# Patient Record
Sex: MALE | Race: BLACK OR AFRICAN AMERICAN | Employment: FULL TIME | URBAN - METROPOLITAN AREA
[De-identification: names, ages, dates, MRNs, and addresses within clinical notes are randomized per-mention and may not be internally consistent; named-entity substitution may affect disease eponyms.]

---

## 2021-06-07 ENCOUNTER — HOSPITAL ENCOUNTER (OUTPATIENT)
Dept: PREADMISSION TESTING | Age: 56
Discharge: HOME | End: 2021-06-07
Payer: COMMERCIAL

## 2021-06-07 VITALS
WEIGHT: 315 LBS | HEART RATE: 74 BPM | DIASTOLIC BLOOD PRESSURE: 78 MMHG | HEIGHT: 77 IN | SYSTOLIC BLOOD PRESSURE: 149 MMHG | BODY MASS INDEX: 37.19 KG/M2 | TEMPERATURE: 98.4 F

## 2021-06-07 DIAGNOSIS — Z96.651 STATUS POST RIGHT KNEE REPLACEMENT: Primary | ICD-10-CM

## 2021-06-07 LAB
ABO + RH BLD: NORMAL
ANION GAP SERPL CALC-SCNC: 4 MMOL/L (ref 5–15)
APPEARANCE UR: CLEAR
BACTERIA URNS QL MICRO: NEGATIVE /HPF
BILIRUB UR QL: NEGATIVE
BLOOD GROUP ANTIBODIES SERPL: NORMAL
BUN SERPL-MCNC: 15 MG/DL (ref 6–20)
BUN/CREAT SERPL: 19 (ref 12–20)
CALCIUM SERPL-MCNC: 9 MG/DL (ref 8.5–10.1)
CHLORIDE SERPL-SCNC: 106 MMOL/L (ref 97–108)
CO2 SERPL-SCNC: 27 MMOL/L (ref 21–32)
COLOR UR: NORMAL
CREAT SERPL-MCNC: 0.81 MG/DL (ref 0.7–1.3)
EPITH CASTS URNS QL MICRO: NORMAL /LPF
ERYTHROCYTE [DISTWIDTH] IN BLOOD BY AUTOMATED COUNT: 12.7 % (ref 11.5–14.5)
EST. AVERAGE GLUCOSE BLD GHB EST-MCNC: 137 MG/DL
GLUCOSE SERPL-MCNC: 111 MG/DL (ref 65–100)
GLUCOSE UR STRIP.AUTO-MCNC: NEGATIVE MG/DL
HBA1C MFR BLD: 6.4 % (ref 4–5.6)
HCT VFR BLD AUTO: 43.2 % (ref 36.6–50.3)
HGB BLD-MCNC: 14 G/DL (ref 12.1–17)
HGB UR QL STRIP: NEGATIVE
HYALINE CASTS URNS QL MICRO: NORMAL /LPF (ref 0–5)
INR PPP: 1 (ref 0.9–1.1)
KETONES UR QL STRIP.AUTO: NEGATIVE MG/DL
LEUKOCYTE ESTERASE UR QL STRIP.AUTO: NEGATIVE
MCH RBC QN AUTO: 29.9 PG (ref 26–34)
MCHC RBC AUTO-ENTMCNC: 32.4 G/DL (ref 30–36.5)
MCV RBC AUTO: 92.3 FL (ref 80–99)
NITRITE UR QL STRIP.AUTO: NEGATIVE
NRBC # BLD: 0 K/UL (ref 0–0.01)
NRBC BLD-RTO: 0 PER 100 WBC
PH UR STRIP: 5.5 [PH] (ref 5–8)
PLATELET # BLD AUTO: 242 K/UL (ref 150–400)
PMV BLD AUTO: 9.9 FL (ref 8.9–12.9)
POTASSIUM SERPL-SCNC: 4.2 MMOL/L (ref 3.5–5.1)
PROT UR STRIP-MCNC: NEGATIVE MG/DL
PROTHROMBIN TIME: 10.5 SEC (ref 9–11.1)
RBC # BLD AUTO: 4.68 M/UL (ref 4.1–5.7)
RBC #/AREA URNS HPF: NORMAL /HPF (ref 0–5)
SODIUM SERPL-SCNC: 137 MMOL/L (ref 136–145)
SP GR UR REFRACTOMETRY: 1.02 (ref 1–1.03)
SPECIMEN EXP DATE BLD: NORMAL
UA: UC IF INDICATED,UAUC: NORMAL
UROBILINOGEN UR QL STRIP.AUTO: 1 EU/DL (ref 0.2–1)
WBC # BLD AUTO: 5.5 K/UL (ref 4.1–11.1)
WBC URNS QL MICRO: NORMAL /HPF (ref 0–4)

## 2021-06-07 PROCEDURE — 81001 URINALYSIS AUTO W/SCOPE: CPT

## 2021-06-07 PROCEDURE — 93005 ELECTROCARDIOGRAM TRACING: CPT

## 2021-06-07 PROCEDURE — 85610 PROTHROMBIN TIME: CPT

## 2021-06-07 PROCEDURE — 83036 HEMOGLOBIN GLYCOSYLATED A1C: CPT

## 2021-06-07 PROCEDURE — 80048 BASIC METABOLIC PNL TOTAL CA: CPT

## 2021-06-07 PROCEDURE — 36415 COLL VENOUS BLD VENIPUNCTURE: CPT

## 2021-06-07 PROCEDURE — 86850 RBC ANTIBODY SCREEN: CPT

## 2021-06-07 PROCEDURE — 85027 COMPLETE CBC AUTOMATED: CPT

## 2021-06-07 RX ORDER — METOPROLOL SUCCINATE 50 MG/1
50 TABLET, EXTENDED RELEASE ORAL
COMMUNITY

## 2021-06-07 RX ORDER — LOVASTATIN 20 MG/1
20 TABLET ORAL
COMMUNITY

## 2021-06-07 RX ORDER — FUROSEMIDE 40 MG/1
TABLET ORAL
COMMUNITY

## 2021-06-07 RX ORDER — POTASSIUM CHLORIDE 20 MEQ/1
20 TABLET, EXTENDED RELEASE ORAL
COMMUNITY

## 2021-06-07 NOTE — PERIOP NOTES
PREOPERATIVE INSTRUCTIONS REVIEWED WITH PATIENT. PATIENT GIVEN TWO--BOTTLES OF CHG SOAPS  INSTRUCTIONS REVIEWED ON USE OF CHG SOAPS  PATIENT GIVEN SSI INFECTIONS SHEET; MRSA/MSSA TREATMENT INSTRUCTION SHEET GIVEN WITH AN EXPLANATION TO PATIENT THAT THEY WILL BE NOTIFIED IF TREATMENT INSTRUCTIONS NEED TO BE INITIATED. PATIENT WAS GIVEN THE OPPORTUNITY TO ASK QUESTIONS; REGARDING THE INFORMATION PROVIDED.       PREOP DIET AND NUTRITION UPDATED GUIDELINES/ INSTRUCTIONS PROVIDED     PATIENT ATTENDED PREOP JOINT CLASS TODAY            COVID 19 VACCINATION, COMPLETED, COPY IN THE CHART

## 2021-06-07 NOTE — PERIOP NOTES
PAT Nurse Practitioner   Pre-Operative Chart Review/Assessment:-ORTHOPEDIC                Patient Name:  Erin Rodríguez                                                           Age:   50 y.o.    :  1965     Today's Date:  2021     Date of PAT:   2021      Date of Surgery:    6/15/2021      Procedure(s):  Right Total Knee Arthroplasty     Surgeon:   Dr. Chhaya Abel                       PLAN:      1)  Medical Clearance:  Dr. Nito Adams. PAT labs faxed to PCP for continuity of care. 2)  Cardiac Clearance:  EKG w/ TWA. No previous EKGs for comparison. Sent to anesthesia for review. EKG from 21 reviewed by Dr. Vitor Feliciano, anesthesiologist. Planned procedure, PMHx, functional status reviewed. Pt ok to proceed with planned procedure per Dr. Vitor Feliciano. 3)  Diabetic Treatment Consult:  Not indicated. A1c-6.4      4)  Sleep Apnea evaluation:   LISA score of 5. Pt reports a diagnosis of HTN. Pt denies loud snoring that can be heard through a closed door, daytime fatigue and witnessed pauses in breathing. Referral sent to PCP for F/U and recommendations.       5) Treatment for MRSA/Staph Aureus:  Neg      6) Additional Concerns:  HTN    Escript sent to [facility] for Lenette Span per pt request.              Vital Signs:         Vitals:    21 0821   BP: (!) 149/78   Pulse: 74   Temp: 98.4 °F (36.9 °C)   Weight: (!) 164 kg (361 lb 8.9 oz)   Height: 6' 5\" (1.956 m)            ____________________________________________  PAST MEDICAL HISTORY  Past Medical History:   Diagnosis Date    Arthritis     Chronic pain     COVID-19 vaccine series completed 21, 21    MODERNA    Elevated hemoglobin A1c 2021    6.4    Hypertension       ____________________________________________  PAST SURGICAL HISTORY  Past Surgical History:   Procedure Laterality Date    HX BUNIONECTOMY Right     HX BUNIONECTOMY Left     HX ORTHOPAEDIC Right 1985    FOOT SURGERY TO CURRECT DEFORMITY ____________________________________________  HOME MEDICATIONS  Current Outpatient Medications   Medication Sig    metoprolol succinate (TOPROL-XL) 50 mg XL tablet Take 50 mg by mouth daily (after breakfast).  lovastatin (MEVACOR) 20 mg tablet Take 20 mg by mouth nightly.  potassium chloride (K-DUR, KLOR-CON) 20 mEq tablet Take 20 mEq by mouth daily (after breakfast).  furosemide (LASIX) 40 mg tablet Take  by mouth daily (after breakfast). No current facility-administered medications for this encounter.      ____________________________________________  ALLERGIES  No Known Allergies   ____________________________________________  SOCIAL HISTORY  Social History     Tobacco Use    Smoking status: Never Smoker    Smokeless tobacco: Never Used   Substance Use Topics    Alcohol use: Yes     Alcohol/week: 2.0 standard drinks     Types: 2 Cans of beer per week      ____________________________________________        Labs:     Hospital Outpatient Visit on 06/07/2021   Component Date Value Ref Range Status    Sodium 06/07/2021 137  136 - 145 mmol/L Final    Potassium 06/07/2021 4.2  3.5 - 5.1 mmol/L Final    Chloride 06/07/2021 106  97 - 108 mmol/L Final    CO2 06/07/2021 27  21 - 32 mmol/L Final    Anion gap 06/07/2021 4* 5 - 15 mmol/L Final    Glucose 06/07/2021 111* 65 - 100 mg/dL Final    BUN 06/07/2021 15  6 - 20 MG/DL Final    Creatinine 06/07/2021 0.81  0.70 - 1.30 MG/DL Final    BUN/Creatinine ratio 06/07/2021 19  12 - 20   Final    GFR est AA 06/07/2021 >60  >60 ml/min/1.73m2 Final    GFR est non-AA 06/07/2021 >60  >60 ml/min/1.73m2 Final    Estimated GFR is calculated using the IDMS-traceable Modification of Diet in Renal Disease (MDRD) Study equation, reported for both  Americans (GFRAA) and non- Americans (GFRNA), and normalized to 1.73m2 body surface area. The physician must decide which value applies to the patient.     Calcium 06/07/2021 9.0  8.5 - 10.1 MG/DL Final  WBC 06/07/2021 5.5  4.1 - 11.1 K/uL Final    RBC 06/07/2021 4.68  4.10 - 5.70 M/uL Final    HGB 06/07/2021 14.0  12.1 - 17.0 g/dL Final    HCT 06/07/2021 43.2  36.6 - 50.3 % Final    MCV 06/07/2021 92.3  80.0 - 99.0 FL Final    MCH 06/07/2021 29.9  26.0 - 34.0 PG Final    MCHC 06/07/2021 32.4  30.0 - 36.5 g/dL Final    RDW 06/07/2021 12.7  11.5 - 14.5 % Final    PLATELET 95/20/3077 340  150 - 400 K/uL Final    MPV 06/07/2021 9.9  8.9 - 12.9 FL Final    NRBC 06/07/2021 0.0  0  WBC Final    ABSOLUTE NRBC 06/07/2021 0.00  0.00 - 0.01 K/uL Final    Crossmatch Expiration 06/07/2021 06/18/2021,2359   Final    ABO/Rh(D) 06/07/2021 B NEGATIVE   Final    Antibody screen 06/07/2021 NEG   Final    INR 06/07/2021 1.0  0.9 - 1.1   Final    A single therapeutic range for Vit K antagonists may not be optimal for all indications - see June, 2008 issue of Chest, American College of Chest Physicians Evidence-Based Clinical Practice Guidelines, 8th Edition.     Prothrombin time 06/07/2021 10.5  9.0 - 11.1 sec Final    Color 06/07/2021 YELLOW/STRAW    Final    Color Reference Range: Straw, Yellow or Dark Yellow    Appearance 06/07/2021 CLEAR  CLEAR   Final    Specific gravity 06/07/2021 1.022  1.003 - 1.030   Final    pH (UA) 06/07/2021 5.5  5.0 - 8.0   Final    Protein 06/07/2021 Negative  NEG mg/dL Final    Glucose 06/07/2021 Negative  NEG mg/dL Final    Ketone 06/07/2021 Negative  NEG mg/dL Final    Bilirubin 06/07/2021 Negative  NEG   Final    Blood 06/07/2021 Negative  NEG   Final    Urobilinogen 06/07/2021 1.0  0.2 - 1.0 EU/dL Final    Nitrites 06/07/2021 Negative  NEG   Final    Leukocyte Esterase 06/07/2021 Negative  NEG   Final    UA:UC IF INDICATED 06/07/2021 CULTURE NOT INDICATED BY UA RESULT  CNI   Final    WBC 06/07/2021 0-4  0 - 4 /hpf Final    RBC 06/07/2021 0-5  0 - 5 /hpf Final    Epithelial cells 06/07/2021 FEW  FEW /lpf Final    Epithelial cell category consists of squamous cells and /or transitional urothelial cells. Renal tubular cells, if present, are separately identified as such.  Bacteria 06/07/2021 Negative  NEG /hpf Final    Hyaline cast 06/07/2021 0-2  0 - 5 /lpf Final    Ventricular Rate 06/07/2021 69  BPM Final    Atrial Rate 06/07/2021 69  BPM Final    P-R Interval 06/07/2021 218  ms Final    QRS Duration 06/07/2021 110  ms Final    Q-T Interval 06/07/2021 430  ms Final    QTC Calculation (Bezet) 06/07/2021 460  ms Final    Calculated P Axis 06/07/2021 18  degrees Final    Calculated R Axis 06/07/2021 -5  degrees Final    Calculated T Axis 06/07/2021 96  degrees Final    Diagnosis 06/07/2021    Final                    Value:Sinus rhythm with 1st degree AV block  T wave abnormality, consider lateral ischemia  No previous ECGs available  Confirmed by Donnie Church M.D., Laila Chaves ([id number]) on 6/8/2021 4:16:24 PM      Hemoglobin A1c 06/07/2021 6.4* 4.0 - 5.6 % Final    Comment: NEW METHOD  PLEASE NOTE NEW REFERENCE RANGE  (NOTE)  HbA1C Interpretive Ranges  <5.7              Normal  5.7 - 6.4         Consider Prediabetes  >6.5              Consider Diabetes      Est. average glucose 06/07/2021 137  mg/dL Final    Special Requests: 06/07/2021 NO SPECIAL REQUESTS    Final    Culture result: 06/07/2021 MRSA NOT PRESENT    Final       Skin:     Denies open wounds, cuts, sores, rashes or other areas of concern in PAT assessment.           Lulu Lacey NP

## 2021-06-08 LAB
ATRIAL RATE: 69 BPM
BACTERIA SPEC CULT: NORMAL
BACTERIA SPEC CULT: NORMAL
CALCULATED P AXIS, ECG09: 18 DEGREES
CALCULATED R AXIS, ECG10: -5 DEGREES
CALCULATED T AXIS, ECG11: 96 DEGREES
DIAGNOSIS, 93000: NORMAL
P-R INTERVAL, ECG05: 218 MS
Q-T INTERVAL, ECG07: 430 MS
QRS DURATION, ECG06: 110 MS
QTC CALCULATION (BEZET), ECG08: 460 MS
SERVICE CMNT-IMP: NORMAL
VENTRICULAR RATE, ECG03: 69 BPM

## 2021-06-08 NOTE — H&P
Elizabeth Bustamante  : 1965  Single / Language: Leeann Menjivar / Race: Black or   Male      History of Present Illness   The patient is a 50year old male who presents to the practice today for a transition into care. Additional reasons for visit:    Knee Pain is described as the following: The onset of the knee pain has been gradual and has been occurring in a persistent pattern for years. The course has been gradually worsening. The knee pain is moderate. The knee pain is characterized as a dull aching. The knee pain is described as being located in the entire knee (bilateral). The knee pain is aggravated by squatting, kneeling, climbing, stairs and prolonged standing. The knee pain is relieved by rest, ice and elevation of leg. Note for \"Knee Pain\": Standing history of bilateral knee pain. Right knee is worse than the left pain is activity limiting. Cannot ace and descend stairs. Pain awakens him from sleep. Has prior orthopedic surgeon had x-rays that showed severe medial compartment DJD both knees. Problem List/Past Medical  Hypertension, goal below 140/90 (401.9  I10)    Right Knee Pain (719.46  M25.561)    Left Meniscus tear (836.2  S83.209A)    Left Knee OA (716.96  M17.10)    Primary osteoarthritis of right knee (715.16  M17.11)    Weight above 97th percentile (V49.89  Z78.9)    Right Knee Swelling (719.06  M25.469)    Knee pain, left anterior (719.46  M25.562)    REVIEW OF SYSTEMS: Systems were reviewed by the provider.      Allergies   No Known Allergies   [2020]:  No Known Drug Allergies   [2020]: Allergies Reconciled      Family History   Arthritis   Father. Bleeding disorder   Father. Diabetes Mellitus   Mother. Heart disease in female family member before age 72    Hypertension   Mother. Kidney disease   Mother. Social History  Alcohol use   1 time, 3-5 drinks per occasion, Drinks beer, Occasionally drinks more than 5 drinks per occasion.   Caffeine use   1-2 drinks per day, Carbonated beverages, Coffee. Current work status   Full-time. Exercise   Inactive. Marital status   Single. No drug use    Seat Belt Use   Always uses seat belts. Medication History   Furosemide  (40MG Tablet, Oral) Active. Potassium Chloride ER  (20MEQ Tablet ER, Oral) Active. Synvisc One  (48MG/6ML Soln Pref Syr, 1 (one) Intra-articular inject prefilled syringe into right knee, Taken starting 12/22/2020) Active. Gel-One  (30MG/3ML Prefilled Syr, 1 (one) Intra-articular inject into right knee, Taken starting 02/22/2021) Active. Metoprolol Succinate ER  (50MG Tablet ER 24HR, Oral) Active. Lovastatin  (20MG Tablet, Oral) Active. Tylenol  (325MG Tablet, Oral) Active. Medications Reconciled     Past Surgical History   Toe Surgery For Arthritis      Other Problems   Arthritis    Bleeding disorder    High blood pressure    Hypercholesterolemia          Review of Systems   General Not Present- Chills and Fatigue. Skin Not Present- Bruising, Pallor and Skin Color Changes. Respiratory Not Present- Cough and Difficulty Breathing. Cardiovascular Not Present- Chest Pain, Fainting / Blacking Out and Rapid Heart Rate. Musculoskeletal Present- Joint Pain. Not Present- Decreased Range of Motion and Joint Swelling. Neurological Not Present- Dysesthesia, Paresthesias and Weakness In Extremities. Hematology Not Present- Abnormal Bleeding, Blood Clots and Petechiae. Vitals   Weight: 340 lb   Height: 77 in   Weight was reported by patient. Height was reported by patient. Body Surface Area: 2.8 m²   Body Mass Index: 40.32 kg/m²        Physical Exam   Musculoskeletal  Global Assessment  Examination of related systems reveals - well-developed, well-nourished, in no acute distress, alert and oriented x 3. Right Lower Extremity - Note: Hip was examined and has painless range of motion with negative impingement and apprehension sign. Straight leg raise and femoral nerve stretch test are negative. Lower extremity has palpable dorsalis pedis pulse. Skin is intact and foot is sensate and well-perfused. Varus malalignment 5 degrees. There is no ligamentous instability. Severe pain along the medial joint line. Range of motion is 3 to 120 degrees. Motor testing reveals 5 out of 5 strength of the hip flexors, quads, ankle plantar flexors, and ankle dorsiflexors. Assessment & Plan    Primary osteoarthritis of right knee (715.16  M17.11)  Impression: Severe right knee DJD. Patient has had appropriate conservative treatment. Progression of symptoms. At this point he would like to proceed to a right total knee. All questions were answered. He has medial compartment bone-on-bone with large osteophytes and subchondral cysts on his radiographs. After long discussion he has elected to proceed with a right total knee. Current Plans  Pt Education - How to [address] using Patient Portal and 3rd Party Apps: discussed with patient and provided information. Pt Education - Educational materials were provided.: discussed with patient and provided information.     REVIEW OF SYSTEMS: Systems were reviewed by the provider.(V49.9)      Weight above 97th percentile (V49.89  Z78.9)    Current Plans  LIFESTYLE EDUCATION REGARDING DIET (58282)

## 2021-06-14 ENCOUNTER — ANESTHESIA EVENT (OUTPATIENT)
Dept: SURGERY | Age: 56
End: 2021-06-14
Payer: COMMERCIAL

## 2021-06-14 PROBLEM — M17.11 PRIMARY OSTEOARTHRITIS OF RIGHT KNEE: Status: ACTIVE | Noted: 2021-06-14

## 2021-06-15 ENCOUNTER — HOSPITAL ENCOUNTER (OUTPATIENT)
Age: 56
Discharge: HOME HEALTH | End: 2021-06-17
Attending: ORTHOPAEDIC SURGERY | Admitting: ORTHOPAEDIC SURGERY
Payer: COMMERCIAL

## 2021-06-15 ENCOUNTER — ANESTHESIA (OUTPATIENT)
Dept: SURGERY | Age: 56
End: 2021-06-15
Payer: COMMERCIAL

## 2021-06-15 DIAGNOSIS — M17.11 PRIMARY OSTEOARTHRITIS OF RIGHT KNEE: Primary | ICD-10-CM

## 2021-06-15 LAB
GLUCOSE BLD STRIP.AUTO-MCNC: 125 MG/DL (ref 65–117)
SERVICE CMNT-IMP: ABNORMAL

## 2021-06-15 PROCEDURE — 77030027138 HC INCENT SPIROMETER -A

## 2021-06-15 PROCEDURE — 97161 PT EVAL LOW COMPLEX 20 MIN: CPT

## 2021-06-15 PROCEDURE — 76060000035 HC ANESTHESIA 2 TO 2.5 HR: Performed by: ORTHOPAEDIC SURGERY

## 2021-06-15 PROCEDURE — 82962 GLUCOSE BLOOD TEST: CPT

## 2021-06-15 PROCEDURE — 2709999900 HC NON-CHARGEABLE SUPPLY: Performed by: ORTHOPAEDIC SURGERY

## 2021-06-15 PROCEDURE — 77030041279 HC DRSG PRMSL AG MDII -B: Performed by: ORTHOPAEDIC SURGERY

## 2021-06-15 PROCEDURE — 77030011992 HC AIRWY NASOPHGL TELE -A: Performed by: ANESTHESIOLOGY

## 2021-06-15 PROCEDURE — C1776 JOINT DEVICE (IMPLANTABLE): HCPCS | Performed by: ORTHOPAEDIC SURGERY

## 2021-06-15 PROCEDURE — 77030005513 HC CATH URETH FOL11 MDII -B: Performed by: ORTHOPAEDIC SURGERY

## 2021-06-15 PROCEDURE — 77030008462 HC STPLR SKN PROX J&J -A: Performed by: ORTHOPAEDIC SURGERY

## 2021-06-15 PROCEDURE — 77030008463 HC STPLR SKN PROX J&J -B: Performed by: ORTHOPAEDIC SURGERY

## 2021-06-15 PROCEDURE — C9290 INJ, BUPIVACAINE LIPOSOME: HCPCS | Performed by: ORTHOPAEDIC SURGERY

## 2021-06-15 PROCEDURE — 76010000171 HC OR TIME 2 TO 2.5 HR INTENSV-TIER 1: Performed by: ORTHOPAEDIC SURGERY

## 2021-06-15 PROCEDURE — 77030019905 HC CATH URETH INTMIT MDII -A: Performed by: ORTHOPAEDIC SURGERY

## 2021-06-15 PROCEDURE — 97116 GAIT TRAINING THERAPY: CPT

## 2021-06-15 PROCEDURE — 74011000258 HC RX REV CODE- 258: Performed by: ORTHOPAEDIC SURGERY

## 2021-06-15 PROCEDURE — C1713 ANCHOR/SCREW BN/BN,TIS/BN: HCPCS | Performed by: ORTHOPAEDIC SURGERY

## 2021-06-15 PROCEDURE — 77030031139 HC SUT VCRL2 J&J -A: Performed by: ORTHOPAEDIC SURGERY

## 2021-06-15 PROCEDURE — 77030018831 HC SOL IRR H20 BAXT -A: Performed by: ORTHOPAEDIC SURGERY

## 2021-06-15 PROCEDURE — 77030018673: Performed by: ORTHOPAEDIC SURGERY

## 2021-06-15 PROCEDURE — 2709999900 HC NON-CHARGEABLE SUPPLY

## 2021-06-15 PROCEDURE — 74011250636 HC RX REV CODE- 250/636: Performed by: ORTHOPAEDIC SURGERY

## 2021-06-15 PROCEDURE — 77030007866 HC KT SPN ANES BBMI -B: Performed by: ANESTHESIOLOGY

## 2021-06-15 PROCEDURE — 74011250637 HC RX REV CODE- 250/637: Performed by: ORTHOPAEDIC SURGERY

## 2021-06-15 PROCEDURE — 77030035236 HC SUT PDS STRATFX BARB J&J -B: Performed by: ORTHOPAEDIC SURGERY

## 2021-06-15 PROCEDURE — 77030028907 HC WRP KNEE WO BGS SOLM -B

## 2021-06-15 PROCEDURE — 77030040922 HC BLNKT HYPOTHRM STRY -A

## 2021-06-15 PROCEDURE — 74011250636 HC RX REV CODE- 250/636: Performed by: NURSE ANESTHETIST, CERTIFIED REGISTERED

## 2021-06-15 PROCEDURE — 77030010783 HC BOWL MX BN CEM J&J -B: Performed by: ORTHOPAEDIC SURGERY

## 2021-06-15 PROCEDURE — 74011000250 HC RX REV CODE- 250: Performed by: NURSE ANESTHETIST, CERTIFIED REGISTERED

## 2021-06-15 PROCEDURE — 74011250636 HC RX REV CODE- 250/636: Performed by: ANESTHESIOLOGY

## 2021-06-15 PROCEDURE — 77030006822 HC BLD SAW SAG BRSM -B: Performed by: ORTHOPAEDIC SURGERY

## 2021-06-15 PROCEDURE — 74011250637 HC RX REV CODE- 250/637: Performed by: ANESTHESIOLOGY

## 2021-06-15 PROCEDURE — 74011000250 HC RX REV CODE- 250: Performed by: ANESTHESIOLOGY

## 2021-06-15 PROCEDURE — 74011000258 HC RX REV CODE- 258: Performed by: NURSE ANESTHETIST, CERTIFIED REGISTERED

## 2021-06-15 PROCEDURE — 74011000250 HC RX REV CODE- 250: Performed by: PHYSICIAN ASSISTANT

## 2021-06-15 PROCEDURE — 74011000250 HC RX REV CODE- 250: Performed by: ORTHOPAEDIC SURGERY

## 2021-06-15 PROCEDURE — 76210000016 HC OR PH I REC 1 TO 1.5 HR: Performed by: ORTHOPAEDIC SURGERY

## 2021-06-15 PROCEDURE — 77030003601 HC NDL NRV BLK BBMI -A

## 2021-06-15 PROCEDURE — 77030000032 HC CUF TRNQT ZIMM -B: Performed by: ORTHOPAEDIC SURGERY

## 2021-06-15 PROCEDURE — 74011250636 HC RX REV CODE- 250/636: Performed by: PHYSICIAN ASSISTANT

## 2021-06-15 PROCEDURE — 74011250637 HC RX REV CODE- 250/637: Performed by: PHYSICIAN ASSISTANT

## 2021-06-15 DEVICE — SMARTSET GHV GENTAMICIN HIGH VISCOSITY BONE CEMENT 40G
Type: IMPLANTABLE DEVICE | Site: KNEE | Status: FUNCTIONAL
Brand: SMARTSET

## 2021-06-15 DEVICE — IMPLANTABLE DEVICE
Type: IMPLANTABLE DEVICE | Site: KNEE | Status: FUNCTIONAL
Brand: PERSONA® NATURAL TIBIA®

## 2021-06-15 DEVICE — KNEE K1 TOT HEMI STD CEM IMPL CAPPED K1 ZIM: Type: IMPLANTABLE DEVICE | Status: FUNCTIONAL

## 2021-06-15 DEVICE — IMPLANTABLE DEVICE
Type: IMPLANTABLE DEVICE | Site: KNEE | Status: FUNCTIONAL
Brand: PERSONA® VIVACIT-E®

## 2021-06-15 DEVICE — IMPLANTABLE DEVICE
Type: IMPLANTABLE DEVICE | Site: KNEE | Status: FUNCTIONAL
Brand: PERSONA®

## 2021-06-15 RX ORDER — ONDANSETRON 2 MG/ML
4 INJECTION INTRAMUSCULAR; INTRAVENOUS
Status: DISCONTINUED | OUTPATIENT
Start: 2021-06-15 | End: 2021-06-17 | Stop reason: HOSPADM

## 2021-06-15 RX ORDER — ONDANSETRON 2 MG/ML
4 INJECTION INTRAMUSCULAR; INTRAVENOUS AS NEEDED
Status: DISCONTINUED | OUTPATIENT
Start: 2021-06-15 | End: 2021-06-15 | Stop reason: HOSPADM

## 2021-06-15 RX ORDER — SODIUM CHLORIDE 9 MG/ML
125 INJECTION, SOLUTION INTRAVENOUS CONTINUOUS
Status: DISPENSED | OUTPATIENT
Start: 2021-06-15 | End: 2021-06-16

## 2021-06-15 RX ORDER — MIDAZOLAM HYDROCHLORIDE 1 MG/ML
1 INJECTION, SOLUTION INTRAMUSCULAR; INTRAVENOUS AS NEEDED
Status: DISCONTINUED | OUTPATIENT
Start: 2021-06-15 | End: 2021-06-15 | Stop reason: HOSPADM

## 2021-06-15 RX ORDER — HYDROXYZINE HYDROCHLORIDE 10 MG/1
10 TABLET, FILM COATED ORAL
Status: DISCONTINUED | OUTPATIENT
Start: 2021-06-15 | End: 2021-06-17 | Stop reason: HOSPADM

## 2021-06-15 RX ORDER — DIPHENHYDRAMINE HYDROCHLORIDE 50 MG/ML
12.5 INJECTION, SOLUTION INTRAMUSCULAR; INTRAVENOUS AS NEEDED
Status: DISCONTINUED | OUTPATIENT
Start: 2021-06-15 | End: 2021-06-15 | Stop reason: HOSPADM

## 2021-06-15 RX ORDER — SODIUM CHLORIDE 0.9 % (FLUSH) 0.9 %
5-40 SYRINGE (ML) INJECTION AS NEEDED
Status: DISCONTINUED | OUTPATIENT
Start: 2021-06-15 | End: 2021-06-17 | Stop reason: HOSPADM

## 2021-06-15 RX ORDER — SODIUM CHLORIDE 0.9 % (FLUSH) 0.9 %
5-40 SYRINGE (ML) INJECTION AS NEEDED
Status: DISCONTINUED | OUTPATIENT
Start: 2021-06-15 | End: 2021-06-15 | Stop reason: HOSPADM

## 2021-06-15 RX ORDER — POTASSIUM CHLORIDE 750 MG/1
20 TABLET, FILM COATED, EXTENDED RELEASE ORAL DAILY
Status: DISCONTINUED | OUTPATIENT
Start: 2021-06-16 | End: 2021-06-17 | Stop reason: HOSPADM

## 2021-06-15 RX ORDER — HYDROMORPHONE HYDROCHLORIDE 1 MG/ML
1 INJECTION, SOLUTION INTRAMUSCULAR; INTRAVENOUS; SUBCUTANEOUS
Status: DISCONTINUED | OUTPATIENT
Start: 2021-06-15 | End: 2021-06-17 | Stop reason: HOSPADM

## 2021-06-15 RX ORDER — METOPROLOL SUCCINATE 50 MG/1
50 TABLET, EXTENDED RELEASE ORAL
Status: DISCONTINUED | OUTPATIENT
Start: 2021-06-16 | End: 2021-06-17 | Stop reason: HOSPADM

## 2021-06-15 RX ORDER — MORPHINE SULFATE 2 MG/ML
2 INJECTION, SOLUTION INTRAMUSCULAR; INTRAVENOUS
Status: DISCONTINUED | OUTPATIENT
Start: 2021-06-15 | End: 2021-06-15 | Stop reason: HOSPADM

## 2021-06-15 RX ORDER — OXYCODONE HYDROCHLORIDE 5 MG/1
5 TABLET ORAL AS NEEDED
Status: DISCONTINUED | OUTPATIENT
Start: 2021-06-15 | End: 2021-06-15 | Stop reason: HOSPADM

## 2021-06-15 RX ORDER — BUPIVACAINE HYDROCHLORIDE 5 MG/ML
INJECTION, SOLUTION EPIDURAL; INTRACAUDAL
Status: COMPLETED | OUTPATIENT
Start: 2021-06-15 | End: 2021-06-15

## 2021-06-15 RX ORDER — LIDOCAINE HYDROCHLORIDE 10 MG/ML
0.5 INJECTION, SOLUTION EPIDURAL; INFILTRATION; INTRACAUDAL; PERINEURAL AS NEEDED
Status: DISCONTINUED | OUTPATIENT
Start: 2021-06-15 | End: 2021-06-15 | Stop reason: HOSPADM

## 2021-06-15 RX ORDER — DEXTROSE, SODIUM CHLORIDE, SODIUM LACTATE, POTASSIUM CHLORIDE, AND CALCIUM CHLORIDE 5; .6; .31; .03; .02 G/100ML; G/100ML; G/100ML; G/100ML; G/100ML
125 INJECTION, SOLUTION INTRAVENOUS CONTINUOUS
Status: DISCONTINUED | OUTPATIENT
Start: 2021-06-15 | End: 2021-06-15 | Stop reason: HOSPADM

## 2021-06-15 RX ORDER — SODIUM CHLORIDE, SODIUM LACTATE, POTASSIUM CHLORIDE, CALCIUM CHLORIDE 600; 310; 30; 20 MG/100ML; MG/100ML; MG/100ML; MG/100ML
INJECTION, SOLUTION INTRAVENOUS
Status: DISCONTINUED | OUTPATIENT
Start: 2021-06-15 | End: 2021-06-15 | Stop reason: HOSPADM

## 2021-06-15 RX ORDER — OXYCODONE HYDROCHLORIDE 5 MG/1
10 TABLET ORAL
Status: DISCONTINUED | OUTPATIENT
Start: 2021-06-15 | End: 2021-06-17 | Stop reason: HOSPADM

## 2021-06-15 RX ORDER — WARFARIN SODIUM 5 MG/1
5 TABLET ORAL ONCE
Status: COMPLETED | OUTPATIENT
Start: 2021-06-15 | End: 2021-06-15

## 2021-06-15 RX ORDER — FUROSEMIDE 40 MG/1
40 TABLET ORAL
Status: DISCONTINUED | OUTPATIENT
Start: 2021-06-16 | End: 2021-06-17 | Stop reason: HOSPADM

## 2021-06-15 RX ORDER — FUROSEMIDE 40 MG/1
40 TABLET ORAL
Status: COMPLETED | OUTPATIENT
Start: 2021-06-15 | End: 2021-06-15

## 2021-06-15 RX ORDER — AMOXICILLIN 250 MG
1 CAPSULE ORAL 2 TIMES DAILY
Status: DISCONTINUED | OUTPATIENT
Start: 2021-06-15 | End: 2021-06-17 | Stop reason: HOSPADM

## 2021-06-15 RX ORDER — FENTANYL CITRATE 50 UG/ML
50 INJECTION, SOLUTION INTRAMUSCULAR; INTRAVENOUS AS NEEDED
Status: DISCONTINUED | OUTPATIENT
Start: 2021-06-15 | End: 2021-06-15 | Stop reason: HOSPADM

## 2021-06-15 RX ORDER — PROPOFOL 10 MG/ML
INJECTION, EMULSION INTRAVENOUS
Status: DISCONTINUED | OUTPATIENT
Start: 2021-06-15 | End: 2021-06-15 | Stop reason: HOSPADM

## 2021-06-15 RX ORDER — SODIUM CHLORIDE 0.9 % (FLUSH) 0.9 %
5-40 SYRINGE (ML) INJECTION EVERY 8 HOURS
Status: DISCONTINUED | OUTPATIENT
Start: 2021-06-15 | End: 2021-06-15 | Stop reason: HOSPADM

## 2021-06-15 RX ORDER — POLYETHYLENE GLYCOL 3350 17 G/17G
17 POWDER, FOR SOLUTION ORAL DAILY
Status: DISCONTINUED | OUTPATIENT
Start: 2021-06-16 | End: 2021-06-17 | Stop reason: HOSPADM

## 2021-06-15 RX ORDER — ATORVASTATIN CALCIUM 10 MG/1
20 TABLET, FILM COATED ORAL
Status: DISCONTINUED | OUTPATIENT
Start: 2021-06-15 | End: 2021-06-17 | Stop reason: HOSPADM

## 2021-06-15 RX ORDER — FENTANYL CITRATE 50 UG/ML
25 INJECTION, SOLUTION INTRAMUSCULAR; INTRAVENOUS
Status: DISCONTINUED | OUTPATIENT
Start: 2021-06-15 | End: 2021-06-15 | Stop reason: HOSPADM

## 2021-06-15 RX ORDER — SODIUM CHLORIDE, SODIUM LACTATE, POTASSIUM CHLORIDE, CALCIUM CHLORIDE 600; 310; 30; 20 MG/100ML; MG/100ML; MG/100ML; MG/100ML
125 INJECTION, SOLUTION INTRAVENOUS CONTINUOUS
Status: DISCONTINUED | OUTPATIENT
Start: 2021-06-15 | End: 2021-06-15 | Stop reason: HOSPADM

## 2021-06-15 RX ORDER — NALOXONE HYDROCHLORIDE 0.4 MG/ML
0.4 INJECTION, SOLUTION INTRAMUSCULAR; INTRAVENOUS; SUBCUTANEOUS AS NEEDED
Status: DISCONTINUED | OUTPATIENT
Start: 2021-06-15 | End: 2021-06-17 | Stop reason: HOSPADM

## 2021-06-15 RX ORDER — ACETAMINOPHEN 500 MG
1000 TABLET ORAL EVERY 6 HOURS
Status: DISCONTINUED | OUTPATIENT
Start: 2021-06-15 | End: 2021-06-17 | Stop reason: HOSPADM

## 2021-06-15 RX ORDER — FACIAL-BODY WIPES
10 EACH TOPICAL DAILY PRN
Status: DISCONTINUED | OUTPATIENT
Start: 2021-06-17 | End: 2021-06-17 | Stop reason: HOSPADM

## 2021-06-15 RX ORDER — SODIUM CHLORIDE 0.9 % (FLUSH) 0.9 %
5-40 SYRINGE (ML) INJECTION EVERY 8 HOURS
Status: DISCONTINUED | OUTPATIENT
Start: 2021-06-15 | End: 2021-06-17 | Stop reason: HOSPADM

## 2021-06-15 RX ORDER — MIDAZOLAM HYDROCHLORIDE 1 MG/ML
1 INJECTION, SOLUTION INTRAMUSCULAR; INTRAVENOUS
Status: DISCONTINUED | OUTPATIENT
Start: 2021-06-15 | End: 2021-06-15 | Stop reason: HOSPADM

## 2021-06-15 RX ORDER — ONDANSETRON 2 MG/ML
INJECTION INTRAMUSCULAR; INTRAVENOUS AS NEEDED
Status: DISCONTINUED | OUTPATIENT
Start: 2021-06-15 | End: 2021-06-15 | Stop reason: HOSPADM

## 2021-06-15 RX ORDER — OXYCODONE HYDROCHLORIDE 5 MG/1
5 TABLET ORAL
Status: DISCONTINUED | OUTPATIENT
Start: 2021-06-15 | End: 2021-06-17 | Stop reason: HOSPADM

## 2021-06-15 RX ORDER — PROPOFOL 10 MG/ML
INJECTION, EMULSION INTRAVENOUS AS NEEDED
Status: DISCONTINUED | OUTPATIENT
Start: 2021-06-15 | End: 2021-06-15 | Stop reason: HOSPADM

## 2021-06-15 RX ORDER — ACETAMINOPHEN 325 MG/1
650 TABLET ORAL ONCE
Status: COMPLETED | OUTPATIENT
Start: 2021-06-15 | End: 2021-06-15

## 2021-06-15 RX ORDER — DEXAMETHASONE SODIUM PHOSPHATE 4 MG/ML
INJECTION, SOLUTION INTRA-ARTICULAR; INTRALESIONAL; INTRAMUSCULAR; INTRAVENOUS; SOFT TISSUE AS NEEDED
Status: DISCONTINUED | OUTPATIENT
Start: 2021-06-15 | End: 2021-06-15 | Stop reason: HOSPADM

## 2021-06-15 RX ORDER — ROPIVACAINE HYDROCHLORIDE 5 MG/ML
INJECTION, SOLUTION EPIDURAL; INFILTRATION; PERINEURAL
Status: COMPLETED | OUTPATIENT
Start: 2021-06-15 | End: 2021-06-15

## 2021-06-15 RX ORDER — SODIUM CHLORIDE 9 MG/ML
INJECTION, SOLUTION INTRAVENOUS
Status: DISCONTINUED | OUTPATIENT
Start: 2021-06-15 | End: 2021-06-15 | Stop reason: HOSPADM

## 2021-06-15 RX ADMIN — SODIUM CHLORIDE 125 ML/HR: 9 INJECTION, SOLUTION INTRAVENOUS at 13:39

## 2021-06-15 RX ADMIN — FUROSEMIDE 40 MG: 40 TABLET ORAL at 17:12

## 2021-06-15 RX ADMIN — ACETAMINOPHEN 650 MG: 325 TABLET ORAL at 08:41

## 2021-06-15 RX ADMIN — OXYCODONE HYDROCHLORIDE 10 MG: 5 TABLET ORAL at 17:56

## 2021-06-15 RX ADMIN — CEFAZOLIN 2 G: 1 INJECTION, POWDER, FOR SOLUTION INTRAMUSCULAR; INTRAVENOUS at 19:33

## 2021-06-15 RX ADMIN — PROPOFOL 30 MG: 10 INJECTION, EMULSION INTRAVENOUS at 11:44

## 2021-06-15 RX ADMIN — SODIUM CHLORIDE, POTASSIUM CHLORIDE, SODIUM LACTATE AND CALCIUM CHLORIDE 125 ML/HR: 600; 310; 30; 20 INJECTION, SOLUTION INTRAVENOUS at 08:53

## 2021-06-15 RX ADMIN — PHENYLEPHRINE HYDROCHLORIDE 60 MCG/MIN: 10 INJECTION INTRAVENOUS at 10:26

## 2021-06-15 RX ADMIN — CEFAZOLIN 3 G: 1 INJECTION, POWDER, FOR SOLUTION INTRAMUSCULAR; INTRAVENOUS; PARENTERAL at 10:32

## 2021-06-15 RX ADMIN — BUPIVACAINE HYDROCHLORIDE 10 MG: 5 INJECTION, SOLUTION EPIDURAL; INTRACAUDAL; PERINEURAL at 10:26

## 2021-06-15 RX ADMIN — OXYCODONE HYDROCHLORIDE 10 MG: 5 TABLET ORAL at 21:18

## 2021-06-15 RX ADMIN — SODIUM CHLORIDE: 900 INJECTION, SOLUTION INTRAVENOUS at 11:44

## 2021-06-15 RX ADMIN — PROPOFOL 30 MG: 10 INJECTION, EMULSION INTRAVENOUS at 10:17

## 2021-06-15 RX ADMIN — ACETAMINOPHEN 1000 MG: 500 TABLET ORAL at 17:12

## 2021-06-15 RX ADMIN — DEXAMETHASONE SODIUM PHOSPHATE 4 MG: 4 INJECTION, SOLUTION INTRAMUSCULAR; INTRAVENOUS at 10:38

## 2021-06-15 RX ADMIN — ONDANSETRON HYDROCHLORIDE 4 MG: 2 INJECTION, SOLUTION INTRAMUSCULAR; INTRAVENOUS at 10:38

## 2021-06-15 RX ADMIN — DOCUSATE SODIUM 50 MG AND SENNOSIDES 8.6 MG 1 TABLET: 8.6; 5 TABLET, FILM COATED ORAL at 17:12

## 2021-06-15 RX ADMIN — SODIUM CHLORIDE, POTASSIUM CHLORIDE, SODIUM LACTATE AND CALCIUM CHLORIDE: 600; 310; 30; 20 INJECTION, SOLUTION INTRAVENOUS at 09:51

## 2021-06-15 RX ADMIN — ATORVASTATIN CALCIUM 20 MG: 10 TABLET, FILM COATED ORAL at 21:18

## 2021-06-15 RX ADMIN — MIDAZOLAM HYDROCHLORIDE 2 MG: 1 INJECTION, SOLUTION INTRAMUSCULAR; INTRAVENOUS at 09:36

## 2021-06-15 RX ADMIN — FENTANYL CITRATE 50 MCG: 50 INJECTION INTRAMUSCULAR; INTRAVENOUS at 09:36

## 2021-06-15 RX ADMIN — WARFARIN SODIUM 5 MG: 5 TABLET ORAL at 15:18

## 2021-06-15 RX ADMIN — TRANEXAMIC ACID 1 G: 100 INJECTION, SOLUTION INTRAVENOUS at 10:30

## 2021-06-15 RX ADMIN — PROPOFOL 50 MCG/KG/MIN: 10 INJECTION, EMULSION INTRAVENOUS at 10:14

## 2021-06-15 RX ADMIN — ROPIVACAINE HYDROCHLORIDE 30 ML: 5 INJECTION, SOLUTION EPIDURAL; INFILTRATION; PERINEURAL at 09:44

## 2021-06-15 NOTE — PROGRESS NOTES
Pharmacist Note - Warfarin Dosing  Consult provided for this 50 y.o. male to manage warfarin for DVT prophylaxis post total knee replacement    INR Goal: 1.7- 2.2    Therapy Day: 1    Preop Dose: Dobzyni[place]- none    Drugs that may increase INR: None  Drugs that may decrease INR: None  Other current anticoagulants/ drugs that may increase bleeding risk: None  Risk factors: None  Daily INR ordered: YES    No results for input(s): HGB, INR, HGBEXT, INREXT in the last 72 hours. Date               INR                 Dose  6/15       1.0 on 6/7  5 mg    Assessment/ Plan: Will order warfarin 5 mg PO x 1 dose. Pharmacy will continue to monitor daily and adjust therapy as indicated.

## 2021-06-15 NOTE — OP NOTES
Name: Dana Calderon  MRN:  [id number]  : 1965  Age:  50 y.o. Surgery Date: 6/15/2021      OPERATIVE REPORT - RIGHT TOTAL KNEE REPLACEMENT    PREOPERATIVE DIAGNOSIS: Osteoarthritis, right knee. POSTOPERATIVE DIAGNOSIS: Osteoarthritis, right knee. PROCEDURE PERFORMED: Right total knee arthroplasty. SURGEON: Meron Norman MD    FIRST ASSISTANT:   Tripp Raymond PA-C    ANESTHESIA: Spinal    PRE-OP ANTIBIOTIC: Ancef 3g    COMPLICATIONS: None. ESTIMATED BLOOD LOSS: 50 mL. SPECIMENS REMOVED: None. COMPONENTS IMPLANTED:   Implant Name Type Inv. Item Serial No.  Lot No. LRB No. Used Action   CEMENT BNE 40GM FULL DOSE PMMA W/ GENT HI VISC RADPQ LNG - SN/A  CEMENT BNE 40GM FULL DOSE PMMA W/ GENT HI VISC RADPQ LNG N/A [employer] [id number] Right 1 Implanted   TIB PRN NP STM 5 DEG SZ HR -- PERSONA - SN/A  TIB PRN NP STM 5 DEG SZ HR -- PERSONA N/A [employer] [id number] Right 1 Implanted   EXTENSION STEM L30MM [id number] KNEE TAPR ADELSO PERSONA - SN/A  EXTENSION STEM L30MM [id number] KNEE Justice Signs N/A [employer] [id number] Right 1 Implanted   PSN FEM CR CMT CCR STD SZ12 R - SN/A  PSN FEM CR CMT CCR STD SZ12 R N/A [employer] [id number] Right 1 Implanted   COMPONENT PAT [id number] STD VIVACIT-E ADELSO INSET FOR - SN/A  COMPONENT PAT [id number] STD VIVACIT-E ADELSO INSET FOR N/A [employer] [id number] Right 1 Implanted   PSN MC VE ASF R 12MM 12/GH - SN/A  PSN MC VE ASF R 12MM 12/GH N/A [employer]ory [employer] [id number] Right 1 Implanted       INDICATIONS: The patient is an 50 yrs male with progressive debilitating right knee pain due to severe osteoarthritis. Symptoms have progressed despite comprehensive conservative treatment and the patient presents for right total knee replacement. Risks, benefits, alternatives of the procedure were reviewed in detail and the patient elects to proceed.  The patient understands the risk for perioperative medical complications. DESCRIPTION OF PROCEDURE: Spinal anesthesia was initiated. Preoperative dose of antibiotic was given. Locke catheter was not placed. The right lower extremity was prepped and draped in the usual sterile fashion. The skin was covered with Ioban occlusive dressing. The right lower extremity was exsanguinated. A medial parapatellar incision was made to the right knee. The right knee was exposed and the distal femur was cut at 5 degrees distal femoral valgus. Femur was sized for a size 12. An AP cutting block was placed, rotated based on bony landmarks. Femoral cuts were completed for a size 12. The tibia was subluxed and a neutral varus/valgus proximal tibial cut was made matching the patient's slope. The meniscal remnants were removed. The posterior osteophytes were removed from the femur. Gaps were examined. The flexion and extension gaps were 12 mm. The femur was finished for a 12, tibia for a H. Trials were placed. Patella was cut and a 41 mm trial was fitted . The knee tracked well with all trial implants. The trials were then removed. Bony surfaces were drilled, lavaged, and dried. All components were cemented. Excess cement was removed. A 12 polyethylene component was placed. After the cement had fully cured, the knee was ranged and  irrigated copiously with pulsatile lavage. All surrounding soft tissues were infiltrated with local anesthetic. The arthrotomy was closed with #2 Vicryl sutures and 0 Vicryl sutures. Skin and subcutaneous tissues were irrigated and closed in standard fashion. Sterile dressing was applied. There were no complications. The procedure was a RIGHT TOTAL KNEE REPLACEMENT. A Flor total knee construct was utilized. No specimens were obtained/sent. The patient was transferred to the recovery room in stable condition. Varus release. PCL was attenuated but present.     Tripp Raymond PA-C was critical throughout the case to assist with positioning, retraction and closure. There were no other available residents or surgical assistants to assist during this procedure.       Clyde Santacruz MD

## 2021-06-15 NOTE — ANESTHESIA PREPROCEDURE EVALUATION
Relevant Problems   No relevant active problems       Anesthetic History   No history of anesthetic complications            Review of Systems / Medical History  Patient summary reviewed, nursing notes reviewed and pertinent labs reviewed    Pulmonary  Within defined limits                 Neuro/Psych   Within defined limits           Cardiovascular  Within defined limits  Hypertension                   GI/Hepatic/Renal  Within defined limits              Endo/Other  Within defined limits      Arthritis     Other Findings              Physical Exam    Airway  Mallampati: II  TM Distance: > 6 cm  Neck ROM: normal range of motion   Mouth opening: Normal     Cardiovascular  Regular rate and rhythm,  S1 and S2 normal,  no murmur, click, rub, or gallop             Dental  No notable dental hx       Pulmonary  Breath sounds clear to auscultation               Abdominal  GI exam deferred       Other Findings            Anesthetic Plan    ASA: 2  Anesthesia type: spinal      Post-op pain plan if not by surgeon: peripheral nerve block single    Induction: Intravenous  Anesthetic plan and risks discussed with: Patient

## 2021-06-15 NOTE — PERIOP NOTES
TRANSFER - OUT REPORT:    Verbal report given to Keily Driver (name) on John Boo  being transferred to [phone] (unit) for routine post - op       Report consisted of patients Situation, Background, Assessment and   Recommendations(SBAR). Time Pre op antibiotic given:1032  Anesthesia Stop time: 9205  Locke Present on Transfer to floor:N/A  Order for Locke on Chart:N/A  Discharge Prescriptions with Chart:N/A    Information from the following report(s) SBAR, OR Summary, Procedure Summary, Intake/Output and MAR was reviewed with the receiving nurse. Opportunity for questions and clarification was provided. Is the patient on 02? YES       L/Min 2       Other N/A    Is the patient on a monitor? NO    Is the nurse transporting with the patient? NO    Surgical Waiting Area notified of patient's transfer from PACU?  YES      The following personal items collected during your admission accompanied patient upon transfer:   Dental Appliance: Dental Appliances: None  Vision:    Hearing Aid:    Jewelry:    Clothing:    Other Valuables:    Valuables sent to safe:

## 2021-06-15 NOTE — PROGRESS NOTES
Problem: Mobility Impaired (Adult and Pediatric)  Goal: *Acute Goals and Plan of Care (Insert Text)  Description: FUNCTIONAL STATUS PRIOR TO ADMISSION: Patient was independent and active without use of DME.    HOME SUPPORT PRIOR TO ADMISSION: The patient lived with family but did not require assist.    Physical Therapy Goals  Initiated 6/15/2021    1. Patient will move from supine to sit and sit to supine  and roll side to side in bed with modified independence within 4 days. 2. Patient will perform sit to stand with modified independence within 4 days. 3. Patient will ambulate with modified independence for 150 feet with the least restrictive device within 4 days. 4. Patient will ascend/descend 4 stairs with 1 handrail(s) with modified independence within 4 days. 5. Patient will perform home exercise program per protocol with modified independence within 4 days. 6. Patient will demonstrate AROM 0-90 degrees in operative joint within 4 days. Outcome: Progressing Towards Goal   PHYSICAL THERAPY EVALUATION  Patient: Emil Ramos (7 y.o. male)  Date: 6/15/2021  Primary Diagnosis: Localized osteoarthritis of right knee [M17.11]  Procedure(s) (LRB):  RIGHT TOTAL KNEE ARTHROPLASTY WITH ARVNID PERSONA IMPLANT SYSTEM (Right) Day of Surgery   Precautions:   Fall, WBAT      ASSESSMENT  Based on the objective data described below, the patient presents with decreased mobility compared to baseline after R TKA, POD0. He was able to ambulate with the RW with min assist overall and fair pain control with VSS. Reviewed safety considerations for hospital stay, positioning and ice for pain management and plan of care. He will need a walker for home use, and it will need to be tall height and bariatric weight. Order placed. Expect that he will progress well as long as his pain is under control and that he will be able to return home with HHPT and family assist per pathway.     Current Level of Function Impacting Discharge (mobility/balance): min assist for short distance ambulation with RW    Functional Outcome Measure: The patient scored Total: 55/100 on the Barthel Index which is indicative of moderately impaired ability to care for basic self needs/dependency on others. Other factors to consider for discharge: needs specialty walker     Patient will benefit from skilled therapy intervention to address the above noted impairments. PLAN :  Recommendations and Planned Interventions: bed mobility training, transfer training, gait training, therapeutic exercises, patient and family training/education, and therapeutic activities      Frequency/Duration: Patient will be followed by physical therapy:  twice daily to address goals. Recommendation for discharge: (in order for the patient to meet his/her long term goals)  Physical therapy at least 2 days/week in the home     This discharge recommendation:  Has been made in collaboration with the attending provider and/or case management    IF patient discharges home will need the following DME: tall bariatric rolling walker         SUBJECTIVE:   Patient stated It's definitely sore.     OBJECTIVE DATA SUMMARY:   HISTORY:    Past Medical History:   Diagnosis Date    Arthritis     Chronic pain     COVID-19 vaccine series completed 4/2/21, 5/7/21    MODERNA    Elevated hemoglobin A1c 06/07/2021    6.4    Hypertension      Past Surgical History:   Procedure Laterality Date    HX BUNIONECTOMY Right 1985    HX BUNIONECTOMY Left 1983    HX ORTHOPAEDIC Right 1985    FOOT SURGERY TO CURRECT DEFORMITY       Personal factors and/or comorbidities impacting plan of care: as noted above    Home Situation  Home Environment: Private residence  # Steps to Enter: 4  Rails to Enter: Yes  Hand Rails : Bilateral  One/Two Story Residence: Two story, live on 1st floor  Living Alone: No  Support Systems: Family member(s), Parent  Patient Expects to be Discharged to[de-identified] [place]  Current DME Used/Available at Home: Cane, straight, Walker, rolling, Cane, quad, Safety frame toliet, Grab bars, Raised toilet seat  Tub or Shower Type: Tub/Shower combination    EXAMINATION/PRESENTATION/DECISION MAKING:   Critical Behavior:  Neurologic State: Alert  Orientation Level: Oriented X4  Cognition: Appropriate for age attention/concentration     Hearing:     Skin:  postop ace in place R LE with no drainage noted  Edema: none  Range Of Motion:  AROM: Generally decreased, functional (R knee approx -10 to 60)                       Strength:    Strength: Within functional limits (RLE 3-/5 post op)                    Tone & Sensation:   Tone: Normal              Sensation: Intact               Coordination:  Coordination: Within functional limits  Vision:      Functional Mobility:  Bed Mobility:     Supine to Sit: Minimum assistance; Additional time  Sit to Supine: Minimum assistance; Additional time     Transfers:  Sit to Stand: Minimum assistance; Adaptive equipment; Additional time  Stand to Sit: Minimum assistance; Adaptive equipment; Additional time                       Balance:   Sitting: Intact; Without support  Standing: Impaired; With support (hands on RW)  Standing - Static: Occasional;Fair  Standing - Dynamic : Fair;Constant support  Ambulation/Gait Training:  Distance (ft): 20 Feet (ft) (twice with seated rest between)  Assistive Device: Gait belt;Walker, rolling  Ambulation - Level of Assistance: Minimal assistance; Adaptive equipment; Additional time        Gait Abnormalities: Antalgic;Decreased step clearance; Step to gait  Right Side Weight Bearing: As tolerated  Left Side Weight Bearing: Full  Base of Support: Widened;Shift to left  Stance: Right decreased  Speed/Johana: Slow  Step Length: Right shortened;Left shortened  Swing Pattern: Right asymmetrical     Interventions: Safety awareness training; Tactile cues; Verbal cues; Visual/Demos            Stairs: Therapeutic Exercises:    Ankle pumps, heel slides for comfort    Functional Measure:  Barthel Index:    Bathin  Bladder: 10  Bowels: 10  Groomin  Dressin  Feeding: 10  Mobility: 0  Stairs: 0  Toilet Use: 5  Transfer (Bed to Chair and Back): 10  Total: 55/100       The Barthel ADL Index: Guidelines  1. The index should be used as a record of what a patient does, not as a record of what a patient could do. 2. The main aim is to establish degree of independence from any help, physical or verbal, however minor and for whatever reason. 3. The need for supervision renders the patient not independent. 4. A patient's performance should be established using the best available evidence. Asking the patient, friends/relatives and nurses are the usual sources, but direct observation and common sense are also important. However direct testing is not needed. 5. Usually the patient's performance over the preceding 24-48 hours is important, but occasionally longer periods will be relevant. 6. Middle categories imply that the patient supplies over 50 per cent of the effort. 7. Use of aids to be independent is allowed. Estrellita Cruz., Barthel, D.W. (4157). Functional evaluation: the Barthel Index. [address] [facility] (14)2. [facility] Flor violeta KENDALL TempletonF, [facility] Rhianna Hernandez., [place], [address] [place] [address] (). Measuring the change indisability after inpatient rehabilitation; comparison of the responsiveness of the Barthel Index and Functional [place] Measure. Journal of Neurology, Neurosurgery, and Psychiatry, 66(4), 134-843. Roma Erazo, N.J.A, DOUGIE Ken, & Jolly Watkins M.A. (2004.) Assessment of post-stroke quality of life in cost-effectiveness studies: The usefulness of the Barthel Index and the EuroQoL-5D.  Quality of Life Research, 15, 319-06        Physical Therapy Evaluation Charge Determination   History Examination Presentation Decision-Making   HIGH Complexity :3+ comorbidities / personal factors will impact the outcome/ POC  MEDIUM Complexity : 3 Standardized tests and measures addressing body structure, function, activity limitation and / or participation in recreation  LOW Complexity : Stable, uncomplicated  LOW Complexity : FOTO score of       Based on the above components, the patient evaluation is determined to be of the following complexity level: LOW     Pain Ratin/10 R knee, RN notified and pillows positioned under RLE to elevate leg    Activity Tolerance:   Good    After treatment patient left in no apparent distress:   Supine in bed, Call bell within reach, and Side rails x 3    COMMUNICATION/EDUCATION:   The patients plan of care was discussed with: Registered nurse. Fall prevention education was provided and the patient/caregiver indicated understanding., Patient/family have participated as able in goal setting and plan of care. , and Patient/family agree to work toward stated goals and plan of care.     Thank you for this referral.  Angelica Bradley, PT   Time Calculation: 25 mins

## 2021-06-15 NOTE — PROGRESS NOTES
Occupational Therapy     Orders received, chart review completed. Note patient POD #0 s/p RIGHT TOTAL KNEE ARTHROPLASTY. OT will follow up tomorrow for evaluation. Recommend OOB to chair three times a day for meals, self-completion of ADLs as able and medically stable. Thank you.

## 2021-06-15 NOTE — ANESTHESIA PROCEDURE NOTES
Peripheral Block    Start time: 6/15/2021 9:40 AM  End time: 6/15/2021 9:44 AM  Performed by: Jacek Gaines MD  Authorized by: Jacek Gaines MD       Pre-procedure: Indications: at surgeon's request and post-op pain management    Preanesthetic Checklist: patient identified, risks and benefits discussed, site marked, timeout performed and patient being monitored    Timeout Time: 09:40 EDT          Block Type:   Block Type:   Adductor canal  Laterality:  Right  Monitoring:  Standard ASA monitoring, continuous pulse ox, frequent vital sign checks, heart rate, responsive to questions and oxygen  Injection Technique:  Single shot  Procedures: ultrasound guided    Patient Position: supine  Prep: DuraPrep    Needle Type:  Stimuplex  Needle Gauge:  22 G  Needle Localization:  Ultrasound guidance  Medication Injected:  Ropivacaine (PF) (NAROPIN)(0.5%) 5 mg/mL injection, 30 mL  Med Admin Time: 6/15/2021 9:44 AM    Assessment:  Number of attempts:  1  Injection Assessment:  Incremental injection every 5 mL, local visualized surrounding nerve on ultrasound, negative aspiration for blood, no paresthesia and no intravascular symptoms  Patient tolerance:  Patient tolerated the procedure well with no immediate complications

## 2021-06-15 NOTE — ANESTHESIA PROCEDURE NOTES
Spinal Block    Start time: 6/15/2021 10:18 AM  End time: 6/15/2021 10:26 AM  Performed by: Steve Vidales CRNA  Authorized by: Keila Longoria MD     Pre-procedure:   Indications: at surgeon's request, post-op pain management, procedure for pain and primary anesthetic  Preanesthetic Checklist: patient identified, risks and benefits discussed, anesthesia consent, site marked, patient being monitored and timeout performed      Spinal Block:   Patient Position:  Seated  Prep Region:  Lumbar  Prep: Betadine      Location:  L3-4  Technique:  Single shot        Needle:   Needle Type:  Pencan  Needle Gauge:  25 G  Attempts:  1      Events: CSF confirmed, no blood with aspiration and no paresthesia        Assessment:  Insertion:  Uncomplicated  Patient tolerance:  Patient tolerated the procedure well with no immediate complications

## 2021-06-15 NOTE — ANESTHESIA POSTPROCEDURE EVALUATION
Post-Anesthesia Evaluation and Assessment    Patient: Keri Grijalva MRN: [id number]  SSN: [id number]    YOB: 1965  Age: 50 y.o. Sex: male      I have evaluated the patient and they are stable and ready for discharge from the PACU. Cardiovascular Function/Vital Signs  Visit Vitals  BP (!) 156/87 (BP 1 Location: Right upper arm, BP Patient Position: At rest)   Pulse 65   Temp 36.9 °C (98.4 °F)   Resp 16   Ht 6' 5\" (1.956 m)   Wt 154.2 kg (340 lb)   SpO2 97%   BMI 40.32 kg/m²       Patient is status post Spinal anesthesia for Procedure(s):  RIGHT TOTAL KNEE ARTHROPLASTY WITH [address] Nausea/Vomiting: None    Postoperative hydration reviewed and adequate. Pain:  Pain Scale 1: Numeric (0 - 10) (06/15/21 1419)  Pain Intensity 1: 0 (06/15/21 1419)   Managed    Neurological Status:   Neuro (WDL): Within Defined Limits (06/15/21 1347)  Neuro  Neurologic State: Alert (06/15/21 1419)  Orientation Level: Oriented X4 (06/15/21 1419)  Cognition: Appropriate for age attention/concentration (06/15/21 1419)  Speech: Clear (06/15/21 1419)  LUE Motor Response: Purposeful (06/15/21 1419)  LLE Motor Response: Purposeful (06/15/21 1419)  RUE Motor Response: Purposeful (06/15/21 1419)  RLE Motor Response: Purposeful;Weak (06/15/21 1419)   At baseline    Mental Status, Level of Consciousness: Alert and  oriented to person, place, and time    Pulmonary Status:   O2 Device: None (Room air) (06/15/21 1419)   Adequate oxygenation and airway patent    Complications related to anesthesia: None    Post-anesthesia assessment completed. No concerns    Signed By: Corry Davison MD     Erma 15, 2021              Procedure(s):  RIGHT TOTAL KNEE ARTHROPLASTY WITH ARVIND PERSONA IMPLANT SYSTEM.     spinal    <BSHSIANPOST>    INITIAL Post-op Vital signs:   Vitals Value Taken Time   /70 06/15/21 1347   Temp 36.5 °C (97.7 °F) 06/15/21 1347   Pulse 62 06/15/21 1353   Resp 16 06/15/21 1353   SpO2 98 % 06/15/21 1353   Vitals shown include unvalidated device data.

## 2021-06-16 LAB
ANION GAP SERPL CALC-SCNC: 7 MMOL/L (ref 5–15)
BUN SERPL-MCNC: 10 MG/DL (ref 6–20)
BUN/CREAT SERPL: 13 (ref 12–20)
CALCIUM SERPL-MCNC: 8.8 MG/DL (ref 8.5–10.1)
CHLORIDE SERPL-SCNC: 103 MMOL/L (ref 97–108)
CO2 SERPL-SCNC: 22 MMOL/L (ref 21–32)
CREAT SERPL-MCNC: 0.76 MG/DL (ref 0.7–1.3)
GLUCOSE SERPL-MCNC: 128 MG/DL (ref 65–100)
HGB BLD-MCNC: 12 G/DL (ref 12.1–17)
INR PPP: 1.1 (ref 0.9–1.1)
POTASSIUM SERPL-SCNC: 3.9 MMOL/L (ref 3.5–5.1)
PROTHROMBIN TIME: 11.1 SEC (ref 9–11.1)
SODIUM SERPL-SCNC: 132 MMOL/L (ref 136–145)

## 2021-06-16 PROCEDURE — 74011250637 HC RX REV CODE- 250/637: Performed by: PHYSICIAN ASSISTANT

## 2021-06-16 PROCEDURE — 97116 GAIT TRAINING THERAPY: CPT

## 2021-06-16 PROCEDURE — 80048 BASIC METABOLIC PNL TOTAL CA: CPT

## 2021-06-16 PROCEDURE — 74011250637 HC RX REV CODE- 250/637: Performed by: ORTHOPAEDIC SURGERY

## 2021-06-16 PROCEDURE — 85610 PROTHROMBIN TIME: CPT

## 2021-06-16 PROCEDURE — 74011250636 HC RX REV CODE- 250/636: Performed by: PHYSICIAN ASSISTANT

## 2021-06-16 PROCEDURE — 85018 HEMOGLOBIN: CPT

## 2021-06-16 PROCEDURE — 36415 COLL VENOUS BLD VENIPUNCTURE: CPT

## 2021-06-16 PROCEDURE — 97530 THERAPEUTIC ACTIVITIES: CPT

## 2021-06-16 PROCEDURE — 97535 SELF CARE MNGMENT TRAINING: CPT

## 2021-06-16 PROCEDURE — 97165 OT EVAL LOW COMPLEX 30 MIN: CPT

## 2021-06-16 PROCEDURE — 74011000250 HC RX REV CODE- 250: Performed by: PHYSICIAN ASSISTANT

## 2021-06-16 RX ORDER — OXYCODONE HYDROCHLORIDE 5 MG/1
5-10 TABLET ORAL
Qty: 60 TABLET | Refills: 0 | Status: SHIPPED | OUTPATIENT
Start: 2021-06-16 | End: 2021-06-30

## 2021-06-16 RX ORDER — NALOXONE HYDROCHLORIDE 4 MG/.1ML
SPRAY NASAL
Qty: 1 EACH | Refills: 0 | Status: SHIPPED | OUTPATIENT
Start: 2021-06-16

## 2021-06-16 RX ORDER — KETOROLAC TROMETHAMINE 30 MG/ML
30 INJECTION, SOLUTION INTRAMUSCULAR; INTRAVENOUS
Status: DISCONTINUED | OUTPATIENT
Start: 2021-06-16 | End: 2021-06-17 | Stop reason: HOSPADM

## 2021-06-16 RX ORDER — AMOXICILLIN 250 MG
1 CAPSULE ORAL 2 TIMES DAILY
Qty: 50 TABLET | Refills: 0 | Status: SHIPPED | OUTPATIENT
Start: 2021-06-16

## 2021-06-16 RX ORDER — KETOROLAC TROMETHAMINE 30 MG/ML
30 INJECTION, SOLUTION INTRAMUSCULAR; INTRAVENOUS
Status: DISCONTINUED | OUTPATIENT
Start: 2021-06-16 | End: 2021-06-16 | Stop reason: SDUPTHER

## 2021-06-16 RX ORDER — WARFARIN 7.5 MG/1
7.5 TABLET ORAL ONCE
Status: COMPLETED | OUTPATIENT
Start: 2021-06-16 | End: 2021-06-16

## 2021-06-16 RX ORDER — WARFARIN 2 MG/1
TABLET ORAL
Qty: 90 TABLET | Refills: 0 | Status: SHIPPED | OUTPATIENT
Start: 2021-06-16 | End: 2021-06-17 | Stop reason: SDUPTHER

## 2021-06-16 RX ORDER — KETOROLAC TROMETHAMINE 30 MG/ML
30 INJECTION, SOLUTION INTRAMUSCULAR; INTRAVENOUS
Status: COMPLETED | OUTPATIENT
Start: 2021-06-16 | End: 2021-06-16

## 2021-06-16 RX ADMIN — OXYCODONE HYDROCHLORIDE 5 MG: 5 TABLET ORAL at 18:32

## 2021-06-16 RX ADMIN — OXYCODONE HYDROCHLORIDE 5 MG: 5 TABLET ORAL at 15:18

## 2021-06-16 RX ADMIN — KETOROLAC TROMETHAMINE 30 MG: 30 INJECTION, SOLUTION INTRAMUSCULAR at 22:06

## 2021-06-16 RX ADMIN — ACETAMINOPHEN 1000 MG: 500 TABLET ORAL at 17:37

## 2021-06-16 RX ADMIN — DOCUSATE SODIUM 50 MG AND SENNOSIDES 8.6 MG 1 TABLET: 8.6; 5 TABLET, FILM COATED ORAL at 08:48

## 2021-06-16 RX ADMIN — METOPROLOL SUCCINATE 50 MG: 50 TABLET, EXTENDED RELEASE ORAL at 08:47

## 2021-06-16 RX ADMIN — ACETAMINOPHEN 1000 MG: 500 TABLET ORAL at 00:12

## 2021-06-16 RX ADMIN — ATORVASTATIN CALCIUM 20 MG: 10 TABLET, FILM COATED ORAL at 22:01

## 2021-06-16 RX ADMIN — OXYCODONE HYDROCHLORIDE 10 MG: 5 TABLET ORAL at 07:23

## 2021-06-16 RX ADMIN — ACETAMINOPHEN 1000 MG: 500 TABLET ORAL at 12:11

## 2021-06-16 RX ADMIN — FUROSEMIDE 40 MG: 40 TABLET ORAL at 08:47

## 2021-06-16 RX ADMIN — OXYCODONE HYDROCHLORIDE 10 MG: 5 TABLET ORAL at 03:29

## 2021-06-16 RX ADMIN — KETOROLAC TROMETHAMINE 30 MG: 30 INJECTION, SOLUTION INTRAMUSCULAR at 08:47

## 2021-06-16 RX ADMIN — SODIUM CHLORIDE 125 ML/HR: 9 INJECTION, SOLUTION INTRAVENOUS at 06:06

## 2021-06-16 RX ADMIN — WARFARIN SODIUM 7.5 MG: 7.5 TABLET ORAL at 12:11

## 2021-06-16 RX ADMIN — CEFAZOLIN 2 G: 1 INJECTION, POWDER, FOR SOLUTION INTRAMUSCULAR; INTRAVENOUS at 03:29

## 2021-06-16 RX ADMIN — ACETAMINOPHEN 1000 MG: 500 TABLET ORAL at 06:06

## 2021-06-16 RX ADMIN — POLYETHYLENE GLYCOL 3350 17 G: 17 POWDER, FOR SOLUTION ORAL at 08:48

## 2021-06-16 RX ADMIN — POTASSIUM CHLORIDE 20 MEQ: 750 TABLET, EXTENDED RELEASE ORAL at 08:47

## 2021-06-16 RX ADMIN — OXYCODONE HYDROCHLORIDE 10 MG: 5 TABLET ORAL at 00:12

## 2021-06-16 RX ADMIN — OXYCODONE HYDROCHLORIDE 5 MG: 5 TABLET ORAL at 10:45

## 2021-06-16 RX ADMIN — DOCUSATE SODIUM 50 MG AND SENNOSIDES 8.6 MG 1 TABLET: 8.6; 5 TABLET, FILM COATED ORAL at 17:37

## 2021-06-16 NOTE — PROGRESS NOTES
Bedside shift change report given to Collette Lucero RN (oncoming nurse) by Grecia Lind RN(offgoing nurse).  Report included the following information Miki ZAMORA

## 2021-06-16 NOTE — PROGRESS NOTES
RADHA: The patient plans to discharge home with outpatient therapy and lab draws at [facility]. Family to transport when stable for discharge. Rolling walker ordered and delivered from [facility] equipment. UPDATE: CM was unable to establish home health due to the patient's insurance. CM sent referrals to: [facility], [facility], [facility], [facility], [address] [facility], [facility], [facility], [facility], and [facility]. CM notified Orthopedics PA. The patient is agreeable to outpatient therapy. RUR: N/A    1. The patient is from home and lives with his mom and sister. 2. Orthopedics, PT/OT following. 3. The patient plans to discharge home with home health and family to transport. Observation notice provided in writing to patient and/or caregiver as well as verbal explanation of the policy. Patients who are in outpatient status also receive the Observation notice. Patient has received notice and or patient representative has received via secure email, fax, or certified mail based on patient representative's preference. Care Management Interventions  PCP Verified by CM: Yes  Palliative Care Criteria Met (RRAT>21 & CHF Dx)?: No  Mode of Transport at Discharge: Other (see comment)  Transition of Care Consult (CM Consult): [facility]: No  Discharge Durable Medical Equipment: Yes ([facility])  Physical Therapy Consult: Yes  Occupational Therapy Consult: Yes  Speech Therapy Consult: No  Current Support Network: Other  Confirm Follow Up Transport: Family  The Plan for Transition of Care is Related to the Following Treatment Goals : The patient plans to discharge home with sister to transport when stable for discharge.    Resource Information Provided?: No  Discharge Location  Discharge Placement: Home with family assistance     Reason for Admission:  Right Total Knee                     RUR Score:  N/A                   Plan for utilizing home health: The Plan for Transition of Care is related to the following treatment goals: Home Health    The Patient and/or patient representative Marjan Payton was provided with a choice of provider and agrees   with the discharge plan. [x] Yes [] No    Freedom of choice list was provided with basic dialogue that supports the patient's individualized plan of care/goals, treatment preferences and shares the quality data associated with the providers. [x] Yes [] No       PCP: First and Last name:  Sherita Busby MD, Phone: [phone]     Name of Practice:    Are you a current patient: Yes/No: Yes   Approximate date of last visit: June, 2021   Can you participate in a virtual visit with your PCP: Yes                    Current Advanced Directive/Advance Care Plan: Full Code      Healthcare Decision Maker:   Click here to complete [address] including selection of the Healthcare Decision Maker Relationship (ie \"Primary\")                             Transition of Care Plan:    CM met with patient in room 566. The patient is alert and oriented x4. Confirmed demographics. The patient is independent with ADL's/IADL's, drives a vehicle and uses [address] in [place]. The patient plans to discharge home with home health and sister, Reji Arevalo ([phone]) to transport home where his lives with his sister and mother. CM following for discharge needs.     Magdaleno Sargent RN/CRM

## 2021-06-16 NOTE — PROGRESS NOTES
Bedside and Verbal shift change report given to Mortimer Hives, RN (oncoming nurse) by Chaya Arevalo RN (offgoing nurse). Report included the following information SBAR.

## 2021-06-16 NOTE — PROGRESS NOTES
Pharmacist Note - Warfarin Dosing  Consult provided for this 50 y.o. male to manage warfarin for DVT prophylaxis post total knee replacement    INR Goal: 1.7- 2.2    Drugs that may increase INR: None  Drugs that may decrease INR: None  Other current anticoagulants/ drugs that may increase bleeding risk: None  Risk factors: None  Daily INR ordered: YES    Recent Labs     06/16/21  0348   HGB 12.0*   INR 1.1       Date               INR                 Dose  6/15       1.0 on 6/7  5 mg  6/16                1.1                  7.5 mg    Assessment/ Plan: Will order warfarin 7.5 mg PO x 1 dose. Pharmacy will continue to monitor daily and adjust therapy as indicated.

## 2021-06-16 NOTE — PROGRESS NOTES
ORTHO PROGRESS NOTE    2021  Admit Date:   6/15/2021        Subjective:    Elizabeth Bustamante is a 50 y.o. BLACK/ male who is 1 Day Post-Op Procedure(s):  RIGHT TOTAL KNEE ARTHROPLASTY WITH ARVIND PERSONA IMPLANT SYSTEM. The patient states significant pain, otherwise without c/o at this time. The patient denies CP, SOB, N/V. Vital Signs:    Patient Vitals for the past 8 hrs:   BP Temp Pulse Resp SpO2   21 0334 128/80 98.1 °F (36.7 °C) (!) 59 16 92 %     Temp (24hrs), Av °F (36.7 °C), Min:97.5 °F (36.4 °C), Max:98.5 °F (36.9 °C)      Pain Control:   Pain Assessment  Pain Scale 1: Numeric (0 - 10)  Pain Intensity 1: 8  Pain Location 1: Knee  Pain Orientation 1: Right  Pain Description 1: Aching  Pain Intervention(s) 1: Medication (see MAR)    LAB:    Recent Labs     21  0348   HGB 12.0*   INR 1.1   *   K 3.9      CO2 22   BUN 10   CREA 0.76   *       Assessment & Physician's Comment:  A&O X3, NAD  Respirations unlabored  Abdomen S/NT  DF/EHL/PF intact  Distal pulses intact  Calves S/NT, SILT bilateral lower extremities  Dressing is clean, dry, and intact    Procedure:  Procedure(s):  RIGHT TOTAL KNEE ARTHROPLASTY WITH ARVIND PERSONA IMPLANT SYSTEM    Plan:  1) Pain Control: Oxycodone, tylenol, dilaudid IV, will add toradol IV. 2) Hemodynamics: Stable. Will continue to monitor. 3) Wound: Change dressing if saturated. 4) Activity: WBAT, mobilize with assist.  5) DVT Prophylaxis: Coumadin pharmacy to dose, SCD's, encourage ankle pump exercise, mobilize. 6) Disposition: Case management for discharge planning. Plan on home today/tomorrow depending upon progress mobilizing with PT. Will continue to follow progress closely.         Cindy Reece PA-C

## 2021-06-16 NOTE — PROGRESS NOTES
Problem: Mobility Impaired (Adult and Pediatric)  Goal: *Acute Goals and Plan of Care (Insert Text)  Description: FUNCTIONAL STATUS PRIOR TO ADMISSION: Patient was independent and active without use of DME.    HOME SUPPORT PRIOR TO ADMISSION: The patient lived with family but did not require assist.    Physical Therapy Goals  Initiated 6/15/2021    1. Patient will move from supine to sit and sit to supine  and roll side to side in bed with modified independence within 4 days. 2. Patient will perform sit to stand with modified independence within 4 days. 3. Patient will ambulate with modified independence for 150 feet with the least restrictive device within 4 days. 4. Patient will ascend/descend 4 stairs with 1 handrail(s) with modified independence within 4 days. 5. Patient will perform home exercise program per protocol with modified independence within 4 days. 6. Patient will demonstrate AROM 0-90 degrees in operative joint within 4 days. Outcome: Progressing Towards Goal   PHYSICAL THERAPY TREATMENT  Patient: Nancy Amaya (37 y.o. male)  Date: 6/16/2021  Diagnosis: Localized osteoarthritis of right knee [M17.11] <principal problem not specified>  Procedure(s) (LRB):  RIGHT TOTAL KNEE ARTHROPLASTY WITH ARVIND PERSONA IMPLANT SYSTEM (Right) 1 Day Post-Op  Precautions: Fall, WBAT  Chart, physical therapy assessment, plan of care and goals were reviewed. ASSESSMENT  Patient continues with skilled PT services and is progressing towards goals. Patient was very motivated for therapy during this session, and requested to ambulate further than he did this morning. Was provided with a specialty RW appropriate for his height and weight, and ambulated with good gait pattern, fluidity, and reported confidence for 300 feet. Noted that pain was more intense this afternoon, and pain remains the primary factor for patient request to discharge tomorrow instead of today.      Anticipate continued gains and recommend stair training tomorrow to facilitate successful discharge to home with [facility]. Current Level of Function Impacting Discharge (mobility/balance): contact guard assistance for bed mobility, transfers, and ambulation    Other factors to consider for discharge: Motivated/A&O x 4/Supportive family/Independent PLOF         PLAN :  Patient continues to benefit from skilled intervention to address the above impairments. Continue treatment per established plan of care. to address goals. Recommendation for discharge: (in order for the patient to meet his/her long term goals)  Physical therapy at least 2 days/week in the home     This discharge recommendation:  A follow-up discussion with the attending provider and/or case management is planned    IF patient discharges home will need the following DME: patient owns DME required for discharge       SUBJECTIVE:   Patient stated I've had about an hour of sleep since I've been in the hospital.    OBJECTIVE DATA SUMMARY:   Critical Behavior:  Neurologic State: Alert, Appropriate for age  Orientation Level: Oriented X4  Cognition: Appropriate decision making, Appropriate for age attention/concentration, Appropriate safety awareness, Follows commands  Safety/Judgement: Awareness of environment, Good awareness of safety precautions, Home safety  Functional Mobility Training:  Bed Mobility:     Supine to Sit:  (received in chair)  Sit to Supine:  (remained seated in chair )           Transfers:  Sit to Stand: Contact guard assistance  Stand to Sit: Contact guard assistance        Bed to Chair: Contact guard assistance                    Balance:  Sitting: Intact; Without support  Standing: Intact; With support  Standing - Static: Good  Standing - Dynamic : Fair  Ambulation/Gait Training:  Distance (ft): 300 Feet (ft)  Assistive Device: Walker, rollator;Gait belt  Ambulation - Level of Assistance: Contact guard assistance        Gait Abnormalities: Antalgic  Right Side Weight Bearing: As tolerated     Base of Support: Widened  Stance: Right decreased  Speed/Johana: Slow  Step Length: Left shortened;Right shortened  Swing Pattern: Right asymmetrical     Interventions: Safety awareness training;Verbal cues           Stairs: Therapeutic Exercises:   Quad sets  Ankle pumps  Hamstring sets  Heel slides  5 reps each after completion of ambulation. Instructed to perform 3 sets daily    Pain Ratin/10 with ambulation    Activity Tolerance:   Good    After treatment patient left in no apparent distress:   Supine in bed, Heels elevated for pressure relief, Call bell within reach, and Side rails x 3    COMMUNICATION/COLLABORATION:   The patients plan of care was discussed with: Registered nurse.      Sandro Galindo, ZACK   Time Calculation: 37 mins

## 2021-06-16 NOTE — PROGRESS NOTES
Problem: Mobility Impaired (Adult and Pediatric)  Goal: *Acute Goals and Plan of Care (Insert Text)  Description: FUNCTIONAL STATUS PRIOR TO ADMISSION: Patient was independent and active without use of DME.    HOME SUPPORT PRIOR TO ADMISSION: The patient lived with family but did not require assist.    Physical Therapy Goals  Initiated 6/15/2021    1. Patient will move from supine to sit and sit to supine  and roll side to side in bed with modified independence within 4 days. 2. Patient will perform sit to stand with modified independence within 4 days. 3. Patient will ambulate with modified independence for 150 feet with the least restrictive device within 4 days. 4. Patient will ascend/descend 4 stairs with 1 handrail(s) with modified independence within 4 days. 5. Patient will perform home exercise program per protocol with modified independence within 4 days. 6. Patient will demonstrate AROM 0-90 degrees in operative joint within 4 days. Outcome: Progressing Towards Goal   PHYSICAL THERAPY TREATMENT  Patient: Tolu Cam (74 y.o. male)  Date: 6/16/2021  Diagnosis: Localized osteoarthritis of right knee [M17.11] <principal problem not specified>  Procedure(s) (LRB):  RIGHT TOTAL KNEE ARTHROPLASTY WITH ARVIND PERSONA IMPLANT SYSTEM (Right) 1 Day Post-Op  Precautions: Fall, WBAT  Chart, physical therapy assessment, plan of care and goals were reviewed. ASSESSMENT  Patient continues with skilled PT services and is progressing towards goals. Patient performed bed mobility, transfers and ambulation. He ambulated 65 ft with RW and gait belt and was left up in recliner. Will see again this afternoon. Current Level of Function Impacting Discharge (mobility/balance): Contact guard assistance for all mobility. Other factors to consider for discharge:  Motivated/A & O x 4/Supportive Family/Independent PLOF          PLAN :  Patient continues to benefit from skilled intervention to address the above impairments. Continue treatment per established plan of care. to address goals. Recommendation for discharge: (in order for the patient to meet his/her long term goals)  Physical therapy at least 2 days/week in the home     This discharge recommendation:  Has been made in collaboration with the attending provider and/or case management    IF patient discharges home will need the following DME: rolling walker: Order placed for IP Case Management. ..needs extra tall, bariatric walker. SUBJECTIVE:   Patient stated I need some sleep.     OBJECTIVE DATA SUMMARY:   Critical Behavior:  Neurologic State: Alert, Appropriate for age  Orientation Level: Oriented X4  Cognition: Appropriate for age attention/concentration     Functional Mobility Training:  Bed Mobility:     Supine to Sit: Contact guard assistance  Sit to Supine: Contact guard assistance           Transfers:  Sit to Stand: Contact guard assistance  Stand to Sit: Contact guard assistance        Bed to Chair: Contact guard assistance                    Balance:  Sitting: Intact  Standing: Impaired; Without support  Standing - Static: Good;Constant support  Standing - Dynamic : Good;Constant support  Ambulation/Gait Training:  Distance (ft): 65 Feet (ft)  Assistive Device: Walker, rolling;Gait belt  Ambulation - Level of Assistance: Contact guard assistance        Gait Abnormalities: Antalgic  Right Side Weight Bearing: As tolerated     Base of Support: Widened;Shift to left  Stance: Right decreased  Speed/Johana: Slow  Step Length: Left shortened  Swing Pattern: Right asymmetrical     Interventions: Safety awareness training;Verbal cues             Therapeutic Exercises:    Ankle Pumps  Ham Sets  Quad Sets  Heel Slides  X 10 each every hour     Pain Ratin/10    Activity Tolerance:   Good    After treatment patient left in no apparent distress:   Sitting in chair, Heels elevated for pressure relief, Call bell within reach, and nurse notified. COMMUNICATION/COLLABORATION:   The patients plan of care was discussed with: Occupational therapist and Case management.      David Parkinson   Time Calculation: 30 mins

## 2021-06-16 NOTE — DISCHARGE INSTRUCTIONS
Discharge Instructions Knee Replacement  Dr. Yolanda Hunter      Patient Name  Carrol Roque  Date of procedure  6/15/2021    Procedure  Procedure(s):  [facility]  Surgeon  Surgeon(s) and Role:     Gregg Lee MD - Primary  Date of discharge: 6/17/2021  PCP: Haley Hardin MD    Follow up care   Follow up visit with Dr. Yolanda Hunter in 2 weeks. Call [phone]  to make an appointment   You do have staples in your knee incision. They will be taken out in 14 days in the office. Activity at home   Take a short walk every hour; except at night when sleeping   Do your Home Exercise Program 3 times every day    After exercising lie down and elevate your leg on pillows for 15-30 minutes to decrease swelling   Refer to your patient notebook for more information    Bathing and caring for your incision   You may take a shower with your waterproof dressing on your knee.  The waterproof dressing is to stay on your knee for 7 days.  On the 7th day have someone gently peel the dressing off by lifting the edge and stretching it to break the seal.   You may then leave your incision open to air unless you see drainage from your knee. Preventing blood clots   Take Coumadin every day as prescribed. You will need to go to a [facility] for weekly blood draws to monitor your coumadin levels. Have them call the office with results at [phone], ext. [phone]). Wednesday's are usually the best day    Call Dr. Yolanda Hunter if you have side effects of blood thinning medication: bleeding, bruising, upset stomach, or diarrhea.     Call Dr. Yolanda Hunter for signs of a blood clot in your leg: calf pain, tenderness, redness, swelling of lower leg    Preventing lung congestion   Use your incentive spirometer 4 times a day; do 10 repetitions each time   Remember to keep the small blue ball between the two arrows when taking a slow, deep breath     Pain Management   Get up and walk a short distance to relieve pain and stiffness.  Place ice wrap on your knee except when you are walking. The gel ice packs should be changed about every 4 hours   Elevate your leg on pillows for 15-30 minutes    Take Tylenol 650mg (take two 325mg tablets) every 6 hours for pain.  If needed, take a narcotic pain pill every 4-6 hours as prescribed.  Take all medications with a small amount of food.  As your pain decreases, take the narcotics less often or take ½ of a pill   Call Dr. Atul Temple if you have side effects from your narcotic pain medication: itching, drowsiness, dizziness, upset stomach, dry mouth, constipation or if you medication is not relieving your pain. Diet after surgery   You may resume your normal diet. Include vegetables, fruit, whole grains, lean meats, and low-fat dairy products. Eat food high in fiber    Drink plenty of fluids, including 8 cups of water daily   Take Senokot-s twice a day to prevent constipation    Avoid after surgery   Do not take any over-the-counter medication for pain except Tylenol   Do not take more than 3000mg (3 grams) of Tylenol in 24 hours.  Do not drink alcoholic beverages   Do not smoke   Do not drive until seen for follow up appointment   Do not place frozen gel pack directly on your skin. It can cause frostbite.  Do not take a tub bath, swim or get in a hot tub for 6 weeks  Prevention of falls and safety at home   Set up an area where you can rest comfortably leaving space around furniture to allow you to walk with your walker   Keep stairs, hallways and bathrooms well lit; especially at night   Arrange for care for your pets   Keep your home free of clutter      Call Dr. Atul Temple at [phone] for:   Pain that is not relieved by pain medication, ice and activity   Side effects of medications   Increased/spread of bruising   Warning signs of infection:  ? persistent fever greater than 100 degrees  ?  shaking or chills  ? increased redness, tenderness, swelling or drainage from incision  ? increased pain during activity or rest   Warning signs of a blood clot in your leg:  ? increased pain in your calf  ? tenderness or redness  ? increased swelling or knee, calf, ankle or foot    Call [phone] after 5pm or on a weekend.  The on call physician will return your phone call   Call your Primary Care Doctor for:    Concerns about your medical conditions such as diabetes, high blood pressure, asthma, congestive heart failure   Blood sugars greater than 180   Persistent headache or dizziness   Coughing or congestion   Constipation or diarrhea   Burning when you go to the bathroom   Abnormal heart rate (fast or slow)     Call 911 and go to the nearest hospital for:    Sudden increased shortness of breath   Sudden onset of chest pain   Difficulty breathing   Localized chest pain with coughing or taking a deep breath

## 2021-06-16 NOTE — PROGRESS NOTES
Bedside shift change report given to [facility] (oncoming nurse) by  Tiff begum). Report included the following information SBAR, Kardex, Intake/Output and MAR.

## 2021-06-17 VITALS
BODY MASS INDEX: 37.19 KG/M2 | RESPIRATION RATE: 16 BRPM | HEIGHT: 77 IN | TEMPERATURE: 97.9 F | HEART RATE: 99 BPM | DIASTOLIC BLOOD PRESSURE: 69 MMHG | OXYGEN SATURATION: 97 % | SYSTOLIC BLOOD PRESSURE: 149 MMHG | WEIGHT: 315 LBS

## 2021-06-17 LAB
INR PPP: 1.1 (ref 0.9–1.1)
PROTHROMBIN TIME: 11.7 SEC (ref 9–11.1)

## 2021-06-17 PROCEDURE — 74011250636 HC RX REV CODE- 250/636: Performed by: PHYSICIAN ASSISTANT

## 2021-06-17 PROCEDURE — 74011250637 HC RX REV CODE- 250/637: Performed by: PHYSICIAN ASSISTANT

## 2021-06-17 PROCEDURE — 36415 COLL VENOUS BLD VENIPUNCTURE: CPT

## 2021-06-17 PROCEDURE — 74011250637 HC RX REV CODE- 250/637: Performed by: ORTHOPAEDIC SURGERY

## 2021-06-17 PROCEDURE — 85610 PROTHROMBIN TIME: CPT

## 2021-06-17 PROCEDURE — 97116 GAIT TRAINING THERAPY: CPT

## 2021-06-17 RX ORDER — WARFARIN SODIUM 5 MG/1
5 TABLET ORAL ONCE
Status: COMPLETED | OUTPATIENT
Start: 2021-06-17 | End: 2021-06-17

## 2021-06-17 RX ORDER — WARFARIN 2 MG/1
TABLET ORAL
Qty: 90 TABLET | Refills: 0 | Status: SHIPPED | OUTPATIENT
Start: 2021-06-17

## 2021-06-17 RX ADMIN — OXYCODONE HYDROCHLORIDE 10 MG: 5 TABLET ORAL at 04:12

## 2021-06-17 RX ADMIN — METOPROLOL SUCCINATE 50 MG: 50 TABLET, EXTENDED RELEASE ORAL at 09:15

## 2021-06-17 RX ADMIN — Medication 5 ML: at 01:00

## 2021-06-17 RX ADMIN — WARFARIN SODIUM 5 MG: 5 TABLET ORAL at 11:27

## 2021-06-17 RX ADMIN — ACETAMINOPHEN 1000 MG: 500 TABLET ORAL at 11:27

## 2021-06-17 RX ADMIN — POTASSIUM CHLORIDE 20 MEQ: 750 TABLET, EXTENDED RELEASE ORAL at 09:15

## 2021-06-17 RX ADMIN — KETOROLAC TROMETHAMINE 30 MG: 30 INJECTION, SOLUTION INTRAMUSCULAR at 08:28

## 2021-06-17 RX ADMIN — OXYCODONE HYDROCHLORIDE 10 MG: 5 TABLET ORAL at 09:15

## 2021-06-17 RX ADMIN — POLYETHYLENE GLYCOL 3350 17 G: 17 POWDER, FOR SOLUTION ORAL at 09:15

## 2021-06-17 RX ADMIN — FUROSEMIDE 40 MG: 40 TABLET ORAL at 09:15

## 2021-06-17 RX ADMIN — DOCUSATE SODIUM 50 MG AND SENNOSIDES 8.6 MG 1 TABLET: 8.6; 5 TABLET, FILM COATED ORAL at 09:15

## 2021-06-17 NOTE — PROGRESS NOTES
Bedside shift change report given to Giorgi Bolaños RN (oncoming nurse) by Pastor Serafin RN (offgoing nurse).  Report included the following information SBAR

## 2021-06-17 NOTE — PROGRESS NOTES
ORTHO PROGRESS NOTE    2021  Admit Date:   6/15/2021        Subjective:    Erin Rodríguez is a 50 y.o. BLACK/ male who is 2 Days Post-Op Procedure(s):  RIGHT TOTAL KNEE ARTHROPLASTY WITH ARVIND PERSONA IMPLANT SYSTEM. The patient states moderate pain, otherwise is without c/o at this time. Pain control is adequate. Making steady gains mobilizing with PT. The patient denies CP, SOB, N/V. Vital Signs:    Patient Vitals for the past 8 hrs:   BP Temp Pulse Resp SpO2   21 0411 124/68 98.4 °F (36.9 °C) 83 16 97 %     Temp (24hrs), Av.1 °F (36.7 °C), Min:97.8 °F (36.6 °C), Max:98.4 °F (36.9 °C)      Pain Control:   Pain Assessment  Pain Scale 1: Numeric (0 - 10)  Pain Intensity 1: 4  Pain Location 1: Knee  Pain Orientation 1: Right  Pain Description 1: Aching  Pain Intervention(s) 1: Medication (see MAR)    LAB:    Recent Labs     21  0413 21  0348   HGB  --  12.0*   INR 1.1 1.1   NA  --  132*   K  --  3.9   CL  --  103   CO2  --  22   BUN  --  10   CREA  --  0.76   GLU  --  128*       Assessment & Physician's Comment:  A&O x 3, NAD  Respirations unlabored  Abdomen S/NT  DF/EHL/PF intact  Distal pulses intact  Calves S/NT, SILT bilateral lower extremities  Dressing is clean, dry, and intact    Procedure:  Procedure(s):  RIGHT TOTAL KNEE ARTHROPLASTY WITH ARVIND PERSONA IMPLANT SYSTEM    Plan:  1) Pain Control: Adequate, continue current regimen. 2) Hemodynamics: Stable. 3) Wound: Change dressing if saturated. 4) Activity: WBAT, mobilize with assist.  5) DVT Prophylaxis: Coumadin, SCD's, encourage ankle pump exercise, mobilize. 6) Disposition: Plan on home today with HH/PT. Will need twice weekly PT/INR lab draws to adjust coumadin dosing. Follow up in 3-4 weeks in Dr Concepción Mishra office for post op care.          Yifan Early PA-C

## 2021-06-17 NOTE — PROGRESS NOTES
Problem: Mobility Impaired (Adult and Pediatric)  Goal: *Acute Goals and Plan of Care (Insert Text)  Description: FUNCTIONAL STATUS PRIOR TO ADMISSION: Patient was independent and active without use of DME.    HOME SUPPORT PRIOR TO ADMISSION: The patient lived with family but did not require assist.    Physical Therapy Goals  Initiated 6/15/2021    1. Patient will move from supine to sit and sit to supine  and roll side to side in bed with modified independence within 4 days. 2. Patient will perform sit to stand with modified independence within 4 days. 3. Patient will ambulate with modified independence for 150 feet with the least restrictive device within 4 days. 4. Patient will ascend/descend 4 stairs with 1 handrail(s) with modified independence within 4 days. 5. Patient will perform home exercise program per protocol with modified independence within 4 days. 6. Patient will demonstrate AROM 0-90 degrees in operative joint within 4 days. Outcome: Resolved/Met   PHYSICAL THERAPY TREATMENT/DISCHARGE  Patient: Carrol Roque (73 y.o. male)  Date: 6/17/2021  Diagnosis: Localized osteoarthritis of right knee [M17.11] <principal problem not specified>  Procedure(s) (LRB):  RIGHT TOTAL KNEE ARTHROPLASTY WITH ARVIND PERSONA IMPLANT SYSTEM (Right) 2 Days Post-Op  Precautions: Fall, WBAT  Chart, physical therapy assessment, plan of care and goals were reviewed. ASSESSMENT  Patient continues with skilled PT services and has met acute care PT goals. Patient is cleared for discharge from PT standpoint. Patient  is independent with post-op TKA exercise protocol and has same in written, illlustrated form. Educated patient on Discharge Instructions relating to PT program progression post-discharge. He demonstrated/verbalized understanding. Barthel Functional Score has improved to 90/100, indicating minimal impaired ability to care for basic self-needs/dependency on others.        Other factors to consider for discharge: Motivated/A & O x 4/Supportive Family/Independent PLOF          PLAN :  Patient will be discharged from acute skilled physical therapy at this time. Rationale for discharge:  Goals achieved    Recommendation for discharge: (in order for the patient to meet his/her long term goals)  Outpatient physical therapy follow up recommended for post TKA rehab as patient can not find a [address] [place], [place] [address]enrique that takes his insurance. This discharge recommendation:  Has been made in collaboration with the attending provider and/or case management    IF patient discharges home will need the following DME: patient owns DME required for discharge       SUBJECTIVE:   Patient stated I am ready to go home today.     OBJECTIVE DATA SUMMARY:   Critical Behavior:  Neurologic State: Appropriate for age, Alert  Orientation Level: Oriented X4  Cognition: Appropriate decision making, Appropriate for age attention/concentration, Appropriate safety awareness, Follows commands  Safety/Judgement: Awareness of environment, Good awareness of safety precautions, Home safety  Functional Mobility Training:  Bed Mobility:     Supine to Sit: Modified independent  Sit to Supine: Modified independent           Transfers:  Sit to Stand: Modified independent  Stand to Sit: Modified independent        Bed to Chair: Modified independent                    Balance:  Sitting: Intact  Standing: Intact; With support  Standing - Static: Good;Constant support  Standing - Dynamic : Good;Constant support  Ambulation/Gait Training:  Distance (ft): 300 Feet (ft)  Assistive Device: Walker, rolling;Gait belt  Ambulation - Level of Assistance: Modified independent        Gait Abnormalities: Antalgic        Base of Support: Widened  Stance: Right decreased  Speed/Johana: Slow  Step Length: Left shortened;Right shortened  Swing Pattern: Right asymmetrical     Interventions: Safety awareness training;Verbal cues       Stairs:  Number of Stairs Trained: 4  Stairs - Level of Assistance: Supervision   Rail Use: Left  (left rail and cane)    Therapeutic Exercises:   Patient  is independent with post-op TKA exercise protocol and has same in written, illlustrated form. Pain Rating:  3/10    Activity Tolerance:   Good    After treatment patient left in no apparent distress:   Sitting in chair, Call bell within reach, and nurse notified. COMMUNICATION/COLLABORATION:   The patients plan of care was discussed with: Registered nurse and Case management.      Mary Anne Childs   Time Calculation: 30 mins

## 2021-06-17 NOTE — PROGRESS NOTES
Pharmacist Note - Warfarin Dosing  Consult provided for this 50 y.o. male to manage warfarin for DVT prophylaxis post total knee replacement    INR Goal: 1.7- 2.2    Drugs that may increase INR: None  Drugs that may decrease INR: None  Other current anticoagulants/ drugs that may increase bleeding risk: None  Risk factors: None (low sensitivity)  Daily INR ordered: YES    Recent Labs     06/17/21  0413 06/16/21  0348   HGB  --  12.0*   INR 1.1 1.1       Date               INR                 Dose  6/15       1.0 on 6/7  5 mg  6/16                1.1                   7.5 mg  6/17  1.1  5 mg    Assessment/ Plan:  Therapy day 3    Will order warfarin 5 mg PO x 1 dose. Pharmacy will continue to monitor daily and adjust therapy as indicated.

## 2021-06-17 NOTE — PROGRESS NOTES
Problem: Falls - Risk of  Goal: *Absence of Falls  Description: Document Burrell Canavan Fall Risk and appropriate interventions in the flowsheet.   Outcome: Resolved/Met  Note: Fall Risk Interventions:  Mobility Interventions: Patient to call before getting OOB    Mentation Interventions: Adequate sleep, hydration, pain control, Door open when patient unattended, Gait belt with transfers/ambulation    Medication Interventions: Patient to call before getting OOB    Elimination Interventions: Call light in reach              Problem: Patient Education: Go to Patient Education Activity  Goal: Patient/Family Education  Outcome: Resolved/Met     Problem: Patient Education: Go to Patient Education Activity  Goal: Patient/Family Education  Outcome: Resolved/Met     Problem: Discharge Planning  Goal: *Discharge to safe environment  Outcome: Resolved/Met

## 2021-06-27 ENCOUNTER — APPOINTMENT (OUTPATIENT)
Dept: VASCULAR SURGERY | Age: 56
End: 2021-06-27
Attending: EMERGENCY MEDICINE
Payer: COMMERCIAL

## 2021-06-27 ENCOUNTER — HOSPITAL ENCOUNTER (EMERGENCY)
Age: 56
Discharge: HOME | End: 2021-06-27
Attending: EMERGENCY MEDICINE
Payer: COMMERCIAL

## 2021-06-27 VITALS
RESPIRATION RATE: 16 BRPM | OXYGEN SATURATION: 95 % | TEMPERATURE: 97.3 F | HEART RATE: 89 BPM | DIASTOLIC BLOOD PRESSURE: 74 MMHG | SYSTOLIC BLOOD PRESSURE: 146 MMHG

## 2021-06-27 DIAGNOSIS — M79.89 RIGHT LEG SWELLING: Primary | ICD-10-CM

## 2021-06-27 LAB
ALBUMIN SERPL-MCNC: 3.6 G/DL (ref 3.5–5)
ALBUMIN/GLOB SERPL: 0.7 {RATIO} (ref 1.1–2.2)
ALP SERPL-CCNC: 106 U/L (ref 45–117)
ALT SERPL-CCNC: 84 U/L (ref 12–78)
ANION GAP SERPL CALC-SCNC: 4 MMOL/L (ref 5–15)
AST SERPL-CCNC: 67 U/L (ref 15–37)
BASOPHILS # BLD: 0.1 K/UL (ref 0–0.1)
BASOPHILS NFR BLD: 1 % (ref 0–1)
BILIRUB SERPL-MCNC: 0.8 MG/DL (ref 0.2–1)
BUN SERPL-MCNC: 11 MG/DL (ref 6–20)
BUN/CREAT SERPL: 13 (ref 12–20)
CALCIUM SERPL-MCNC: 9.6 MG/DL (ref 8.5–10.1)
CHLORIDE SERPL-SCNC: 103 MMOL/L (ref 97–108)
CO2 SERPL-SCNC: 28 MMOL/L (ref 21–32)
COMMENT, HOLDF: NORMAL
CREAT SERPL-MCNC: 0.85 MG/DL (ref 0.7–1.3)
DIFFERENTIAL METHOD BLD: ABNORMAL
EOSINOPHIL # BLD: 0.2 K/UL (ref 0–0.4)
EOSINOPHIL NFR BLD: 2 % (ref 0–7)
ERYTHROCYTE [DISTWIDTH] IN BLOOD BY AUTOMATED COUNT: 12.4 % (ref 11.5–14.5)
GLOBULIN SER CALC-MCNC: 5.5 G/DL (ref 2–4)
GLUCOSE SERPL-MCNC: 159 MG/DL (ref 65–100)
HCT VFR BLD AUTO: 39.5 % (ref 36.6–50.3)
HGB BLD-MCNC: 13.1 G/DL (ref 12.1–17)
IMM GRANULOCYTES # BLD AUTO: 0.1 K/UL (ref 0–0.04)
IMM GRANULOCYTES NFR BLD AUTO: 1 % (ref 0–0.5)
LYMPHOCYTES # BLD: 2.3 K/UL (ref 0.8–3.5)
LYMPHOCYTES NFR BLD: 25 % (ref 12–49)
MCH RBC QN AUTO: 30 PG (ref 26–34)
MCHC RBC AUTO-ENTMCNC: 33.2 G/DL (ref 30–36.5)
MCV RBC AUTO: 90.4 FL (ref 80–99)
MONOCYTES # BLD: 1 K/UL (ref 0–1)
MONOCYTES NFR BLD: 10 % (ref 5–13)
NEUTS SEG # BLD: 5.8 K/UL (ref 1.8–8)
NEUTS SEG NFR BLD: 61 % (ref 32–75)
NRBC # BLD: 0 K/UL (ref 0–0.01)
NRBC BLD-RTO: 0 PER 100 WBC
PLATELET # BLD AUTO: 424 K/UL (ref 150–400)
PMV BLD AUTO: 8.8 FL (ref 8.9–12.9)
POTASSIUM SERPL-SCNC: 4.5 MMOL/L (ref 3.5–5.1)
PROT SERPL-MCNC: 9.1 G/DL (ref 6.4–8.2)
RBC # BLD AUTO: 4.37 M/UL (ref 4.1–5.7)
SAMPLES BEING HELD,HOLD: NORMAL
SODIUM SERPL-SCNC: 135 MMOL/L (ref 136–145)
WBC # BLD AUTO: 9.4 K/UL (ref 4.1–11.1)

## 2021-06-27 PROCEDURE — 74011250637 HC RX REV CODE- 250/637: Performed by: EMERGENCY MEDICINE

## 2021-06-27 PROCEDURE — 93971 EXTREMITY STUDY: CPT

## 2021-06-27 PROCEDURE — 85025 COMPLETE CBC W/AUTO DIFF WBC: CPT

## 2021-06-27 PROCEDURE — 36415 COLL VENOUS BLD VENIPUNCTURE: CPT

## 2021-06-27 PROCEDURE — 80053 COMPREHEN METABOLIC PANEL: CPT

## 2021-06-27 PROCEDURE — 99283 EMERGENCY DEPT VISIT LOW MDM: CPT

## 2021-06-27 RX ORDER — OXYCODONE AND ACETAMINOPHEN 10; 325 MG/1; MG/1
1 TABLET ORAL
Status: COMPLETED | OUTPATIENT
Start: 2021-06-27 | End: 2021-06-27

## 2021-06-27 RX ADMIN — OXYCODONE HYDROCHLORIDE AND ACETAMINOPHEN 1 TABLET: 10; 325 TABLET ORAL at 16:13

## 2021-06-27 NOTE — ED PROVIDER NOTES
HPI patient is a 61-year-old male with past medical history significant for arthritis, chronic pain, hypertension and obesity who presents to the ED with right leg pain and swelling 12 days postop total knee replacement. He  denies any falls, direct injury or blunt trauma. He is taking Xarelto as prescribed. He reports his range of motion is decreased due to the increased swelling. Denies fever, cold symptoms,headache, neck pain, visual changes, focal weakness or rash. Denies any difficulty breathing, difficulty swallowing, SOB or chest pain. Denies any nausea, vomiting or diarrhea. Pt. Reports taking Percocet as prescribed for pain with some relief noted. He is walking steady with use of his walker. His incision site is well approximated with staples intact; no bleeding or drainage; no extending erythema. He has a localized area of tenderness on the right lateral ankle area concerning for         Past Medical History:   Diagnosis Date    Arthritis     Chronic pain     COVID-19 vaccine series completed 4/2/21, 5/7/21    MODERNA    Elevated hemoglobin A1c 06/07/2021    6.4    Hypertension        Past Surgical History:   Procedure Laterality Date    HX BUNIONECTOMY Right 1985    HX BUNIONECTOMY Left 1983    HX ORTHOPAEDIC Right 1985    FOOT SURGERY TO CURRECT DEFORMITY         Family History:   Problem Relation Age of Onset    Diabetes Mother     Heart Disease Father     No Known Problems Sister     No Known Problems Brother     Anesth Problems Neg Hx        Social History     Socioeconomic History    Marital status: SINGLE     Spouse name: Not on file    Number of children: Not on file    Years of education: Not on file    Highest education level: Not on file   Occupational History    Not on file   Tobacco Use    Smoking status: Never Smoker    Smokeless tobacco: Never Used   Vaping Use    Vaping Use: Never used   Substance and Sexual Activity    Alcohol use:  Yes     Alcohol/week: 2.0 standard drinks     Types: 2 Cans of beer per week    Drug use: Never    Sexual activity: Not on file   Other Topics Concern    Not on file   Social History Narrative    Not on file     Social Determinants of Health     Financial Resource Strain:     Difficulty of Paying Living Expenses:    Food Insecurity:     Worried About Running Out of Food in the Last Year:     [address] in the Last Year:    Transportation Needs:     Lack of Transportation (Medical):  Lack of Transportation (Non-Medical):    Physical Activity:     Days of Exercise per Week:     Minutes of Exercise per Session:    Stress:     Feeling of Stress :    Social Connections:     Frequency of Communication with Friends and Family:     Frequency of Social Gatherings with Friends and Family:     Attends [employer] Services:     Active Member of Clubs or Organizations:     Attends Club or Organization Meetings:     Marital Status:    Intimate Partner Violence:     Fear of Current or Ex-Partner:     Emotionally Abused:     Physically Abused:     Sexually Abused: ALLERGIES: Patient has no known allergies. Review of Systems   Constitutional: Positive for activity change. Negative for appetite change and fever. HENT: Negative for congestion, sore throat and trouble swallowing. Respiratory: Negative for chest tightness, shortness of breath and stridor. Cardiovascular: Positive for leg swelling. Gastrointestinal: Negative for abdominal pain, diarrhea, nausea and vomiting. Genitourinary: Negative for difficulty urinating and dysuria. Musculoskeletal: Positive for gait problem. Skin: Positive for wound. Incision site is well approximated with localized ecchymosis and dependent swelling. Neurological: Negative for dizziness, light-headedness, numbness and headaches. All other systems reviewed and are negative.       Vitals:    06/27/21 1544   BP: (!) 183/96   Pulse: 100   Resp: 16   Temp: 97.6 °F (36.4 °C)   SpO2: 99%            Physical Exam  Vitals and nursing note reviewed. Constitutional:       General: He is not in acute distress. Appearance: He is obese. He is not ill-appearing, toxic-appearing or diaphoretic. Comments: Black male; non smoker; 12 days post op  Right TKR   HENT:      Head: Normocephalic. Cardiovascular:      Rate and Rhythm: Normal rate and regular rhythm. Pulmonary:      Effort: Pulmonary effort is normal.      Breath sounds: Normal breath sounds. Musculoskeletal:         General: Swelling and tenderness present. No deformity. Comments: Right leg has intact staples and surgical incision consistent with total knee replacement; localized ecchymosis and dependent swelling; There is no obvious new deformity. Good neurovascular sensation. No apparent tendon or nerve injury. The skin on the lateral aspect of the right ankle area is thin and tender to touch concerning for a pressure sore. Skin:     Findings: Bruising present. No erythema. Neurological:      General: No focal deficit present. Mental Status: He is alert and oriented to person, place, and time. MDM       Procedures    Reviewed skin care to prevent pressure wound on right lateral ankle; recommend thin coat of Vaseline with protective cream provided. Labs Reviewed   CBC WITH AUTOMATED DIFF - Abnormal; Notable for the following components:       Result Value    PLATELET 970 (*)     MPV 8.8 (*)     IMMATURE GRANULOCYTES 1 (*)     ABS. IMM. GRANS. 0.1 (*)     All other components within normal limits   METABOLIC PANEL, COMPREHENSIVE   SAMPLES BEING HELD     5:01 PM  Change of shift. Care of patient signed over to [facility]. Awaiting duplex venous study right lower leg.  Dyan Opitz, NP

## 2021-06-27 NOTE — ED TRIAGE NOTES
Pt presents to department with a CC of right leg swelling and a \"welt\" on his right foot where he thinks his foot rubbed against his shoe or pillow. Pt had right total knee replacement on 6/15. Pt first noticed the welt last Tuesday. Pt is concerned about a blood clot or possible infection in his foot. Pt's surgical incision is clean and intact. Pt discharged on xarelto and denies any missed doses.

## 2021-06-27 NOTE — PROGRESS NOTES
5:03 PM  Change of shift. Care of patient taken over from [facility], NP; H&P reviewed, bedside handoff complete. Awaiting Duplex results. 5:37 PM  Duplex negative for DVT. Discussed results of blood work and imaging with patient. Reviewed care plan as he has discussed with previous provider. He has no further questions. Opportunity for questions presented. Provided with return precautions and follow up recommendations. Pt verbalizes their understanding and agreement with care plan. VITAL SIGNS:  Vitals:    06/27/21 1544 06/27/21 1736   BP: (!) 183/96 (!) 146/74   Pulse: 100 89   Resp: 16 16   Temp: 97.6 °F (36.4 °C) 97.3 °F (36.3 °C)   SpO2: 99% 95%         LABS:  Recent Results (from the past 24 hour(s))   METABOLIC PANEL, COMPREHENSIVE    Collection Time: 06/27/21  4:02 PM   Result Value Ref Range    Sodium 135 (L) 136 - 145 mmol/L    Potassium 4.5 3.5 - 5.1 mmol/L    Chloride 103 97 - 108 mmol/L    CO2 28 21 - 32 mmol/L    Anion gap 4 (L) 5 - 15 mmol/L    Glucose 159 (H) 65 - 100 mg/dL    BUN 11 6 - 20 MG/DL    Creatinine 0.85 0.70 - 1.30 MG/DL    BUN/Creatinine ratio 13 12 - 20      GFR est AA >60 >60 ml/min/1.73m2    GFR est non-AA >60 >60 ml/min/1.73m2    Calcium 9.6 8.5 - 10.1 MG/DL    Bilirubin, total 0.8 0.2 - 1.0 MG/DL    ALT (SGPT) 84 (H) 12 - 78 U/L    AST (SGOT) 67 (H) 15 - 37 U/L    Alk.  phosphatase 106 45 - 117 U/L    Protein, total 9.1 (H) 6.4 - 8.2 g/dL    Albumin 3.6 3.5 - 5.0 g/dL    Globulin 5.5 (H) 2.0 - 4.0 g/dL    A-G Ratio 0.7 (L) 1.1 - 2.2     CBC WITH AUTOMATED DIFF    Collection Time: 06/27/21  4:02 PM   Result Value Ref Range    WBC 9.4 4.1 - 11.1 K/uL    RBC 4.37 4.10 - 5.70 M/uL    HGB 13.1 12.1 - 17.0 g/dL    HCT 39.5 36.6 - 50.3 %    MCV 90.4 80.0 - 99.0 FL    MCH 30.0 26.0 - 34.0 PG    MCHC 33.2 30.0 - 36.5 g/dL    RDW 12.4 11.5 - 14.5 %    PLATELET 263 (H) 116 - 400 K/uL    MPV 8.8 (L) 8.9 - 12.9 FL    NRBC 0.0 0  WBC    ABSOLUTE NRBC 0.00 0.00 - 0.01 K/uL NEUTROPHILS 61 32 - 75 %    LYMPHOCYTES 25 12 - 49 %    MONOCYTES 10 5 - 13 %    EOSINOPHILS 2 0 - 7 %    BASOPHILS 1 0 - 1 %    IMMATURE GRANULOCYTES 1 (H) 0.0 - 0.5 %    ABS. NEUTROPHILS 5.8 1.8 - 8.0 K/UL    ABS. LYMPHOCYTES 2.3 0.8 - 3.5 K/UL    ABS. MONOCYTES 1.0 0.0 - 1.0 K/UL    ABS. EOSINOPHILS 0.2 0.0 - 0.4 K/UL    ABS. BASOPHILS 0.1 0.0 - 0.1 K/UL    ABS. IMM. GRANS. 0.1 (H) 0.00 - 0.04 K/UL    DF AUTOMATED     SAMPLES BEING HELD    Collection Time: 06/27/21  4:02 PM   Result Value Ref Range    SAMPLES BEING HELD 1RED     COMMENT        Add-on orders for these samples will be processed based on acceptable specimen integrity and analyte stability, which may vary by analyte. IMAGING:  No orders to display         Medications During Visit:  Medications   oxyCODONE-acetaminophen (PERCOCET 10)  mg per tablet 1 Tablet (1 Tablet Oral Given 6/27/21 1613)         IMPRESSION:  1. Right leg swelling        DISPOSITION:  Discharged      Discharge Medication List as of 6/27/2021  5:34 PM           Follow-up Information       Follow up With Specialties Details Why Contact Info    Can Monte MD Orthopedic Surgery  At your prescheduled appointment for Tuesday, June 29 [address] [place] [address] [address], [facility] Go to  As needed, If symptoms worsen, if onset of fever, spreading redness, new or other concerning symptoms [address] [place] [address]  [phone]              The patient is asked to follow-up with their primary care provider and any other physicians as above in the next several days. They are to call tomorrow for an appointment. We have discussed strict return precautions and the patient is asked to return promptly for any increased concerns or worsening of symptoms and for return precautions regarding their symptoms today. They can return to this emergency department or any other emergency department. I have discussed with them results as above and presented opportunity for questions. They verbalize their understanding of the aboveand agreement with care plan. Please note that this dictation was completed with [employer], the computer voice recognition software. Quite often unanticipated grammatical, syntax, homophones, and other interpretive errors are inadvertently transcribed by the computer software. Please disregard these errors. Please excuse any errors that have escaped final proofreading.

## 2021-06-27 NOTE — DISCHARGE INSTRUCTIONS
Please continue with your daily Xarelto as prescribed. Please use only unscented soaps and lotions on your skin; apply a thin amount of the barrier cream and Vaseline mixed together to the right lower lateral ankle pressure area. Elevate legs with pillows behind the knee and ankle when able.

## 2021-07-02 ENCOUNTER — HOME HEALTH ADMISSION (OUTPATIENT)
Dept: HOME HEALTH SERVICES | Facility: HOME HEALTH | Age: 56
End: 2021-07-02

## 2022-03-18 PROBLEM — M17.11 LOCALIZED OSTEOARTHRITIS OF RIGHT KNEE: Status: ACTIVE | Noted: 2021-06-15

## 2022-03-19 PROBLEM — M17.11 PRIMARY OSTEOARTHRITIS OF RIGHT KNEE: Status: ACTIVE | Noted: 2021-06-14

## 2022-07-22 ENCOUNTER — HOSPITAL ENCOUNTER (OUTPATIENT)
Dept: VASCULAR SURGERY | Age: 57
Discharge: HOME | End: 2022-07-22
Attending: FAMILY MEDICINE
Payer: COMMERCIAL

## 2022-07-22 DIAGNOSIS — M79.661 PAIN AND SWELLING OF RIGHT LOWER LEG: ICD-10-CM

## 2022-07-22 DIAGNOSIS — M79.89 PAIN AND SWELLING OF RIGHT LOWER LEG: ICD-10-CM

## 2022-07-22 PROCEDURE — 93971 EXTREMITY STUDY: CPT

## 2023-04-20 ENCOUNTER — OFFICE VISIT (OUTPATIENT)
Dept: ORTHOPEDIC SURGERY | Age: 58
End: 2023-04-20

## 2023-04-20 VITALS — BODY MASS INDEX: 36.11 KG/M2 | WEIGHT: 305.8 LBS | HEIGHT: 77 IN

## 2023-04-20 DIAGNOSIS — M17.11 PRIMARY OSTEOARTHRITIS OF RIGHT KNEE: ICD-10-CM

## 2023-04-20 DIAGNOSIS — M25.562 LEFT KNEE PAIN, UNSPECIFIED CHRONICITY: ICD-10-CM

## 2023-04-20 DIAGNOSIS — M17.12 PRIMARY OSTEOARTHRITIS OF LEFT KNEE: Primary | ICD-10-CM

## 2023-04-20 DIAGNOSIS — Z96.651 STATUS POST RIGHT KNEE REPLACEMENT: ICD-10-CM

## 2023-04-20 RX ORDER — TRIAMCINOLONE ACETONIDE 40 MG/ML
40 INJECTION, SUSPENSION INTRA-ARTICULAR; INTRAMUSCULAR ONCE
Status: COMPLETED | OUTPATIENT
Start: 2023-04-20 | End: 2023-04-20

## 2023-04-20 RX ADMIN — TRIAMCINOLONE ACETONIDE 40 MG: 40 INJECTION, SUSPENSION INTRA-ARTICULAR; INTRAMUSCULAR at 12:04

## 2023-04-20 NOTE — PROGRESS NOTES
Lm Hollingsworth (: 1965) is a 56 y.o. male, patient, here for evaluation of the following chief complaint(s):  Knee Pain (Left knee pain would like to discuss treatment options/cortisone injection /Right knee stiffness - RTKA 06/15/2021)       HPI:    Follow-up bilateral knee issues. Right knee has been replaced. Left knee has severe osteoarthritis. He has had a lot going on in the last several years. He had aortic valve replacement other medical issues. Really does not want to do anything invasive at this point. No Known Allergies    Current Outpatient Medications   Medication Sig    metFORMIN (GLUCOPHAGE) 500 mg tablet Take 1 Tablet by mouth two (2) times daily (with meals). ferrous sulfate (IRON) 325 mg (65 mg iron) EC tablet Take 1 Tablet by mouth every Monday, Wednesday, Friday. omeprazole (PRILOSEC) 20 mg capsule Take 1 Capsule by mouth daily. aspirin delayed-release 81 mg tablet Take 1 Tablet by mouth daily. rosuvastatin (CRESTOR) 40 mg tablet Take 1 Tablet by mouth nightly. warfarin (COUMADIN) 2 mg tablet Take 3 tabs PO daily or as directed  Indications: deep vein thrombosis prevention, post op DVT prevention    metoprolol succinate (TOPROL-XL) 50 mg XL tablet Take 1 Tablet by mouth daily (after breakfast). lovastatin (MEVACOR) 20 mg tablet Take 1 Tablet by mouth nightly. potassium chloride (K-DUR, KLOR-CON) 20 mEq tablet Take 1 Tablet by mouth daily (after breakfast). furosemide (LASIX) 40 mg tablet Take  by mouth daily (after breakfast). cyclobenzaprine (FLEXERIL) 10 mg tablet Take 1 Tablet by mouth three (3) times daily as needed for Muscle Spasm(s). (Patient not taking: Reported on 2023)    senna-docusate (PERICOLACE) 8.6-50 mg per tablet Take 1 Tablet by mouth two (2) times a day.  Indications: constipation, opioid induced constipation (Patient not taking: Reported on 2023)    naloxone [facility]) 4 mg/actuation nasal spray Use 1 spray intranasally, then discard. Repeat with new spray every 2 min as needed for opioid overdose symptoms, alternating nostrils. (Patient not taking: Reported on 4/20/2023)     No current facility-administered medications for this visit. Past Medical History:   Diagnosis Date    Arthritis     Cellulitis     Chronic pain     COVID-19 vaccine series completed 4/2/21, 5/7/21    MODERNA    Diabetes ([facility] [address]     Elevated hemoglobin A1c 06/07/2021    6.4    Hyperlipidemia     Hypertension     Hypertension complicating diabetes ([facility] [address]         Past Surgical History:   Procedure Laterality Date    HX BUNIONECTOMY Right 1985    HX BUNIONECTOMY Left 1983    HX KNEE REPLACEMENT      HX ORTHOPAEDIC Right 1985    FOOT SURGERY TO CURRECT DEFORMITY       Family History   Problem Relation Age of Onset    Diabetes Mother     Heart Disease Father     No Known Problems Sister     No Known Problems Brother     Anesth Problems Neg Hx         Social History     Socioeconomic History    Marital status: UNKNOWN     Spouse name: Not on file    Number of children: Not on file    Years of education: Not on file    Highest education level: Not on file   Occupational History    Not on file   Tobacco Use    Smoking status: Never    Smokeless tobacco: Never   Vaping Use    Vaping Use: Never used   Substance and Sexual Activity    Alcohol use:  Yes     Alcohol/week: 2.0 standard drinks     Types: 2 Cans of beer per week    Drug use: Never    Sexual activity: Not on file   Other Topics Concern    Not on file   Social History Narrative    Not on file     Social Determinants of Health     Financial Resource Strain: Not on file   Food Insecurity: Not on file   Transportation Needs: Not on file   Physical Activity: Not on file   Stress: Not on file   Social Connections: Not on file   Intimate Partner Violence: Not on file   Housing Stability: Not on file       ROS    Positive for: Musculoskeletal  Last edited by Abiel Waller on 4/20/2023 11:30 AM.            Vitals:  Ht 6' 5\" (1.956 m)   Wt 305 lb 12.8 oz (138.7 kg)   BMI 36.26 kg/m²    Body mass index is 36.26 kg/m². PHYSICAL EXAM:  Exam today shows varus alignment of the left knee with about 5 degree flexion traction the right knee is slightly swollen no instability no warmth    Bilateral hips are painless    IMAGING:  XR Results (most recent):  Results from Appointment encounter on 04/20/23    XR KNEE LT 3 V    Narrative  Bilateral knees. 3 views. Left knee tricompartmental bone-on-bone with subchondral cyst and osteophytes. Right knee does not appear loose. Soft tissue swelling is present right total knee. ASSESSMENT/PLAN:  1. Primary osteoarthritis of left knee  -     XR KNEE LT 3 V; Future  -     REFERRAL TO PHYSICAL THERAPY  -     triamcinolone acetonide (KENALOG-40) 40 mg/mL injection 40 mg; 40 mg, Intra artICUlar, ONCE, 1 dose, On Thu 4/20/23 at 1300  2. Left knee pain, unspecified chronicity  -     XR KNEE LT 3 V; Future  -     REFERRAL TO PHYSICAL THERAPY  -     triamcinolone acetonide (KENALOG-40) 40 mg/mL injection 40 mg; 40 mg, Intra artICUlar, ONCE, 1 dose, On Thu 4/20/23 at 1300  3. Status post right knee replacement  -     XR KNEE LT 3 V; Future  -     REFERRAL TO PHYSICAL THERAPY  -     triamcinolone acetonide (KENALOG-40) 40 mg/mL injection 40 mg; 40 mg, Intra artICUlar, ONCE, 1 dose, On Thu 4/20/23 at 1300  4. Primary osteoarthritis of right knee  -     XR KNEE LT 3 V; Future  -     REFERRAL TO PHYSICAL THERAPY  -     triamcinolone acetonide (KENALOG-40) 40 mg/mL injection 40 mg; 40 mg, Intra artICUlar, ONCE, 1 dose, On Thu 4/20/23 at 1300    Conservative treatments. Injected left knee. Started PT. Will observe along. When he has worsening of his symptoms he should follow-up for repeat injection and further discussion.   Medications physician to prescribe by his primary care physician due to multiple medical problems    Discussed risks/benefits of cortisone injection and patient gave verbal consent. Under sterile conditions, the left was injected with   1 cc 40 mg cc, intra-articularly, tolerated the procedure well. An electronic signature was used to authenticate this note.   --Sonido Orr MD

## 2023-06-01 PROBLEM — R60.9 EDEMA: Status: ACTIVE | Noted: 2023-06-01

## 2023-06-01 PROBLEM — R60.0 EDEMA OF LOWER EXTREMITY: Status: ACTIVE | Noted: 2023-06-01

## 2023-06-01 PROBLEM — E11.69 HYPERLIPIDEMIA ASSOCIATED WITH TYPE 2 DIABETES MELLITUS (HCC): Status: ACTIVE | Noted: 2023-06-01

## 2023-06-01 PROBLEM — E78.5 HYPERLIPIDEMIA ASSOCIATED WITH TYPE 2 DIABETES MELLITUS (HCC): Status: ACTIVE | Noted: 2023-06-01

## 2023-06-01 PROBLEM — I15.2 HYPERTENSION COMPLICATING DIABETES (HCC): Status: ACTIVE | Noted: 2023-06-01

## 2023-06-01 PROBLEM — E11.9 HYPERTENSION COMPLICATING DIABETES (HCC): Status: ACTIVE | Noted: 2023-06-01

## 2023-06-01 PROBLEM — E66.01 SEVERE OBESITY (BMI 35.0-39.9) WITH COMORBIDITY (HCC): Status: ACTIVE | Noted: 2023-06-01

## 2023-06-01 PROBLEM — I10 HYPERTENSION COMPLICATING DIABETES (HCC): Status: ACTIVE | Noted: 2023-06-01

## 2023-06-01 PROBLEM — E11.59 HYPERTENSION COMPLICATING DIABETES (HCC): Status: ACTIVE | Noted: 2023-06-01

## 2023-06-01 PROBLEM — D68.9 COAGULATION DEFECT, UNSPECIFIED (HCC): Status: ACTIVE | Noted: 2023-06-01

## 2023-06-08 PROBLEM — R80.9 PROTEINURIA: Status: ACTIVE | Noted: 2023-06-08

## 2023-11-22 NOTE — PROGRESS NOTES
OCCUPATIONAL THERAPY EVALUATION/DISCHARGE  Patient: Keri Grijalva (71 y.o. male)  Date: 6/16/2021  Primary Diagnosis: Localized osteoarthritis of right knee [M17.11]  Procedure(s) (LRB):  RIGHT TOTAL KNEE ARTHROPLASTY WITH ARVIND PERSONA IMPLANT SYSTEM (Right) 1 Day Post-Op   Precautions:   Fall, WBAT    ASSESSMENT  Based on the objective data described below, the patient presents with decreased functional mobility and activity tolerance s/p R TKA. Pt was independent PTA and living with his sister and mother (hospice). Today, pt was able to complete grooming and bathing while seated with setup. Pt verbalizes good understanding of education provided on safety with good insight into deficits. Given pt's current level of function and level of home support, pt is cleared for discharge from OT standpoint. Current Level of Function (ADLs/self-care): CGA-setup    Functional Outcome Measure: The patient scored 55/100 on the Barthel Index outcome measure which is indicative of 45% impairment in self care skills. Other factors to consider for discharge: home support     PLAN :  Recommend with staff: Adalid Kulkarni for meals, assistance to bathroom    Recommendation for discharge: (in order for the patient to meet his/her long term goals)  No skilled occupational therapy/ follow up rehabilitation needs identified at this time. This discharge recommendation:  Has not yet been discussed the attending provider and/or case management    IF patient discharges home will need the following DME: ensure DME he owns is appropriate for bariatric weight       SUBJECTIVE:   Patient stated Mckayla Still took care of my dad after his knee surgery.     OBJECTIVE DATA SUMMARY:   HISTORY:   Past Medical History:   Diagnosis Date    Arthritis     Chronic pain     COVID-19 vaccine series completed 4/2/21, 5/7/21    MODERNA    Elevated hemoglobin A1c 06/07/2021    6.4    Hypertension      Past Surgical History:   Procedure Laterality Date    HX BUNIONECTOMY Right 1985    HX BUNIONECTOMY Left 1983    HX ORTHOPAEDIC Right 1985    FOOT SURGERY TO CURRECT DEFORMITY       Prior Level of Function/Environment/Context: Independent  Expanded or extensive additional review of patient history:   Home Situation  Home Environment: Private residence  # Steps to Enter: 4  Rails to Enter: Yes  Hand Rails : Bilateral  One/Two Story Residence: Two story, live on 1st floor  Living Alone: No  Support Systems: Family member(s), Parent  Patient Expects to be Discharged to[de-identified] House  Current DME Used/Available at Home: Cane, straight, Walker, rolling, [address], [address], quad, Safety frame toliet, Grab bars, Raised toilet seat  Tub or Shower Type: Tub/Shower combination    Hand dominance: Right    EXAMINATION OF PERFORMANCE DEFICITS:  Cognitive/Behavioral Status:  Neurologic State: Alert; Appropriate for age  Orientation Level: Oriented X4  Cognition: Appropriate decision making; Appropriate for age attention/concentration; Appropriate safety awareness; Follows commands  Perception: Appears intact  Perseveration: No perseveration noted  Safety/Judgement: Awareness of environment;Good awareness of safety precautions; Home safety    Skin: appear intact    Edema: none noted    Hearing: Auditory  Auditory Impairment: None    Vision/Perceptual:                Range of Motion:  AROM: Generally decreased, functional  PROM: Generally decreased, functional                      Strength:  *Strength: Generally decreased, functional                Coordination:  Coordination: Within functional limits  Fine Motor Skills-Upper: Left Intact; Right Intact    Gross Motor Skills-Upper: Left Intact; Right Intact    Tone & Sensation:  Tone: Normal  Sensation: Intact                      Balance:  Sitting: Intact  Standing:  (remained seated in chair )  Standing - Static: Good;Constant support  Standing - Dynamic : Good;Constant support    Functional Mobility and Transfers for ADLs:  Bed Mobility:  Supine to Sit: (received in chair)  Sit to Supine:  (remained seated in chair )    Transfers:  Sit to Stand: Contact guard assistance  Stand to Sit: Contact guard assistance  Bed to Chair: Contact guard assistance    ADL Assessment:  Feeding: Independent    Oral Facial Hygiene/Grooming: Independent    Bathing: Setup    Upper Body Dressing: Setup    Lower Body Dressing: Setup    Toileting: Stand by assistance                ADL Intervention and task modifications:   Patient received sitting in chair and agreeable to participating in therapy. He was able to brush his teeth and bathe while seated with setup. Education provided on home safety and energy conservation techniques. Pt verbalized good understanding of safety with good insight into deficits. Pt left sitting in chair with PT coming to work with him soon. Grooming  Grooming Assistance: Independent  Position Performed: Seated in chair  Brushing Teeth: Independent    Upper Body Bathing  Bathing Assistance: Set-up  Position Performed: Seated in chair    Lower Body Bathing  Bathing Assistance: Set-up  Position Performed: Seated in chair    [facility]: Set-up    Lower Body [address]: Set-up  Underpants: Set-up  Pants With Elastic Waist: Set-up  Position Performed: Seated in chair         Cognitive Retraining  Safety/Judgement: Awareness of environment;Good awareness of safety precautions; Home safety      Functional Measure:  Barthel Index:    Bathin  Bladder: 10  Bowels: 10  Groomin  Dressin  Feeding: 10  Mobility: 0  Stairs: 0  Toilet Use: 5  Transfer (Bed to Chair and Back): 10  Total: 55/100        The Barthel ADL Index: Guidelines  1. The index should be used as a record of what a patient does, not as a record of what a patient could do.   2. The main aim is to establish degree of independence from any help, physical or verbal, however minor and for whatever reason. 3. The need for supervision renders the patient not independent. 4. A patient's performance should be established using the best available evidence. Asking the patient, friends/relatives and nurses are the usual sources, but direct observation and common sense are also important. However direct testing is not needed. 5. Usually the patient's performance over the preceding 24-48 hours is important, but occasionally longer periods will be relevant. 6. Middle categories imply that the patient supplies over 50 per cent of the effort. 7. Use of aids to be independent is allowed. Mariela Castro., Barthel, D.W. (5673). Functional evaluation: the Barthel Index. [facility] (14)2. LORIE Macias, Dari Barriga., Philly Saucedo., [place], [address] [place] [address] (1999). Measuring the change indisability after inpatient rehabilitation; comparison of the responsiveness of the Barthel Index and Functional [place] Measure. Journal of Neurology, Neurosurgery, and Psychiatry, 66(4), 977-759. AUSTEN Haque.A, DOUGIE Ken, & Tia Jeffries M.A. (2004.) Assessment of post-stroke quality of life in cost-effectiveness studies: The usefulness of the Barthel Index and the EuroQoL-5D. Quality of Life Research, 15, 835-17       Occupational Therapy Evaluation Charge Determination   History Examination Decision-Making   LOW Complexity : Brief history review  MEDIUM Complexity : 3-5 performance deficits relating to physical, cognitive , or psychosocial skils that result in activity limitations and / or participation restrictions MEDIUM Complexity : Patient may present with comorbidities that affect occupational performnce.  Miniml to moderate modification of tasks or assistance (eg, physical or verbal ) with assesment(s) is necessary to enable patient to complete evaluation       Based on the above components, the patient evaluation is determined to be of the following complexity level: LOW   Pain Rating:  Pain not rated    Activity Tolerance:   Fair    After treatment patient left in no apparent distress:    Sitting in chair and Call bell within reach    COMMUNICATION/EDUCATION:   The patients plan of care was discussed with: Physical therapist.     Thank you for this referral.  Fernando Alejandro    Regarding student involvement in patient care:  A student participated in this treatment session. Per CMS Medicare statements and AOTA guidelines I certify that the following was true:  1. I was present and directly observed the entire session. 2. I made all skilled judgments and clinical decisions regarding care. 3. I am the practitioner responsible for assessment, treatment, and documentation. Patient

## 2023-11-29 ENCOUNTER — HOSPITAL ENCOUNTER (OUTPATIENT)
Facility: HOSPITAL | Age: 58
Discharge: HOME | End: 2023-12-02
Attending: INTERNAL MEDICINE
Payer: MEDICARE

## 2023-11-29 DIAGNOSIS — N17.9 ACUTE RENAL FAILURE, UNSPECIFIED ACUTE RENAL FAILURE TYPE (HCC): ICD-10-CM

## 2023-11-29 PROCEDURE — 76770 US EXAM ABDO BACK WALL COMP: CPT

## 2024-04-06 ENCOUNTER — HOSPITAL ENCOUNTER (EMERGENCY)
Facility: HOSPITAL | Age: 59
Discharge: HOME | End: 2024-04-06
Attending: STUDENT IN AN ORGANIZED HEALTH CARE EDUCATION/TRAINING PROGRAM
Payer: MEDICARE

## 2024-04-06 ENCOUNTER — APPOINTMENT (OUTPATIENT)
Facility: HOSPITAL | Age: 59
End: 2024-04-06
Payer: MEDICARE

## 2024-04-06 VITALS
TEMPERATURE: 98.2 F | SYSTOLIC BLOOD PRESSURE: 132 MMHG | RESPIRATION RATE: 16 BRPM | BODY MASS INDEX: 37.19 KG/M2 | DIASTOLIC BLOOD PRESSURE: 64 MMHG | OXYGEN SATURATION: 99 % | HEART RATE: 79 BPM | WEIGHT: 315 LBS | HEIGHT: 77 IN

## 2024-04-06 DIAGNOSIS — M79.89 RIGHT LEG SWELLING: Primary | ICD-10-CM

## 2024-04-06 DIAGNOSIS — N18.9 CHRONIC RENAL IMPAIRMENT, UNSPECIFIED CKD STAGE: ICD-10-CM

## 2024-04-06 LAB
ALBUMIN SERPL-MCNC: 3.2 G/DL (ref 3.5–5)
ALBUMIN/GLOB SERPL: 0.5 (ref 1.1–2.2)
ALP SERPL-CCNC: 91 U/L (ref 45–117)
ALT SERPL-CCNC: 21 U/L (ref 12–78)
ANION GAP SERPL CALC-SCNC: 5 MMOL/L (ref 5–15)
APPEARANCE UR: ABNORMAL
AST SERPL-CCNC: 19 U/L (ref 15–37)
BACTERIA URNS QL MICRO: NEGATIVE /HPF
BASOPHILS # BLD: 0.1 K/UL (ref 0–0.1)
BASOPHILS NFR BLD: 1 % (ref 0–1)
BILIRUB SERPL-MCNC: 0.6 MG/DL (ref 0.2–1)
BILIRUB UR QL: NEGATIVE
BUN SERPL-MCNC: 46 MG/DL (ref 6–20)
BUN/CREAT SERPL: 18 (ref 12–20)
CALCIUM SERPL-MCNC: 9.8 MG/DL (ref 8.5–10.1)
CHLORIDE SERPL-SCNC: 104 MMOL/L (ref 97–108)
CO2 SERPL-SCNC: 25 MMOL/L (ref 21–32)
COLOR UR: ABNORMAL
COMMENT:: NORMAL
CREAT SERPL-MCNC: 2.59 MG/DL (ref 0.7–1.3)
DIFFERENTIAL METHOD BLD: ABNORMAL
ECHO BSA: 2.94 M2
EKG ATRIAL RATE: 71 BPM
EKG DIAGNOSIS: NORMAL
EKG P AXIS: 38 DEGREES
EKG P-R INTERVAL: 160 MS
EKG Q-T INTERVAL: 468 MS
EKG QRS DURATION: 172 MS
EKG QTC CALCULATION (BAZETT): 508 MS
EKG R AXIS: -40 DEGREES
EKG T AXIS: 124 DEGREES
EKG VENTRICULAR RATE: 71 BPM
EOSINOPHIL # BLD: 0.1 K/UL (ref 0–0.4)
EOSINOPHIL NFR BLD: 2 % (ref 0–7)
EPITH CASTS URNS QL MICRO: ABNORMAL /LPF
ERYTHROCYTE [DISTWIDTH] IN BLOOD BY AUTOMATED COUNT: 15.9 % (ref 11.5–14.5)
EST. AVERAGE GLUCOSE BLD GHB EST-MCNC: 143 MG/DL
GLOBULIN SER CALC-MCNC: 5.9 G/DL (ref 2–4)
GLUCOSE SERPL-MCNC: 123 MG/DL (ref 65–100)
GLUCOSE UR STRIP.AUTO-MCNC: NEGATIVE MG/DL
HBA1C MFR BLD: 6.6 % (ref 4–5.6)
HCT VFR BLD AUTO: 32.7 % (ref 36.6–50.3)
HGB BLD-MCNC: 10.6 G/DL (ref 12.1–17)
HGB UR QL STRIP: ABNORMAL
IMM GRANULOCYTES # BLD AUTO: 0.1 K/UL (ref 0–0.04)
IMM GRANULOCYTES NFR BLD AUTO: 1 % (ref 0–0.5)
KETONES UR QL STRIP.AUTO: NEGATIVE MG/DL
LEUKOCYTE ESTERASE UR QL STRIP.AUTO: NEGATIVE
LIPASE SERPL-CCNC: 87 U/L (ref 13–75)
LYMPHOCYTES # BLD: 1.7 K/UL (ref 0.8–3.5)
LYMPHOCYTES NFR BLD: 21 % (ref 12–49)
MAGNESIUM SERPL-MCNC: 2.2 MG/DL (ref 1.6–2.4)
MCH RBC QN AUTO: 27.1 PG (ref 26–34)
MCHC RBC AUTO-ENTMCNC: 32.4 G/DL (ref 30–36.5)
MCV RBC AUTO: 83.6 FL (ref 80–99)
MONOCYTES # BLD: 0.9 K/UL (ref 0–1)
MONOCYTES NFR BLD: 12 % (ref 5–13)
NEUTS SEG # BLD: 5 K/UL (ref 1.8–8)
NEUTS SEG NFR BLD: 63 % (ref 32–75)
NITRITE UR QL STRIP.AUTO: NEGATIVE
NRBC # BLD: 0 K/UL (ref 0–0.01)
NRBC BLD-RTO: 0 PER 100 WBC
NT PRO BNP: 245 PG/ML
PH UR STRIP: 5 (ref 5–8)
PLATELET # BLD AUTO: 366 K/UL (ref 150–400)
PMV BLD AUTO: 8.8 FL (ref 8.9–12.9)
POTASSIUM SERPL-SCNC: 3.7 MMOL/L (ref 3.5–5.1)
PROT SERPL-MCNC: 9.1 G/DL (ref 6.4–8.2)
PROT UR STRIP-MCNC: 100 MG/DL
RBC # BLD AUTO: 3.91 M/UL (ref 4.1–5.7)
RBC #/AREA URNS HPF: ABNORMAL /HPF (ref 0–5)
SODIUM SERPL-SCNC: 134 MMOL/L (ref 136–145)
SP GR UR REFRACTOMETRY: 1.01 (ref 1–1.03)
SPECIMEN HOLD: NORMAL
SPECIMEN HOLD: NORMAL
TROPONIN I SERPL HS-MCNC: 10 NG/L (ref 0–76)
UROBILINOGEN UR QL STRIP.AUTO: 0.2 EU/DL (ref 0.2–1)
WBC # BLD AUTO: 7.9 K/UL (ref 4.1–11.1)
WBC URNS QL MICRO: ABNORMAL /HPF (ref 0–4)

## 2024-04-06 PROCEDURE — 84484 ASSAY OF TROPONIN QUANT: CPT

## 2024-04-06 PROCEDURE — 93971 EXTREMITY STUDY: CPT

## 2024-04-06 PROCEDURE — 93005 ELECTROCARDIOGRAM TRACING: CPT | Performed by: EMERGENCY MEDICINE

## 2024-04-06 PROCEDURE — 85025 COMPLETE CBC W/AUTO DIFF WBC: CPT

## 2024-04-06 PROCEDURE — 81001 URINALYSIS AUTO W/SCOPE: CPT

## 2024-04-06 PROCEDURE — 83690 ASSAY OF LIPASE: CPT

## 2024-04-06 PROCEDURE — 99284 EMERGENCY DEPT VISIT MOD MDM: CPT

## 2024-04-06 PROCEDURE — 36415 COLL VENOUS BLD VENIPUNCTURE: CPT

## 2024-04-06 PROCEDURE — 83880 ASSAY OF NATRIURETIC PEPTIDE: CPT

## 2024-04-06 PROCEDURE — 83036 HEMOGLOBIN GLYCOSYLATED A1C: CPT

## 2024-04-06 PROCEDURE — 80053 COMPREHEN METABOLIC PANEL: CPT

## 2024-04-06 PROCEDURE — 83735 ASSAY OF MAGNESIUM: CPT

## 2024-04-06 ASSESSMENT — ENCOUNTER SYMPTOMS
BACK PAIN: 0
COLOR CHANGE: 0
ABDOMINAL PAIN: 0
TROUBLE SWALLOWING: 0
COUGH: 0
SHORTNESS OF BREATH: 0

## 2024-04-06 ASSESSMENT — PAIN SCALES - GENERAL: PAINLEVEL_OUTOF10: 0

## 2024-04-06 ASSESSMENT — PAIN - FUNCTIONAL ASSESSMENT: PAIN_FUNCTIONAL_ASSESSMENT: NONE - DENIES PAIN

## 2024-04-06 NOTE — DISCHARGE INSTRUCTIONS
Please monitor your fluid intake.  Aim towards 8 ounces every hour that you are awake with every other drink being water.  Recommend close follow-up with your PCP for lab rechecks and kidney specialist referral.

## 2024-04-06 NOTE — ED TRIAGE NOTES
Pt arrives ambulatory to ED for right leg swelling and weight gain for \"a couple weeks\". Pt takes lasix daily. Pt also endorses decreased urinary frequency. Endorses intermittent chest pressure at night, not currently experiencing.     Pt sees Dr Rodriguez for cardiology

## 2024-04-06 NOTE — ED PROVIDER NOTES
[facility] EMERGENCY DEP  EMERGENCY DEPARTMENT ENCOUNTER      Pt Name: Javier Ugalde  MRN: [id number]  Birthdate 1965  Date of evaluation: 4/6/2024  Provider: JIM Tsai NP    CHIEF COMPLAINT       Chief Complaint   Patient presents with    Leg Swelling         HISTORY OF PRESENT ILLNESS   (Location/Symptom, Timing/Onset, Context/Setting, Quality, Duration, Modifying Factors, Severity)  Note limiting factors.   HPI patient is a 58-year-old male with past medical history significant for chronic edema of the right lower extremity, localized osteoarthritis of the right knee, coagulation defect, obesity, proteinuria, type 2 diabetes, hypertension, and chronic pain who presents to the ED for evaluation of his right leg swelling, intermittent chest pain and urinary changes for several weeks.  He is under the care of of topical orthopedics for his right knee and presently taking physical therapy.  He denies any falls, direct injury or blunt trauma.Denies fever, cold symptoms,cough, headache, neck pain, visual changes, focal weakness or rash. Denies any difficulty breathing, difficulty swallowing or SOB. Denies any nausea, vomiting or diarrhea. Pt. Reports eating breakfast prior to arrival.  He states he is taking his previously prescribed medications as ordered.  He states that he is presently not on any medications for his diabetes.    Old charts reviewed.          Review of External Medical Records:     Nursing Notes were reviewed.    REVIEW OF SYSTEMS    (2-9 systems for level 4, 10 or more for level 5)     Review of Systems   Constitutional:  Negative for activity change, appetite change, fever and unexpected weight change.   HENT:  Negative for trouble swallowing.    Eyes:  Negative for visual disturbance.   Respiratory:  Negative for cough and shortness of breath.    Cardiovascular:  Positive for chest pain and leg swelling.        Episodic chest pain   Gastrointestinal:  Negative for abdominal pain.  Index  [JM] Edelmira Mitchell MD     Patient has been reexamined and denies any further complaints of pain or discomfort.  Discussed his lab results including his elevated BUN and creatinine.  He states that he does see Dr. Griffin Pringle (nephrology) and has a follow-up appointment in May.  He was given copies of his labs and CT report for his follow-up appointment.  Encouraged to keep a food and fluid diary.  No prescriptions were written.  Discussed plan of care with Dr. Mitchell.     12:09 PM  Patient's results and plan of care have been reviewed with him.  Patient has verbally conveyed his understanding and agreement of his signs, symptoms, diagnosis, treatment and prognosis and additionally agrees to follow up as recommended or return to the Emergency Room should his condition change prior to follow-up.  Discharge instructions have also been provided to the patient with some educational information regarding his diagnosis as well a list of reasons why he would want to return to the ER prior to his follow-up appointment should his condition change.          CONSULTS:  None    PROCEDURES:  Unless otherwise noted below, none     Procedures      FINAL IMPRESSION      1. Right leg swelling    2. Chronic renal impairment, unspecified CKD stage          DISPOSITION/PLAN   DISPOSITION Decision To Discharge 04/06/2024 11:45:59 AM      PATIENT REFERRED TO:  Brielle Muller MD  [address] [place] [address]  [id number]  Martita SAUL [address] [phone]    In 2 days  re-evaluation      DISCHARGE MEDICATIONS:  Discharge Medication List as of 4/6/2024 11:48 AM            (Please note that portions of this note were completed with a voice recognition program.  Efforts were made to edit the dictations but occasionally words are mis-transcribed.)    JIM Tsai NP (electronically signed)  Emergency Attending Physician / Physician Assistant / Nurse Practitioner             Beatrice Armenta APRN - NP  04/06/24 [id number]

## 2024-04-08 LAB — ECHO BSA: 2.94 M2

## 2024-05-16 ENCOUNTER — HOSPITAL ENCOUNTER (OUTPATIENT)
Age: 59
Discharge: HOME | End: 2024-05-16
Attending: FAMILY MEDICINE
Payer: MEDICARE

## 2024-05-16 DIAGNOSIS — M79.669 CALF PAIN, UNSPECIFIED LATERALITY: ICD-10-CM

## 2024-05-16 PROCEDURE — 93971 EXTREMITY STUDY: CPT

## 2024-05-20 ENCOUNTER — HOSPITAL ENCOUNTER (INPATIENT)
Facility: HOSPITAL | Age: 59
LOS: 15 days | Discharge: HOME HEALTH | DRG: 872 | End: 2024-06-04
Attending: STUDENT IN AN ORGANIZED HEALTH CARE EDUCATION/TRAINING PROGRAM | Admitting: INTERNAL MEDICINE
Payer: MEDICARE

## 2024-05-20 ENCOUNTER — APPOINTMENT (OUTPATIENT)
Facility: HOSPITAL | Age: 59
DRG: 872 | End: 2024-05-20
Payer: MEDICARE

## 2024-05-20 DIAGNOSIS — N17.9 ACUTE RENAL FAILURE SUPERIMPOSED ON CHRONIC KIDNEY DISEASE, UNSPECIFIED ACUTE RENAL FAILURE TYPE, UNSPECIFIED CKD STAGE (HCC): ICD-10-CM

## 2024-05-20 DIAGNOSIS — N18.9 ACUTE RENAL FAILURE SUPERIMPOSED ON CHRONIC KIDNEY DISEASE, UNSPECIFIED ACUTE RENAL FAILURE TYPE, UNSPECIFIED CKD STAGE (HCC): ICD-10-CM

## 2024-05-20 DIAGNOSIS — L03.115 CELLULITIS OF RIGHT LOWER EXTREMITY: Primary | ICD-10-CM

## 2024-05-20 LAB
ALBUMIN SERPL-MCNC: 2.8 G/DL (ref 3.5–5)
ALBUMIN/GLOB SERPL: 0.5 (ref 1.1–2.2)
ALP SERPL-CCNC: 77 U/L (ref 45–117)
ALT SERPL-CCNC: 31 U/L (ref 12–78)
ANION GAP SERPL CALC-SCNC: 11 MMOL/L (ref 5–15)
AST SERPL-CCNC: 53 U/L (ref 15–37)
BASOPHILS # BLD: 0.1 K/UL (ref 0–0.1)
BASOPHILS NFR BLD: 0 % (ref 0–1)
BILIRUB SERPL-MCNC: 1.3 MG/DL (ref 0.2–1)
BUN SERPL-MCNC: 51 MG/DL (ref 6–20)
BUN/CREAT SERPL: 10 (ref 12–20)
CALCIUM SERPL-MCNC: 10.4 MG/DL (ref 8.5–10.1)
CHLORIDE SERPL-SCNC: 97 MMOL/L (ref 97–108)
CO2 SERPL-SCNC: 22 MMOL/L (ref 21–32)
CREAT SERPL-MCNC: 5.33 MG/DL (ref 0.7–1.3)
D DIMER PPP FEU-MCNC: 2.97 MG/L FEU (ref 0–0.65)
DIFFERENTIAL METHOD BLD: ABNORMAL
EOSINOPHIL # BLD: 0 K/UL (ref 0–0.4)
EOSINOPHIL NFR BLD: 0 % (ref 0–7)
ERYTHROCYTE [DISTWIDTH] IN BLOOD BY AUTOMATED COUNT: 14.7 % (ref 11.5–14.5)
GLOBULIN SER CALC-MCNC: 6 G/DL (ref 2–4)
GLUCOSE BLD STRIP.AUTO-MCNC: 119 MG/DL (ref 65–117)
GLUCOSE SERPL-MCNC: 124 MG/DL (ref 65–100)
HCT VFR BLD AUTO: 25.7 % (ref 36.6–50.3)
HGB BLD-MCNC: 8.4 G/DL (ref 12.1–17)
IMM GRANULOCYTES # BLD AUTO: 0.2 K/UL (ref 0–0.04)
IMM GRANULOCYTES NFR BLD AUTO: 1 % (ref 0–0.5)
INR PPP: 2.3 (ref 0.9–1.1)
LACTATE SERPL-SCNC: 1.7 MMOL/L (ref 0.4–2)
LYMPHOCYTES # BLD: 1.1 K/UL (ref 0.8–3.5)
LYMPHOCYTES NFR BLD: 7 % (ref 12–49)
MCH RBC QN AUTO: 26.8 PG (ref 26–34)
MCHC RBC AUTO-ENTMCNC: 32.7 G/DL (ref 30–36.5)
MCV RBC AUTO: 82.1 FL (ref 80–99)
MONOCYTES # BLD: 1.4 K/UL (ref 0–1)
MONOCYTES NFR BLD: 8 % (ref 5–13)
NEUTS SEG # BLD: 13.8 K/UL (ref 1.8–8)
NEUTS SEG NFR BLD: 84 % (ref 32–75)
NRBC # BLD: 0 K/UL (ref 0–0.01)
NRBC BLD-RTO: 0 PER 100 WBC
PLATELET # BLD AUTO: 422 K/UL (ref 150–400)
PMV BLD AUTO: 9.1 FL (ref 8.9–12.9)
POTASSIUM SERPL-SCNC: 3.1 MMOL/L (ref 3.5–5.1)
PROCALCITONIN SERPL-MCNC: 0.75 NG/ML
PROT SERPL-MCNC: 8.8 G/DL (ref 6.4–8.2)
PROTHROMBIN TIME: 22.9 SEC (ref 9–11.1)
RBC # BLD AUTO: 3.13 M/UL (ref 4.1–5.7)
SERVICE CMNT-IMP: ABNORMAL
SODIUM SERPL-SCNC: 130 MMOL/L (ref 136–145)
TROPONIN I SERPL HS-MCNC: 23 NG/L (ref 0–76)
WBC # BLD AUTO: 16.6 K/UL (ref 4.1–11.1)

## 2024-05-20 PROCEDURE — 6370000000 HC RX 637 (ALT 250 FOR IP): Performed by: INTERNAL MEDICINE

## 2024-05-20 PROCEDURE — A9540 TC99M MAA: HCPCS | Performed by: STUDENT IN AN ORGANIZED HEALTH CARE EDUCATION/TRAINING PROGRAM

## 2024-05-20 PROCEDURE — 82962 GLUCOSE BLOOD TEST: CPT

## 2024-05-20 PROCEDURE — 85025 COMPLETE CBC W/AUTO DIFF WBC: CPT

## 2024-05-20 PROCEDURE — 80053 COMPREHEN METABOLIC PANEL: CPT

## 2024-05-20 PROCEDURE — 6360000002 HC RX W HCPCS: Performed by: STUDENT IN AN ORGANIZED HEALTH CARE EDUCATION/TRAINING PROGRAM

## 2024-05-20 PROCEDURE — 78580 LUNG PERFUSION IMAGING: CPT

## 2024-05-20 PROCEDURE — 71046 X-RAY EXAM CHEST 2 VIEWS: CPT

## 2024-05-20 PROCEDURE — 99285 EMERGENCY DEPT VISIT HI MDM: CPT

## 2024-05-20 PROCEDURE — 2580000003 HC RX 258: Performed by: STUDENT IN AN ORGANIZED HEALTH CARE EDUCATION/TRAINING PROGRAM

## 2024-05-20 PROCEDURE — 87040 BLOOD CULTURE FOR BACTERIA: CPT

## 2024-05-20 PROCEDURE — 83605 ASSAY OF LACTIC ACID: CPT

## 2024-05-20 PROCEDURE — 1100000000 HC RM PRIVATE

## 2024-05-20 PROCEDURE — 85379 FIBRIN DEGRADATION QUANT: CPT

## 2024-05-20 PROCEDURE — 84484 ASSAY OF TROPONIN QUANT: CPT

## 2024-05-20 PROCEDURE — 36415 COLL VENOUS BLD VENIPUNCTURE: CPT

## 2024-05-20 PROCEDURE — 3430000000 HC RX DIAGNOSTIC RADIOPHARMACEUTICAL: Performed by: STUDENT IN AN ORGANIZED HEALTH CARE EDUCATION/TRAINING PROGRAM

## 2024-05-20 PROCEDURE — 85610 PROTHROMBIN TIME: CPT

## 2024-05-20 PROCEDURE — 84145 PROCALCITONIN (PCT): CPT

## 2024-05-20 PROCEDURE — 93005 ELECTROCARDIOGRAM TRACING: CPT | Performed by: STUDENT IN AN ORGANIZED HEALTH CARE EDUCATION/TRAINING PROGRAM

## 2024-05-20 PROCEDURE — 2580000003 HC RX 258: Performed by: INTERNAL MEDICINE

## 2024-05-20 RX ORDER — SODIUM CHLORIDE, SODIUM LACTATE, POTASSIUM CHLORIDE, AND CALCIUM CHLORIDE .6; .31; .03; .02 G/100ML; G/100ML; G/100ML; G/100ML
500 INJECTION, SOLUTION INTRAVENOUS ONCE
Status: COMPLETED | OUTPATIENT
Start: 2024-05-20 | End: 2024-05-20

## 2024-05-20 RX ORDER — ACETAMINOPHEN 325 MG/1
650 TABLET ORAL EVERY 6 HOURS PRN
Status: DISCONTINUED | OUTPATIENT
Start: 2024-05-20 | End: 2024-06-04 | Stop reason: HOSPADM

## 2024-05-20 RX ORDER — POLYETHYLENE GLYCOL 3350 17 G/17G
17 POWDER, FOR SOLUTION ORAL DAILY PRN
Status: DISCONTINUED | OUTPATIENT
Start: 2024-05-20 | End: 2024-06-02 | Stop reason: SDUPTHER

## 2024-05-20 RX ORDER — SODIUM CHLORIDE 0.9 % (FLUSH) 0.9 %
5-40 SYRINGE (ML) INJECTION PRN
Status: DISCONTINUED | OUTPATIENT
Start: 2024-05-20 | End: 2024-06-04 | Stop reason: HOSPADM

## 2024-05-20 RX ORDER — SODIUM CHLORIDE 9 MG/ML
INJECTION, SOLUTION INTRAVENOUS CONTINUOUS
Status: DISPENSED | OUTPATIENT
Start: 2024-05-20 | End: 2024-05-21

## 2024-05-20 RX ORDER — ONDANSETRON 2 MG/ML
4 INJECTION INTRAMUSCULAR; INTRAVENOUS EVERY 6 HOURS PRN
Status: DISCONTINUED | OUTPATIENT
Start: 2024-05-20 | End: 2024-06-04 | Stop reason: HOSPADM

## 2024-05-20 RX ORDER — METOPROLOL SUCCINATE 50 MG/1
50 TABLET, EXTENDED RELEASE ORAL DAILY
Status: DISCONTINUED | OUTPATIENT
Start: 2024-05-21 | End: 2024-06-04 | Stop reason: HOSPADM

## 2024-05-20 RX ORDER — ROSUVASTATIN CALCIUM 40 MG/1
40 TABLET, COATED ORAL DAILY
Status: DISCONTINUED | OUTPATIENT
Start: 2024-05-21 | End: 2024-05-21

## 2024-05-20 RX ORDER — ACETAMINOPHEN 650 MG/1
650 SUPPOSITORY RECTAL EVERY 6 HOURS PRN
Status: DISCONTINUED | OUTPATIENT
Start: 2024-05-20 | End: 2024-06-04 | Stop reason: HOSPADM

## 2024-05-20 RX ORDER — WARFARIN SODIUM 5 MG/1
7.5 TABLET ORAL
Status: COMPLETED | OUTPATIENT
Start: 2024-05-20 | End: 2024-05-20

## 2024-05-20 RX ORDER — PANTOPRAZOLE SODIUM 40 MG/1
40 TABLET, DELAYED RELEASE ORAL
Status: DISCONTINUED | OUTPATIENT
Start: 2024-05-21 | End: 2024-06-04 | Stop reason: HOSPADM

## 2024-05-20 RX ORDER — ALBUTEROL SULFATE 2.5 MG/3ML
2.5 SOLUTION RESPIRATORY (INHALATION) EVERY 6 HOURS PRN
Status: DISCONTINUED | OUTPATIENT
Start: 2024-05-20 | End: 2024-06-04 | Stop reason: HOSPADM

## 2024-05-20 RX ORDER — POTASSIUM CHLORIDE 750 MG/1
40 TABLET, FILM COATED, EXTENDED RELEASE ORAL ONCE
Status: COMPLETED | OUTPATIENT
Start: 2024-05-20 | End: 2024-05-20

## 2024-05-20 RX ORDER — SODIUM CHLORIDE 9 MG/ML
INJECTION, SOLUTION INTRAVENOUS PRN
Status: DISCONTINUED | OUTPATIENT
Start: 2024-05-20 | End: 2024-06-04 | Stop reason: HOSPADM

## 2024-05-20 RX ORDER — SODIUM CHLORIDE 0.9 % (FLUSH) 0.9 %
5-40 SYRINGE (ML) INJECTION EVERY 12 HOURS SCHEDULED
Status: DISCONTINUED | OUTPATIENT
Start: 2024-05-20 | End: 2024-06-04 | Stop reason: HOSPADM

## 2024-05-20 RX ADMIN — POTASSIUM CHLORIDE 40 MEQ: 750 TABLET, FILM COATED, EXTENDED RELEASE ORAL at 23:14

## 2024-05-20 RX ADMIN — SODIUM CHLORIDE, POTASSIUM CHLORIDE, SODIUM LACTATE AND CALCIUM CHLORIDE 500 ML: 600; 310; 30; 20 INJECTION, SOLUTION INTRAVENOUS at 14:55

## 2024-05-20 RX ADMIN — KIT FOR THE PREPARATION OF TECHNETIUM TC 99M ALBUMIN AGGREGATED 4.4 MILLICURIE: 2.5 INJECTION, POWDER, FOR SOLUTION INTRAVENOUS at 15:10

## 2024-05-20 RX ADMIN — Medication 2500 MG: at 16:49

## 2024-05-20 RX ADMIN — CEFEPIME 2000 MG: 2 INJECTION, POWDER, FOR SOLUTION INTRAVENOUS at 16:40

## 2024-05-20 RX ADMIN — WARFARIN SODIUM 7.5 MG: 5 TABLET ORAL at 17:55

## 2024-05-20 RX ADMIN — SODIUM CHLORIDE: 9 INJECTION, SOLUTION INTRAVENOUS at 17:57

## 2024-05-20 RX ADMIN — ACETAMINOPHEN 650 MG: 325 TABLET ORAL at 23:16

## 2024-05-20 ASSESSMENT — PAIN SCALES - GENERAL
PAINLEVEL_OUTOF10: 3
PAINLEVEL_OUTOF10: 4
PAINLEVEL_OUTOF10: 0
PAINLEVEL_OUTOF10: 1

## 2024-05-20 ASSESSMENT — PAIN - FUNCTIONAL ASSESSMENT
PAIN_FUNCTIONAL_ASSESSMENT: PREVENTS OR INTERFERES SOME ACTIVE ACTIVITIES AND ADLS
PAIN_FUNCTIONAL_ASSESSMENT: PREVENTS OR INTERFERES SOME ACTIVE ACTIVITIES AND ADLS
PAIN_FUNCTIONAL_ASSESSMENT: 0-10

## 2024-05-20 ASSESSMENT — PAIN DESCRIPTION - LOCATION
LOCATION: LEG
LOCATION: LEG

## 2024-05-20 ASSESSMENT — PAIN DESCRIPTION - ORIENTATION
ORIENTATION: RIGHT
ORIENTATION: RIGHT

## 2024-05-20 ASSESSMENT — PAIN DESCRIPTION - DESCRIPTORS
DESCRIPTORS: SHARP
DESCRIPTORS: OTHER (COMMENT)

## 2024-05-20 ASSESSMENT — LIFESTYLE VARIABLES
HOW MANY STANDARD DRINKS CONTAINING ALCOHOL DO YOU HAVE ON A TYPICAL DAY: PATIENT DOES NOT DRINK
HOW OFTEN DO YOU HAVE A DRINK CONTAINING ALCOHOL: NEVER

## 2024-05-20 NOTE — ED NOTES
12:57 PM  Javier Ugalde is a 58 y.o. male presenting to the ED c/o right leg swelling for the past 2 months and sob for the past 2 weeks. Pt reports currently on doxycycline that he started about a week ago for cellulitis. Pt reports he was seeing by Dr. Muller, PCP and was referred to the ED to have leg reevaluated. Pt reports having a fever about 1 week ago. P also reports INR was also noted to be elevated.     Denies chest pains.     I have evaluated the patient as the Provider in Rapid Medical Evaluation (RME). I have reviewed his vital signs and the triage nurse assessment. I have talked with the patient and any available family and advised that I am the provider in triage and have ordered the appropriate study to initiate their work up based on the clinical presentation during my assessment. I have advised that the patient will be accommodated in the Main ED as soon as possible. I have also requested to contact the triage nurse or myself immediately if the patient experiences any changes in their condition during this brief waiting period.  JIM Noe NP, Jennie Z, APRN - NP  05/20/24 4515    
[employer] pacemaker interrogated.  
Patient to nuclear med.  
Summary Nursing Index tab.    Recommendation    Pending orders consult to wound care, consult to nephrology, consult to vascular surgery  Consults ordered: IP CONSULT TO HOSPITALIST  IP CONSULT TO NEPHROLOGY  IP WOUND CARE NURSE CONSULT TO EVAL  IP CONSULT TO VASCULAR SURGERY  IP CONSULT TO PHARMACY  IP CONSULT TO PHARMACY    Consulted provider:      Plan for next 24 hours: continue IV antibiotics, consult to wound care, consult to vascular surgery  Additional Comments: patient is hypotensive at baseline, AV paced, uses urinal to void     If any further questions, please call Sending RN at [phone]  Electronically signed by: Electronically signed by Marisol Mccall RN on 5/20/2024 at 6:13 PM

## 2024-05-20 NOTE — ED PROVIDER NOTES
ordered.    Risk  Decision regarding hospitalization.          Perfect Serve Consult for Admission  3:50 PM    ED Room Number: ER02/02  Patient Name and age:  Javier Ugalde 58 y.o.  male  Working Diagnosis:   1. Cellulitis of right lower extremity    2. Acute renal failure superimposed on chronic kidney disease, unspecified acute renal failure type, unspecified CKD stage (HCC)        COVID-19 Suspicion: No  Sepsis present:  No  Reassessment needed: Yes  Code Status:  Full Code  Readmission: No  Isolation Requirements: no  Recommended Level of Care: med/surg  Department: [facility] Adult ED - [phone]  Consulting Provider:     Other: Patient presenting for cellulitis, unresponsive to outpatient management with doxycycline, IV antibiotics ordered and blood cultures    Total critical care time spent exclusive of procedures:  32 minutes.         REASSESSMENT     ED Course as of 05/20/24 1910   Mon May 20, 2024   1329 Temp: 98.4 °F (36.9 °C) [AL]   1329 BP(!): 95/50 [AL]   1329 Respirations: 20 [AL]   1358 WBC(!): 16.6  Leukocytosis  [SL]   1358 Hemoglobin Quant(!): 8.4  Chronic anemia [SL]   1419 Creatinine(!): 5.33  Elevation in creatinine concerning for [SL]   1420  acute on chronic renal dysfunction [SL]   1420 Potassium(!): 3.1  Hypokalemia [SL]   1420 Sodium(!): 130  Hyponatremia [SL]   1420 Lactic Acid, Plasma: 1.7 [SL]   1450 D-Dimer, Quant(!): 2.97  Unable to obtain CTA chest due to patient's acute renal failure, will need V/Q study inpatient [SL]   1458 EKG shows atrial sensed ventricular paced rhythm, 66 bpm, diffuse ST abnormalities, no STEMI [SL]   1541 NM LUNG SCAN PERFUSION ONLY  Normal perfusion imaging. [SL]   1600 Hospitalist consulted for admission  [SL]      ED Course User Index  [AL] Bartolo Alvarado MD  [SL] Oneida Alfred MD           CONSULTS:  None    PROCEDURES:  Unless otherwise noted below, none     Procedures          FINAL IMPRESSION      1. Cellulitis of right lower extremity    2. Acute

## 2024-05-20 NOTE — ED TRIAGE NOTES
Pt here for right leg swelling x 2 months, sob x 2 weeks , denies cp, pt currently on antibiotics, +fever a week ago, denies cp , +significant swelling noted to right knee and leg

## 2024-05-20 NOTE — H&P
coordination needs.     Assessment/Plab:   Given the patient's current clinical presentation, there is a high level of concern for decompensation if discharged from the emergency department. Complex decision making was performed, which includes reviewing the patient's available past medical records, laboratory results, and imaging studies.    RLE cellulitis and necrotic wound failing outpatient abx  - admit patient remote telemetry  - BC NGTD x2 sets  - check MRSA swab  - empiric cefepime and vancomycin, renally dosed  - WOCN consult  - consult to Vascular Surgery due to necrotic ulcer  - pain/nausea control    Elevated d-dimer  - RLE venous duplex 5/16 no DVT  - VQ scan 5/20 no PE    NIKKIE on CKD Stage 4  - gentle IVF  - consult Renal  - avoid nephrotoxins    Hypokalemia  - replete prn    Recurrent hyponatremia  - pt asymptomatic     Anemia of chronic disease  - stable H/H    S/p mechanical AVR  S/p PPM  - warfarin with pharmacy dosing for goal INR 2-3  - follows with Dr Rodriguez  - PPM interrogation in the ED    T2DM  - check A1c, POC glucose    Arthritis  Chronic pain  HTN  HLD  Obesity, Body mass index is 38.61 kg/m².    DIET: ADULT DIET; Regular; 5 carb choices (75 gm/meal); Low Fat/Low Chol/High Fiber/2 gm Na   ISOLATION PRECAUTIONS: No active isolations  CODE STATUS: Full Code   Central Line:   none  DVT PROPHYLAXIS: Coumadin  FUNCTIONAL STATUS PRIOR TO HOSPITALIZATION: Fully active and ambulatory; able to carry on all self-care without restriction.  Ambulatory status/function: By self   EARLY MOBILITY ASSESSMENT: Recommend an assessment from physical therapy and/or occupational therapy  ANTICIPATED DISCHARGE: Greater than 48 hours.  ANTICIPATED DISPOSITION: TBD  EMERGENCY CONTACT/SURROGATE DECISION MAKER: Simin Lawler (Other)  [phone] (Mobile)     Signed By: Annette Funk MD     May 20, 2024         Please note that this dictation may have been completed with Dragon, the computer voice recognition

## 2024-05-21 ENCOUNTER — APPOINTMENT (OUTPATIENT)
Facility: HOSPITAL | Age: 59
DRG: 872 | End: 2024-05-21
Attending: SURGERY
Payer: MEDICARE

## 2024-05-21 LAB
ALBUMIN SERPL-MCNC: 2.2 G/DL (ref 3.5–5)
ANION GAP SERPL CALC-SCNC: 9 MMOL/L (ref 5–15)
APPEARANCE UR: ABNORMAL
BACTERIA SPEC CULT: NORMAL
BACTERIA SPEC CULT: NORMAL
BACTERIA URNS QL MICRO: ABNORMAL /HPF
BASOPHILS # BLD: 0.1 K/UL (ref 0–0.1)
BASOPHILS NFR BLD: 1 % (ref 0–1)
BILIRUB UR QL: NEGATIVE
BUN SERPL-MCNC: 55 MG/DL (ref 6–20)
BUN/CREAT SERPL: 10 (ref 12–20)
CALCIUM SERPL-MCNC: 9.8 MG/DL (ref 8.5–10.1)
CHLORIDE SERPL-SCNC: 103 MMOL/L (ref 97–108)
CO2 SERPL-SCNC: 22 MMOL/L (ref 21–32)
COLOR UR: ABNORMAL
CREAT SERPL-MCNC: 5.45 MG/DL (ref 0.7–1.3)
DIFFERENTIAL METHOD BLD: ABNORMAL
EKG ATRIAL RATE: 66 BPM
EKG DIAGNOSIS: NORMAL
EKG P AXIS: 58 DEGREES
EKG P-R INTERVAL: 192 MS
EKG Q-T INTERVAL: 484 MS
EKG QRS DURATION: 150 MS
EKG QTC CALCULATION (BAZETT): 507 MS
EKG R AXIS: -37 DEGREES
EKG T AXIS: 145 DEGREES
EKG VENTRICULAR RATE: 66 BPM
EOSINOPHIL # BLD: 0.1 K/UL (ref 0–0.4)
EOSINOPHIL NFR BLD: 1 % (ref 0–7)
EPITH CASTS URNS QL MICRO: ABNORMAL /LPF
ERYTHROCYTE [DISTWIDTH] IN BLOOD BY AUTOMATED COUNT: 14.6 % (ref 11.5–14.5)
GLUCOSE BLD STRIP.AUTO-MCNC: 110 MG/DL (ref 65–117)
GLUCOSE BLD STRIP.AUTO-MCNC: 118 MG/DL (ref 65–117)
GLUCOSE BLD STRIP.AUTO-MCNC: 119 MG/DL (ref 65–117)
GLUCOSE BLD STRIP.AUTO-MCNC: 146 MG/DL (ref 65–117)
GLUCOSE SERPL-MCNC: 106 MG/DL (ref 65–100)
GLUCOSE UR STRIP.AUTO-MCNC: NEGATIVE MG/DL
HCT VFR BLD AUTO: 18.8 % (ref 36.6–50.3)
HCT VFR BLD AUTO: 25.7 % (ref 36.6–50.3)
HGB BLD-MCNC: 6.1 G/DL (ref 12.1–17)
HGB BLD-MCNC: 8.4 G/DL (ref 12.1–17)
HGB UR QL STRIP: ABNORMAL
IMM GRANULOCYTES # BLD AUTO: 0.2 K/UL (ref 0–0.04)
IMM GRANULOCYTES NFR BLD AUTO: 1 % (ref 0–0.5)
INR PPP: 2.8 (ref 0.9–1.1)
KETONES UR QL STRIP.AUTO: NEGATIVE MG/DL
LEUKOCYTE ESTERASE UR QL STRIP.AUTO: NEGATIVE
LYMPHOCYTES # BLD: 1.2 K/UL (ref 0.8–3.5)
LYMPHOCYTES NFR BLD: 9 % (ref 12–49)
MAGNESIUM SERPL-MCNC: 2.2 MG/DL (ref 1.6–2.4)
MCH RBC QN AUTO: 26.9 PG (ref 26–34)
MCHC RBC AUTO-ENTMCNC: 32.4 G/DL (ref 30–36.5)
MCV RBC AUTO: 82.8 FL (ref 80–99)
MONOCYTES # BLD: 1.5 K/UL (ref 0–1)
MONOCYTES NFR BLD: 12 % (ref 5–13)
NEUTS SEG # BLD: 10.2 K/UL (ref 1.8–8)
NEUTS SEG NFR BLD: 76 % (ref 32–75)
NITRITE UR QL STRIP.AUTO: NEGATIVE
NRBC # BLD: 0 K/UL (ref 0–0.01)
NRBC BLD-RTO: 0 PER 100 WBC
NT PRO BNP: 1667 PG/ML
PH UR STRIP: 5.5 (ref 5–8)
PHOSPHATE SERPL-MCNC: 4.7 MG/DL (ref 2.6–4.7)
PLATELET # BLD AUTO: 342 K/UL (ref 150–400)
PMV BLD AUTO: 9.1 FL (ref 8.9–12.9)
POTASSIUM SERPL-SCNC: 3 MMOL/L (ref 3.5–5.1)
PROT UR STRIP-MCNC: 100 MG/DL
PROTHROMBIN TIME: 27.3 SEC (ref 9–11.1)
RBC # BLD AUTO: 2.27 M/UL (ref 4.1–5.7)
RBC #/AREA URNS HPF: ABNORMAL /HPF (ref 0–5)
SERVICE CMNT-IMP: ABNORMAL
SERVICE CMNT-IMP: NORMAL
SERVICE CMNT-IMP: NORMAL
SODIUM SERPL-SCNC: 134 MMOL/L (ref 136–145)
SP GR UR REFRACTOMETRY: 1.01 (ref 1–1.03)
URINE CULTURE IF INDICATED: ABNORMAL
UROBILINOGEN UR QL STRIP.AUTO: 0.2 EU/DL (ref 0.2–1)
WBC # BLD AUTO: 13.3 K/UL (ref 4.1–11.1)
WBC URNS QL MICRO: ABNORMAL /HPF (ref 0–4)

## 2024-05-21 PROCEDURE — 1100000000 HC RM PRIVATE

## 2024-05-21 PROCEDURE — 81001 URINALYSIS AUTO W/SCOPE: CPT

## 2024-05-21 PROCEDURE — 83880 ASSAY OF NATRIURETIC PEPTIDE: CPT

## 2024-05-21 PROCEDURE — 2580000003 HC RX 258: Performed by: INTERNAL MEDICINE

## 2024-05-21 PROCEDURE — 93010 ELECTROCARDIOGRAM REPORT: CPT | Performed by: INTERNAL MEDICINE

## 2024-05-21 PROCEDURE — 36415 COLL VENOUS BLD VENIPUNCTURE: CPT

## 2024-05-21 PROCEDURE — 82962 GLUCOSE BLOOD TEST: CPT

## 2024-05-21 PROCEDURE — 93922 UPR/L XTREMITY ART 2 LEVELS: CPT

## 2024-05-21 PROCEDURE — 97535 SELF CARE MNGMENT TRAINING: CPT

## 2024-05-21 PROCEDURE — 85014 HEMATOCRIT: CPT

## 2024-05-21 PROCEDURE — 97161 PT EVAL LOW COMPLEX 20 MIN: CPT

## 2024-05-21 PROCEDURE — 6370000000 HC RX 637 (ALT 250 FOR IP): Performed by: INTERNAL MEDICINE

## 2024-05-21 PROCEDURE — 97162 PT EVAL MOD COMPLEX 30 MIN: CPT

## 2024-05-21 PROCEDURE — 6370000000 HC RX 637 (ALT 250 FOR IP)

## 2024-05-21 PROCEDURE — 6370000000 HC RX 637 (ALT 250 FOR IP): Performed by: FAMILY MEDICINE

## 2024-05-21 PROCEDURE — 97165 OT EVAL LOW COMPLEX 30 MIN: CPT

## 2024-05-21 PROCEDURE — 80069 RENAL FUNCTION PANEL: CPT

## 2024-05-21 PROCEDURE — 6360000002 HC RX W HCPCS: Performed by: INTERNAL MEDICINE

## 2024-05-21 PROCEDURE — 97116 GAIT TRAINING THERAPY: CPT

## 2024-05-21 PROCEDURE — 85025 COMPLETE CBC W/AUTO DIFF WBC: CPT

## 2024-05-21 PROCEDURE — 85610 PROTHROMBIN TIME: CPT

## 2024-05-21 PROCEDURE — 83735 ASSAY OF MAGNESIUM: CPT

## 2024-05-21 PROCEDURE — 85018 HEMOGLOBIN: CPT

## 2024-05-21 RX ORDER — POTASSIUM CHLORIDE 750 MG/1
40 TABLET, FILM COATED, EXTENDED RELEASE ORAL ONCE
Status: COMPLETED | OUTPATIENT
Start: 2024-05-21 | End: 2024-05-21

## 2024-05-21 RX ORDER — WARFARIN SODIUM 5 MG/1
5 TABLET ORAL
Status: COMPLETED | OUTPATIENT
Start: 2024-05-21 | End: 2024-05-21

## 2024-05-21 RX ORDER — ROSUVASTATIN CALCIUM 10 MG/1
10 TABLET, COATED ORAL DAILY
Status: DISCONTINUED | OUTPATIENT
Start: 2024-05-22 | End: 2024-06-04 | Stop reason: HOSPADM

## 2024-05-21 RX ORDER — AMMONIUM LACTATE 12 G/100G
LOTION TOPICAL EVERY 12 HOURS
Status: DISCONTINUED | OUTPATIENT
Start: 2024-05-21 | End: 2024-06-04 | Stop reason: HOSPADM

## 2024-05-21 RX ORDER — DEXTROSE MONOHYDRATE 100 MG/ML
INJECTION, SOLUTION INTRAVENOUS CONTINUOUS PRN
Status: DISCONTINUED | OUTPATIENT
Start: 2024-05-21 | End: 2024-06-04 | Stop reason: HOSPADM

## 2024-05-21 RX ORDER — INSULIN LISPRO 100 [IU]/ML
0-4 INJECTION, SOLUTION INTRAVENOUS; SUBCUTANEOUS
Status: DISCONTINUED | OUTPATIENT
Start: 2024-05-21 | End: 2024-06-04 | Stop reason: HOSPADM

## 2024-05-21 RX ORDER — INSULIN LISPRO 100 [IU]/ML
0-4 INJECTION, SOLUTION INTRAVENOUS; SUBCUTANEOUS NIGHTLY
Status: DISCONTINUED | OUTPATIENT
Start: 2024-05-21 | End: 2024-06-04 | Stop reason: HOSPADM

## 2024-05-21 RX ORDER — TRAMADOL HYDROCHLORIDE 50 MG/1
25 TABLET ORAL EVERY 6 HOURS PRN
Status: DISCONTINUED | OUTPATIENT
Start: 2024-05-21 | End: 2024-06-04 | Stop reason: HOSPADM

## 2024-05-21 RX ADMIN — TRAMADOL HYDROCHLORIDE 25 MG: 50 TABLET ORAL at 17:05

## 2024-05-21 RX ADMIN — PANTOPRAZOLE SODIUM 40 MG: 40 TABLET, DELAYED RELEASE ORAL at 06:13

## 2024-05-21 RX ADMIN — POTASSIUM CHLORIDE 40 MEQ: 750 TABLET, FILM COATED, EXTENDED RELEASE ORAL at 09:23

## 2024-05-21 RX ADMIN — CEFEPIME 1000 MG: 1 INJECTION, POWDER, FOR SOLUTION INTRAMUSCULAR; INTRAVENOUS at 17:09

## 2024-05-21 RX ADMIN — ROSUVASTATIN 40 MG: 40 TABLET, FILM COATED ORAL at 09:23

## 2024-05-21 RX ADMIN — SODIUM CHLORIDE, PRESERVATIVE FREE 10 ML: 5 INJECTION INTRAVENOUS at 21:14

## 2024-05-21 RX ADMIN — WARFARIN SODIUM 5 MG: 5 TABLET ORAL at 14:33

## 2024-05-21 ASSESSMENT — PAIN DESCRIPTION - LOCATION
LOCATION: FOOT
LOCATION: LEG

## 2024-05-21 ASSESSMENT — PAIN DESCRIPTION - DESCRIPTORS: DESCRIPTORS: PINS AND NEEDLES

## 2024-05-21 ASSESSMENT — PAIN - FUNCTIONAL ASSESSMENT: PAIN_FUNCTIONAL_ASSESSMENT: PREVENTS OR INTERFERES SOME ACTIVE ACTIVITIES AND ADLS

## 2024-05-21 ASSESSMENT — PAIN SCALES - GENERAL: PAINLEVEL_OUTOF10: 3

## 2024-05-21 ASSESSMENT — PAIN DESCRIPTION - ORIENTATION: ORIENTATION: RIGHT

## 2024-05-21 NOTE — WOUND CARE
Wound Care Note:     New consult placed by physician request for RLE wound    Chart shows:  Admitted for cellulitis of right lower extremity  Past Medical History:   Diagnosis Date    Arthritis     Cellulitis     Chronic pain     COVID-19 vaccine series completed 4/2/21, 5/7/21    MODERNA    Diabetes (HCC)     Elevated hemoglobin A1c 06/07/2021    6.4    Hyperlipidemia     Hypertension     Hypertension complicating diabetes (HCC)      WBC = 13.3 on 5/21/24  Admitted from home    Assessment:   Patient is A&O x 4, communicative, continent with no assistance needed in repositioning.    Bed: [facility]  Diet: Adult diet regular; 5 carb choices; low fat/low chol/high fiber/2 gm NA/ 1500 ml  Patient reports some discomfort when cleaning wound.      Bilateral heels skin intact and without erythema.  Buttock and sacral skin were not assessed.    Palpable DP pulse.      1. POA right lateral knee with crusted wound, measures 4 cm x 9 cm, no drainage, kristyn-wound with erythema and edema.  Tegaderm applied.      2.  POA right posterior thigh with superficial crusted wound, crusting was easily removed and revealed intact skin.  Left open to air.    Discussed with patient removal of crusting and continuing with moist wound healing.  Advised that if wound is healing is delayed or gets worse there are Outpatient Wound Care Centers that will see him weekly, can debride wound and modify wound care as needed until wound healed.    Spoke with EFE Persaud, wound care orders obtained.    Patient repositioned supine.     Recommendations:    Right lateral knee- Change dressing as needed, breakthrough drainage or dressing coming loose.  Cleanse with VASHE, blot dry and reapply Tegaderm.    Bilateral lower legs- Daily cleanse with soap and water using a wash cloth to remove loose, dry skin.  Every 12 hours liberally apply Lac-Hydrin.    Skin Care & Pressure Prevention:  Minimize layers

## 2024-05-21 NOTE — CARE COORDINATION
Care Management Initial Assessment       RUR: 18% - moderate  Readmission? No  1st IM letter given? Yes - 5/21/24  1st  letter given: No    CM spoke with patient at bedside. Patient endorsed residing at address on file alone. Previous home health through [facility] in 2022 after AVR surgery. Agreeable to home health again if recommended by treatment team. Patient owns a RW and cane at home, but does not use them. Drives himself to/from appointments and errands. Perphyllisr in [place] for prescriptions. Sister resides nearby and will transport patient at time of discharge. CM discussed options of wound care clinic for OP services vs home health wound care - patient will make decision if he needs IV abx at discharge.    CM will continue to follow.       05/21/24 1322   Service Assessment   Patient Orientation Alert and Oriented   Cognition Alert   History Provided By Patient   Primary Caregiver Self   Support Systems Family Members   Patient's Healthcare Decision Maker is: Legal Next of Kin   PCP Verified by CM Yes   Last Visit to PCP Within last 3 months   Prior Functional Level Independent in ADLs/IADLs   Current Functional Level Independent in ADLs/IADLs   Can patient return to prior living arrangement Yes   Ability to make needs known: Good   Family able to assist with home care needs: No   Would you like for me to discuss the discharge plan with any other family members/significant others, and if so, who? No   Financial Resources Medicare;Medicaid   Community Resources None   Social/Functional History   Lives With Alone   Type of Home House   Home Equipment Cane;Walker - Rolling   ADL Assistance Independent   Homemaking Assistance Independent   Homemaking Responsibilities Yes   Ambulation Assistance Independent   Transfer Assistance Independent   Active  Yes   Mode of Transportation Car   Occupation On disability   Discharge Planning   Type of Residence House   Living Arrangements Alone

## 2024-05-22 LAB
ALBUMIN SERPL-MCNC: 2.2 G/DL (ref 3.5–5)
ANION GAP SERPL CALC-SCNC: 9 MMOL/L (ref 5–15)
BASOPHILS # BLD: 0.1 K/UL (ref 0–0.1)
BASOPHILS NFR BLD: 1 % (ref 0–1)
BUN SERPL-MCNC: 56 MG/DL (ref 6–20)
BUN/CREAT SERPL: 11 (ref 12–20)
CALCIUM SERPL-MCNC: 9.5 MG/DL (ref 8.5–10.1)
CHLORIDE SERPL-SCNC: 103 MMOL/L (ref 97–108)
CO2 SERPL-SCNC: 20 MMOL/L (ref 21–32)
CREAT SERPL-MCNC: 5.06 MG/DL (ref 0.7–1.3)
DIFFERENTIAL METHOD BLD: ABNORMAL
EOSINOPHIL # BLD: 0.1 K/UL (ref 0–0.4)
EOSINOPHIL NFR BLD: 1 % (ref 0–7)
ERYTHROCYTE [DISTWIDTH] IN BLOOD BY AUTOMATED COUNT: 14.6 % (ref 11.5–14.5)
FOLATE SERPL-MCNC: 4.5 NG/ML (ref 5–21)
GLUCOSE BLD STRIP.AUTO-MCNC: 110 MG/DL (ref 65–117)
GLUCOSE BLD STRIP.AUTO-MCNC: 120 MG/DL (ref 65–117)
GLUCOSE BLD STRIP.AUTO-MCNC: 133 MG/DL (ref 65–117)
GLUCOSE BLD STRIP.AUTO-MCNC: 96 MG/DL (ref 65–117)
GLUCOSE SERPL-MCNC: 117 MG/DL (ref 65–100)
HCT VFR BLD AUTO: 20.8 % (ref 36.6–50.3)
HCT VFR BLD AUTO: 20.9 % (ref 36.6–50.3)
HCT VFR BLD AUTO: 25.6 % (ref 36.6–50.3)
HGB BLD-MCNC: 6.7 G/DL (ref 12.1–17)
HGB BLD-MCNC: 6.9 G/DL (ref 12.1–17)
HGB BLD-MCNC: 8.2 G/DL (ref 12.1–17)
HISTORY CHECK: NORMAL
IMM GRANULOCYTES # BLD AUTO: 0.1 K/UL (ref 0–0.04)
IMM GRANULOCYTES NFR BLD AUTO: 1 % (ref 0–0.5)
INR PPP: 3 (ref 0.9–1.1)
IRON SATN MFR SERPL: 16 % (ref 20–50)
IRON SERPL-MCNC: 24 UG/DL (ref 35–150)
LYMPHOCYTES # BLD: 1.4 K/UL (ref 0.8–3.5)
LYMPHOCYTES NFR BLD: 10 % (ref 12–49)
MAGNESIUM SERPL-MCNC: 2.1 MG/DL (ref 1.6–2.4)
MCH RBC QN AUTO: 26.6 PG (ref 26–34)
MCHC RBC AUTO-ENTMCNC: 32.2 G/DL (ref 30–36.5)
MCV RBC AUTO: 82.5 FL (ref 80–99)
MONOCYTES # BLD: 1.4 K/UL (ref 0–1)
MONOCYTES NFR BLD: 10 % (ref 5–13)
NEUTS SEG # BLD: 10.6 K/UL (ref 1.8–8)
NEUTS SEG NFR BLD: 77 % (ref 32–75)
NRBC # BLD: 0 K/UL (ref 0–0.01)
NRBC BLD-RTO: 0 PER 100 WBC
PHOSPHATE SERPL-MCNC: 4.6 MG/DL (ref 2.6–4.7)
PLATELET # BLD AUTO: 341 K/UL (ref 150–400)
PMV BLD AUTO: 9.1 FL (ref 8.9–12.9)
POTASSIUM SERPL-SCNC: 3.4 MMOL/L (ref 3.5–5.1)
PROTHROMBIN TIME: 29 SEC (ref 9–11.1)
RBC # BLD AUTO: 2.52 M/UL (ref 4.1–5.7)
RBC MORPH BLD: ABNORMAL
RBC MORPH BLD: ABNORMAL
SERVICE CMNT-IMP: ABNORMAL
SERVICE CMNT-IMP: ABNORMAL
SERVICE CMNT-IMP: NORMAL
SERVICE CMNT-IMP: NORMAL
SODIUM SERPL-SCNC: 132 MMOL/L (ref 136–145)
TIBC SERPL-MCNC: 150 UG/DL (ref 250–450)
VANCOMYCIN SERPL-MCNC: 14.8 UG/ML
VIT B12 SERPL-MCNC: 792 PG/ML (ref 193–986)
WBC # BLD AUTO: 13.7 K/UL (ref 4.1–11.1)

## 2024-05-22 PROCEDURE — 83550 IRON BINDING TEST: CPT

## 2024-05-22 PROCEDURE — 86850 RBC ANTIBODY SCREEN: CPT

## 2024-05-22 PROCEDURE — 80069 RENAL FUNCTION PANEL: CPT

## 2024-05-22 PROCEDURE — 6370000000 HC RX 637 (ALT 250 FOR IP): Performed by: FAMILY MEDICINE

## 2024-05-22 PROCEDURE — P9016 RBC LEUKOCYTES REDUCED: HCPCS

## 2024-05-22 PROCEDURE — 85610 PROTHROMBIN TIME: CPT

## 2024-05-22 PROCEDURE — 6360000002 HC RX W HCPCS

## 2024-05-22 PROCEDURE — 86923 COMPATIBILITY TEST ELECTRIC: CPT

## 2024-05-22 PROCEDURE — 86900 BLOOD TYPING SEROLOGIC ABO: CPT

## 2024-05-22 PROCEDURE — 83540 ASSAY OF IRON: CPT

## 2024-05-22 PROCEDURE — 85025 COMPLETE CBC W/AUTO DIFF WBC: CPT

## 2024-05-22 PROCEDURE — 6360000002 HC RX W HCPCS: Performed by: INTERNAL MEDICINE

## 2024-05-22 PROCEDURE — 82595 ASSAY OF CRYOGLOBULIN: CPT

## 2024-05-22 PROCEDURE — 6370000000 HC RX 637 (ALT 250 FOR IP): Performed by: INTERNAL MEDICINE

## 2024-05-22 PROCEDURE — 82607 VITAMIN B-12: CPT

## 2024-05-22 PROCEDURE — 85014 HEMATOCRIT: CPT

## 2024-05-22 PROCEDURE — 83735 ASSAY OF MAGNESIUM: CPT

## 2024-05-22 PROCEDURE — 86901 BLOOD TYPING SEROLOGIC RH(D): CPT

## 2024-05-22 PROCEDURE — 2580000003 HC RX 258: Performed by: INTERNAL MEDICINE

## 2024-05-22 PROCEDURE — 82746 ASSAY OF FOLIC ACID SERUM: CPT

## 2024-05-22 PROCEDURE — 82962 GLUCOSE BLOOD TEST: CPT

## 2024-05-22 PROCEDURE — 36415 COLL VENOUS BLD VENIPUNCTURE: CPT

## 2024-05-22 PROCEDURE — 80202 ASSAY OF VANCOMYCIN: CPT

## 2024-05-22 PROCEDURE — 1100000000 HC RM PRIVATE

## 2024-05-22 PROCEDURE — 36430 TRANSFUSION BLD/BLD COMPNT: CPT

## 2024-05-22 PROCEDURE — 86038 ANTINUCLEAR ANTIBODIES: CPT

## 2024-05-22 PROCEDURE — 85018 HEMOGLOBIN: CPT

## 2024-05-22 PROCEDURE — 6370000000 HC RX 637 (ALT 250 FOR IP)

## 2024-05-22 PROCEDURE — 30233N1 TRANSFUSION OF NONAUTOLOGOUS RED BLOOD CELLS INTO PERIPHERAL VEIN, PERCUTANEOUS APPROACH: ICD-10-PCS

## 2024-05-22 PROCEDURE — 86160 COMPLEMENT ANTIGEN: CPT

## 2024-05-22 RX ORDER — SODIUM CHLORIDE 9 MG/ML
INJECTION, SOLUTION INTRAVENOUS PRN
Status: DISCONTINUED | OUTPATIENT
Start: 2024-05-22 | End: 2024-06-04 | Stop reason: HOSPADM

## 2024-05-22 RX ORDER — POTASSIUM CHLORIDE 750 MG/1
10 TABLET, FILM COATED, EXTENDED RELEASE ORAL ONCE
Status: COMPLETED | OUTPATIENT
Start: 2024-05-22 | End: 2024-05-22

## 2024-05-22 RX ORDER — FOLIC ACID 1 MG/1
1 TABLET ORAL DAILY
Status: DISCONTINUED | OUTPATIENT
Start: 2024-05-22 | End: 2024-06-04 | Stop reason: HOSPADM

## 2024-05-22 RX ADMIN — Medication 1 MG: at 10:22

## 2024-05-22 RX ADMIN — Medication 10 ML: at 11:57

## 2024-05-22 RX ADMIN — IRON SUCROSE 200 MG: 20 INJECTION, SOLUTION INTRAVENOUS at 08:26

## 2024-05-22 RX ADMIN — TRAMADOL HYDROCHLORIDE 25 MG: 50 TABLET ORAL at 17:27

## 2024-05-22 RX ADMIN — CEFEPIME 1000 MG: 1 INJECTION, POWDER, FOR SOLUTION INTRAMUSCULAR; INTRAVENOUS at 17:36

## 2024-05-22 RX ADMIN — ROSUVASTATIN CALCIUM 10 MG: 10 TABLET, FILM COATED ORAL at 08:27

## 2024-05-22 RX ADMIN — TRAMADOL HYDROCHLORIDE 25 MG: 50 TABLET ORAL at 08:27

## 2024-05-22 RX ADMIN — VANCOMYCIN HYDROCHLORIDE 500 MG: 500 INJECTION, POWDER, LYOPHILIZED, FOR SOLUTION INTRAVENOUS at 10:23

## 2024-05-22 RX ADMIN — POTASSIUM CHLORIDE 10 MEQ: 750 TABLET, FILM COATED, EXTENDED RELEASE ORAL at 11:57

## 2024-05-22 RX ADMIN — TRAMADOL HYDROCHLORIDE 25 MG: 50 TABLET ORAL at 00:02

## 2024-05-22 RX ADMIN — POLYETHYLENE GLYCOL 3350 17 G: 17 POWDER, FOR SOLUTION ORAL at 21:42

## 2024-05-22 RX ADMIN — Medication: at 04:19

## 2024-05-22 RX ADMIN — Medication: at 13:51

## 2024-05-22 RX ADMIN — PANTOPRAZOLE SODIUM 40 MG: 40 TABLET, DELAYED RELEASE ORAL at 06:47

## 2024-05-22 ASSESSMENT — PAIN SCALES - GENERAL
PAINLEVEL_OUTOF10: 5
PAINLEVEL_OUTOF10: 6
PAINLEVEL_OUTOF10: 8
PAINLEVEL_OUTOF10: 0
PAINLEVEL_OUTOF10: 4
PAINLEVEL_OUTOF10: 5
PAINLEVEL_OUTOF10: 3
PAINLEVEL_OUTOF10: 5
PAINLEVEL_OUTOF10: 8

## 2024-05-22 ASSESSMENT — PAIN DESCRIPTION - LOCATION
LOCATION: LEG

## 2024-05-22 ASSESSMENT — PAIN DESCRIPTION - ONSET: ONSET: ON-GOING

## 2024-05-22 ASSESSMENT — PAIN DESCRIPTION - FREQUENCY: FREQUENCY: INTERMITTENT

## 2024-05-22 ASSESSMENT — PAIN DESCRIPTION - ORIENTATION
ORIENTATION: RIGHT

## 2024-05-22 ASSESSMENT — PAIN DESCRIPTION - DESCRIPTORS
DESCRIPTORS: ACHING

## 2024-05-22 ASSESSMENT — PAIN - FUNCTIONAL ASSESSMENT
PAIN_FUNCTIONAL_ASSESSMENT: ACTIVITIES ARE NOT PREVENTED

## 2024-05-22 ASSESSMENT — PAIN DESCRIPTION - PAIN TYPE
TYPE: SURGICAL PAIN
TYPE: SURGICAL PAIN

## 2024-05-23 LAB
ABO + RH BLD: NORMAL
ALBUMIN SERPL-MCNC: 2.2 G/DL (ref 3.5–5)
ANA SER QL: NEGATIVE
ANION GAP SERPL CALC-SCNC: 10 MMOL/L (ref 5–15)
BASOPHILS # BLD: 0.1 K/UL (ref 0–0.1)
BASOPHILS NFR BLD: 1 % (ref 0–1)
BLD PROD TYP BPU: NORMAL
BLOOD BANK BLOOD PRODUCT EXPIRATION DATE: NORMAL
BLOOD BANK DISPENSE STATUS: NORMAL
BLOOD BANK ISBT PRODUCT BLOOD TYPE: 1700
BLOOD BANK PRODUCT CODE: NORMAL
BLOOD BANK UNIT TYPE AND RH: NORMAL
BLOOD GROUP ANTIBODIES SERPL: NORMAL
BPU ID: NORMAL
BUN SERPL-MCNC: 52 MG/DL (ref 6–20)
BUN/CREAT SERPL: 11 (ref 12–20)
C3 SERPL-MCNC: 102 MG/DL (ref 82–167)
C4 SERPL-MCNC: 5 MG/DL (ref 12–38)
CALCIUM SERPL-MCNC: 9.7 MG/DL (ref 8.5–10.1)
CHLORIDE SERPL-SCNC: 103 MMOL/L (ref 97–108)
CO2 SERPL-SCNC: 20 MMOL/L (ref 21–32)
CREAT SERPL-MCNC: 4.88 MG/DL (ref 0.7–1.3)
CROSSMATCH RESULT: NORMAL
DIFFERENTIAL METHOD BLD: ABNORMAL
ECHO BSA: 2.83 M2
EOSINOPHIL # BLD: 0.2 K/UL (ref 0–0.4)
EOSINOPHIL NFR BLD: 1 % (ref 0–7)
ERYTHROCYTE [DISTWIDTH] IN BLOOD BY AUTOMATED COUNT: 14.9 % (ref 11.5–14.5)
GLUCOSE BLD STRIP.AUTO-MCNC: 105 MG/DL (ref 65–117)
GLUCOSE BLD STRIP.AUTO-MCNC: 114 MG/DL (ref 65–117)
GLUCOSE BLD STRIP.AUTO-MCNC: 116 MG/DL (ref 65–117)
GLUCOSE BLD STRIP.AUTO-MCNC: 124 MG/DL (ref 65–117)
GLUCOSE SERPL-MCNC: 118 MG/DL (ref 65–100)
HAPTOGLOB SERPL-MCNC: 106 MG/DL (ref 30–200)
HCT VFR BLD AUTO: 22.1 % (ref 36.6–50.3)
HGB BLD-MCNC: 7.5 G/DL (ref 12.1–17)
IMM GRANULOCYTES # BLD AUTO: 0.2 K/UL (ref 0–0.04)
IMM GRANULOCYTES NFR BLD AUTO: 2 % (ref 0–0.5)
INR PPP: 2.8 (ref 0.9–1.1)
LDH SERPL L TO P-CCNC: 229 U/L (ref 85–241)
LYMPHOCYTES # BLD: 1.1 K/UL (ref 0.8–3.5)
LYMPHOCYTES NFR BLD: 7 % (ref 12–49)
MAGNESIUM SERPL-MCNC: 2.2 MG/DL (ref 1.6–2.4)
MCH RBC QN AUTO: 27.7 PG (ref 26–34)
MCHC RBC AUTO-ENTMCNC: 33.9 G/DL (ref 30–36.5)
MCV RBC AUTO: 81.5 FL (ref 80–99)
MONOCYTES # BLD: 1.4 K/UL (ref 0–1)
MONOCYTES NFR BLD: 10 % (ref 5–13)
NEUTS SEG # BLD: 11.6 K/UL (ref 1.8–8)
NEUTS SEG NFR BLD: 79 % (ref 32–75)
NRBC # BLD: 0 K/UL (ref 0–0.01)
NRBC BLD-RTO: 0 PER 100 WBC
PHOSPHATE SERPL-MCNC: 5 MG/DL (ref 2.6–4.7)
PLATELET # BLD AUTO: 317 K/UL (ref 150–400)
PMV BLD AUTO: 8.7 FL (ref 8.9–12.9)
POTASSIUM SERPL-SCNC: 3.5 MMOL/L (ref 3.5–5.1)
PROTHROMBIN TIME: 27.6 SEC (ref 9–11.1)
RBC # BLD AUTO: 2.71 M/UL (ref 4.1–5.7)
SERVICE CMNT-IMP: ABNORMAL
SERVICE CMNT-IMP: NORMAL
SODIUM SERPL-SCNC: 133 MMOL/L (ref 136–145)
SPECIMEN EXP DATE BLD: NORMAL
UNIT DIVISION: 0
UNIT ISSUE DATE/TIME: NORMAL
VAS LEFT ABI: 0.89
VAS LEFT ARM BP: 124 MMHG
VAS LEFT DORSALIS PEDIS BP: 110 MMHG
VAS LEFT PTA BP: 101 MMHG
VAS RIGHT ABI: 0.81
VAS RIGHT ARM BP: 119 MMHG
VAS RIGHT DORSALIS PEDIS BP: 101 MMHG
VAS RIGHT PTA BP: 88 MMHG
WBC # BLD AUTO: 14.6 K/UL (ref 4.1–11.1)

## 2024-05-23 PROCEDURE — 82962 GLUCOSE BLOOD TEST: CPT

## 2024-05-23 PROCEDURE — 85025 COMPLETE CBC W/AUTO DIFF WBC: CPT

## 2024-05-23 PROCEDURE — 85610 PROTHROMBIN TIME: CPT

## 2024-05-23 PROCEDURE — 83615 LACTATE (LD) (LDH) ENZYME: CPT

## 2024-05-23 PROCEDURE — 80069 RENAL FUNCTION PANEL: CPT

## 2024-05-23 PROCEDURE — 97116 GAIT TRAINING THERAPY: CPT

## 2024-05-23 PROCEDURE — 6370000000 HC RX 637 (ALT 250 FOR IP): Performed by: INTERNAL MEDICINE

## 2024-05-23 PROCEDURE — 2580000003 HC RX 258: Performed by: INTERNAL MEDICINE

## 2024-05-23 PROCEDURE — 6370000000 HC RX 637 (ALT 250 FOR IP): Performed by: FAMILY MEDICINE

## 2024-05-23 PROCEDURE — 97530 THERAPEUTIC ACTIVITIES: CPT

## 2024-05-23 PROCEDURE — 97535 SELF CARE MNGMENT TRAINING: CPT

## 2024-05-23 PROCEDURE — 6360000002 HC RX W HCPCS: Performed by: INTERNAL MEDICINE

## 2024-05-23 PROCEDURE — 1100000000 HC RM PRIVATE

## 2024-05-23 PROCEDURE — 83010 ASSAY OF HAPTOGLOBIN QUANT: CPT

## 2024-05-23 PROCEDURE — 83735 ASSAY OF MAGNESIUM: CPT

## 2024-05-23 PROCEDURE — 6360000002 HC RX W HCPCS

## 2024-05-23 PROCEDURE — 36415 COLL VENOUS BLD VENIPUNCTURE: CPT

## 2024-05-23 PROCEDURE — 6370000000 HC RX 637 (ALT 250 FOR IP)

## 2024-05-23 RX ORDER — MIDODRINE HYDROCHLORIDE 5 MG/1
5 TABLET ORAL
Status: DISCONTINUED | OUTPATIENT
Start: 2024-05-23 | End: 2024-06-04 | Stop reason: HOSPADM

## 2024-05-23 RX ORDER — LACTULOSE 10 G/15ML
20 SOLUTION ORAL 3 TIMES DAILY
Status: COMPLETED | OUTPATIENT
Start: 2024-05-23 | End: 2024-05-23

## 2024-05-23 RX ADMIN — IRON SUCROSE 200 MG: 20 INJECTION, SOLUTION INTRAVENOUS at 06:30

## 2024-05-23 RX ADMIN — TRAMADOL HYDROCHLORIDE 25 MG: 50 TABLET ORAL at 17:50

## 2024-05-23 RX ADMIN — MIDODRINE HYDROCHLORIDE 5 MG: 5 TABLET ORAL at 17:13

## 2024-05-23 RX ADMIN — LACTULOSE 20 G: 20 SOLUTION ORAL at 20:42

## 2024-05-23 RX ADMIN — ROSUVASTATIN CALCIUM 10 MG: 10 TABLET, FILM COATED ORAL at 08:10

## 2024-05-23 RX ADMIN — TRAMADOL HYDROCHLORIDE 25 MG: 50 TABLET ORAL at 00:44

## 2024-05-23 RX ADMIN — SODIUM CHLORIDE, PRESERVATIVE FREE 10 ML: 5 INJECTION INTRAVENOUS at 20:43

## 2024-05-23 RX ADMIN — Medication: at 03:56

## 2024-05-23 RX ADMIN — LACTULOSE 20 G: 20 SOLUTION ORAL at 11:48

## 2024-05-23 RX ADMIN — Medication 10 ML: at 08:10

## 2024-05-23 RX ADMIN — SODIUM CHLORIDE, PRESERVATIVE FREE 10 ML: 5 INJECTION INTRAVENOUS at 08:10

## 2024-05-23 RX ADMIN — SODIUM CHLORIDE: 9 INJECTION, SOLUTION INTRAVENOUS at 00:41

## 2024-05-23 RX ADMIN — TRAMADOL HYDROCHLORIDE 25 MG: 50 TABLET ORAL at 06:28

## 2024-05-23 RX ADMIN — PANTOPRAZOLE SODIUM 40 MG: 40 TABLET, DELAYED RELEASE ORAL at 06:29

## 2024-05-23 RX ADMIN — CEFEPIME 1000 MG: 1 INJECTION, POWDER, FOR SOLUTION INTRAMUSCULAR; INTRAVENOUS at 17:13

## 2024-05-23 RX ADMIN — Medication 1 MG: at 08:10

## 2024-05-23 RX ADMIN — Medication: at 14:31

## 2024-05-23 ASSESSMENT — PAIN DESCRIPTION - ORIENTATION
ORIENTATION: RIGHT

## 2024-05-23 ASSESSMENT — PAIN SCALES - GENERAL
PAINLEVEL_OUTOF10: 4
PAINLEVEL_OUTOF10: 1
PAINLEVEL_OUTOF10: 7
PAINLEVEL_OUTOF10: 1
PAINLEVEL_OUTOF10: 4

## 2024-05-23 ASSESSMENT — PAIN DESCRIPTION - DESCRIPTORS
DESCRIPTORS: ACHING
DESCRIPTORS: ACHING

## 2024-05-23 ASSESSMENT — PAIN DESCRIPTION - LOCATION
LOCATION: LEG

## 2024-05-23 NOTE — CARE COORDINATION
Transition of Care Plan:    RUR: 19%  Prior Level of Functioning: Independent   Disposition: Home with HH vs. OP wound care clinic  Follow up appointments: per physician recommendation   DME needed: TBD  Transportation at discharge: family  IM/IMM Medicare/ letter given: 1st 5/21, 2nd at discharge   Caregiver Contact: Simin [phone]  Barriers to discharge: Medical stability, procedures     CM reviewed chart, noted: Previous home health through [facility] in 2022 after AVR surgery. Agreeable to home health again if recommended by treatment team. Patient owns a RW and cane at home, but does not use them. Drives himself to/from appointments and errands. [facility] in [place] for prescriptions. Sister resides nearby and will transport patient at time of discharge. CM discussed options of wound care clinic for OP services vs home health wound care - patient will make decision if he needs IV abx at discharge.       Noted from IDRs: patient needs renal biopsy, MD watching INR.     Patient is to be seen to determine if patient needs a wound vac.    CM will continue to follow.     Dina Ragland RN/CRM

## 2024-05-24 LAB
ANION GAP SERPL CALC-SCNC: 9 MMOL/L (ref 5–15)
BASOPHILS # BLD: 0.1 K/UL (ref 0–0.1)
BASOPHILS NFR BLD: 1 % (ref 0–1)
BUN SERPL-MCNC: 47 MG/DL (ref 6–20)
BUN/CREAT SERPL: 11 (ref 12–20)
CALCIUM SERPL-MCNC: 10.3 MG/DL (ref 8.5–10.1)
CHLORIDE SERPL-SCNC: 105 MMOL/L (ref 97–108)
CO2 SERPL-SCNC: 19 MMOL/L (ref 21–32)
CREAT SERPL-MCNC: 4.44 MG/DL (ref 0.7–1.3)
DIFFERENTIAL METHOD BLD: ABNORMAL
EOSINOPHIL # BLD: 0.3 K/UL (ref 0–0.4)
EOSINOPHIL NFR BLD: 2 % (ref 0–7)
ERYTHROCYTE [DISTWIDTH] IN BLOOD BY AUTOMATED COUNT: 15 % (ref 11.5–14.5)
GLUCOSE BLD STRIP.AUTO-MCNC: 101 MG/DL (ref 65–117)
GLUCOSE BLD STRIP.AUTO-MCNC: 104 MG/DL (ref 65–117)
GLUCOSE BLD STRIP.AUTO-MCNC: 115 MG/DL (ref 65–117)
GLUCOSE BLD STRIP.AUTO-MCNC: 116 MG/DL (ref 65–117)
GLUCOSE SERPL-MCNC: 104 MG/DL (ref 65–100)
HCT VFR BLD AUTO: 24.1 % (ref 36.6–50.3)
HGB BLD-MCNC: 7.8 G/DL (ref 12.1–17)
IMM GRANULOCYTES # BLD AUTO: 0.3 K/UL (ref 0–0.04)
IMM GRANULOCYTES NFR BLD AUTO: 2 % (ref 0–0.5)
INR PPP: 2.6 (ref 0.9–1.1)
LYMPHOCYTES # BLD: 1.5 K/UL (ref 0.8–3.5)
LYMPHOCYTES NFR BLD: 9 % (ref 12–49)
MCH RBC QN AUTO: 26.9 PG (ref 26–34)
MCHC RBC AUTO-ENTMCNC: 32.4 G/DL (ref 30–36.5)
MCV RBC AUTO: 83.1 FL (ref 80–99)
MONOCYTES # BLD: 1.7 K/UL (ref 0–1)
MONOCYTES NFR BLD: 10 % (ref 5–13)
NEUTS SEG # BLD: 13.5 K/UL (ref 1.8–8)
NEUTS SEG NFR BLD: 76 % (ref 32–75)
NRBC # BLD: 0 K/UL (ref 0–0.01)
NRBC BLD-RTO: 0 PER 100 WBC
PLATELET # BLD AUTO: 373 K/UL (ref 150–400)
PMV BLD AUTO: 8.9 FL (ref 8.9–12.9)
POTASSIUM SERPL-SCNC: 3.7 MMOL/L (ref 3.5–5.1)
PROTHROMBIN TIME: 25.5 SEC (ref 9–11.1)
RBC # BLD AUTO: 2.9 M/UL (ref 4.1–5.7)
SERVICE CMNT-IMP: NORMAL
SODIUM SERPL-SCNC: 133 MMOL/L (ref 136–145)
WBC # BLD AUTO: 17.3 K/UL (ref 4.1–11.1)

## 2024-05-24 PROCEDURE — 36415 COLL VENOUS BLD VENIPUNCTURE: CPT

## 2024-05-24 PROCEDURE — 6370000000 HC RX 637 (ALT 250 FOR IP)

## 2024-05-24 PROCEDURE — 80048 BASIC METABOLIC PNL TOTAL CA: CPT

## 2024-05-24 PROCEDURE — 97530 THERAPEUTIC ACTIVITIES: CPT

## 2024-05-24 PROCEDURE — 85025 COMPLETE CBC W/AUTO DIFF WBC: CPT

## 2024-05-24 PROCEDURE — 85610 PROTHROMBIN TIME: CPT

## 2024-05-24 PROCEDURE — 97116 GAIT TRAINING THERAPY: CPT

## 2024-05-24 PROCEDURE — 2580000003 HC RX 258: Performed by: FAMILY MEDICINE

## 2024-05-24 PROCEDURE — 6360000002 HC RX W HCPCS

## 2024-05-24 PROCEDURE — 6370000000 HC RX 637 (ALT 250 FOR IP): Performed by: PHYSICIAN ASSISTANT

## 2024-05-24 PROCEDURE — 6360000002 HC RX W HCPCS: Performed by: FAMILY MEDICINE

## 2024-05-24 PROCEDURE — 6370000000 HC RX 637 (ALT 250 FOR IP): Performed by: INTERNAL MEDICINE

## 2024-05-24 PROCEDURE — 1100000000 HC RM PRIVATE

## 2024-05-24 PROCEDURE — 2580000003 HC RX 258: Performed by: INTERNAL MEDICINE

## 2024-05-24 PROCEDURE — 82962 GLUCOSE BLOOD TEST: CPT

## 2024-05-24 PROCEDURE — 6370000000 HC RX 637 (ALT 250 FOR IP): Performed by: FAMILY MEDICINE

## 2024-05-24 RX ORDER — GINSENG 100 MG
CAPSULE ORAL DAILY
Status: DISCONTINUED | OUTPATIENT
Start: 2024-05-24 | End: 2024-05-30

## 2024-05-24 RX ADMIN — TRAMADOL HYDROCHLORIDE 25 MG: 50 TABLET ORAL at 06:03

## 2024-05-24 RX ADMIN — IRON SUCROSE 200 MG: 20 INJECTION, SOLUTION INTRAVENOUS at 06:23

## 2024-05-24 RX ADMIN — MIDODRINE HYDROCHLORIDE 5 MG: 5 TABLET ORAL at 18:35

## 2024-05-24 RX ADMIN — SODIUM CHLORIDE, PRESERVATIVE FREE 10 ML: 5 INJECTION INTRAVENOUS at 08:31

## 2024-05-24 RX ADMIN — Medication 1 MG: at 08:42

## 2024-05-24 RX ADMIN — Medication: at 03:00

## 2024-05-24 RX ADMIN — Medication: at 13:34

## 2024-05-24 RX ADMIN — BACITRACIN 1 EACH: 500 OINTMENT TOPICAL at 11:00

## 2024-05-24 RX ADMIN — MIDODRINE HYDROCHLORIDE 5 MG: 5 TABLET ORAL at 08:29

## 2024-05-24 RX ADMIN — PANTOPRAZOLE SODIUM 40 MG: 40 TABLET, DELAYED RELEASE ORAL at 06:04

## 2024-05-24 RX ADMIN — MIDODRINE HYDROCHLORIDE 5 MG: 5 TABLET ORAL at 12:05

## 2024-05-24 RX ADMIN — TRAMADOL HYDROCHLORIDE 25 MG: 50 TABLET ORAL at 20:32

## 2024-05-24 RX ADMIN — CEFEPIME 2000 MG: 2 INJECTION, POWDER, FOR SOLUTION INTRAVENOUS at 18:29

## 2024-05-24 RX ADMIN — ACETAMINOPHEN 650 MG: 325 TABLET ORAL at 06:04

## 2024-05-24 RX ADMIN — TRAMADOL HYDROCHLORIDE 25 MG: 50 TABLET ORAL at 13:33

## 2024-05-24 RX ADMIN — ROSUVASTATIN CALCIUM 10 MG: 10 TABLET, FILM COATED ORAL at 08:30

## 2024-05-24 RX ADMIN — SODIUM CHLORIDE, PRESERVATIVE FREE 10 ML: 5 INJECTION INTRAVENOUS at 21:20

## 2024-05-24 ASSESSMENT — PAIN DESCRIPTION - ORIENTATION
ORIENTATION: RIGHT
ORIENTATION: RIGHT

## 2024-05-24 ASSESSMENT — PAIN DESCRIPTION - DESCRIPTORS
DESCRIPTORS: PINS AND NEEDLES
DESCRIPTORS: ACHING
DESCRIPTORS: ACHING

## 2024-05-24 ASSESSMENT — PAIN SCALES - GENERAL
PAINLEVEL_OUTOF10: 4
PAINLEVEL_OUTOF10: 7
PAINLEVEL_OUTOF10: 7

## 2024-05-24 ASSESSMENT — PAIN DESCRIPTION - LOCATION
LOCATION: LEG

## 2024-05-24 NOTE — WOUND CARE
WOCN Note:     Follow up visit    Javier Ugalde is a 58 y.o. y/o male who presented for Cellulitis of right lower extremity [L03.115]  Acute renal failure superimposed on chronic kidney disease, unspecified acute renal failure type, unspecified CKD stage (HCC) [N17.9, N18.9]  Admitted on 5/20/2024    Past Medical History:   Diagnosis Date    Arthritis     Cellulitis     Chronic pain     COVID-19 vaccine series completed 4/2/21, 5/7/21    MODERNA    Diabetes (HCC)     Elevated hemoglobin A1c 06/07/2021    6.4    Hyperlipidemia     Hypertension     Hypertension complicating diabetes (HCC)        Lab Results   Component Value Date/Time    WBC 17.3 (H) 05/24/2024 06:26 AM    LABA1C 6.6 (H) 04/06/2024 09:04 AM    POCGLU 115 05/24/2024 06:15 AM    POCGLU 124 (H) 05/23/2024 08:47 PM    HGB 7.8 (L) 05/24/2024 06:26 AM    HCT 24.1 (L) 05/24/2024 06:26 AM     05/24/2024 06:26 AM        Tobacco Use      Smoking status: Never      Smokeless tobacco: Never     ADULT DIET; Regular; 5 carb choices (75 gm/meal); Low Fat/Low Chol/High Fiber/2 gm Na; 1500 ml     Assessment:   Seen today in room 554/01   Alert and appropriately communicative. Denies pain.   Mobile and repositions independently   Continent and up to bathroom  Surface: foam mattress    1. POA Wound to Right Lateral Knee    5.5 x 9.5 x 0.1 cm  75% pink, 25% black soft devitalized tissue with attached margins  Small serous exudate.   Periwound with minor maceration & with edema and erythema    Tx: cleansed with Vashe wet gauze, covered with foam dressing     Wound Recommendations:    Right lateral knee: Daily - allow Vashe wet gauze to sit on wound for 10 minutes, apply Bacitracin ensuring it stays within the wound bed and not on the kristyn-wound skin, cover with foam dressing     PI Prevention:  Turn/reposition approximately every 2 hours  Offload heels with heels hanging off end of pillow at all times

## 2024-05-25 LAB
BACTERIA SPEC CULT: NORMAL
BACTERIA SPEC CULT: NORMAL
CREAT UR-MCNC: 55.8 MG/DL
GLUCOSE BLD STRIP.AUTO-MCNC: 104 MG/DL (ref 65–117)
GLUCOSE BLD STRIP.AUTO-MCNC: 111 MG/DL (ref 65–117)
GLUCOSE BLD STRIP.AUTO-MCNC: 117 MG/DL (ref 65–117)
GLUCOSE BLD STRIP.AUTO-MCNC: 126 MG/DL (ref 65–117)
INR PPP: 2.5 (ref 0.9–1.1)
PROT UR-MCNC: 96 MG/DL (ref 0–11.9)
PROT/CREAT UR-RTO: 1.7
PROTHROMBIN TIME: 25.1 SEC (ref 9–11.1)
SERVICE CMNT-IMP: ABNORMAL
SERVICE CMNT-IMP: NORMAL

## 2024-05-25 PROCEDURE — 6370000000 HC RX 637 (ALT 250 FOR IP): Performed by: FAMILY MEDICINE

## 2024-05-25 PROCEDURE — 82962 GLUCOSE BLOOD TEST: CPT

## 2024-05-25 PROCEDURE — 2580000003 HC RX 258: Performed by: FAMILY MEDICINE

## 2024-05-25 PROCEDURE — 6370000000 HC RX 637 (ALT 250 FOR IP): Performed by: INTERNAL MEDICINE

## 2024-05-25 PROCEDURE — 82570 ASSAY OF URINE CREATININE: CPT

## 2024-05-25 PROCEDURE — 36415 COLL VENOUS BLD VENIPUNCTURE: CPT

## 2024-05-25 PROCEDURE — 85610 PROTHROMBIN TIME: CPT

## 2024-05-25 PROCEDURE — 2580000003 HC RX 258: Performed by: INTERNAL MEDICINE

## 2024-05-25 PROCEDURE — 1100000000 HC RM PRIVATE

## 2024-05-25 PROCEDURE — 6370000000 HC RX 637 (ALT 250 FOR IP)

## 2024-05-25 PROCEDURE — 6370000000 HC RX 637 (ALT 250 FOR IP): Performed by: PHYSICIAN ASSISTANT

## 2024-05-25 PROCEDURE — 84156 ASSAY OF PROTEIN URINE: CPT

## 2024-05-25 PROCEDURE — 6360000002 HC RX W HCPCS: Performed by: FAMILY MEDICINE

## 2024-05-25 RX ADMIN — Medication 15 ML: at 09:31

## 2024-05-25 RX ADMIN — TRAMADOL HYDROCHLORIDE 25 MG: 50 TABLET ORAL at 06:01

## 2024-05-25 RX ADMIN — TRAMADOL HYDROCHLORIDE 25 MG: 50 TABLET ORAL at 19:30

## 2024-05-25 RX ADMIN — MIDODRINE HYDROCHLORIDE 5 MG: 5 TABLET ORAL at 11:45

## 2024-05-25 RX ADMIN — BACITRACIN 1 EACH: 500 OINTMENT TOPICAL at 11:46

## 2024-05-25 RX ADMIN — CEFEPIME 2000 MG: 2 INJECTION, POWDER, FOR SOLUTION INTRAVENOUS at 19:35

## 2024-05-25 RX ADMIN — Medication: at 22:33

## 2024-05-25 RX ADMIN — SODIUM CHLORIDE, PRESERVATIVE FREE 10 ML: 5 INJECTION INTRAVENOUS at 22:34

## 2024-05-25 RX ADMIN — PANTOPRAZOLE SODIUM 40 MG: 40 TABLET, DELAYED RELEASE ORAL at 06:02

## 2024-05-25 RX ADMIN — MIDODRINE HYDROCHLORIDE 5 MG: 5 TABLET ORAL at 08:40

## 2024-05-25 RX ADMIN — TRAMADOL HYDROCHLORIDE 25 MG: 50 TABLET ORAL at 11:46

## 2024-05-25 RX ADMIN — Medication: at 11:46

## 2024-05-25 RX ADMIN — Medication: at 00:03

## 2024-05-25 RX ADMIN — ROSUVASTATIN CALCIUM 10 MG: 10 TABLET, FILM COATED ORAL at 09:31

## 2024-05-25 RX ADMIN — ACETAMINOPHEN 650 MG: 325 TABLET ORAL at 22:28

## 2024-05-25 RX ADMIN — SODIUM CHLORIDE, PRESERVATIVE FREE 10 ML: 5 INJECTION INTRAVENOUS at 09:32

## 2024-05-25 RX ADMIN — MIDODRINE HYDROCHLORIDE 5 MG: 5 TABLET ORAL at 19:30

## 2024-05-25 RX ADMIN — Medication 1 MG: at 09:31

## 2024-05-25 ASSESSMENT — PAIN SCALES - GENERAL
PAINLEVEL_OUTOF10: 3
PAINLEVEL_OUTOF10: 2
PAINLEVEL_OUTOF10: 3
PAINLEVEL_OUTOF10: 4
PAINLEVEL_OUTOF10: 3

## 2024-05-25 ASSESSMENT — PAIN - FUNCTIONAL ASSESSMENT
PAIN_FUNCTIONAL_ASSESSMENT: PREVENTS OR INTERFERES SOME ACTIVE ACTIVITIES AND ADLS
PAIN_FUNCTIONAL_ASSESSMENT: PREVENTS OR INTERFERES SOME ACTIVE ACTIVITIES AND ADLS

## 2024-05-25 ASSESSMENT — PAIN DESCRIPTION - LOCATION
LOCATION: LEG

## 2024-05-25 ASSESSMENT — PAIN DESCRIPTION - ORIENTATION
ORIENTATION: RIGHT
ORIENTATION: RIGHT

## 2024-05-25 ASSESSMENT — PAIN DESCRIPTION - DESCRIPTORS: DESCRIPTORS: SHARP

## 2024-05-26 LAB
ALBUMIN SERPL-MCNC: 2.1 G/DL (ref 3.5–5)
ALBUMIN/GLOB SERPL: 0.4 (ref 1.1–2.2)
ALP SERPL-CCNC: 82 U/L (ref 45–117)
ALT SERPL-CCNC: 23 U/L (ref 12–78)
ANION GAP SERPL CALC-SCNC: 9 MMOL/L (ref 5–15)
AST SERPL-CCNC: 29 U/L (ref 15–37)
BILIRUB SERPL-MCNC: 0.8 MG/DL (ref 0.2–1)
BUN SERPL-MCNC: 46 MG/DL (ref 6–20)
BUN/CREAT SERPL: 11 (ref 12–20)
CALCIUM SERPL-MCNC: 11 MG/DL (ref 8.5–10.1)
CHLORIDE SERPL-SCNC: 105 MMOL/L (ref 97–108)
CO2 SERPL-SCNC: 19 MMOL/L (ref 21–32)
CREAT SERPL-MCNC: 4.05 MG/DL (ref 0.7–1.3)
ERYTHROCYTE [DISTWIDTH] IN BLOOD BY AUTOMATED COUNT: 15 % (ref 11.5–14.5)
GLOBULIN SER CALC-MCNC: 5.4 G/DL (ref 2–4)
GLUCOSE BLD STRIP.AUTO-MCNC: 101 MG/DL (ref 65–117)
GLUCOSE BLD STRIP.AUTO-MCNC: 108 MG/DL (ref 65–117)
GLUCOSE BLD STRIP.AUTO-MCNC: 114 MG/DL (ref 65–117)
GLUCOSE BLD STRIP.AUTO-MCNC: 138 MG/DL (ref 65–117)
GLUCOSE SERPL-MCNC: 105 MG/DL (ref 65–100)
HCT VFR BLD AUTO: 23.7 % (ref 36.6–50.3)
HGB BLD-MCNC: 7.6 G/DL (ref 12.1–17)
INR PPP: 2 (ref 0.9–1.1)
MAGNESIUM SERPL-MCNC: 2.1 MG/DL (ref 1.6–2.4)
MCH RBC QN AUTO: 27.1 PG (ref 26–34)
MCHC RBC AUTO-ENTMCNC: 32.1 G/DL (ref 30–36.5)
MCV RBC AUTO: 84.6 FL (ref 80–99)
NRBC # BLD: 0 K/UL (ref 0–0.01)
NRBC BLD-RTO: 0 PER 100 WBC
PHOSPHATE SERPL-MCNC: 4.9 MG/DL (ref 2.6–4.7)
PLATELET # BLD AUTO: 365 K/UL (ref 150–400)
PMV BLD AUTO: 8.9 FL (ref 8.9–12.9)
POTASSIUM SERPL-SCNC: 3.3 MMOL/L (ref 3.5–5.1)
PROT SERPL-MCNC: 7.5 G/DL (ref 6.4–8.2)
PROTHROMBIN TIME: 19.8 SEC (ref 9–11.1)
RBC # BLD AUTO: 2.8 M/UL (ref 4.1–5.7)
SERVICE CMNT-IMP: ABNORMAL
SERVICE CMNT-IMP: NORMAL
SODIUM SERPL-SCNC: 133 MMOL/L (ref 136–145)
WBC # BLD AUTO: 15.9 K/UL (ref 4.1–11.1)

## 2024-05-26 PROCEDURE — 6370000000 HC RX 637 (ALT 250 FOR IP): Performed by: INTERNAL MEDICINE

## 2024-05-26 PROCEDURE — 84100 ASSAY OF PHOSPHORUS: CPT

## 2024-05-26 PROCEDURE — 6370000000 HC RX 637 (ALT 250 FOR IP)

## 2024-05-26 PROCEDURE — 86334 IMMUNOFIX E-PHORESIS SERUM: CPT

## 2024-05-26 PROCEDURE — 83521 IG LIGHT CHAINS FREE EACH: CPT

## 2024-05-26 PROCEDURE — 2580000003 HC RX 258: Performed by: INTERNAL MEDICINE

## 2024-05-26 PROCEDURE — 85027 COMPLETE CBC AUTOMATED: CPT

## 2024-05-26 PROCEDURE — 6370000000 HC RX 637 (ALT 250 FOR IP): Performed by: FAMILY MEDICINE

## 2024-05-26 PROCEDURE — 36415 COLL VENOUS BLD VENIPUNCTURE: CPT

## 2024-05-26 PROCEDURE — 6360000002 HC RX W HCPCS: Performed by: INTERNAL MEDICINE

## 2024-05-26 PROCEDURE — 1100000000 HC RM PRIVATE

## 2024-05-26 PROCEDURE — 84165 PROTEIN E-PHORESIS SERUM: CPT

## 2024-05-26 PROCEDURE — 82962 GLUCOSE BLOOD TEST: CPT

## 2024-05-26 PROCEDURE — 85610 PROTHROMBIN TIME: CPT

## 2024-05-26 PROCEDURE — 80053 COMPREHEN METABOLIC PANEL: CPT

## 2024-05-26 PROCEDURE — 82784 ASSAY IGA/IGD/IGG/IGM EACH: CPT

## 2024-05-26 PROCEDURE — 83735 ASSAY OF MAGNESIUM: CPT

## 2024-05-26 PROCEDURE — 93005 ELECTROCARDIOGRAM TRACING: CPT | Performed by: HOSPITALIST

## 2024-05-26 RX ORDER — POTASSIUM CHLORIDE 750 MG/1
40 TABLET, FILM COATED, EXTENDED RELEASE ORAL ONCE
Status: COMPLETED | OUTPATIENT
Start: 2024-05-26 | End: 2024-05-26

## 2024-05-26 RX ORDER — CALCITONIN SALMON 200 [USP'U]/ML
4 INJECTION, SOLUTION INTRAMUSCULAR; SUBCUTANEOUS EVERY 12 HOURS
Status: DISPENSED | OUTPATIENT
Start: 2024-05-26 | End: 2024-05-27

## 2024-05-26 RX ORDER — CALCITONIN SALMON 200 [USP'U]/ML
4 INJECTION, SOLUTION INTRAMUSCULAR; SUBCUTANEOUS ONCE
Status: COMPLETED | OUTPATIENT
Start: 2024-05-27 | End: 2024-05-27

## 2024-05-26 RX ORDER — CALCITONIN SALMON 200 [USP'U]/ML
4 INJECTION, SOLUTION INTRAMUSCULAR; SUBCUTANEOUS EVERY 12 HOURS
Status: DISCONTINUED | OUTPATIENT
Start: 2024-05-26 | End: 2024-05-26

## 2024-05-26 RX ADMIN — SODIUM CHLORIDE, PRESERVATIVE FREE 10 ML: 5 INJECTION INTRAVENOUS at 11:24

## 2024-05-26 RX ADMIN — MIDODRINE HYDROCHLORIDE 5 MG: 5 TABLET ORAL at 22:10

## 2024-05-26 RX ADMIN — Medication 15 ML: at 11:22

## 2024-05-26 RX ADMIN — ROSUVASTATIN CALCIUM 10 MG: 10 TABLET, FILM COATED ORAL at 11:15

## 2024-05-26 RX ADMIN — ACETAMINOPHEN 650 MG: 325 TABLET ORAL at 11:13

## 2024-05-26 RX ADMIN — Medication 1 MG: at 11:22

## 2024-05-26 RX ADMIN — TRAMADOL HYDROCHLORIDE 25 MG: 50 TABLET ORAL at 01:28

## 2024-05-26 RX ADMIN — SODIUM CHLORIDE, PRESERVATIVE FREE 10 ML: 5 INJECTION INTRAVENOUS at 21:16

## 2024-05-26 RX ADMIN — PANTOPRAZOLE SODIUM 40 MG: 40 TABLET, DELAYED RELEASE ORAL at 07:04

## 2024-05-26 RX ADMIN — Medication: at 17:07

## 2024-05-26 RX ADMIN — MIDODRINE HYDROCHLORIDE 5 MG: 5 TABLET ORAL at 07:11

## 2024-05-26 RX ADMIN — CALCITONIN SALMON 588 UNITS: 200 INJECTION, SOLUTION INTRAMUSCULAR; SUBCUTANEOUS at 15:14

## 2024-05-26 RX ADMIN — TRAMADOL HYDROCHLORIDE 25 MG: 50 TABLET ORAL at 20:54

## 2024-05-26 RX ADMIN — TRAMADOL HYDROCHLORIDE 25 MG: 50 TABLET ORAL at 07:05

## 2024-05-26 RX ADMIN — POTASSIUM CHLORIDE 40 MEQ: 750 TABLET, FILM COATED, EXTENDED RELEASE ORAL at 11:13

## 2024-05-26 RX ADMIN — MIDODRINE HYDROCHLORIDE 5 MG: 5 TABLET ORAL at 11:15

## 2024-05-26 RX ADMIN — ONDANSETRON 4 MG: 2 INJECTION INTRAMUSCULAR; INTRAVENOUS at 17:07

## 2024-05-26 RX ADMIN — ONDANSETRON 4 MG: 2 INJECTION INTRAMUSCULAR; INTRAVENOUS at 23:28

## 2024-05-26 ASSESSMENT — PAIN SCALES - GENERAL
PAINLEVEL_OUTOF10: 5
PAINLEVEL_OUTOF10: 4
PAINLEVEL_OUTOF10: 7
PAINLEVEL_OUTOF10: 4
PAINLEVEL_OUTOF10: 5
PAINLEVEL_OUTOF10: 5
PAINLEVEL_OUTOF10: 4

## 2024-05-26 ASSESSMENT — PAIN DESCRIPTION - LOCATION
LOCATION: LEG

## 2024-05-26 ASSESSMENT — PAIN DESCRIPTION - ORIENTATION
ORIENTATION: RIGHT

## 2024-05-26 ASSESSMENT — PAIN DESCRIPTION - DESCRIPTORS
DESCRIPTORS: ACHING
DESCRIPTORS: ACHING
DESCRIPTORS: SORE
DESCRIPTORS: ACHING;SORE;THROBBING
DESCRIPTORS: ACHING

## 2024-05-26 ASSESSMENT — PAIN - FUNCTIONAL ASSESSMENT
PAIN_FUNCTIONAL_ASSESSMENT: PREVENTS OR INTERFERES SOME ACTIVE ACTIVITIES AND ADLS

## 2024-05-26 ASSESSMENT — PAIN DESCRIPTION - PAIN TYPE
TYPE: ACUTE PAIN
TYPE: ACUTE PAIN

## 2024-05-26 ASSESSMENT — PAIN DESCRIPTION - FREQUENCY
FREQUENCY: INTERMITTENT
FREQUENCY: INTERMITTENT

## 2024-05-26 ASSESSMENT — PAIN DESCRIPTION - ONSET
ONSET: GRADUAL
ONSET: PROGRESSIVE

## 2024-05-26 NOTE — SIGNIFICANT EVENT
Rapid Response Team Consult    1718- Notified by CMU that the patient had a period of rapid heart rate.     Between 17:14:53 to 17:16:01 Rhythm appeared to be Vtach with a rate of 134 bpm. He was then paced out of it.    Patient reports he may have been getting up to the bedside commode at that time. He also reports he has been vomiting lately which is an acute change for him. He reports he has been feeling poorly since the dose of Calcitonin given SQ at 1514.       1729- 12 lead EKG performed. Patient denies symptoms at this time. Denies chest pain today.     1745- Dr. Serrano notified by primary nursing team. Orders to reconsult cardiology.

## 2024-05-27 LAB
ALBUMIN SERPL-MCNC: 2.2 G/DL (ref 3.5–5)
ALBUMIN/GLOB SERPL: 0.4 (ref 1.1–2.2)
ALP SERPL-CCNC: 76 U/L (ref 45–117)
ALT SERPL-CCNC: 23 U/L (ref 12–78)
ANION GAP SERPL CALC-SCNC: 6 MMOL/L (ref 5–15)
AST SERPL-CCNC: 31 U/L (ref 15–37)
BILIRUB SERPL-MCNC: 0.7 MG/DL (ref 0.2–1)
BUN SERPL-MCNC: 40 MG/DL (ref 6–20)
BUN/CREAT SERPL: 11 (ref 12–20)
CALCIUM SERPL-MCNC: 10.4 MG/DL (ref 8.5–10.1)
CHLORIDE SERPL-SCNC: 107 MMOL/L (ref 97–108)
CO2 SERPL-SCNC: 21 MMOL/L (ref 21–32)
CREAT SERPL-MCNC: 3.6 MG/DL (ref 0.7–1.3)
CRYOGLOB SER QL 1D COLD INC: NORMAL
EKG ATRIAL RATE: 81 BPM
EKG DIAGNOSIS: NORMAL
EKG P AXIS: 34 DEGREES
EKG P-R INTERVAL: 196 MS
EKG Q-T INTERVAL: 444 MS
EKG QRS DURATION: 150 MS
EKG QTC CALCULATION (BAZETT): 515 MS
EKG R AXIS: -42 DEGREES
EKG T AXIS: 129 DEGREES
EKG VENTRICULAR RATE: 81 BPM
ERYTHROCYTE [DISTWIDTH] IN BLOOD BY AUTOMATED COUNT: 15.5 % (ref 11.5–14.5)
GLOBULIN SER CALC-MCNC: 5.4 G/DL (ref 2–4)
GLUCOSE BLD STRIP.AUTO-MCNC: 103 MG/DL (ref 65–117)
GLUCOSE BLD STRIP.AUTO-MCNC: 114 MG/DL (ref 65–117)
GLUCOSE BLD STRIP.AUTO-MCNC: 116 MG/DL (ref 65–117)
GLUCOSE BLD STRIP.AUTO-MCNC: 132 MG/DL (ref 65–117)
GLUCOSE SERPL-MCNC: 111 MG/DL (ref 65–100)
HCT VFR BLD AUTO: 22.9 % (ref 36.6–50.3)
HGB BLD-MCNC: 7.5 G/DL (ref 12.1–17)
INR PPP: 1.9 (ref 0.9–1.1)
MAGNESIUM SERPL-MCNC: 1.9 MG/DL (ref 1.6–2.4)
MCH RBC QN AUTO: 27.7 PG (ref 26–34)
MCHC RBC AUTO-ENTMCNC: 32.8 G/DL (ref 30–36.5)
MCV RBC AUTO: 84.5 FL (ref 80–99)
NRBC # BLD: 0 K/UL (ref 0–0.01)
NRBC BLD-RTO: 0 PER 100 WBC
PHOSPHATE SERPL-MCNC: 4.8 MG/DL (ref 2.6–4.7)
PLATELET # BLD AUTO: 379 K/UL (ref 150–400)
PMV BLD AUTO: 8.8 FL (ref 8.9–12.9)
POTASSIUM SERPL-SCNC: 3.8 MMOL/L (ref 3.5–5.1)
PROT SERPL-MCNC: 7.6 G/DL (ref 6.4–8.2)
PROTHROMBIN TIME: 19.3 SEC (ref 9–11.1)
RBC # BLD AUTO: 2.71 M/UL (ref 4.1–5.7)
SERVICE CMNT-IMP: ABNORMAL
SERVICE CMNT-IMP: NORMAL
SODIUM SERPL-SCNC: 134 MMOL/L (ref 136–145)
WBC # BLD AUTO: 17.4 K/UL (ref 4.1–11.1)

## 2024-05-27 PROCEDURE — 80053 COMPREHEN METABOLIC PANEL: CPT

## 2024-05-27 PROCEDURE — 83735 ASSAY OF MAGNESIUM: CPT

## 2024-05-27 PROCEDURE — 36415 COLL VENOUS BLD VENIPUNCTURE: CPT

## 2024-05-27 PROCEDURE — 6370000000 HC RX 637 (ALT 250 FOR IP): Performed by: INTERNAL MEDICINE

## 2024-05-27 PROCEDURE — 85027 COMPLETE CBC AUTOMATED: CPT

## 2024-05-27 PROCEDURE — 93010 ELECTROCARDIOGRAM REPORT: CPT | Performed by: INTERNAL MEDICINE

## 2024-05-27 PROCEDURE — 85610 PROTHROMBIN TIME: CPT

## 2024-05-27 PROCEDURE — 6360000002 HC RX W HCPCS: Performed by: HOSPITALIST

## 2024-05-27 PROCEDURE — 1100000000 HC RM PRIVATE

## 2024-05-27 PROCEDURE — 6370000000 HC RX 637 (ALT 250 FOR IP): Performed by: FAMILY MEDICINE

## 2024-05-27 PROCEDURE — 6360000002 HC RX W HCPCS: Performed by: INTERNAL MEDICINE

## 2024-05-27 PROCEDURE — 82962 GLUCOSE BLOOD TEST: CPT

## 2024-05-27 PROCEDURE — 6370000000 HC RX 637 (ALT 250 FOR IP): Performed by: PHYSICIAN ASSISTANT

## 2024-05-27 PROCEDURE — 2580000003 HC RX 258: Performed by: INTERNAL MEDICINE

## 2024-05-27 PROCEDURE — 6370000000 HC RX 637 (ALT 250 FOR IP)

## 2024-05-27 PROCEDURE — 84100 ASSAY OF PHOSPHORUS: CPT

## 2024-05-27 RX ADMIN — TRAMADOL HYDROCHLORIDE 25 MG: 50 TABLET ORAL at 16:24

## 2024-05-27 RX ADMIN — MIDODRINE HYDROCHLORIDE 5 MG: 5 TABLET ORAL at 10:44

## 2024-05-27 RX ADMIN — Medication: at 22:23

## 2024-05-27 RX ADMIN — ONDANSETRON 4 MG: 2 INJECTION INTRAMUSCULAR; INTRAVENOUS at 17:40

## 2024-05-27 RX ADMIN — Medication 15 ML: at 08:24

## 2024-05-27 RX ADMIN — TRAMADOL HYDROCHLORIDE 25 MG: 50 TABLET ORAL at 08:21

## 2024-05-27 RX ADMIN — ACETAMINOPHEN 650 MG: 325 TABLET ORAL at 22:33

## 2024-05-27 RX ADMIN — CALCITONIN SALMON 588 UNITS: 200 INJECTION, SOLUTION INTRAMUSCULAR; SUBCUTANEOUS at 00:18

## 2024-05-27 RX ADMIN — BACITRACIN 1 EACH: 500 OINTMENT TOPICAL at 08:23

## 2024-05-27 RX ADMIN — ROSUVASTATIN CALCIUM 10 MG: 10 TABLET, FILM COATED ORAL at 08:23

## 2024-05-27 RX ADMIN — PANTOPRAZOLE SODIUM 40 MG: 40 TABLET, DELAYED RELEASE ORAL at 07:36

## 2024-05-27 RX ADMIN — SODIUM CHLORIDE, PRESERVATIVE FREE 10 ML: 5 INJECTION INTRAVENOUS at 08:24

## 2024-05-27 RX ADMIN — Medication 1 MG: at 08:23

## 2024-05-27 RX ADMIN — MIDODRINE HYDROCHLORIDE 5 MG: 5 TABLET ORAL at 16:21

## 2024-05-27 RX ADMIN — MIDODRINE HYDROCHLORIDE 5 MG: 5 TABLET ORAL at 07:36

## 2024-05-27 RX ADMIN — Medication: at 10:44

## 2024-05-27 ASSESSMENT — PAIN DESCRIPTION - LOCATION
LOCATION: LEG;FOOT
LOCATION: LEG
LOCATION: LEG;FOOT
LOCATION: LEG;FOOT
LOCATION: LEG
LOCATION: LEG;FOOT
LOCATION: FOOT;LEG
LOCATION: LEG
LOCATION: LEG
LOCATION: LEG;FOOT
LOCATION: LEG
LOCATION: LEG

## 2024-05-27 ASSESSMENT — PAIN DESCRIPTION - ORIENTATION
ORIENTATION: RIGHT

## 2024-05-27 ASSESSMENT — PAIN DESCRIPTION - PAIN TYPE
TYPE: ACUTE PAIN

## 2024-05-27 ASSESSMENT — PAIN DESCRIPTION - DESCRIPTORS
DESCRIPTORS: ACHING

## 2024-05-27 ASSESSMENT — PAIN SCALES - GENERAL
PAINLEVEL_OUTOF10: 4
PAINLEVEL_OUTOF10: 5
PAINLEVEL_OUTOF10: 6
PAINLEVEL_OUTOF10: 5
PAINLEVEL_OUTOF10: 6
PAINLEVEL_OUTOF10: 6
PAINLEVEL_OUTOF10: 5
PAINLEVEL_OUTOF10: 5
PAINLEVEL_OUTOF10: 6

## 2024-05-27 ASSESSMENT — PAIN - FUNCTIONAL ASSESSMENT
PAIN_FUNCTIONAL_ASSESSMENT: ACTIVITIES ARE NOT PREVENTED
PAIN_FUNCTIONAL_ASSESSMENT: PREVENTS OR INTERFERES SOME ACTIVE ACTIVITIES AND ADLS

## 2024-05-27 ASSESSMENT — PAIN DESCRIPTION - FREQUENCY
FREQUENCY: INTERMITTENT

## 2024-05-27 ASSESSMENT — PAIN DESCRIPTION - ONSET: ONSET: GRADUAL

## 2024-05-28 PROBLEM — N18.32 STAGE 3B CHRONIC KIDNEY DISEASE (HCC): Status: ACTIVE | Noted: 2024-05-28

## 2024-05-28 PROBLEM — E11.65 INADEQUATELY CONTROLLED DIABETES MELLITUS (HCC): Status: ACTIVE | Noted: 2023-06-01

## 2024-05-28 LAB
ALBUMIN SERPL-MCNC: 2.2 G/DL (ref 3.5–5)
ALBUMIN/GLOB SERPL: 0.5 (ref 1.1–2.2)
ALP SERPL-CCNC: 77 U/L (ref 45–117)
ALT SERPL-CCNC: 24 U/L (ref 12–78)
ANION GAP SERPL CALC-SCNC: 8 MMOL/L (ref 5–15)
AST SERPL-CCNC: 34 U/L (ref 15–37)
BILIRUB SERPL-MCNC: 0.7 MG/DL (ref 0.2–1)
BUN SERPL-MCNC: 40 MG/DL (ref 6–20)
BUN/CREAT SERPL: 13 (ref 12–20)
CALCIUM SERPL-MCNC: 10.1 MG/DL (ref 8.5–10.1)
CHLORIDE SERPL-SCNC: 104 MMOL/L (ref 97–108)
CO2 SERPL-SCNC: 22 MMOL/L (ref 21–32)
CREAT SERPL-MCNC: 3.19 MG/DL (ref 0.7–1.3)
ERYTHROCYTE [DISTWIDTH] IN BLOOD BY AUTOMATED COUNT: 15.4 % (ref 11.5–14.5)
EST. AVERAGE GLUCOSE BLD GHB EST-MCNC: 117 MG/DL
GLOBULIN SER CALC-MCNC: 4.3 G/DL (ref 2–4)
GLUCOSE BLD STRIP.AUTO-MCNC: 111 MG/DL (ref 65–117)
GLUCOSE BLD STRIP.AUTO-MCNC: 128 MG/DL (ref 65–117)
GLUCOSE BLD STRIP.AUTO-MCNC: 96 MG/DL (ref 65–117)
GLUCOSE BLD STRIP.AUTO-MCNC: 99 MG/DL (ref 65–117)
GLUCOSE SERPL-MCNC: 96 MG/DL (ref 65–100)
HBA1C MFR BLD: 5.7 % (ref 4–5.6)
HCT VFR BLD AUTO: 22.1 % (ref 36.6–50.3)
HGB BLD-MCNC: 7.3 G/DL (ref 12.1–17)
INR PPP: 2.1 (ref 0.9–1.1)
KAPPA LC FREE SER-MCNC: 109.5 MG/L (ref 3.3–19.4)
KAPPA LC FREE/LAMBDA FREE SER: 1 (ref 0.26–1.65)
LAMBDA LC FREE SERPL-MCNC: 110 MG/L (ref 5.7–26.3)
MAGNESIUM SERPL-MCNC: 1.9 MG/DL (ref 1.6–2.4)
MCH RBC QN AUTO: 27.4 PG (ref 26–34)
MCHC RBC AUTO-ENTMCNC: 33 G/DL (ref 30–36.5)
MCV RBC AUTO: 83.1 FL (ref 80–99)
NRBC # BLD: 0 K/UL (ref 0–0.01)
NRBC BLD-RTO: 0 PER 100 WBC
PHOSPHATE SERPL-MCNC: 4.5 MG/DL (ref 2.6–4.7)
PLATELET # BLD AUTO: 355 K/UL (ref 150–400)
PMV BLD AUTO: 8.7 FL (ref 8.9–12.9)
POTASSIUM SERPL-SCNC: 3.6 MMOL/L (ref 3.5–5.1)
PROT SERPL-MCNC: 6.5 G/DL (ref 6.4–8.2)
PROTHROMBIN TIME: 21.4 SEC (ref 9–11.1)
RBC # BLD AUTO: 2.66 M/UL (ref 4.1–5.7)
SERVICE CMNT-IMP: ABNORMAL
SERVICE CMNT-IMP: NORMAL
SODIUM SERPL-SCNC: 134 MMOL/L (ref 136–145)
WBC # BLD AUTO: 15 K/UL (ref 4.1–11.1)

## 2024-05-28 PROCEDURE — 80053 COMPREHEN METABOLIC PANEL: CPT

## 2024-05-28 PROCEDURE — 97116 GAIT TRAINING THERAPY: CPT

## 2024-05-28 PROCEDURE — 6370000000 HC RX 637 (ALT 250 FOR IP): Performed by: INTERNAL MEDICINE

## 2024-05-28 PROCEDURE — 99221 1ST HOSP IP/OBS SF/LOW 40: CPT | Performed by: CLINICAL NURSE SPECIALIST

## 2024-05-28 PROCEDURE — 1100000000 HC RM PRIVATE

## 2024-05-28 PROCEDURE — 6370000000 HC RX 637 (ALT 250 FOR IP)

## 2024-05-28 PROCEDURE — 83036 HEMOGLOBIN GLYCOSYLATED A1C: CPT

## 2024-05-28 PROCEDURE — 84100 ASSAY OF PHOSPHORUS: CPT

## 2024-05-28 PROCEDURE — 82962 GLUCOSE BLOOD TEST: CPT

## 2024-05-28 PROCEDURE — 36415 COLL VENOUS BLD VENIPUNCTURE: CPT

## 2024-05-28 PROCEDURE — 85027 COMPLETE CBC AUTOMATED: CPT

## 2024-05-28 PROCEDURE — 85610 PROTHROMBIN TIME: CPT

## 2024-05-28 PROCEDURE — 2580000003 HC RX 258: Performed by: INTERNAL MEDICINE

## 2024-05-28 PROCEDURE — 83735 ASSAY OF MAGNESIUM: CPT

## 2024-05-28 PROCEDURE — 6370000000 HC RX 637 (ALT 250 FOR IP): Performed by: PHYSICIAN ASSISTANT

## 2024-05-28 RX ORDER — WARFARIN SODIUM 5 MG/1
5 TABLET ORAL
Status: COMPLETED | OUTPATIENT
Start: 2024-05-28 | End: 2024-05-28

## 2024-05-28 RX ORDER — POTASSIUM CHLORIDE 750 MG/1
20 TABLET, FILM COATED, EXTENDED RELEASE ORAL ONCE
Status: COMPLETED | OUTPATIENT
Start: 2024-05-28 | End: 2024-05-28

## 2024-05-28 RX ADMIN — Medication 15 ML: at 07:56

## 2024-05-28 RX ADMIN — MIDODRINE HYDROCHLORIDE 5 MG: 5 TABLET ORAL at 07:56

## 2024-05-28 RX ADMIN — Medication 1 MG: at 07:56

## 2024-05-28 RX ADMIN — POTASSIUM CHLORIDE 20 MEQ: 750 TABLET, FILM COATED, EXTENDED RELEASE ORAL at 11:21

## 2024-05-28 RX ADMIN — WARFARIN SODIUM 5 MG: 5 TABLET ORAL at 17:55

## 2024-05-28 RX ADMIN — BACITRACIN 1 EACH: 500 OINTMENT TOPICAL at 07:56

## 2024-05-28 RX ADMIN — MIDODRINE HYDROCHLORIDE 5 MG: 5 TABLET ORAL at 10:03

## 2024-05-28 RX ADMIN — ACETAMINOPHEN 650 MG: 325 TABLET ORAL at 07:02

## 2024-05-28 RX ADMIN — Medication: at 21:22

## 2024-05-28 RX ADMIN — MIDODRINE HYDROCHLORIDE 5 MG: 5 TABLET ORAL at 17:55

## 2024-05-28 RX ADMIN — ACETAMINOPHEN 650 MG: 325 TABLET ORAL at 18:01

## 2024-05-28 RX ADMIN — ROSUVASTATIN CALCIUM 10 MG: 10 TABLET, FILM COATED ORAL at 07:56

## 2024-05-28 RX ADMIN — PANTOPRAZOLE SODIUM 40 MG: 40 TABLET, DELAYED RELEASE ORAL at 06:56

## 2024-05-28 RX ADMIN — Medication: at 10:03

## 2024-05-28 RX ADMIN — SODIUM CHLORIDE, PRESERVATIVE FREE 10 ML: 5 INJECTION INTRAVENOUS at 21:24

## 2024-05-28 ASSESSMENT — PAIN DESCRIPTION - PAIN TYPE
TYPE: ACUTE PAIN

## 2024-05-28 ASSESSMENT — PAIN DESCRIPTION - DESCRIPTORS
DESCRIPTORS: ACHING

## 2024-05-28 ASSESSMENT — PAIN DESCRIPTION - LOCATION
LOCATION: LEG

## 2024-05-28 ASSESSMENT — PAIN DESCRIPTION - FREQUENCY
FREQUENCY: INTERMITTENT

## 2024-05-28 ASSESSMENT — PAIN SCALES - GENERAL
PAINLEVEL_OUTOF10: 3
PAINLEVEL_OUTOF10: 4
PAINLEVEL_OUTOF10: 0
PAINLEVEL_OUTOF10: 5
PAINLEVEL_OUTOF10: 3
PAINLEVEL_OUTOF10: 3
PAINLEVEL_OUTOF10: 5
PAINLEVEL_OUTOF10: 4
PAINLEVEL_OUTOF10: 4
PAINLEVEL_OUTOF10: 3

## 2024-05-28 ASSESSMENT — PAIN DESCRIPTION - ORIENTATION
ORIENTATION: RIGHT

## 2024-05-28 NOTE — CARE COORDINATION
Transition of Care Plan:    RUR: 20%  Prior Level of Functioning: Independent   Disposition: Home with HH   Follow up appointments: per physician recommendation   DME needed: TBD  Transportation at discharge: family  IM/IMM Medicare/ letter given: 1st 5/21, 2nd at discharge   Caregiver Contact: Simin [phone]  Barriers to discharge: Medical stability    CM reviewed chart, noted: Previous home health through [facility] in 2022 after AVR surgery. Agreeable to home health again if recommended by treatment team. Patient owns a RW and cane at home, but does not use them. Drives himself to/from appointments and errands. [facility] in [place] for prescriptions. Sister resides nearby and will transport patient at time of discharge. CM discussed options of wound care clinic for OP services vs home health wound care - patient will make decision if he needs IV abx at discharge.       Patient may discharge tomorrow, patient prefers to discharge with HH SN/PT/OT. FOC offered, [facility] has accepted patient for HH, CM added to AVS.     CM will continue to follow.     Dina Ragland RN/CRM

## 2024-05-29 LAB
ALBUMIN SERPL-MCNC: 2.3 G/DL (ref 3.5–5)
ALBUMIN/GLOB SERPL: 0.5 (ref 1.1–2.2)
ALP SERPL-CCNC: 74 U/L (ref 45–117)
ALT SERPL-CCNC: 30 U/L (ref 12–78)
ANION GAP SERPL CALC-SCNC: 7 MMOL/L (ref 5–15)
AST SERPL-CCNC: 42 U/L (ref 15–37)
BILIRUB SERPL-MCNC: 0.8 MG/DL (ref 0.2–1)
BUN SERPL-MCNC: 37 MG/DL (ref 6–20)
BUN/CREAT SERPL: 13 (ref 12–20)
CALCIUM SERPL-MCNC: 10.7 MG/DL (ref 8.5–10.1)
CALCIUM SERPL-MCNC: 10.9 MG/DL (ref 8.5–10.1)
CHLORIDE SERPL-SCNC: 104 MMOL/L (ref 97–108)
CO2 SERPL-SCNC: 21 MMOL/L (ref 21–32)
CREAT SERPL-MCNC: 2.93 MG/DL (ref 0.7–1.3)
ERYTHROCYTE [DISTWIDTH] IN BLOOD BY AUTOMATED COUNT: 15.5 % (ref 11.5–14.5)
GLOBULIN SER CALC-MCNC: 5.1 G/DL (ref 2–4)
GLUCOSE BLD STRIP.AUTO-MCNC: 101 MG/DL (ref 65–117)
GLUCOSE BLD STRIP.AUTO-MCNC: 107 MG/DL (ref 65–117)
GLUCOSE BLD STRIP.AUTO-MCNC: 114 MG/DL (ref 65–117)
GLUCOSE BLD STRIP.AUTO-MCNC: 147 MG/DL (ref 65–117)
GLUCOSE SERPL-MCNC: 93 MG/DL (ref 65–100)
HCT VFR BLD AUTO: 22.8 % (ref 36.6–50.3)
HGB BLD-MCNC: 7.5 G/DL (ref 12.1–17)
INR PPP: 2.1 (ref 0.9–1.1)
MAGNESIUM SERPL-MCNC: 2 MG/DL (ref 1.6–2.4)
MCH RBC QN AUTO: 27.2 PG (ref 26–34)
MCHC RBC AUTO-ENTMCNC: 32.9 G/DL (ref 30–36.5)
MCV RBC AUTO: 82.6 FL (ref 80–99)
NRBC # BLD: 0 K/UL (ref 0–0.01)
NRBC BLD-RTO: 0 PER 100 WBC
PHOSPHATE SERPL-MCNC: 3.7 MG/DL (ref 2.6–4.7)
PLATELET # BLD AUTO: 373 K/UL (ref 150–400)
PMV BLD AUTO: 8.7 FL (ref 8.9–12.9)
POTASSIUM SERPL-SCNC: 3.6 MMOL/L (ref 3.5–5.1)
PROT SERPL-MCNC: 7.4 G/DL (ref 6.4–8.2)
PROTHROMBIN TIME: 20.7 SEC (ref 9–11.1)
PTH-INTACT SERPL-MCNC: 7.7 PG/ML (ref 18.4–88)
RBC # BLD AUTO: 2.76 M/UL (ref 4.1–5.7)
SERVICE CMNT-IMP: ABNORMAL
SERVICE CMNT-IMP: NORMAL
SODIUM SERPL-SCNC: 132 MMOL/L (ref 136–145)
WBC # BLD AUTO: 13.5 K/UL (ref 4.1–11.1)

## 2024-05-29 PROCEDURE — 6370000000 HC RX 637 (ALT 250 FOR IP): Performed by: INTERNAL MEDICINE

## 2024-05-29 PROCEDURE — 83735 ASSAY OF MAGNESIUM: CPT

## 2024-05-29 PROCEDURE — 6370000000 HC RX 637 (ALT 250 FOR IP): Performed by: NURSE PRACTITIONER

## 2024-05-29 PROCEDURE — 82962 GLUCOSE BLOOD TEST: CPT

## 2024-05-29 PROCEDURE — 36415 COLL VENOUS BLD VENIPUNCTURE: CPT

## 2024-05-29 PROCEDURE — 85610 PROTHROMBIN TIME: CPT

## 2024-05-29 PROCEDURE — 84100 ASSAY OF PHOSPHORUS: CPT

## 2024-05-29 PROCEDURE — 1100000000 HC RM PRIVATE

## 2024-05-29 PROCEDURE — 80053 COMPREHEN METABOLIC PANEL: CPT

## 2024-05-29 PROCEDURE — 83970 ASSAY OF PARATHORMONE: CPT

## 2024-05-29 PROCEDURE — 97116 GAIT TRAINING THERAPY: CPT

## 2024-05-29 PROCEDURE — 85027 COMPLETE CBC AUTOMATED: CPT

## 2024-05-29 PROCEDURE — 97530 THERAPEUTIC ACTIVITIES: CPT

## 2024-05-29 PROCEDURE — 2580000003 HC RX 258: Performed by: INTERNAL MEDICINE

## 2024-05-29 PROCEDURE — 6370000000 HC RX 637 (ALT 250 FOR IP): Performed by: PHYSICIAN ASSISTANT

## 2024-05-29 PROCEDURE — 6370000000 HC RX 637 (ALT 250 FOR IP)

## 2024-05-29 RX ORDER — SODIUM CHLORIDE 9 MG/ML
INJECTION, SOLUTION INTRAVENOUS CONTINUOUS
Status: DISCONTINUED | OUTPATIENT
Start: 2024-05-29 | End: 2024-06-03

## 2024-05-29 RX ORDER — AMIODARONE HYDROCHLORIDE 200 MG/1
400 TABLET ORAL 2 TIMES DAILY
Status: DISCONTINUED | OUTPATIENT
Start: 2024-05-29 | End: 2024-06-04

## 2024-05-29 RX ORDER — WARFARIN SODIUM 5 MG/1
7.5 TABLET ORAL
Status: COMPLETED | OUTPATIENT
Start: 2024-05-29 | End: 2024-05-29

## 2024-05-29 RX ADMIN — ROSUVASTATIN CALCIUM 10 MG: 10 TABLET, FILM COATED ORAL at 08:28

## 2024-05-29 RX ADMIN — AMIODARONE HYDROCHLORIDE 400 MG: 200 TABLET ORAL at 21:30

## 2024-05-29 RX ADMIN — BACITRACIN 1 EACH: 500 OINTMENT TOPICAL at 08:28

## 2024-05-29 RX ADMIN — ACETAMINOPHEN 650 MG: 325 TABLET ORAL at 17:30

## 2024-05-29 RX ADMIN — MIDODRINE HYDROCHLORIDE 5 MG: 5 TABLET ORAL at 08:28

## 2024-05-29 RX ADMIN — MIDODRINE HYDROCHLORIDE 5 MG: 5 TABLET ORAL at 10:31

## 2024-05-29 RX ADMIN — MIDODRINE HYDROCHLORIDE 5 MG: 5 TABLET ORAL at 17:31

## 2024-05-29 RX ADMIN — Medication 15 ML: at 08:28

## 2024-05-29 RX ADMIN — ACETAMINOPHEN 650 MG: 325 TABLET ORAL at 00:12

## 2024-05-29 RX ADMIN — Medication: at 10:31

## 2024-05-29 RX ADMIN — WARFARIN SODIUM 7.5 MG: 5 TABLET ORAL at 17:30

## 2024-05-29 RX ADMIN — PANTOPRAZOLE SODIUM 40 MG: 40 TABLET, DELAYED RELEASE ORAL at 05:39

## 2024-05-29 RX ADMIN — ACETAMINOPHEN 650 MG: 325 TABLET ORAL at 08:27

## 2024-05-29 RX ADMIN — SODIUM CHLORIDE: 9 INJECTION, SOLUTION INTRAVENOUS at 09:27

## 2024-05-29 RX ADMIN — Medication 1 MG: at 08:27

## 2024-05-29 ASSESSMENT — PAIN DESCRIPTION - ORIENTATION
ORIENTATION: RIGHT

## 2024-05-29 ASSESSMENT — PAIN DESCRIPTION - LOCATION
LOCATION: LEG

## 2024-05-29 ASSESSMENT — PAIN SCALES - GENERAL
PAINLEVEL_OUTOF10: 2
PAINLEVEL_OUTOF10: 3
PAINLEVEL_OUTOF10: 4
PAINLEVEL_OUTOF10: 3
PAINLEVEL_OUTOF10: 4
PAINLEVEL_OUTOF10: 3

## 2024-05-29 ASSESSMENT — PAIN DESCRIPTION - DESCRIPTORS
DESCRIPTORS: ACHING

## 2024-05-29 ASSESSMENT — PAIN SCALES - WONG BAKER: WONGBAKER_NUMERICALRESPONSE: NO HURT

## 2024-05-29 NOTE — CARE COORDINATION
Transition of Care Plan:    RUR: 20%  Prior Level of Functioning: Independent   Disposition: Home with HH   Follow up appointments: per physician recommendation   DME needed: TBD  Transportation at discharge: family  IM/IMM Medicare/ letter given: 1st 5/21, 2nd 5/29  Caregiver Contact: Simin [phone]  Barriers to discharge: Medical stability, life vest    CM reviewed chart, noted: Previous home health through [facility] in 2022 after AVR surgery. Agreeable to home health again if recommended by treatment team. Patient owns a RW and cane at home, but does not use them. Drives himself to/from appointments and errands. [facility] in [place] for prescriptions. Sister resides nearby and will transport patient at time of discharge. CM discussed options of wound care clinic for OP services vs home health wound care - patient will make decision if he needs IV abx at discharge.     Patients discharge is on hold, Patient is being followed by Nephro and Cardiology, there has been reference to patient needing a life vest prior to discharge. CM is following for Cardiologist lisettes.     Patient has inquired about resources for companies who can upgrade him home to make it more medically assessable. CM provided information for home updates and ramp resources.      [facility] has accepted patient for HH, CM added to AVS.     CM will continue to follow.     Dina Ragland RN/CRM

## 2024-05-30 LAB
ALBUMIN SERPL-MCNC: 2.1 G/DL (ref 3.5–5)
ALBUMIN/GLOB SERPL: 0.4 (ref 1.1–2.2)
ALP SERPL-CCNC: 70 U/L (ref 45–117)
ALT SERPL-CCNC: 34 U/L (ref 12–78)
ANION GAP SERPL CALC-SCNC: 5 MMOL/L (ref 5–15)
AST SERPL-CCNC: 42 U/L (ref 15–37)
BILIRUB SERPL-MCNC: 0.7 MG/DL (ref 0.2–1)
BUN SERPL-MCNC: 36 MG/DL (ref 6–20)
BUN/CREAT SERPL: 14 (ref 12–20)
CALCIUM SERPL-MCNC: 10.3 MG/DL (ref 8.5–10.1)
CHLORIDE SERPL-SCNC: 107 MMOL/L (ref 97–108)
CO2 SERPL-SCNC: 20 MMOL/L (ref 21–32)
CREAT SERPL-MCNC: 2.64 MG/DL (ref 0.7–1.3)
ERYTHROCYTE [DISTWIDTH] IN BLOOD BY AUTOMATED COUNT: 15.4 % (ref 11.5–14.5)
GLOBULIN SER CALC-MCNC: 5 G/DL (ref 2–4)
GLUCOSE BLD STRIP.AUTO-MCNC: 104 MG/DL (ref 65–117)
GLUCOSE BLD STRIP.AUTO-MCNC: 124 MG/DL (ref 65–117)
GLUCOSE BLD STRIP.AUTO-MCNC: 126 MG/DL (ref 65–117)
GLUCOSE BLD STRIP.AUTO-MCNC: 97 MG/DL (ref 65–117)
GLUCOSE SERPL-MCNC: 95 MG/DL (ref 65–100)
HCT VFR BLD AUTO: 22.9 % (ref 36.6–50.3)
HGB BLD-MCNC: 7.2 G/DL (ref 12.1–17)
INR PPP: 2.1 (ref 0.9–1.1)
MAGNESIUM SERPL-MCNC: 1.8 MG/DL (ref 1.6–2.4)
MCH RBC QN AUTO: 27.3 PG (ref 26–34)
MCHC RBC AUTO-ENTMCNC: 31.4 G/DL (ref 30–36.5)
MCV RBC AUTO: 86.7 FL (ref 80–99)
NRBC # BLD: 0 K/UL (ref 0–0.01)
NRBC BLD-RTO: 0 PER 100 WBC
PHOSPHATE SERPL-MCNC: 3.2 MG/DL (ref 2.6–4.7)
PLATELET # BLD AUTO: 320 K/UL (ref 150–400)
PMV BLD AUTO: 8.4 FL (ref 8.9–12.9)
POTASSIUM SERPL-SCNC: 3.8 MMOL/L (ref 3.5–5.1)
PROT SERPL-MCNC: 7.1 G/DL (ref 6.4–8.2)
PROTHROMBIN TIME: 20.6 SEC (ref 9–11.1)
RBC # BLD AUTO: 2.64 M/UL (ref 4.1–5.7)
SERVICE CMNT-IMP: ABNORMAL
SERVICE CMNT-IMP: ABNORMAL
SERVICE CMNT-IMP: NORMAL
SERVICE CMNT-IMP: NORMAL
SODIUM SERPL-SCNC: 132 MMOL/L (ref 136–145)
WBC # BLD AUTO: 12.5 K/UL (ref 4.1–11.1)

## 2024-05-30 PROCEDURE — 6370000000 HC RX 637 (ALT 250 FOR IP): Performed by: INTERNAL MEDICINE

## 2024-05-30 PROCEDURE — 6370000000 HC RX 637 (ALT 250 FOR IP)

## 2024-05-30 PROCEDURE — 83735 ASSAY OF MAGNESIUM: CPT

## 2024-05-30 PROCEDURE — 85027 COMPLETE CBC AUTOMATED: CPT

## 2024-05-30 PROCEDURE — 80053 COMPREHEN METABOLIC PANEL: CPT

## 2024-05-30 PROCEDURE — 82962 GLUCOSE BLOOD TEST: CPT

## 2024-05-30 PROCEDURE — 84100 ASSAY OF PHOSPHORUS: CPT

## 2024-05-30 PROCEDURE — 36415 COLL VENOUS BLD VENIPUNCTURE: CPT

## 2024-05-30 PROCEDURE — 1100000000 HC RM PRIVATE

## 2024-05-30 PROCEDURE — 85610 PROTHROMBIN TIME: CPT

## 2024-05-30 PROCEDURE — 2580000003 HC RX 258: Performed by: INTERNAL MEDICINE

## 2024-05-30 RX ORDER — WARFARIN SODIUM 5 MG/1
7.5 TABLET ORAL
Status: DISCONTINUED | OUTPATIENT
Start: 2024-05-30 | End: 2024-05-30

## 2024-05-30 RX ADMIN — AMIODARONE HYDROCHLORIDE 400 MG: 200 TABLET ORAL at 09:25

## 2024-05-30 RX ADMIN — MIDODRINE HYDROCHLORIDE 5 MG: 5 TABLET ORAL at 12:03

## 2024-05-30 RX ADMIN — MIDODRINE HYDROCHLORIDE 5 MG: 5 TABLET ORAL at 07:29

## 2024-05-30 RX ADMIN — Medication 15 ML: at 09:25

## 2024-05-30 RX ADMIN — ACETAMINOPHEN 650 MG: 325 TABLET ORAL at 00:34

## 2024-05-30 RX ADMIN — PANTOPRAZOLE SODIUM 40 MG: 40 TABLET, DELAYED RELEASE ORAL at 07:29

## 2024-05-30 RX ADMIN — AMIODARONE HYDROCHLORIDE 400 MG: 200 TABLET ORAL at 23:05

## 2024-05-30 RX ADMIN — SODIUM CHLORIDE, PRESERVATIVE FREE 10 ML: 5 INJECTION INTRAVENOUS at 09:26

## 2024-05-30 RX ADMIN — Medication 1 MG: at 09:25

## 2024-05-30 RX ADMIN — SODIUM CHLORIDE, PRESERVATIVE FREE 10 ML: 5 INJECTION INTRAVENOUS at 23:08

## 2024-05-30 RX ADMIN — Medication: at 00:34

## 2024-05-30 RX ADMIN — ROSUVASTATIN CALCIUM 10 MG: 10 TABLET, FILM COATED ORAL at 09:25

## 2024-05-30 RX ADMIN — Medication: at 23:06

## 2024-05-30 RX ADMIN — Medication: at 11:40

## 2024-05-30 RX ADMIN — SODIUM CHLORIDE: 9 INJECTION, SOLUTION INTRAVENOUS at 11:39

## 2024-05-30 RX ADMIN — MIDODRINE HYDROCHLORIDE 5 MG: 5 TABLET ORAL at 17:33

## 2024-05-30 NOTE — DISCHARGE INSTRUCTIONS
Discharge Instructions       PATIENT ID: Javier Ugalde  MRN: [id number]   YOB: 1965    DATE OF ADMISSION: 5/20/2024   DATE OF DISCHARGE: 6/4/2024    PRIMARY CARE PROVIDER: Brielle Muller     ATTENDING PHYSICIAN: Jodie Ervin MD   DISCHARGING PROVIDER: JIM Jasso NP    To contact this individual call [phone] and ask the  to page.   If unavailable ask to be transferred the Adult Hospitalist Department.    DISCHARGE DIAGNOSES right lower extremity cellulitis, NIKKIE on CKD stage 4, anemia    CONSULTATIONS: Nephrology, Cardiology, Vascular Surgery, Infectious Disease, Wound Care    PROCEDURES/SURGERIES: * No surgery found *    PENDING TEST RESULTS:   At the time of discharge the following test results are still pending: none    FOLLOW UP APPOINTMENTS:    Follow-up Information       Follow up With Specialties Details Why Contact Info    Brielle Muller MD Family Medicine, Emergency Medicine   [address] [place] [address]  [id number]  Martita SAUL [id number] [phone]      [facility]  Follow up [facility] Phone: [phone]    [facility] OP WOUND CARE AMOR Wound Ostomy Go on 6/19/2024 Patients Wound Care appointment is scheduled for Wednesday June 19, 2024 at 2:00PM. Please arrive 15 minutes early. [address] [facility] [address]  [facility] [address] [phone]    Loly Ashraf MD Nephrology Follow up Call to schedule follow up appointment in 2-3 weeks. [address] [facility]  [address]  [facility] [id number] [phone]      Gustavo Rodriguez MD Cardiothoracic Surgery, Interventional Cardiology, Cardiology Follow up Call to schedule follow up in 1 week. [address] [facility]  [address]  [facility] [id number] [phone]      Jann Hernandez MD Cardiology Follow up Call to schedule follow up within 1 month. [address] [facility] [address]  [facility] [id number] [phone]              ADDITIONAL CARE RECOMMENDATIONS:   Schedule follow-up appointments as listed above.   Please closely review

## 2024-05-30 NOTE — CARE COORDINATION
CM received a consult for a OP wound care appointment. Patients Wound Care appointment is scheduled for Wednesday June 5, 2024 at 2:15PM. Please arrive 15 minutes early.     CM added to AVS.     [facility] OP WOUND CARE [address] [address]     Dina Ragland RN/CRM

## 2024-05-30 NOTE — WOUND CARE
Wound Care Note:     Wound care follow up for right lower leg wound.      Chart shows:  Admitted for cellulitis of right lower extremity  Past Medical History:   Diagnosis Date    Arthritis     Cellulitis     Chronic pain     COVID-19 vaccine series completed 4/2/21, 5/7/21    MODERNA    Diabetes (HCC)     Elevated hemoglobin A1c 06/07/2021    6.4    Hyperlipidemia     Hypertension     Hypertension complicating diabetes (HCC)      WBC = 12.5 on 5/30/24  Admitted from home    Assessment:   Patient is A&O x 4, communicative, continent with no assistance needed in repositioning.    Bed: [facility]  Diet: Adult diet regular; 5 carb choices; low fat/low chol/high fiber/2 gm NA with nutritional supplements  Patient reports some discomfort when cleaning wound.      Bilateral heels skin intact and without erythema.  Buttock and sacral skin were not assessed.    Palpable DP pulse.      1. POA right lateral knee wound bed with small wound with pink tissue, remaining tissue with slough/eschar, small to moderate serous drainage, wound edges are open, kristyn-wound with edema.  Triad and small sacral border applied.      Spoke with EFE Alva, wound care orders obtained.  She requested an appointment with [facility] be made prior to discharge.      Patient repositioned supine.     Recommendations:    Right lateral knee- Every other day apply VASHE moistened gauze and let sit 5 to 10 minutes, wipe wound bed clean using same gauze, apply Triad and cover.    Bilateral lower legs- Daily cleanse with soap and water using a wash cloth to remove loose, dry skin.  Every 12 hours liberally apply Lac-Hydrin.    Skin Care & Pressure Prevention:  Minimize layers of linen/pads under patient to optimize support surface.    Turn/reposition approximately every 2 hours and offload heels.  Manage incontinence / promote continence   Nourishing Skin Cream to dry skin,

## 2024-05-30 NOTE — CARE COORDINATION
Transition of Care Plan:    RUR: 19%  Prior Level of Functioning: Independent   Disposition: Home with HH   Follow up appointments: per physician recommendation   DME needed: TBD  Transportation at discharge: family  IM/IMM Medicare/ letter given: 1st 5/21, 2nd 5/29  Caregiver Contact: Simin [phone]  Barriers to discharge: Medical stability, life vest    CM reviewed chart, noted: Previous home health through [facility] in 2022 after AVR surgery. Agreeable to home health again if recommended by treatment team. Patient owns a RW and cane at home, but does not use them. Drives himself to/from appointments and errands. [facility] in [place] for prescriptions. Sister resides nearby and will transport patient at time of discharge. CM discussed options of wound care clinic for OP services vs home health wound care - patient will make decision if he needs IV abx at discharge.     Patients discharge is on hold, Patient is being followed by Nephro and Cardiology, patient will need a lifevest prior to discharge. CM called Anton with [employer] at [phone], Anton is at the hospital and came to [address] to obtain the order, facesheet, cardiologist note. Anton will initiate the insurance auth and states the patient should get fitted today, planning for discharge Tuesday.     [facility] has accepted patient for HH, CM added to AVS.     CM will continue to follow.     Dina Ragland RN/CRM

## 2024-05-31 LAB
ALBUMIN SERPL-MCNC: 2.3 G/DL (ref 3.5–5)
ALBUMIN/GLOB SERPL: 0.5 (ref 1.1–2.2)
ALP SERPL-CCNC: 72 U/L (ref 45–117)
ALT SERPL-CCNC: 39 U/L (ref 12–78)
ANION GAP SERPL CALC-SCNC: 6 MMOL/L (ref 5–15)
AST SERPL-CCNC: 45 U/L (ref 15–37)
BILIRUB SERPL-MCNC: 0.9 MG/DL (ref 0.2–1)
BUN SERPL-MCNC: 28 MG/DL (ref 6–20)
BUN/CREAT SERPL: 12 (ref 12–20)
CALCIUM SERPL-MCNC: 10.7 MG/DL (ref 8.5–10.1)
CHLORIDE SERPL-SCNC: 107 MMOL/L (ref 97–108)
CO2 SERPL-SCNC: 22 MMOL/L (ref 21–32)
CREAT SERPL-MCNC: 2.26 MG/DL (ref 0.7–1.3)
ERYTHROCYTE [DISTWIDTH] IN BLOOD BY AUTOMATED COUNT: 15.7 % (ref 11.5–14.5)
GLOBULIN SER CALC-MCNC: 5.1 G/DL (ref 2–4)
GLUCOSE BLD STRIP.AUTO-MCNC: 101 MG/DL (ref 65–117)
GLUCOSE BLD STRIP.AUTO-MCNC: 117 MG/DL (ref 65–117)
GLUCOSE BLD STRIP.AUTO-MCNC: 131 MG/DL (ref 65–117)
GLUCOSE BLD STRIP.AUTO-MCNC: 90 MG/DL (ref 65–117)
GLUCOSE SERPL-MCNC: 102 MG/DL (ref 65–100)
HCT VFR BLD AUTO: 22.9 % (ref 36.6–50.3)
HGB BLD-MCNC: 7.6 G/DL (ref 12.1–17)
INR PPP: 2 (ref 0.9–1.1)
MAGNESIUM SERPL-MCNC: 1.7 MG/DL (ref 1.6–2.4)
MCH RBC QN AUTO: 27.8 PG (ref 26–34)
MCHC RBC AUTO-ENTMCNC: 33.2 G/DL (ref 30–36.5)
MCV RBC AUTO: 83.9 FL (ref 80–99)
NRBC # BLD: 0 K/UL (ref 0–0.01)
NRBC BLD-RTO: 0 PER 100 WBC
PHOSPHATE SERPL-MCNC: 2.7 MG/DL (ref 2.6–4.7)
PLATELET # BLD AUTO: 345 K/UL (ref 150–400)
PMV BLD AUTO: 8.7 FL (ref 8.9–12.9)
POTASSIUM SERPL-SCNC: 3.7 MMOL/L (ref 3.5–5.1)
PROT SERPL-MCNC: 7.4 G/DL (ref 6.4–8.2)
PROTHROMBIN TIME: 20.1 SEC (ref 9–11.1)
RBC # BLD AUTO: 2.73 M/UL (ref 4.1–5.7)
SERVICE CMNT-IMP: ABNORMAL
SERVICE CMNT-IMP: NORMAL
SODIUM SERPL-SCNC: 135 MMOL/L (ref 136–145)
WBC # BLD AUTO: 13.7 K/UL (ref 4.1–11.1)

## 2024-05-31 PROCEDURE — 2580000003 HC RX 258: Performed by: INTERNAL MEDICINE

## 2024-05-31 PROCEDURE — 85610 PROTHROMBIN TIME: CPT

## 2024-05-31 PROCEDURE — 6370000000 HC RX 637 (ALT 250 FOR IP)

## 2024-05-31 PROCEDURE — 84100 ASSAY OF PHOSPHORUS: CPT

## 2024-05-31 PROCEDURE — 83735 ASSAY OF MAGNESIUM: CPT

## 2024-05-31 PROCEDURE — 6370000000 HC RX 637 (ALT 250 FOR IP): Performed by: INTERNAL MEDICINE

## 2024-05-31 PROCEDURE — 36415 COLL VENOUS BLD VENIPUNCTURE: CPT

## 2024-05-31 PROCEDURE — 99223 1ST HOSP IP/OBS HIGH 75: CPT | Performed by: INTERNAL MEDICINE

## 2024-05-31 PROCEDURE — 1100000000 HC RM PRIVATE

## 2024-05-31 PROCEDURE — 97116 GAIT TRAINING THERAPY: CPT

## 2024-05-31 PROCEDURE — 82962 GLUCOSE BLOOD TEST: CPT

## 2024-05-31 PROCEDURE — 80053 COMPREHEN METABOLIC PANEL: CPT

## 2024-05-31 PROCEDURE — 85027 COMPLETE CBC AUTOMATED: CPT

## 2024-05-31 RX ADMIN — ROSUVASTATIN CALCIUM 10 MG: 10 TABLET, FILM COATED ORAL at 09:01

## 2024-05-31 RX ADMIN — SODIUM CHLORIDE: 9 INJECTION, SOLUTION INTRAVENOUS at 18:27

## 2024-05-31 RX ADMIN — ACETAMINOPHEN 650 MG: 325 TABLET ORAL at 11:06

## 2024-05-31 RX ADMIN — ACETAMINOPHEN 650 MG: 325 TABLET ORAL at 18:31

## 2024-05-31 RX ADMIN — MIDODRINE HYDROCHLORIDE 5 MG: 5 TABLET ORAL at 09:01

## 2024-05-31 RX ADMIN — MIDODRINE HYDROCHLORIDE 5 MG: 5 TABLET ORAL at 13:07

## 2024-05-31 RX ADMIN — AMIODARONE HYDROCHLORIDE 400 MG: 200 TABLET ORAL at 21:23

## 2024-05-31 RX ADMIN — Medication: at 09:18

## 2024-05-31 RX ADMIN — SODIUM CHLORIDE, PRESERVATIVE FREE 10 ML: 5 INJECTION INTRAVENOUS at 21:24

## 2024-05-31 RX ADMIN — PANTOPRAZOLE SODIUM 40 MG: 40 TABLET, DELAYED RELEASE ORAL at 07:05

## 2024-05-31 RX ADMIN — Medication 1 MG: at 09:01

## 2024-05-31 RX ADMIN — SODIUM CHLORIDE, PRESERVATIVE FREE 10 ML: 5 INJECTION INTRAVENOUS at 09:02

## 2024-05-31 RX ADMIN — Medication 15 ML: at 09:05

## 2024-05-31 RX ADMIN — Medication: at 21:24

## 2024-05-31 RX ADMIN — MIDODRINE HYDROCHLORIDE 5 MG: 5 TABLET ORAL at 18:24

## 2024-05-31 ASSESSMENT — PAIN DESCRIPTION - ORIENTATION: ORIENTATION: RIGHT

## 2024-05-31 ASSESSMENT — PAIN SCALES - GENERAL
PAINLEVEL_OUTOF10: 3
PAINLEVEL_OUTOF10: 6

## 2024-05-31 ASSESSMENT — PAIN DESCRIPTION - LOCATION: LOCATION: LEG

## 2024-05-31 ASSESSMENT — PAIN DESCRIPTION - DESCRIPTORS: DESCRIPTORS: PINS AND NEEDLES

## 2024-05-31 NOTE — CONSULTS
[facility] Cardiology Consult Note    Date of consult:  05/22/24  Date of admission: 5/20/2024  Primary Cardiologist: Jennifer  Physician Requesting consult: Cori Collins    CC / Reason for consult: guidance on anticoagulation, needs renal biopsy  Chief Complaint   Patient presents with    Leg Swelling        History of the presenting illness:  Javier Ugalde is a 58 y.o. M with right leg wound, also found to have progressive renal failure.  Nephrology are following and have recommended a renal biopsy    He feels ok but is fatigued and short of breath since he's been anemic.    He has a known mechanical aortic valve and typically is on coumadin for this.   Per review of [facility] records, this is an On-X valved conduit, with surgery performed March 2022 by Dr Verma.   He has a Medtr[employer] pacemaker that was placed for complete heart block    Past Medical History:   Diagnosis Date    Arthritis     Cellulitis     Chronic pain     COVID-19 vaccine series completed 4/2/21, 5/7/21    MODERNA    Diabetes (HCC)     Elevated hemoglobin A1c 06/07/2021    6.4    Hyperlipidemia     Hypertension     Hypertension complicating diabetes (HCC)        Prior to Admission medications    Medication Sig Start Date End Date Taking? Authorizing Provider   doxycycline hyclate (VIBRA-TABS) 100 MG tablet Take 1 tablet by mouth 2 times daily for 10 days 5/15/24 5/25/24  Brielle Muller MD   methocarbamol (ROBAXIN) 500 MG tablet Take 2 tablets by mouth 4 times daily as needed (breakthrough pain) 1/9/24   Brielle Muller MD   lidocaine (LIDODERM) 5 % 12 hours on, 12 hours off. 1/9/24   Brielle Muller MD   blood glucose test strips (ASCENSIA AUTODISC VI;ONE TOUCH ULTRA TEST VI) strip 1 each by In Vitro route daily As needed. 1/8/24   Brielle Muller MD   Blood Glucose Monitoring Suppl (TRUE METRIX METER) w/Device KIT Use as directed to test blood sugar 1/8/24   Brielle Muller MD   albuterol sulfate HFA (PROVENTIL HFA) 108 (90 Base) MCG/ACT inhaler 
   [facility] ([facility])         Nephrology and Hypertension         Patient seen and examined. Full consult dictated-[id number]    ASSESSMENT:  NIKKIE  CKD-3B/4 (Dr Griffin Pringle)  Proteinuria- 1.1 grams  Hematuria on recent dipstick UA-but urine microscopy only 0-2 RBC  Hypokalemia  Hyponatremia- mild  Borderline hypotension  Pre DM  RLE Cellulitis with wound around R knee  SOB  Hx of Mechanical AVR on coumadin    Pt has evolving NIKKIE on CKD; UA seems to have somewhat active sediment. Serologies from outpt notable for low C4, +RF (511) and +IgG4. Hep C was negative. Cryo was ordered?- No results yet; Suspect pt has underlying GN- ddx includes MPGN, Cryoglobulinemic vasculitis, infectious GN etc.     PLAN:  Ct gentle IV hydration with NS  Cautious prn replacement of KCL with dense NIKKIE  Ct IV Abx per 1ry team  Daily labs  Monitor I&O  May need kidney biopsy- will touch base with Dr Griffin Pringle; will be high risk given the need for coumadin  Repeat UA with micro  Check LILLIAN, Complements, Cryoglobulin with am labs  FR of 1.5 L/day  Daily wt  Check LDH/Haptoglobin    D/W pt  Thanks for Renal consult. We will follow patient with you.    Signed by:  Xander Painter MD  Nephrology and HTN    
---                       [address]  [address]                              CONSULTATION      PATIENT NAME: COCO HART                : 1965  MED REC NO: [id number]                       ROOM: 554  ACCOUNT NO: [id number]                       ADMIT DATE: 2024  PROVIDER: Long Bernardo MD    DATE OF SERVICE:  2024    ATTENDING PHYSICIAN:  Malika Danielle MD    REASON FOR CONSULTATION:  Right leg wound.    HISTORY OF PRESENT ILLNESS:  The patient is a 58-year-old gentleman with multiple medical problems, who has presented to the hospital with shortness of breath as well as a right leg wound with swelling.  He reports having chronic swelling since  after a knee replacement.  He is a fairly obese large gentleman with some possibility of venous insufficiency in his past.  Nevertheless, he has had a right leg swelling greater than the left.  He has never had a deep venous thrombosis or anything similar to this.  He reports being ruled out for vascular disease, but I do not see those study results here.  He has a wound on the right lateral knee area that he says started with itching due to a medication reaction.  He had some bleeding since he is supratherapeutic on Coumadin and has had some necrotic skin associated with this.    PAST MEDICAL HISTORY:  Significant for prior mechanical aortic valve surgery for which he is on Coumadin.  This was done --.  Chronic kidney disease, lower extremity swelling, morbid obesity, osteoarthritis, hyperlipidemia, hypertension, diabetes.    CURRENT MEDICATIONS:  Include Imodium, cefepime, insulin, Protonix, Crestor, vancomycin, warfarin.    ALLERGIES:  NO KNOWN DRUG ALLERGIES.      SOCIAL HISTORY:  Negative for tobacco, small amount of alcohol per week.  No problems with alcohol.    PHYSICAL EXAMINATION:  VITAL SIGNS:  Temperature is 97.3, heart rate is 83, blood pressure is 108/52, 99% on room air.  GENERAL:  
Vascular Surgery  --full note dictated  --WILLIAN ordered  
skin.  CNS:  Alert and oriented x3.    LABS:  As reviewed in HPI.  In addition, INR is 2.8.  CBC with elevated white count of 13.3, hemoglobin is down to 6.1, platelets are normal.  Lower extremity Doppler was negative for DVT.  Previous renal ultrasound back in November 2023 showed no hydronephrosis.  Bilateral renal cysts.    ASSESSMENT AND PLAN:  Please refer to my note in Epic.        MERCED SHIELDS MD      BMP/AQS  D:  05/21/2024 12:08:38  T:  05/21/2024 14:16:21  JOB #:  [id number]     
7.5* 7.2* 7.6*   HCT 22.8* 22.9* 22.9*    320 345       BMP:  Recent Labs     05/29/24  0615 05/30/24  0452 05/31/24  0404   BUN 37* 36* 28*   * 132* 135*   K 3.6 3.8 3.7    107 107   CO2 21 20* 22       LFTS:  Recent Labs     05/29/24  0615 05/30/24  0452 05/31/24  0404   ALT 30 34 39       Microbiology:   [unfilled]    Imaging:   XR CHEST STANDARD TWO VW 05/20/2024    Narrative  Indication: Chest Pain    Exam: PA and lateral views of the chest.    There is no prior study for direct comparison.    Findings: Cardiomediastinal silhouette is at the upper limits of normal. Intact  pacer leads overlie the right atrium and right ventricle. Lungs are clear  bilaterally. Pleural spaces are normal. Osseous structures are intact.    Impression  No acute cardiopulmonary disease.      Vascular ankle brachial index (WILLIAN)    Result Date: 5/23/2024    Mildly abnormal WILLIAN's on the right and left are consistent with a possible mild arterial obstruction in the right and left lower extremities.   Possible mild small digit disease at the right great toe.     NM LUNG SCAN PERFUSION ONLY    Result Date: 5/20/2024  Clinical History: Shortness of breath, elevated D-dimer, lower extremity swelling Comparison to chest radiograph dated 5/20/2024 Ventilation: Ventilation images were not obtained due to Covid precautions. Perfusion: Perfusion images were obtained in 6 projections utilizing 4.4 mCi Tc-99 M MAA. The examination demonstrates normal tracer distribution throughout both lung fields.     Normal perfusion imaging.     XR CHEST (2 VW)    Result Date: 5/20/2024  Indication: Chest Pain Exam: PA and lateral views of the chest. There is no prior study for direct comparison. Findings: Cardiomediastinal silhouette is at the upper limits of normal. Intact pacer leads overlie the right atrium and right ventricle. Lungs are clear bilaterally. Pleural spaces are normal. Osseous structures are intact.     No acute

## 2024-06-01 LAB
ALBUMIN SERPL-MCNC: 2.1 G/DL (ref 3.5–5)
ALBUMIN/GLOB SERPL: 0.4 (ref 1.1–2.2)
ALP SERPL-CCNC: 72 U/L (ref 45–117)
ALT SERPL-CCNC: 40 U/L (ref 12–78)
ANION GAP SERPL CALC-SCNC: 5 MMOL/L (ref 5–15)
AST SERPL-CCNC: 43 U/L (ref 15–37)
BILIRUB SERPL-MCNC: 0.6 MG/DL (ref 0.2–1)
BUN SERPL-MCNC: 30 MG/DL (ref 6–20)
BUN/CREAT SERPL: 14 (ref 12–20)
CALCIUM SERPL-MCNC: 10.3 MG/DL (ref 8.5–10.1)
CHLORIDE SERPL-SCNC: 108 MMOL/L (ref 97–108)
CO2 SERPL-SCNC: 21 MMOL/L (ref 21–32)
CREAT SERPL-MCNC: 2.17 MG/DL (ref 0.7–1.3)
ERYTHROCYTE [DISTWIDTH] IN BLOOD BY AUTOMATED COUNT: 15.9 % (ref 11.5–14.5)
GLOBULIN SER CALC-MCNC: 4.8 G/DL (ref 2–4)
GLUCOSE BLD STRIP.AUTO-MCNC: 107 MG/DL (ref 65–117)
GLUCOSE BLD STRIP.AUTO-MCNC: 132 MG/DL (ref 65–117)
GLUCOSE BLD STRIP.AUTO-MCNC: 93 MG/DL (ref 65–117)
GLUCOSE BLD STRIP.AUTO-MCNC: 97 MG/DL (ref 65–117)
GLUCOSE SERPL-MCNC: 97 MG/DL (ref 65–100)
HCT VFR BLD AUTO: 22.1 % (ref 36.6–50.3)
HGB BLD-MCNC: 7.1 G/DL (ref 12.1–17)
INR PPP: 1.7 (ref 0.9–1.1)
MAGNESIUM SERPL-MCNC: 1.7 MG/DL (ref 1.6–2.4)
MCH RBC QN AUTO: 27.1 PG (ref 26–34)
MCHC RBC AUTO-ENTMCNC: 32.1 G/DL (ref 30–36.5)
MCV RBC AUTO: 84.4 FL (ref 80–99)
NRBC # BLD: 0 K/UL (ref 0–0.01)
NRBC BLD-RTO: 0 PER 100 WBC
PHOSPHATE SERPL-MCNC: 3.2 MG/DL (ref 2.6–4.7)
PLATELET # BLD AUTO: 298 K/UL (ref 150–400)
PMV BLD AUTO: 8.3 FL (ref 8.9–12.9)
POTASSIUM SERPL-SCNC: 3.8 MMOL/L (ref 3.5–5.1)
PROT SERPL-MCNC: 6.9 G/DL (ref 6.4–8.2)
PROTHROMBIN TIME: 17.3 SEC (ref 9–11.1)
RBC # BLD AUTO: 2.62 M/UL (ref 4.1–5.7)
SERVICE CMNT-IMP: ABNORMAL
SERVICE CMNT-IMP: NORMAL
SODIUM SERPL-SCNC: 134 MMOL/L (ref 136–145)
WBC # BLD AUTO: 12.3 K/UL (ref 4.1–11.1)

## 2024-06-01 PROCEDURE — 80053 COMPREHEN METABOLIC PANEL: CPT

## 2024-06-01 PROCEDURE — 85610 PROTHROMBIN TIME: CPT

## 2024-06-01 PROCEDURE — 6370000000 HC RX 637 (ALT 250 FOR IP): Performed by: INTERNAL MEDICINE

## 2024-06-01 PROCEDURE — 6370000000 HC RX 637 (ALT 250 FOR IP)

## 2024-06-01 PROCEDURE — 36415 COLL VENOUS BLD VENIPUNCTURE: CPT

## 2024-06-01 PROCEDURE — 83735 ASSAY OF MAGNESIUM: CPT

## 2024-06-01 PROCEDURE — 84100 ASSAY OF PHOSPHORUS: CPT

## 2024-06-01 PROCEDURE — 2580000003 HC RX 258: Performed by: INTERNAL MEDICINE

## 2024-06-01 PROCEDURE — 85027 COMPLETE CBC AUTOMATED: CPT

## 2024-06-01 PROCEDURE — 82962 GLUCOSE BLOOD TEST: CPT

## 2024-06-01 PROCEDURE — 1100000000 HC RM PRIVATE

## 2024-06-01 RX ADMIN — Medication 1 MG: at 09:56

## 2024-06-01 RX ADMIN — SODIUM CHLORIDE: 9 INJECTION, SOLUTION INTRAVENOUS at 22:17

## 2024-06-01 RX ADMIN — MIDODRINE HYDROCHLORIDE 5 MG: 5 TABLET ORAL at 17:23

## 2024-06-01 RX ADMIN — PANTOPRAZOLE SODIUM 40 MG: 40 TABLET, DELAYED RELEASE ORAL at 07:28

## 2024-06-01 RX ADMIN — AMIODARONE HYDROCHLORIDE 400 MG: 200 TABLET ORAL at 09:56

## 2024-06-01 RX ADMIN — MIDODRINE HYDROCHLORIDE 5 MG: 5 TABLET ORAL at 09:56

## 2024-06-01 RX ADMIN — Medication: at 22:19

## 2024-06-01 RX ADMIN — ACETAMINOPHEN 650 MG: 325 TABLET ORAL at 00:57

## 2024-06-01 RX ADMIN — SODIUM CHLORIDE: 9 INJECTION, SOLUTION INTRAVENOUS at 09:54

## 2024-06-01 RX ADMIN — MIDODRINE HYDROCHLORIDE 5 MG: 5 TABLET ORAL at 12:53

## 2024-06-01 RX ADMIN — Medication: at 12:43

## 2024-06-01 RX ADMIN — AMIODARONE HYDROCHLORIDE 400 MG: 200 TABLET ORAL at 22:07

## 2024-06-01 RX ADMIN — ROSUVASTATIN CALCIUM 10 MG: 10 TABLET, FILM COATED ORAL at 09:56

## 2024-06-01 RX ADMIN — Medication 15 ML: at 09:56

## 2024-06-01 RX ADMIN — ACETAMINOPHEN 650 MG: 325 TABLET ORAL at 22:08

## 2024-06-01 ASSESSMENT — PAIN SCALES - GENERAL
PAINLEVEL_OUTOF10: 7
PAINLEVEL_OUTOF10: 3
PAINLEVEL_OUTOF10: 7

## 2024-06-01 ASSESSMENT — PAIN DESCRIPTION - DESCRIPTORS: DESCRIPTORS: ACHING

## 2024-06-01 ASSESSMENT — PAIN DESCRIPTION - LOCATION
LOCATION: LEG
LOCATION: OTHER (COMMENT)

## 2024-06-01 ASSESSMENT — PAIN DESCRIPTION - ORIENTATION
ORIENTATION: RIGHT
ORIENTATION: RIGHT

## 2024-06-02 LAB
ALBUMIN SERPL-MCNC: 2.2 G/DL (ref 3.5–5)
ALBUMIN/GLOB SERPL: 0.6 (ref 1.1–2.2)
ALP SERPL-CCNC: 70 U/L (ref 45–117)
ALT SERPL-CCNC: 45 U/L (ref 12–78)
ANION GAP SERPL CALC-SCNC: 4 MMOL/L (ref 5–15)
AST SERPL-CCNC: 45 U/L (ref 15–37)
BILIRUB SERPL-MCNC: 0.7 MG/DL (ref 0.2–1)
BUN SERPL-MCNC: 27 MG/DL (ref 6–20)
BUN/CREAT SERPL: 14 (ref 12–20)
CALCIUM SERPL-MCNC: 10 MG/DL (ref 8.5–10.1)
CHLORIDE SERPL-SCNC: 110 MMOL/L (ref 97–108)
CO2 SERPL-SCNC: 22 MMOL/L (ref 21–32)
CREAT SERPL-MCNC: 2 MG/DL (ref 0.7–1.3)
ERYTHROCYTE [DISTWIDTH] IN BLOOD BY AUTOMATED COUNT: 16 % (ref 11.5–14.5)
GLOBULIN SER CALC-MCNC: 4 G/DL (ref 2–4)
GLUCOSE BLD STRIP.AUTO-MCNC: 105 MG/DL (ref 65–117)
GLUCOSE BLD STRIP.AUTO-MCNC: 107 MG/DL (ref 65–117)
GLUCOSE BLD STRIP.AUTO-MCNC: 125 MG/DL (ref 65–117)
GLUCOSE BLD STRIP.AUTO-MCNC: 150 MG/DL (ref 65–117)
GLUCOSE SERPL-MCNC: 96 MG/DL (ref 65–100)
HCT VFR BLD AUTO: 21.1 % (ref 36.6–50.3)
HCT VFR BLD AUTO: 25.6 % (ref 36.6–50.3)
HGB BLD-MCNC: 6.8 G/DL (ref 12.1–17)
HGB BLD-MCNC: 8 G/DL (ref 12.1–17)
HISTORY CHECK: NORMAL
INR PPP: 1.5 (ref 0.9–1.1)
MAGNESIUM SERPL-MCNC: 1.6 MG/DL (ref 1.6–2.4)
MCH RBC QN AUTO: 27.3 PG (ref 26–34)
MCHC RBC AUTO-ENTMCNC: 32.2 G/DL (ref 30–36.5)
MCV RBC AUTO: 84.7 FL (ref 80–99)
NRBC # BLD: 0 K/UL (ref 0–0.01)
NRBC BLD-RTO: 0 PER 100 WBC
PHOSPHATE SERPL-MCNC: 4 MG/DL (ref 2.6–4.7)
PLATELET # BLD AUTO: 306 K/UL (ref 150–400)
PMV BLD AUTO: 8.8 FL (ref 8.9–12.9)
POTASSIUM SERPL-SCNC: 3.8 MMOL/L (ref 3.5–5.1)
PROT SERPL-MCNC: 6.2 G/DL (ref 6.4–8.2)
PROTHROMBIN TIME: 15.2 SEC (ref 9–11.1)
RBC # BLD AUTO: 2.49 M/UL (ref 4.1–5.7)
SERVICE CMNT-IMP: ABNORMAL
SERVICE CMNT-IMP: ABNORMAL
SERVICE CMNT-IMP: NORMAL
SERVICE CMNT-IMP: NORMAL
SODIUM SERPL-SCNC: 136 MMOL/L (ref 136–145)
WBC # BLD AUTO: 11.2 K/UL (ref 4.1–11.1)

## 2024-06-02 PROCEDURE — 86900 BLOOD TYPING SEROLOGIC ABO: CPT

## 2024-06-02 PROCEDURE — 86901 BLOOD TYPING SEROLOGIC RH(D): CPT

## 2024-06-02 PROCEDURE — 82962 GLUCOSE BLOOD TEST: CPT

## 2024-06-02 PROCEDURE — 85014 HEMATOCRIT: CPT

## 2024-06-02 PROCEDURE — 83735 ASSAY OF MAGNESIUM: CPT

## 2024-06-02 PROCEDURE — 85018 HEMOGLOBIN: CPT

## 2024-06-02 PROCEDURE — 84100 ASSAY OF PHOSPHORUS: CPT

## 2024-06-02 PROCEDURE — 6370000000 HC RX 637 (ALT 250 FOR IP): Performed by: INTERNAL MEDICINE

## 2024-06-02 PROCEDURE — 1100000000 HC RM PRIVATE

## 2024-06-02 PROCEDURE — 6370000000 HC RX 637 (ALT 250 FOR IP)

## 2024-06-02 PROCEDURE — 85610 PROTHROMBIN TIME: CPT

## 2024-06-02 PROCEDURE — P9016 RBC LEUKOCYTES REDUCED: HCPCS

## 2024-06-02 PROCEDURE — 86850 RBC ANTIBODY SCREEN: CPT

## 2024-06-02 PROCEDURE — 85027 COMPLETE CBC AUTOMATED: CPT

## 2024-06-02 PROCEDURE — 36430 TRANSFUSION BLD/BLD COMPNT: CPT

## 2024-06-02 PROCEDURE — 2580000003 HC RX 258: Performed by: INTERNAL MEDICINE

## 2024-06-02 PROCEDURE — 36415 COLL VENOUS BLD VENIPUNCTURE: CPT

## 2024-06-02 PROCEDURE — 80053 COMPREHEN METABOLIC PANEL: CPT

## 2024-06-02 PROCEDURE — 86923 COMPATIBILITY TEST ELECTRIC: CPT

## 2024-06-02 RX ORDER — FERROUS SULFATE 325(65) MG
325 TABLET ORAL
Status: DISCONTINUED | OUTPATIENT
Start: 2024-06-03 | End: 2024-06-04 | Stop reason: HOSPADM

## 2024-06-02 RX ORDER — UREA 10 %
325 LOTION (ML) TOPICAL
Status: DISCONTINUED | OUTPATIENT
Start: 2024-06-03 | End: 2024-06-02 | Stop reason: SDUPTHER

## 2024-06-02 RX ORDER — SODIUM CHLORIDE 9 MG/ML
INJECTION, SOLUTION INTRAVENOUS PRN
Status: DISCONTINUED | OUTPATIENT
Start: 2024-06-02 | End: 2024-06-04 | Stop reason: HOSPADM

## 2024-06-02 RX ORDER — POLYETHYLENE GLYCOL 3350 17 G/17G
17 POWDER, FOR SOLUTION ORAL DAILY PRN
Status: DISCONTINUED | OUTPATIENT
Start: 2024-06-02 | End: 2024-06-04 | Stop reason: HOSPADM

## 2024-06-02 RX ADMIN — MIDODRINE HYDROCHLORIDE 5 MG: 5 TABLET ORAL at 17:01

## 2024-06-02 RX ADMIN — Medication: at 11:47

## 2024-06-02 RX ADMIN — MIDODRINE HYDROCHLORIDE 5 MG: 5 TABLET ORAL at 12:21

## 2024-06-02 RX ADMIN — AMIODARONE HYDROCHLORIDE 400 MG: 200 TABLET ORAL at 20:42

## 2024-06-02 RX ADMIN — MIDODRINE HYDROCHLORIDE 5 MG: 5 TABLET ORAL at 09:47

## 2024-06-02 RX ADMIN — ACETAMINOPHEN 650 MG: 325 TABLET ORAL at 05:26

## 2024-06-02 RX ADMIN — ROSUVASTATIN CALCIUM 10 MG: 10 TABLET, FILM COATED ORAL at 09:46

## 2024-06-02 RX ADMIN — Medication 1 MG: at 09:47

## 2024-06-02 RX ADMIN — PANTOPRAZOLE SODIUM 40 MG: 40 TABLET, DELAYED RELEASE ORAL at 05:26

## 2024-06-02 RX ADMIN — AMIODARONE HYDROCHLORIDE 400 MG: 200 TABLET ORAL at 09:46

## 2024-06-02 RX ADMIN — Medication: at 23:00

## 2024-06-02 RX ADMIN — SODIUM CHLORIDE: 9 INJECTION, SOLUTION INTRAVENOUS at 17:02

## 2024-06-02 ASSESSMENT — PAIN DESCRIPTION - DESCRIPTORS: DESCRIPTORS: ACHING

## 2024-06-02 ASSESSMENT — PAIN SCALES - GENERAL: PAINLEVEL_OUTOF10: 6

## 2024-06-02 ASSESSMENT — PAIN DESCRIPTION - ORIENTATION: ORIENTATION: RIGHT

## 2024-06-02 ASSESSMENT — PAIN DESCRIPTION - LOCATION: LOCATION: LEG

## 2024-06-03 ENCOUNTER — APPOINTMENT (OUTPATIENT)
Facility: HOSPITAL | Age: 59
DRG: 872 | End: 2024-06-03
Payer: MEDICARE

## 2024-06-03 PROBLEM — D72.825 BANDEMIA: Status: ACTIVE | Noted: 2024-06-03

## 2024-06-03 PROBLEM — N18.31 STAGE 3A CHRONIC KIDNEY DISEASE (HCC): Status: ACTIVE | Noted: 2024-06-03

## 2024-06-03 PROBLEM — N17.9 AKI (ACUTE KIDNEY INJURY) (HCC): Status: ACTIVE | Noted: 2024-06-03

## 2024-06-03 PROBLEM — Z95.2 S/P AVR (AORTIC VALVE REPLACEMENT): Status: ACTIVE | Noted: 2024-06-03

## 2024-06-03 PROBLEM — E11.8 CONTROLLED DIABETES MELLITUS TYPE 2 WITH COMPLICATIONS (HCC): Status: ACTIVE | Noted: 2023-06-01

## 2024-06-03 PROBLEM — Z96.653 HISTORY OF TOTAL BILATERAL KNEE REPLACEMENT (TKR): Status: ACTIVE | Noted: 2024-06-03

## 2024-06-03 LAB
ABO + RH BLD: NORMAL
ALBUMIN SERPL-MCNC: 2.2 G/DL (ref 3.5–5)
ALBUMIN/GLOB SERPL: 0.6 (ref 1.1–2.2)
ALP SERPL-CCNC: 62 U/L (ref 45–117)
ALT SERPL-CCNC: 43 U/L (ref 12–78)
ANION GAP SERPL CALC-SCNC: 7 MMOL/L (ref 5–15)
AST SERPL-CCNC: 40 U/L (ref 15–37)
BILIRUB SERPL-MCNC: 1.1 MG/DL (ref 0.2–1)
BLD PROD TYP BPU: NORMAL
BLOOD BANK BLOOD PRODUCT EXPIRATION DATE: NORMAL
BLOOD BANK DISPENSE STATUS: NORMAL
BLOOD BANK ISBT PRODUCT BLOOD TYPE: 1700
BLOOD BANK PRODUCT CODE: NORMAL
BLOOD BANK UNIT TYPE AND RH: NORMAL
BLOOD GROUP ANTIBODIES SERPL: NORMAL
BPU ID: NORMAL
BUN SERPL-MCNC: 22 MG/DL (ref 6–20)
BUN/CREAT SERPL: 13 (ref 12–20)
CALCIUM SERPL-MCNC: 9.7 MG/DL (ref 8.5–10.1)
CHLORIDE SERPL-SCNC: 109 MMOL/L (ref 97–108)
CO2 SERPL-SCNC: 20 MMOL/L (ref 21–32)
CREAT SERPL-MCNC: 1.67 MG/DL (ref 0.7–1.3)
CROSSMATCH RESULT: NORMAL
ERYTHROCYTE [DISTWIDTH] IN BLOOD BY AUTOMATED COUNT: 16 % (ref 11.5–14.5)
ERYTHROCYTE [DISTWIDTH] IN BLOOD BY AUTOMATED COUNT: 16.2 % (ref 11.5–14.5)
GLOBULIN SER CALC-MCNC: 3.7 G/DL (ref 2–4)
GLUCOSE BLD STRIP.AUTO-MCNC: 104 MG/DL (ref 65–117)
GLUCOSE BLD STRIP.AUTO-MCNC: 150 MG/DL (ref 65–117)
GLUCOSE BLD STRIP.AUTO-MCNC: 76 MG/DL (ref 65–117)
GLUCOSE BLD STRIP.AUTO-MCNC: 87 MG/DL (ref 65–117)
GLUCOSE SERPL-MCNC: 104 MG/DL (ref 65–100)
HCT VFR BLD AUTO: 23.2 % (ref 36.6–50.3)
HCT VFR BLD AUTO: 25.2 % (ref 36.6–50.3)
HGB BLD-MCNC: 7.3 G/DL (ref 12.1–17)
HGB BLD-MCNC: 8.1 G/DL (ref 12.1–17)
INR PPP: 1.4 (ref 0.9–1.1)
MAGNESIUM SERPL-MCNC: 1.4 MG/DL (ref 1.6–2.4)
MCH RBC QN AUTO: 27.3 PG (ref 26–34)
MCH RBC QN AUTO: 27.6 PG (ref 26–34)
MCHC RBC AUTO-ENTMCNC: 31.5 G/DL (ref 30–36.5)
MCHC RBC AUTO-ENTMCNC: 32.1 G/DL (ref 30–36.5)
MCV RBC AUTO: 85.7 FL (ref 80–99)
MCV RBC AUTO: 86.9 FL (ref 80–99)
NRBC # BLD: 0 K/UL (ref 0–0.01)
NRBC # BLD: 0 K/UL (ref 0–0.01)
NRBC BLD-RTO: 0 PER 100 WBC
NRBC BLD-RTO: 0 PER 100 WBC
PHOSPHATE SERPL-MCNC: 3.3 MG/DL (ref 2.6–4.7)
PLATELET # BLD AUTO: 302 K/UL (ref 150–400)
PLATELET # BLD AUTO: 338 K/UL (ref 150–400)
PMV BLD AUTO: 8.6 FL (ref 8.9–12.9)
PMV BLD AUTO: 8.9 FL (ref 8.9–12.9)
POTASSIUM SERPL-SCNC: 3.7 MMOL/L (ref 3.5–5.1)
PROT SERPL-MCNC: 5.9 G/DL (ref 6.4–8.2)
PROTHROMBIN TIME: 14.6 SEC (ref 9–11.1)
RBC # BLD AUTO: 2.67 M/UL (ref 4.1–5.7)
RBC # BLD AUTO: 2.94 M/UL (ref 4.1–5.7)
SERVICE CMNT-IMP: ABNORMAL
SERVICE CMNT-IMP: NORMAL
SODIUM SERPL-SCNC: 136 MMOL/L (ref 136–145)
SPECIMEN EXP DATE BLD: NORMAL
UNIT DIVISION: 0
UNIT ISSUE DATE/TIME: NORMAL
WBC # BLD AUTO: 11.1 K/UL (ref 4.1–11.1)
WBC # BLD AUTO: 12.3 K/UL (ref 4.1–11.1)

## 2024-06-03 PROCEDURE — 36415 COLL VENOUS BLD VENIPUNCTURE: CPT

## 2024-06-03 PROCEDURE — 6370000000 HC RX 637 (ALT 250 FOR IP): Performed by: INTERNAL MEDICINE

## 2024-06-03 PROCEDURE — 85610 PROTHROMBIN TIME: CPT

## 2024-06-03 PROCEDURE — 88305 TISSUE EXAM BY PATHOLOGIST: CPT

## 2024-06-03 PROCEDURE — 2580000003 HC RX 258: Performed by: INTERNAL MEDICINE

## 2024-06-03 PROCEDURE — 1100000000 HC RM PRIVATE

## 2024-06-03 PROCEDURE — 84100 ASSAY OF PHOSPHORUS: CPT

## 2024-06-03 PROCEDURE — 82962 GLUCOSE BLOOD TEST: CPT

## 2024-06-03 PROCEDURE — 6360000002 HC RX W HCPCS: Performed by: NURSE PRACTITIONER

## 2024-06-03 PROCEDURE — 0TB13ZX EXCISION OF LEFT KIDNEY, PERCUTANEOUS APPROACH, DIAGNOSTIC: ICD-10-PCS | Performed by: STUDENT IN AN ORGANIZED HEALTH CARE EDUCATION/TRAINING PROGRAM

## 2024-06-03 PROCEDURE — 6360000002 HC RX W HCPCS: Performed by: PHYSICIAN ASSISTANT

## 2024-06-03 PROCEDURE — 83735 ASSAY OF MAGNESIUM: CPT

## 2024-06-03 PROCEDURE — 99152 MOD SED SAME PHYS/QHP 5/>YRS: CPT

## 2024-06-03 PROCEDURE — 6370000000 HC RX 637 (ALT 250 FOR IP)

## 2024-06-03 PROCEDURE — 80053 COMPREHEN METABOLIC PANEL: CPT

## 2024-06-03 PROCEDURE — 85027 COMPLETE CBC AUTOMATED: CPT

## 2024-06-03 RX ORDER — FENTANYL CITRATE 50 UG/ML
INJECTION, SOLUTION INTRAMUSCULAR; INTRAVENOUS PRN
Status: COMPLETED | OUTPATIENT
Start: 2024-06-03 | End: 2024-06-03

## 2024-06-03 RX ORDER — MIDAZOLAM HYDROCHLORIDE 5 MG/ML
INJECTION INTRAMUSCULAR; INTRAVENOUS PRN
Status: COMPLETED | OUTPATIENT
Start: 2024-06-03 | End: 2024-06-03

## 2024-06-03 RX ORDER — MAGNESIUM SULFATE IN WATER 40 MG/ML
2000 INJECTION, SOLUTION INTRAVENOUS ONCE
Status: COMPLETED | OUTPATIENT
Start: 2024-06-03 | End: 2024-06-03

## 2024-06-03 RX ORDER — ACETAMINOPHEN 325 MG/1
650 TABLET ORAL EVERY 4 HOURS PRN
Status: DISCONTINUED | OUTPATIENT
Start: 2024-06-03 | End: 2024-06-03

## 2024-06-03 RX ADMIN — AMIODARONE HYDROCHLORIDE 400 MG: 200 TABLET ORAL at 08:13

## 2024-06-03 RX ADMIN — ACETAMINOPHEN 650 MG: 325 TABLET ORAL at 20:31

## 2024-06-03 RX ADMIN — Medication: at 10:50

## 2024-06-03 RX ADMIN — AMIODARONE HYDROCHLORIDE 400 MG: 200 TABLET ORAL at 20:31

## 2024-06-03 RX ADMIN — FENTANYL CITRATE 25 MCG: 50 INJECTION, SOLUTION INTRAMUSCULAR; INTRAVENOUS at 13:25

## 2024-06-03 RX ADMIN — Medication 1 MG: at 08:13

## 2024-06-03 RX ADMIN — SODIUM CHLORIDE: 9 INJECTION, SOLUTION INTRAVENOUS at 04:58

## 2024-06-03 RX ADMIN — MIDODRINE HYDROCHLORIDE 5 MG: 5 TABLET ORAL at 16:14

## 2024-06-03 RX ADMIN — ROSUVASTATIN CALCIUM 10 MG: 10 TABLET, FILM COATED ORAL at 08:13

## 2024-06-03 RX ADMIN — MIDODRINE HYDROCHLORIDE 5 MG: 5 TABLET ORAL at 10:52

## 2024-06-03 RX ADMIN — FENTANYL CITRATE 50 MCG: 50 INJECTION, SOLUTION INTRAMUSCULAR; INTRAVENOUS at 13:07

## 2024-06-03 RX ADMIN — Medication 15 ML: at 08:12

## 2024-06-03 RX ADMIN — MIDAZOLAM 1 MG: 5 INJECTION INTRAMUSCULAR; INTRAVENOUS at 13:07

## 2024-06-03 RX ADMIN — MAGNESIUM SULFATE HEPTAHYDRATE 2000 MG: 40 INJECTION, SOLUTION INTRAVENOUS at 08:13

## 2024-06-03 RX ADMIN — MIDODRINE HYDROCHLORIDE 5 MG: 5 TABLET ORAL at 08:13

## 2024-06-03 NOTE — CARE COORDINATION
Transition of Care Plan:    RUR: 18%  Prior Level of Functioning: Independent   Disposition: Home with HH   Follow up appointments: per physician recommendation   DME needed: TBD  Transportation at discharge: family  IM/IMM Medicare/ letter given: 1st 5/21, 2nd 5/29  Caregiver Contact: Simin [phone]  Barriers to discharge: Medical stability, procedure    CM reviewed chart, noted: Previous home health through [facility] in 2022 after AVR surgery. Agreeable to home health again if recommended by treatment team. Patient owns a RW and cane at home, but does not use them. Drives himself to/from appointments and errands. [facility] in [place] for prescriptions. Sister resides nearby and will transport patient at time of discharge. CM discussed options of wound care clinic for OP services vs home health wound care - patient will make decision if he needs IV abx at discharge.     Lifevest has been delivered and patient is currently wearing it.     Per Nephrology note; will need kidney bx;waiting for Inr(better at 1.4)     [facility] accepted patient for HH, CM added to AVS.     CM will continue to follow.     4:10PM: CM called the OP wound care clinic [phone] to reschedule the patients appointment, due to the patient is still being in the hospital. CM left a voicemail. CM will try to call again tomorrow to reschedule.     Dina Ragland RN/CRM

## 2024-06-04 VITALS
HEIGHT: 77 IN | BODY MASS INDEX: 37.19 KG/M2 | SYSTOLIC BLOOD PRESSURE: 121 MMHG | RESPIRATION RATE: 17 BRPM | TEMPERATURE: 97.7 F | HEART RATE: 71 BPM | WEIGHT: 315 LBS | OXYGEN SATURATION: 100 % | DIASTOLIC BLOOD PRESSURE: 61 MMHG

## 2024-06-04 LAB
ALBUMIN SERPL ELPH-MCNC: 2.6 G/DL (ref 2.9–4.4)
ALBUMIN/GLOB SERPL: 0.8 (ref 0.7–1.7)
ALPHA1 GLOB SERPL ELPH-MCNC: 0.5 G/DL (ref 0–0.4)
ALPHA2 GLOB SERPL ELPH-MCNC: 0.6 G/DL (ref 0.4–1)
ANION GAP SERPL CALC-SCNC: 5 MMOL/L (ref 5–15)
B-GLOBULIN SERPL ELPH-MCNC: 0.8 G/DL (ref 0.7–1.3)
BASOPHILS # BLD: 0.1 K/UL (ref 0–0.1)
BASOPHILS NFR BLD: 1 % (ref 0–1)
BUN SERPL-MCNC: 21 MG/DL (ref 6–20)
BUN/CREAT SERPL: 13 (ref 12–20)
CALCIUM SERPL-MCNC: 10.3 MG/DL (ref 8.5–10.1)
CHLORIDE SERPL-SCNC: 109 MMOL/L (ref 97–108)
CO2 SERPL-SCNC: 22 MMOL/L (ref 21–32)
CREAT SERPL-MCNC: 1.67 MG/DL (ref 0.7–1.3)
DIFFERENTIAL METHOD BLD: ABNORMAL
EOSINOPHIL # BLD: 0.2 K/UL (ref 0–0.4)
EOSINOPHIL NFR BLD: 2 % (ref 0–7)
ERYTHROCYTE [DISTWIDTH] IN BLOOD BY AUTOMATED COUNT: 16.4 % (ref 11.5–14.5)
GAMMA GLOB SERPL ELPH-MCNC: 1.8 G/DL (ref 0.4–1.8)
GLOBULIN SER-MCNC: 3.7 G/DL (ref 2.2–3.9)
GLUCOSE BLD STRIP.AUTO-MCNC: 102 MG/DL (ref 65–117)
GLUCOSE BLD STRIP.AUTO-MCNC: 114 MG/DL (ref 65–117)
GLUCOSE SERPL-MCNC: 98 MG/DL (ref 65–100)
HCT VFR BLD AUTO: 22.9 % (ref 36.6–50.3)
HCT VFR BLD AUTO: 23 % (ref 36.6–50.3)
HGB BLD-MCNC: 7.5 G/DL (ref 12.1–17)
HGB BLD-MCNC: 7.5 G/DL (ref 12.1–17)
IGA SERPL-MCNC: 354 MG/DL (ref 90–386)
IGG SERPL-MCNC: 1665 MG/DL (ref 603–1613)
IGM SERPL-MCNC: 256 MG/DL (ref 20–172)
IMM GRANULOCYTES # BLD AUTO: 0.1 K/UL (ref 0–0.04)
IMM GRANULOCYTES NFR BLD AUTO: 1 % (ref 0–0.5)
INR PPP: 1.4 (ref 0.9–1.1)
INTERPRETATION SERPL IEP-IMP: ABNORMAL
LYMPHOCYTES # BLD: 1.1 K/UL (ref 0.8–3.5)
LYMPHOCYTES NFR BLD: 12 % (ref 12–49)
M PROTEIN SERPL ELPH-MCNC: ABNORMAL G/DL
MAGNESIUM SERPL-MCNC: 2 MG/DL (ref 1.6–2.4)
MCH RBC QN AUTO: 27.9 PG (ref 26–34)
MCHC RBC AUTO-ENTMCNC: 32.6 G/DL (ref 30–36.5)
MCV RBC AUTO: 85.5 FL (ref 80–99)
MONOCYTES # BLD: 1 K/UL (ref 0–1)
MONOCYTES NFR BLD: 11 % (ref 5–13)
NEUTS SEG # BLD: 6.8 K/UL (ref 1.8–8)
NEUTS SEG NFR BLD: 73 % (ref 32–75)
NRBC # BLD: 0 K/UL (ref 0–0.01)
NRBC BLD-RTO: 0 PER 100 WBC
PLATELET # BLD AUTO: 298 K/UL (ref 150–400)
PMV BLD AUTO: 9 FL (ref 8.9–12.9)
POTASSIUM SERPL-SCNC: 3.6 MMOL/L (ref 3.5–5.1)
PROT SERPL-MCNC: 6.3 G/DL (ref 6–8.5)
PROTHROMBIN TIME: 14.2 SEC (ref 9–11.1)
RBC # BLD AUTO: 2.69 M/UL (ref 4.1–5.7)
SERVICE CMNT-IMP: NORMAL
SERVICE CMNT-IMP: NORMAL
SODIUM SERPL-SCNC: 136 MMOL/L (ref 136–145)
WBC # BLD AUTO: 9.1 K/UL (ref 4.1–11.1)

## 2024-06-04 PROCEDURE — 85014 HEMATOCRIT: CPT

## 2024-06-04 PROCEDURE — 6370000000 HC RX 637 (ALT 250 FOR IP): Performed by: INTERNAL MEDICINE

## 2024-06-04 PROCEDURE — 85025 COMPLETE CBC W/AUTO DIFF WBC: CPT

## 2024-06-04 PROCEDURE — 85610 PROTHROMBIN TIME: CPT

## 2024-06-04 PROCEDURE — 6370000000 HC RX 637 (ALT 250 FOR IP)

## 2024-06-04 PROCEDURE — 2580000003 HC RX 258: Performed by: INTERNAL MEDICINE

## 2024-06-04 PROCEDURE — 83735 ASSAY OF MAGNESIUM: CPT

## 2024-06-04 PROCEDURE — 36415 COLL VENOUS BLD VENIPUNCTURE: CPT

## 2024-06-04 PROCEDURE — 82962 GLUCOSE BLOOD TEST: CPT

## 2024-06-04 PROCEDURE — 85018 HEMOGLOBIN: CPT

## 2024-06-04 PROCEDURE — 6370000000 HC RX 637 (ALT 250 FOR IP): Performed by: NURSE PRACTITIONER

## 2024-06-04 PROCEDURE — 80048 BASIC METABOLIC PNL TOTAL CA: CPT

## 2024-06-04 RX ORDER — AMIODARONE HYDROCHLORIDE 400 MG/1
400 TABLET ORAL DAILY
Qty: 30 TABLET | Refills: 2 | Status: SHIPPED | OUTPATIENT
Start: 2024-06-05 | End: 2024-06-04

## 2024-06-04 RX ORDER — MIDODRINE HYDROCHLORIDE 5 MG/1
5 TABLET ORAL
Qty: 90 TABLET | Refills: 2 | Status: SHIPPED | OUTPATIENT
Start: 2024-06-04

## 2024-06-04 RX ORDER — AMIODARONE HYDROCHLORIDE 400 MG/1
400 TABLET ORAL DAILY
Qty: 30 TABLET | Refills: 2 | Status: SHIPPED | OUTPATIENT
Start: 2024-06-05

## 2024-06-04 RX ORDER — AMIODARONE HYDROCHLORIDE 200 MG/1
400 TABLET ORAL DAILY
Status: DISCONTINUED | OUTPATIENT
Start: 2024-06-05 | End: 2024-06-04 | Stop reason: HOSPADM

## 2024-06-04 RX ORDER — FOLIC ACID 1 MG/1
1 TABLET ORAL DAILY
Qty: 30 TABLET | Refills: 2 | Status: SHIPPED | OUTPATIENT
Start: 2024-06-05

## 2024-06-04 RX ORDER — FERROUS SULFATE 325(65) MG
325 TABLET ORAL
Qty: 30 TABLET | Refills: 2 | Status: SHIPPED | OUTPATIENT
Start: 2024-06-05

## 2024-06-04 RX ADMIN — AMIODARONE HYDROCHLORIDE 400 MG: 200 TABLET ORAL at 08:21

## 2024-06-04 RX ADMIN — MIDODRINE HYDROCHLORIDE 5 MG: 5 TABLET ORAL at 08:22

## 2024-06-04 RX ADMIN — Medication 1 MG: at 08:21

## 2024-06-04 RX ADMIN — MIDODRINE HYDROCHLORIDE 5 MG: 5 TABLET ORAL at 11:52

## 2024-06-04 RX ADMIN — ROSUVASTATIN CALCIUM 10 MG: 10 TABLET, FILM COATED ORAL at 08:22

## 2024-06-04 RX ADMIN — Medication 15 ML: at 08:23

## 2024-06-04 RX ADMIN — PANTOPRAZOLE SODIUM 40 MG: 40 TABLET, DELAYED RELEASE ORAL at 06:54

## 2024-06-04 RX ADMIN — ACETAMINOPHEN 650 MG: 325 TABLET ORAL at 02:57

## 2024-06-04 RX ADMIN — FERROUS SULFATE TAB 325 MG (65 MG ELEMENTAL FE) 325 MG: 325 (65 FE) TAB at 06:54

## 2024-06-04 RX ADMIN — SODIUM CHLORIDE, PRESERVATIVE FREE 10 ML: 5 INJECTION INTRAVENOUS at 08:27

## 2024-06-04 ASSESSMENT — PAIN DESCRIPTION - LOCATION
LOCATION: BACK;LEG
LOCATION: BACK;LEG

## 2024-06-04 ASSESSMENT — PAIN SCALES - GENERAL
PAINLEVEL_OUTOF10: 2
PAINLEVEL_OUTOF10: 7
PAINLEVEL_OUTOF10: 2

## 2024-06-04 NOTE — CARE COORDINATION
Patients Wound Care appointment is re-scheduled for Wednesday June 19, 2024 at 2:00PM. Please arrive 15 minutes early.     CM added to AVS.     [facility] OP WOUND CARE [address] [address]     Dina Ragland RN/CRM

## 2024-06-04 NOTE — DISCHARGE SUMMARY
MG extended release tablet  Commonly known as: TOPROL XL     potassium chloride 20 MEQ Tbcr extended release tablet  Commonly known as: K-TAB               Where to Get Your Medications        These medications were sent to [facility] #[id number] - [place], [place] - [address] [facility] [phone] - F [phone] [address] [facility] [address]      Phone: [phone]   amiodarone 400 MG tablet       These medications were sent to [facility] [id number] - [place], [place] - [address] [facility] [phone] - F [phone] [address] [facility] [address]      Phone: [phone]   ferrous sulfate 325 (65 Fe) MG tablet  folic acid 1 MG tablet  midodrine 5 MG tablet  sennosides 8.8 MG/5ML syrup         NOTIFY YOUR PHYSICIAN FOR ANY OF THE FOLLOWING:   Fever over 101 degrees for 24 hours.   Chest pain, shortness of breath, fever, chills, nausea, vomiting, diarrhea, change in mentation, falling, weakness, bleeding. Severe pain or pain not relieved by medications.  Or, any other signs or symptoms that you may have questions about.    DISPOSITION:   X Home With:  X OT X PT X HH  RN       Long term SNF/Inpatient Rehab    Independent/assisted living    Hospice    Other:       PATIENT CONDITION AT DISCHARGE:     Functional status    Poor    X Deconditioned     Independent      Cognition    X Lucid     Forgetful     Dementia      Catheters/lines (plus indication)    John     PICC     PEG    X None      Code status    X Full code     DNR      PHYSICAL EXAMINATION AT DISCHARGE:  Patient seen and examined, lying in bed. No acute issues overnight.    General : alert x 3, awake, no acute distress  HEENT: PEERL, EOMI, moist mucus membrane  Neck: supple, no JVD, no meningeal signs  Chest: Clear to auscultation bilaterally, on room air  CVS: S1 S2 heard, Capillary refill less than 2 seconds, LifeVest in place  Abd: soft/non tender, non distended, BS physiological, obese  Ext: no clubbing, no cyanosis, 1+ bilateral lower

## 2024-06-04 NOTE — PROGRESS NOTES
Name: Javier Ugalde   MRN: [id number]  : 1965    Assessment:  NIKKIE  CKD-3B/4 (Dr Griffin Pringle)  Proteinuria- 1.1 grams  Hematuria on recent dipstick UA-but urine microscopy only 0-2 RBC  Hypokalemia  Hyponatremia- mild  Borderline hypotension  Pre DM  RLE Cellulitis with wound around R knee  SOB  Hx of Mechanical AVR on coumadin-X valve     Pt has evolving NIKKIE on CKD; Cr has been up and down as outpt. UA seems to have somewhat active sediment. Serologies from outpt notable for low C4, +quite high RF (511) and +IgG4 (mild). Hep C was negative. Cryoglobulin level could not be done as outpt; Suspect pt has underlying GN- ddx includes Cryoglobulinemic vasculitis, MPGN, infectious GN etc.   His RLE ulceration could also be related to Cryoglobulinemia?    He needs CT-guided kidney biopsy.  We discussed the pros and cons of kidney biopsy.  Risk including, but not limited to infection, bleeding, hematoma, loss of kidney function etc were discussed.  He understands and agrees to proceed.   Is going to be high risk for kidney biopsy because of the fact he has mechanical heart valve and is on Coumadin.  Revd cardiology rec's. Because of X valve, he does not need bridging with IV Heparin unless its long term hold of coumadin. LDH and Hapto came back normal    He is making good urine and CR is down to 5 to 4.88 to 4.44.  BP still soft, but little better. Repeat UA shows moderate blood and 2+ protein, but urine microscopy only with 0-5 RBCs    Repeat serologies: LILLIAN is normal. C3 is nl, but C4 is low again. Cryoglobulin in process    Plan/Recommendations:  Ct  midodrine 5 mg TID w/ meals (started )  Off IVF.  Encourage increased oral intake and hydration  Off Abx  No acute indication for dialysis -monitor closely for the need of it  Daily labs  Monitor I&O  Ct to hold Coumadin for now 
                                                                                                                                              Name: Javier Ugalde   MRN: [id number]  : 1965    Assessment:  NIKKIE  CKD-3B/4 (Dr Griffin Pringle)  Proteinuria- 1.7 grams  Hematuria on recent dipstick UA-but urine microscopy only 0-2 RBC  Hypokalemia  Hyponatremia- mild  Hypercalcemia: immobility? Send off paraproteinemia w/u  Borderline hypotension  Pre DM  RLE Cellulitis with wound around R knee  SOB  Hx of Mechanical AVR on coumadin-X valve     Pt has evolving NIKKIE on CKD; Cr has been up and down as outpt. UA seems to have somewhat active sediment. Serologies from outpt notable for low C4, +quite high RF (511) and +IgG4 (mild). Hep C was negative. Cryoglobulin level could not be done as outpt; Suspect pt has underlying GN- ddx includes Cryoglobulinemic vasculitis, MPGN, infectious GN etc.   His RLE ulceration could also be related to Cryoglobulinemia?    He needs CT-guided kidney biopsy.  We discussed the pros and cons of kidney biopsy.  Risk including, but not limited to infection, bleeding, hematoma, loss of kidney function etc were discussed.  He understands and agrees to proceed.   Is going to be high risk for kidney biopsy because of the fact he has mechanical heart valve and is on Coumadin.  Revd cardiology rec's. Because of X valve, he does not need bridging with IV Heparin unless its long term hold of coumadin. LDH and Hapto came back normal    He is making good urine and CR is down to 5 to 4.88 to 4.44 to 4mg/dl.  BP still soft, but little better.     Repeat serologies: LILLIAN is normal. C3 is nl, but C4 is low again. Cryoglobulin in process    Plan/Recommendations:  No acute indication for dialysis  Order Calcitonin 4IU/kg/dose SQ q12hrs x 2 doses  Send off SPEP, ANITRA, and Free light chains  Oral KCL 40meq x1 dose  Ct  midodrine 5 mg TID w/ meals (started )  Encourage increased oral intake and hydration  Daily 
                                                                                                                                              Name: Javier Ugalde   MRN: [id number]  : 1965    Assessment:  NIKKIE  CKD-3B/4 (Dr Griffin Pringle)  Proteinuria- 1.1 grams  Hematuria on recent dipstick UA-but urine microscopy only 0-2 RBC  Hypokalemia  Hyponatremia- mild  Borderline hypotension  Pre DM  RLE Cellulitis with wound around R knee  SOB  Hx of Mechanical AVR on coumadin-X valve     Pt has evolving NIKKIE on CKD; Cr has been up and down as outpt. UA seems to have somewhat active sediment. Serologies from outpt notable for low C4, +quite high RF (511) and +IgG4 (mild). Hep C was negative. Cryoglobulin level could not be done as outpt; Suspect pt has underlying GN- ddx includes Cryoglobulinemic vasculitis, MPGN, infectious GN etc.   His RLE ulceration could also be related to Cryoglobulinemia?    He needs CT-guided kidney biopsy.  We discussed the pros and cons of kidney biopsy.  Risk including, but not limited to infection, bleeding, hematoma, loss of kidney function etc were discussed.  He understands and agrees to proceed.   Is going to be high risk for kidney biopsy because of the fact he has mechanical heart valve and is on Coumadin.  Revd cardiology rec's. Because of X valve, he does not need bridging with IV Heparin unless its long term hold of coumadin. LDH and Hapto came back normal    He is making good urine and CR is down to 5 to 4.88.  BP still soft, but little better. Repeat UA shows moderate blood and 2+ protein, but urine microscopy only with 0-5 RBCs  Will need to try midodrine for hypotension    Plan/Recommendations:  Start midodrine 5 mg TID w/ meals  Off IVF.  Encourage increased oral intake and hydration  Off Abx  No acute indication for dialysis -monitor closely for the need of it  Daily labs  Monitor I&O  Ct to hold Coumadin for now and let the INR drift down  INR is 2.8 today. Don't expect INR to be 
                                                                                                                                              Name: Javier Ugalde   MRN: [id number]  : 1965    Assessment:  NIKKIE  CKD-3B/4 (Dr Griffin Pringle)  Proteinuria- 1.1 grams  Hematuria on recent dipstick UA-but urine microscopy only 0-2 RBC  Hypokalemia  Hyponatremia- mild  Borderline hypotension  Pre DM  RLE Cellulitis with wound around R knee  SOB  Hx of Mechanical AVR on coumadin-X valve     Pt has evolving NIKKIE on CKD; Cr has been up and down as outpt. UA seems to have somewhat active sediment. Serologies from outpt notable for low C4, +quite high RF (511) and +IgG4 (mild). Hep C was negative. Cryoglobulin level could not be done as outpt; Suspect pt has underlying GN- ddx includes Cryoglobulinemic vasculitis, MPGN, infectious GN etc.   His RLE ulceration could also be related to Cryoglobulinemia?    He needs CT-guided kidney biopsy.  We discussed the pros and cons of kidney biopsy.  Risk including, but not limited to infection, bleeding, hematoma, loss of kidney function etc were discussed.  He understands and agrees to proceed.   Is going to be high risk for kidney biopsy because of the fact he has mechanical heart valve and is on Coumadin.  Revd cardiology rec's. Because of X valve, he does not need bridging with IV Heparin unless its long term hold of coumadin. LDH and Hapto came back normal    He is making good urine and CR is down to 5 to 4.88 to 4.44.  BP still soft, but little better. Repeat UA shows moderate blood and 2+ protein, but urine microscopy only with 0-5 RBCs    Repeat serologies: LILLIAN is normal. C3 is nl, but C4 is low again. Cryoglobulin in process    Plan/Recommendations:  No new labs this morning-> repeat tomorrow am  Send off UPR  No acute indication for dialysis -monitor closely for the need of it  Ct  midodrine 5 mg TID w/ meals (started )  Encourage increased oral intake and hydration  Daily 
                                                                                                                                              Name: Javier Ugalde   MRN: [id number]  : 1965    Assessment:  NIKKIE  CKD-3B/4 (Dr Griffin Pringle)  Proteinuria- 1.1 grams  Hematuria on recent dipstick UA-but urine microscopy only 0-2 RBC  Hypokalemia  Hyponatremia- mild  Borderline hypotension  Pre DM  RLE Cellulitis with wound around R knee  SOB  Hx of Mechanical AVR on coumadin-X valve     Pt has evolving NIKKIE on CKD; Cr has been up and down as outpt. UA seems to have somewhat active sediment. Serologies from outpt notable for low C4, +quite high RF (511) and +IgG4 (mild). Hep C was negative. Cryoglobulin level could not be done as outpt; Suspect pt has underlying GN- ddx includes Cryoglobulinemic vasculitis, MPGN, infectious GN etc.   His RLE ulceration could also be related to Cryoglobulinemia?    He needs CT-guided kidney biopsy.  We discussed the pros and cons of kidney biopsy.  Risk including, but not limited to infection, bleeding, hematoma, loss of kidney function etc were discussed.  He understands and agrees to proceed.   Is going to be high risk for kidney biopsy because of the fact he has mechanical heart valve and is on Coumadin.  Need to hold Coumadin and get cardiology opinion about bridging with IV heparin.  Depending on how soon the INR comes down, I suspect the timing of kidney biopsy may end up early next week, as biopsy could not be done over the weekend    He is making good urine and CR is down to 5.  BP still soft, but little better. Repeat UA shows moderate blood and 2+ protein, but urine microscopy only with 0-5 RBCs     Plan/Recommendations:  Off IVF.  Encourage increased oral intake and hydration  Cautious prn replacement of KCL with dense NIKKIE  Ct IV Abx per 1ry team  No acute indication for dialysis -monitor closely for the need of it  Daily labs  Monitor I&O  Hold Coumadin for now and let the INR 
                                                                                                                                              Name: Javier Ugalde   MRN: [id number]  : 1965    Assessment:  NIKKIE  CKD-3B/4 (Dr Griffin Pringle)  Proteinuria- 1.7 grams  Hematuria on recent dipstick UA-but urine microscopy only 0-2 RBC  Hypokalemia  Hyponatremia- mild  Hypercalcemia: immobility? Send off paraproteinemia w/u. Ca 11-> 10.4-> 10.1 s/p Calcitonin SQ but now corrected Ca >12  Borderline hypotension  Pre DM  RLE Cellulitis with wound around R knee  SOB  Hx of Mechanical AVR on coumadin-X valve     Pt has evolving NIKKIE on CKD; Cr has been up and down as outpt. Serologies from outpt notable for low C4, +quite high RF (511) and +IgG4 (mild). Hep C was negative. Cryoglobulin level undetectable so far. Discussed case with Dr. Griffin Pringle (primary nephrologist)->feels like patient would really benefit from a kidney biopsy despite recent renal recovery    CT-guided kidney biopsy was being considered.  We discussed the pros and cons of kidney biopsy.  Risk including, but not limited to infection, bleeding, hematoma, loss of kidney function etc were discussed.  He understands and agrees to proceed.   Is going to be high risk for kidney biopsy because of the fact he has mechanical heart valve and is on Coumadin.  Revd cardiology rec's. Because of X valve, he does not need bridging with IV Heparin unless its long term hold of coumadin. LDH and Hapto came back normal    He is making excellent urine and CR is down from peak of 5.4mg/dl to 4.88 to 4.44 to 4 to 3.6 to 3.1 to 2.9 to 2.6mg/dl today.  BP still soft/stable    Calcium remains an issue-> Corrected Ca ~11.8. iPTH appropriately low.    Plan/Recommendations:  Ct IV NS at 75cc/hr  Long discussion with patient today-> since hypercalcemia is still keeping patient here-> probably best to proceed with CT guided kidney biopsy  Hold Coumadin.  Will schedule kidney biopsy for 
                                                                                                                                              Name: Javier Ugalde   MRN: [id number]  : 1965    Assessment:  NIKKIE  CKD-3B/4 (Dr Griffin Pringle)  Proteinuria- 1.7 grams  Hematuria on recent dipstick UA-but urine microscopy only 0-2 RBC  Hypokalemia  Hyponatremia- mild  Hypercalcemia: immobility? Send off paraproteinemia w/u. Ca 11-> 10.4-> 10.1 s/p Calcitonin SQ but now corrected Ca >12  Borderline hypotension  Pre DM  RLE Cellulitis with wound around R knee  SOB  Hx of Mechanical AVR on coumadin-X valve     Pt has evolving NIKKIE on CKD; Cr has been up and down as outpt. Serologies from outpt notable for low C4, +quite high RF (511) and +IgG4 (mild). Hep C was negative. Cryoglobulin level undetectable so far.    CT-guided kidney biopsy was being considered.  We discussed the pros and cons of kidney biopsy.  Risk including, but not limited to infection, bleeding, hematoma, loss of kidney function etc were discussed.  He understands and agrees to proceed.   Is going to be high risk for kidney biopsy because of the fact he has mechanical heart valve and is on Coumadin.  Revd cardiology rec's. Because of X valve, he does not need bridging with IV Heparin unless its long term hold of coumadin. LDH and Hapto came back normal    He is making excellent urine and CR is down to 5 to 4.88 to 4.44 to 4 to 3.6 to 3.1 to 2.9mg/dl.  BP still soft/stable    Calcium remains an issue    Plan/Recommendations:  Start IV NS at 75cc/hr  Check iPTH  Ct midodrine 5 mg TID w/ meals (started )  Encourage increased oral intake and hydration  Daily labs  Monitor I&O  F/u pending Cryoglobulin (none detected so far)  Liberated FR  Daily wt  Will need BiV ICD upgrade in the future  Hold off on discharge    D/W pt     Subjective:  No events overnight. UOP >3L yesterday    ROS:   No chest pain  No n/v    Exam:  BP (!) 97/56   Pulse 66   Temp 98.1 °F 
                                                                                                                                              Name: Javier Ugalde   MRN: [id number]  : 1965    Assessment:  NIKKIE  CKD-3B/4 (Dr Griffin Pringle)  Proteinuria- 1.7 grams  Hematuria on recent dipstick UA-but urine microscopy only 0-2 RBC  Hypokalemia  Hyponatremia- mild  Hypercalcemia: immobility? Send off paraproteinemia w/u. Ca 11-> 1.4 s/p Calcitonin SQ  Borderline hypotension  Pre DM  RLE Cellulitis with wound around R knee  SOB  Hx of Mechanical AVR on coumadin-X valve     Pt has evolving NIKKIE on CKD; Cr has been up and down as outpt. Serologies from outpt notable for low C4, +quite high RF (511) and +IgG4 (mild). Hep C was negative. Cryoglobulin level undetectable so far.    CT-guided kidney biopsy was being considered.  We discussed the pros and cons of kidney biopsy.  Risk including, but not limited to infection, bleeding, hematoma, loss of kidney function etc were discussed.  He understands and agrees to proceed.   Is going to be high risk for kidney biopsy because of the fact he has mechanical heart valve and is on Coumadin.  Revd cardiology rec's. Because of X valve, he does not need bridging with IV Heparin unless its long term hold of coumadin. LDH and Hapto came back normal    He is making good urine and CR is down to 5 to 4.88 to 4.44 to 4 to 3.6mg/dl.  BP still soft, but little better.     Repeat serologies: LILLIAN is normal. C3 is nl, but C4 is low again. Cryoglobulin in process    Plan/Recommendations:  Tentatively Kidney biopsy now on hold-> he is showing renal recovery.  S/p Calcitonin 4IU/kg/dose SQ q12hrs x 2 doses yesterday  SPEP, ANITRA, and Free light chains pending  Ct  midodrine 5 mg TID w/ meals (started )  Encourage increased oral intake and hydration  Daily labs  Monitor I&O  Ct to hold Coumadin for now and let the INR drift down-2.6 today  F/u pending Cryoglobulin (none detected so far)  Ct FR of 
                                                                                                                                              Name: Javier gUalde   MRN: [id number]  : 1965    Assessment:  NIKKIE  CKD-3B/4 (Dr Griffin Pringle)  Proteinuria- 1.7 grams  Hematuria on recent dipstick UA-but urine microscopy only 0-2 RBC  Hypokalemia  Hyponatremia- mild  Hypercalcemia: immobility? Sent off paraproteinemia w/u. Ca 11-> 10.4-> 10.1 s/p Calcitonin SQ but now corrected Ca >12  Borderline hypotension  Pre DM  RLE Cellulitis with wound around R knee  SOB  Hx of Mechanical AVR on coumadin-X valve     **Pt has evolving NIKKIE on CKD; Cr has been up and down as outpt. Serologies from outpt notable for low C4, +quite high RF (511) and +IgG4 (mild). Hep C was negative. Cryoglobulin level undetectable so far. Discussed case with Dr. Griffin Pringle (primary nephrologist)->feels like patient would really benefit from a kidney biopsy despite recent renal recovery    CT-guided kidney biopsy scheduled Because of X valve, he does not need bridging with IV Heparin unless its long term hold of coumadin. LDH and Hapto came back normal    He is making excellent urine and CR is down from peak of 5.4mg/dl to 4.88 to 4.44 to 4 to 3.6 to 3.1 to 2.9 to 2.6 to 2.2mg/dl today.  BP still soft/stable    Calcium remains an issue-> Corrected Ca ~12 iPTH appropriately low.    Plan/Recommendations:  Ct IV NS at 75cc/hr  Encouraged getting up to the chair and ambulating with help.  Holding Coumadin again  Will schedule kidney biopsy for Monday 6/3/24 (INR needs to be <1.5)  Ct midodrine 5 mg TID w/ meals (started )  Encourage increased oral intake and hydration  Monitor I&O  F/u pending Cryoglobulin (none detected so far). ANITRA and PE still pending  Liberated FR  Will need BiV ICD upgrade in the future.   Am labs      D/W pt     Subjective:  Issues with [employer] overnight-> alarms. Better after it was reset.     ROS:   No chest pain  No 
                                                                                                Hospitalist Progress Note  JIM Mckeon NP  Answering service: [phone] OR [phone] from in house phone        Date of Service:  2024  NAME:  Javier Ugalde  :  1965  MRN:  [id number]      Admission Summary:   Per H&P on    Javier Ugalde is a 58 y.o. male with arthritis, chronic pain, T2DM, HTN, CKD (Dr Ashraf), HLD, s/p mechanical AVR on warfarin with goal INR 2-3 (Dr Rodriguez), s/p PPM, obesity who was sent by his PCP for RLE cellulitis failing outpatient doxycycline x1 week. RLE swelling x2 months and SOB x2 weeks. Pt states his cardiologist and nephrologist were adjusting his meds and as his leg swelling improved, the skin of his right leg was getting loose, he started picking it and then infection started about 2 weeks ago. He has a open bleeding wound on the R lower leg. Pt reports adherence to doxy. He c/o chills but no f/n/v, cough, COVID/flu, diarrhea, constipation, dysuria. RLE venous duplex on  showed no DVT. Of note, INR was 5.2 on 5/15 so warfarin was held until yesteday and then the RLE wound started bleeding again. BC showed NGTDx2. CXR showed no acute process. He was given CTX but became nauseated while it was getting pushed.    Interval history / Subjective:   I saw the patient this morning on rounds.  No complaints the moment of the encounter     Assessment & Plan:      RLE cellulitis and necrotic wound failing outpatient abx  MRSA negative  Wound care following  Has been evaluated by vascular surgery, likely due to chronic edema  As needed tramadol  Blood cultures without growth  White count 17 today, did receive 5 days of antibiotics         NIKKIE on CKD Stage 4, improving  Hyponatremia  Hypokalemia:   Hypercalcemia  Nephrology following, appreciate recommendations  For renal biopsy when INR less than 1.5  INR 2.0 today  Avoiding nephrotoxins  Creatinine was 5.33 at 
                                                                                                Hospitalist Progress Note  JIM Mcmanus - NP  Answering service: [phone] OR [phone] from in house phone        Date of Service:  2024  NAME:  Javier Ugalde  :  1965  MRN:  [id number]      Admission Summary:   Per H&P on   Javier Ugalde is a 58 y.o. male with arthritis, chronic pain, T2DM, HTN, CKD (Dr Ashraf), HLD, s/p mechanical AVR on warfarin with goal INR 2-3 (Dr Rodriguez), s/p PPM, obesity who was sent by his PCP for RLE cellulitis failing outpatient doxycycline x1 week. RLE swelling x2 months and SOB x2 weeks. Pt states his cardiologist and nephrologist were adjusting his meds and as his leg swelling improved, the skin of his right leg was getting loose, he started picking it and then infection started about 2 weeks ago. He has a open bleeding wound on the R lower leg. Pt reports adherence to doxy. He c/o chills but no f/n/v, cough, COVID/flu, diarrhea, constipation, dysuria. RLE venous duplex on  showed no DVT. Of note, INR was 5.2 on 5/15 so warfarin was held until yesteday and then the RLE wound started bleeding again. BC showed NGTDx2. CXR showed no acute process. He was given CTX but became nauseated while it was getting pushed.    Interval history / Subjective:   Met with pt and nephrology at bedside.  Will attempt to pursue kidney bx, hold coumadin. Kidney bx on Monday. Order will need to be placed over weekend.      Assessment & Plan:     RLE cellulitis and necrotic wound failing outpatient abx - completed abx  - MRSA screen negative  - evaluated by Wound care team  - evaluated by vascular surgery, likely due to chronic edema  - prn tramadol  - Blood cultures without growth x 5 days  -  wbc 15.0, afebrile,   - completed 5 days of Cefepime  -  Right knee appears to be improved per pt    NIKKIE on CKD Stage 4, improving  Hyponatremia, mild  - Nephrology following, appreciate 
                                                                                                Hospitalist Progress Note  JIM Mcmanus - NP  Answering service: [phone] OR [phone] from in house phone        Date of Service:  2024  NAME:  Javier Ugalde  :  1965  MRN:  [id number]      Admission Summary:   Per H&P on   Javier Ugalde is a 58 y.o. male with arthritis, chronic pain, T2DM, HTN, CKD (Dr Ashraf), HLD, s/p mechanical AVR on warfarin with goal INR 2-3 (Dr Rodriguez), s/p PPM, obesity who was sent by his PCP for RLE cellulitis failing outpatient doxycycline x1 week. RLE swelling x2 months and SOB x2 weeks. Pt states his cardiologist and nephrologist were adjusting his meds and as his leg swelling improved, the skin of his right leg was getting loose, he started picking it and then infection started about 2 weeks ago. He has a open bleeding wound on the R lower leg. Pt reports adherence to doxy. He c/o chills but no f/n/v, cough, COVID/flu, diarrhea, constipation, dysuria. RLE venous duplex on  showed no DVT. Of note, INR was 5.2 on 5/15 so warfarin was held until yesteday and then the RLE wound started bleeding again. BC showed NGTDx2. CXR showed no acute process. He was given CTX but became nauseated while it was getting pushed.    Interval history / Subjective:   INR 2.1  Holding off on renal bx as Cr is improving  May resume wafarin  Cr 3.19  UOP 2600 ml in last 24 hours  WBC 15.0, remains elevated, afebrile  Right knee appears to be improved per pt  Pt reports CHAKRABORTY x 1 month since off of Losartan, will discuss with cardiology, no echo on file.  No need for Echo already one on file with cardiology, can likely d/c tomorrow with [employer], cardiology to arrange for pt     Assessment & Plan:     RLE cellulitis and necrotic wound failing outpatient abx - completed abx  - MRSA screen negative  - evaluated by Wound care team  - evaluated by vascular surgery, likely due to chronic 
        [facility]  Hospitalist Group                                                                                          Hospitalist Progress Note  JIM Jasso NP  Answering service: [phone] OR [phone] from in house phone        Date of Service:  2024  NAME:  Javier Ugalde  :  1965  MRN:  [id number]       Admission Summary:   Javier Ugalde is a 58 y.o. male with arthritis, chronic pain, T2DM, HTN, CKD (Dr Ashraf), HLD, s/p mechanical AVR on warfarin with goal INR 2-3 (Dr Rodriguez), s/p PPM, obesity who was sent by his PCP for RLE cellulitis failing outpatient doxycycline x1 week. RLE swelling x2 months and SOB x2 weeks. Pt states his cardiologist and nephrologist were adjusting his meds and as his leg swelling improved, the skin of his right leg was getting loose, he started picking it and then infection started about 2 weeks ago. He has a open bleeding wound on the R lower leg. Pt reports adherence to doxy. He c/o chills but no f/n/v, cough, COVID/flu, diarrhea, constipation, dysuria. RLE venous duplex on  showed no DVT. Of note, INR was 5.2 on 5/15 so warfarin was held until yesteday and then the RLE wound started bleeding again. BC showed NGTDx2. CXR showed no acute process. He was given CTX but became nauseated while it was getting pushed.      Interval history / Subjective:   Patient seen and examined, lying in bed. Tolerated blood transfusion without issues yesterday. No BM since .     Nephrology requesting renal biopsy. Patient seen by Cardiology yesterday, okay to hold coumadin with no need to bridge with heparin. Biopsy likely to be next week once INR decreased.      Assessment & Plan:     RLE cellulitis and necrotic wound failing outpatient abx  - BC X 2 (): NGTD  - MRSA swab negative  - continue empiric cefepime, discontinued vancomycin   - wound care following  - Vascular Surgery following  - WILLIAN: mildly abnormal on right and left, 
      [facility]  Hospitalist Group                                                                                          Hospitalist Progress Note  David M Milligram, PA-C  Answering service: [phone] from in house phone        Date of Service:  2024  NAME:  Javier Ugalde  :  1965  MRN:  [id number]      Admission Summary:   Javier Ugalde is a 58 y.o. male with arthritis, chronic pain, T2DM, HTN, CKD (Dr Ashraf), HLD, s/p mechanical AVR on warfarin with goal INR 2-3 (Dr Rodriguez), s/p PPM, obesity who was sent by his PCP for RLE cellulitis failing outpatient doxycycline x1 week. RLE swelling x2 months and SOB x2 weeks. Pt states his cardiologist and nephrologist were adjusting his meds and as his leg swelling improved, the skin of his right leg was getting loose, he started picking it and then infection started about 2 weeks ago. He has a open bleeding wound on the R lower leg. Pt reports adherence to doxy. He c/o chills but no f/n/v, cough, COVID/flu, diarrhea, constipation, dysuria. RLE venous duplex on  showed no DVT. Of note, INR was 5.2 on 5/15 so warfarin was held until yesteday and then the RLE wound started bleeding again. BC showed NGTDx2. CXR showed no acute process. He was given CTX but became nauseated while it was getting pushed.       Interval history / Subjective:      No acute interval events. Patient reports feeling well today, he does have some shortness of breath with ambulation, but this seems mostly associated with position changes and resolves quickly. Pulmonary exam is grossly benign    Awaiting kidney bx on 24    Assessment & Plan:     RLE cellulitis and necrotic wound failing outpatient abx  - MRSA swab negative  - wound care following  - Vascular Surgery signed off, wounds are likely due to chronic edema  - WILLIAN: mildly abnormal on right and left, consistent with possible mild arterial obstruction in right and left lower extremities, 
  Physician Progress Note      PATIENT:               COCO HART  CSN #:                  [id number]  :                       1965  ADMIT DATE:       2024 1:41 PM  DISCH DATE:  RESPONDING  PROVIDER #:        Jasper Childress NP          QUERY TEXT:    Pt admitted with Cellulitis of right lower leg. Pt noted to have elevated WBC,   HR, low BP. If possible, please document in the progress notes and discharge   summary if you are evaluating and /or treating any of the following:    The medical record reflects the following:  Risk Factors: Cellulitis, Gangrene  Clinical Indicators: IM PN  RLE cellulitis and necrotic wound failing   outpatient abx, BC X 2 (): NGTD, MRSA swab pending  wound care following, consult to Vascular Surgery due to necrotic ulcer, WBC   16.6 --> 13.3,  BP-95/50, 96/53, 107/59, 97/57, 93/60  WBC-14.6,17.3,15.9,17.4  , 104  Treatment: IV cefepime (MAXIPIME) 2,000 mg, IV vancomycin (VANCOCIN) 2500 mg,   IVF    Thank You,  Blessing Lutz [facility], CDS  Options provided:  -- Sepsis due to cellulitis of right lower leg, present on admission  -- Cellulitis of right lower leg without Sepsis  -- Other - I will add my own diagnosis  -- Disagree - Not applicable / Not valid  -- Disagree - Clinically unable to determine / Unknown  -- Refer to Clinical Documentation Reviewer    PROVIDER RESPONSE TEXT:    This patient has sepsis due to cellulitis of right lower leg which was present   on admission.    Query created by: Blessing Lutz on 2024 5:22 AM      Electronically signed by:  Jasper Childress NP 2024 7:50 AM          
Advance Care Planning     Advance Care Planning (ACP) Physician/NP/PA Conversation    Date of Conversation: 5/20/2024  Conducted with: Patient with Decision Making Capacity  Other persons present: None    Healthcare Decision Maker:   Primary Decision Maker: Bucky Ortiz Kimberly - Jesse - [phone]  Click here to complete Healthcare Decision Makers including selection of the Healthcare Decision Maker Relationship (ie \"Primary\").        Care Preferences:    Hospitalization:  \"If your health worsens and it becomes clear that your chance of recovery is unlikely, what would be your preference regarding hospitalization?\"  The patient would prefer hospitalization.    Ventilation:  \"If you were unable to breath on your own and your chance of recovery was unlikely, what would be your preference about the use of a ventilator (breathing machine) if it was available to you?\"  The patient would NOT desire the use of a ventilator.    Resuscitation:  \"In the event your heart stopped as a result of an underlying serious health condition, would you want attempts made to restart your heart, or would you prefer a natural death?\"  No, do NOT attempt to resuscitate.    treatment goals, ventilation preferences, hospitalization preferences, and resuscitation preferences    Conversation Outcomes / Follow-Up Plan:  ACP complete - no further action today  Reviewed DNR/DNI and patient confirms current DNR status - completed forms on file (place new order if needed)    Length of Voluntary ACP Conversation in minutes:  16 minutes    JIM Mckeon - NP                   
Blood Transfusion Consent    We discussed the risk of infection, both bacterial and viral, allergic reactions, volume overload and electrolyte derangements.     He was able to repeat back symptoms to be aware of when to contact medical staff.  He is agreement and consents to the transfusion.     Nida Isabel, [facility]  Hospitalist    
Cardiology Progress Note                                        Admit Date: 5/20/2024    Assessment/Plan:     Problems:  1. Aortic aneurysm with moderate aortic insufficiency:  The aortic root (5.4 cm). The sinotubular junction (4.7 cm).  The ascending thoracic aorta at the level of the pulmonary artery bifurcation (6.3cm) s/p mechanical composite aortic root replacement with 25 mm On-X mechanical valved conduit and reimplantation of coronary arteries on 3/25/22 by Dr. Clemente Verma. Echo 12/19/22: EF 55%, appropriate fx mechanical aortic valve with no regurg.  2. Intermittent Mobitz I and CHB s/p DDD PM implant 3/29/22, Medtronic.   3. Paroxsymal atrial fibrillation, he is on Warfarin/Coumadin.  4. C 9/28/21: Single vessel branch vessel disease with a focal 80% stenosis of the proximal diagonal branch, but no main vessel disease and the other branches. Known ascending aneurysm. Moderate aortic insufficiency.   5. Hypertension.  6. Hyperlipidemia  7. Diabetes, type II.  8. Venous insufficiency.  9. GI bleed 3/2022.  10. Anemia.      1.mechanical aortic valve and typically is on coumadin for this.    On-X valved conduit, with surgery performed March 2022 by Dr Verma.   2.  Medtronic pacemaker that was placed for complete heart block   (Medtronic [place])  Echo 1/2024  Concentric LVH with moderately increased wall thickness and LVEF 60-65%.  Moderate left atrial enlargement. RV not well visualized, likely normal systolic function.  3. Sustained  Ventricular tachycardia:    device interrogation  no arrhthymias detected  no afib      WCT different than paced morphology and cl 450 msec so bellow vt detection of pacemaker.    Going to need upgrade of his pacemaker to biv icd  ( new rv lead and device)        Will need iv contrast to confirm vein patency of left scv.   ( about 20-30 cc)       Will delay until renal fx better   and infection resolved    Plan :life vest and delay until these issues resolved   pt will fu with 
Cardiology Progress Note                                        Admit Date: 5/20/2024    Assessment/Plan:     Problems:  1. Aortic aneurysm with moderate aortic insufficiency:  The aortic root (5.4 cm). The sinotubular junction (4.7 cm).  The ascending thoracic aorta at the level of the pulmonary artery bifurcation (6.3cm) s/p mechanical composite aortic root replacement with 25 mm On-X mechanical valved conduit and reimplantation of coronary arteries on 3/25/22 by Dr. Clemente Verma. Echo 12/19/22: EF 55%, appropriate fx mechanical aortic valve with no regurg.  2. Intermittent Mobitz I and CHB s/p DDD PM implant 3/29/22, Medtronic.   3. Paroxsymal atrial fibrillation, he is on Warfarin/Coumadin.  4. C 9/28/21: Single vessel branch vessel disease with a focal 80% stenosis of the proximal diagonal branch, but no main vessel disease and the other branches. Known ascending aneurysm. Moderate aortic insufficiency.   5. Hypertension.  6. Hyperlipidemia  7. Diabetes, type II.  8. Venous insufficiency.  9. GI bleed 3/2022.  10. Anemia.      1.mechanical aortic valve and typically is on coumadin for this.    On-X valved conduit, with surgery performed March 2022 by Dr Verma.   2.  Medtronic pacemaker that was placed for complete heart block   (Medtronic [place])  Echo 1/2024  Concentric LVH with moderately increased wall thickness and LVEF 60-65%.  Moderate left atrial enlargement. RV not well visualized, likely normal systolic function.  3. Sustained  Ventricular tachycardia:    device interrogation  no arrhthymias detected  no afib      WCT different than paced morphology and cl 450 msec so bellow vt detection of pacemaker.    Going to need upgrade of his pacemaker to biv icd  ( new rv lead and device)        Will need iv contrast to confirm vein patency of left scv.   ( about 20-30 cc)       Will delay until renal fx better   and infection resolved  may need life vest and delay until these issues resolved.   4  ARF  cr 
Chart reviewed and cleared by RN.  Attempted pt but currently with provider. Will follow up later as able and appropriate.     John De Luna, PT    
Cr continues to improve, UOP in last 24 hours 3,220 ml  Possible discharge today, pending nephrology and cardiology recommendations.  Will need life vest arranged by cardiology prior to d/c    Nida Isabel -ACNP  Hospital Medicine  
Day #1 of Cefepime  Indication:  Cellulitis  Current regimen:  500mg IV q12h  Abx regimen: Cefepime, vancomycin  Recent Labs     24  1322   WBC 16.6*   CREATININE 5.33*   BUN 51*     Est CrCl: 25 ml/min  Temp (24hrs), Av.2 °F (36.8 °C), Min:97.9 °F (36.6 °C), Max:98.4 °F (36.9 °C)    Cultures: Blood pending    Plan: Change to 1000mg IV q24h per extended infusion dosing policy      
Day #4 of Cefepime  Indication:  Skin and soft tissue infection failing outpatient antibiotics  Current regimen:  1000 g every 24 hours  Abx regimen: Cefepime monotherapy  Recent Labs     24  0340 24  0338 24  0626   WBC 13.7* 14.6* 17.3*   CREATININE 5.06* 4.88* 4.44*   BUN 56* 52* 47*     Est CrCl: 30 ml/min  Temp (24hrs), Av.2 °F (36.8 °C), Min:97.6 °F (36.4 °C), Max:98.8 °F (37.1 °C)    Cultures:  Blood 1/2 - NGRD - preliminary    Plan: Change to 2000 mg q24 hours for crcl 30-59      
Endorsed to tech for day shift about pt daily wt. Tech will get patient's wt this shift.  
NUTRITION  Reason for Assessment: LOS      Recommendations/Interventions/Plan:   Continue diet as ordered, adding ONS BID as additional kcal/pro option.    Will rescreen per policy       Past Medical History:   Diagnosis Date    Arthritis     Cellulitis     Chronic pain     COVID-19 vaccine series completed 4/2/21, 5/7/21    MODERNA    Diabetes (HCC)     Elevated hemoglobin A1c 06/07/2021    6.4    Hyperlipidemia     Hypertension     Hypertension complicating diabetes (HCC)          Pt screened for LOS.  Chart/labs/meds reviewed.  Admitted with Cellulitis of right lower extremity [L03.115]  Acute renal failure superimposed on chronic kidney disease, unspecified acute renal failure type, unspecified CKD stage (HCC) [N17.9, N18.9]  Pt visited at bedside this morning. When asked how he is eating, he smiled and said, \"its hospital food.\" Feels appetite is normal but does not care much for what is being served. Agreeable to nutrition shakes to help bridge this gap. No acute/overt malnutrition concerns at this time and will continue to follow per policy.       Nutrition Related Findings:   Edema: Right lower extremity            RLE Edema: +2         Last BM: 05/26/24      Skin: Overall intact, +cellulitis R leg      Current Nutrition Therapies:  Diet: 5 carb choice/meal  Supplements: None presently ordered  Meal Intake:   Patient Vitals for the past 168 hrs:   PO Meals Eaten (%)   05/26/24 1800 0%     Supplement Intake:  No data found.      Weight Hx:  Wt Readings from Last 10 Encounters:   05/29/24 (!) 141.8 kg (312 lb 11.2 oz)   05/20/24 (!) 147 kg (324 lb)   05/15/24 (!) 147 kg (324 lb)   04/09/24 (!) 159.2 kg (351 lb)   04/06/24 (!) 159.3 kg (351 lb 3.1 oz)   01/03/24 (!) 140.6 kg (310 lb)   10/18/23 (!) 140.6 kg (310 lb)   08/29/23 (!) 142.9 kg (315 lb)   07/25/23 (!) 142.9 kg (315 lb)   06/22/23 (!) 142.9 kg (315 lb)         Recent Labs     05/27/24  0705 05/28/24  0530 05/29/24  0615 05/29/24  0917   GLUCOSE 111* 
Occupational Therapy    Chart reviewed in prep for skilled OT treatment; however, per talks with PT and CM, pt up and Supervision for mobility/balance and ADL completion, with pt's D/C pending secondary to possible life vest.  CM reports HH has already been set-up, with no noted therapy notes.  Given above mentioned, no further acute OT needs identified, with OT to sign off.    Thank you,  Matthew Kerley, OT    
Occupational Therapy Note:     Attempted to see pt for intervention.  Offered dressing and bathing activities but he declined activities at this time.  Pt reporting he would like to follow up tomorrow as he plans to discharge tomorrow.      Jaylin Jernigan, OTR/L      
Occupational Therapy Note:     Pt with HgB of 6.9 this morning and to receive 1 unit of blood.  Pt does have low BP as well and will defer OT at this time and follow up as able and appropriate.     Jaylin Jernigan, OTR/L      
Patient seen in IR ,had kidney biopsy,so far doing well,good BP,no pain  Will follow post biopsy protocol and recheck H/H   Resume diet at 1830 today  Discussed with him,radiology team   
Patient was discharged,discharged instruction was given to the patient.patient was awaiting for his ride.  
Pharmacist Note - Vancomycin Dosing    Consult provided for this 58 y.o. male for indication of SSTI.  Antibiotic regimen(s): Vanc+Cefepime  Patient on vancomycin PTA? NO     Recent Labs     24  1322   WBC 16.6*   CREATININE 5.33*   BUN 51*     Frequency of BMP: Daily   Height: 195.6 cm  Weight: 147.7 kg  Est CrCl: 25 ml/min; UO: --- ml/kg/hr  Temp (24hrs), Av.1 °F (36.7 °C), Min:97.9 °F (36.6 °C), Max:98.4 °F (36.9 °C)    Cultures:  Blood  - pending  Wound - pending     MRSA Swab ordered (if applicable)? N/A    The plan below is expected to result in a target range of Trough 10-15 mcg/mL    Therapy will be initiated with a loading dose of 2500 mg IV x 1 then hold due to worsening renal fxn.  Dosing will be based on levels until renal fxn improves.  Pharmacy to follow patient daily and order levels / make dose adjustments as appropriate.    Serge Bentley, RadhaD      
Pharmacist Note - Vancomycin Dosing  Therapy day 3  Indication: RLE cellulitis and necrotic wound failing outpatient abx   Current regimen: Dosing by levels    Recent Labs     24  1322 24  0620 24  0340   WBC 16.6* 13.3* 13.7*   CREATININE 5.33* 5.45* 5.06*   BUN 51* 55* 56*   Temp (24hrs), Av.1 °F (36.7 °C), Min:97.3 °F (36.3 °C), Max:98.6 °F (37 °C)    A random vancomycin level of 14.8 mcg/mL was obtained ~36 hours post-dose.    Goal target range Trough 10-15 mcg/mL      Plan: NIKKIE on CKD 4, SCr improved this AM; UO 1 mL/kg/hr. Continue to dose by levels. ~36 hours level at the upper end of goal range. Will schedule a once 500 mg x 1 dose which is predicting a 24hr level of 13.4 mcg/mL. Will assess another random level tomorrow mid-morning. Pharmacy will continue to monitor this patient daily for changes in clinical status and renal function.    Mohan Sullivan, PharmD  Clinical Pharmacist, Orthopedics and Med/Surg  [facility] ([phone])    
Pharmacist Note - Warfarin Dosing  Consult provided for this 58 y.o.male to manage warfarin for  AVR    INR Goal: 2 - 3 (followed by Dr. Rodriguez)    Home regimen/ tablet size: 5 mg Tues/Thurs; 7.5 mg all other days of the week    Drugs that may increase INR: Doxycycline (home med) - not reordered  Drugs that may decrease INR: None  Other current anticoagulants/ drugs that may increase bleeding risk: None  Risk factors: None  Daily INR ordered through: 5/27    Recent Labs     05/20/24  1322 05/21/24  0620 05/21/24  1511 05/22/24  0340   HGB 8.4* 6.1* 8.4* 6.7*   INR 2.3* 2.8*  --  3.0*     Date               INR                  Dose  5/20  2.3  7.5 mg  5/21  2.8        5 mg  5/22  3.0  HOLD - pending renal biopsy                                                                               Assessment/ Plan:  Spoke with Dr. Painter yesterday evening (5/21) after work. Coumadin is to be on hold pending renal biopsy.    Pharmacy will continue to monitor daily and adjust therapy as indicated.  Please contact the pharmacist at [phone] or [phone] for outpatient recommendations if needed.     Mohan Sullivan, PharmD  Clinical Pharmacist, Orthopedics and Med/Surg  [facility] ([phone])    
Pharmacist Note - Warfarin Dosing  Consult provided for this 58 y.o.male to manage warfarin for  AVR    INR Goal: 2 - 3 (followed by Dr. Rodriguez)    Home regimen/ tablet size: 5 mg Tues/Thurs; 7.5 mg all other days of the week    Drugs that may increase INR: Doxycycline (home med) - not reordered  Drugs that may decrease INR: None  Other current anticoagulants/ drugs that may increase bleeding risk: None  Risk factors: None  Daily INR ordered through: 5/27    Recent Labs     05/21/24  0620 05/21/24  1511 05/22/24  0340 05/22/24  0821 05/22/24  1808 05/23/24  0338   HGB 6.1*   < > 6.7* 6.9* 8.2* 7.5*   INR 2.8*  --  3.0*  --   --  2.8*    < > = values in this interval not displayed.     Date               INR                  Dose  5/20  2.3  7.5 mg  5/21  2.8        5 mg  5/22  3.0  HOLD - pending renal biopsy  5/23                 2.8                  Hold                                                                               Assessment/ Plan:  Coumadin is to be on hold pending renal biopsy.     Cardiology recommending INR <1.5 for biopsy, or when nephrology/IR thinks it's safe.     Pharmacy will continue to monitor daily and adjust therapy as indicated.  Please contact the pharmacist at [phone] or [phone] for outpatient recommendations if needed.             
Pharmacist Note - Warfarin Dosing  Consult provided for this 58 y.o.male to manage warfarin for  AVR    INR Goal: 2 - 3 (followed by Dr. Rodriguez)    Home regimen/ tablet size: 5 mg Tues/Thurs; 7.5 mg all other days of the week    Drugs that may increase INR: Doxycycline (home med) - not reordered  Drugs that may decrease INR: None  Other current anticoagulants/ drugs that may increase bleeding risk: None  Risk factors: None  Daily INR ordered through: 5/27    Recent Labs     05/22/24  0340 05/22/24  0821 05/22/24  1808 05/23/24  0338 05/24/24  0626   HGB 6.7*   < > 8.2* 7.5* 7.8*   INR 3.0*  --   --  2.8* 2.6*    < > = values in this interval not displayed.     Date               INR                  Dose  5/20  2.3  7.5 mg  5/21  2.8        5 mg  5/22  3.0  HOLD - pending renal biopsy  5/23                 2.8                  Hold  5/24                 2.6                  Continuing to hold                                                                             Assessment/ Plan:  Coumadin is to be on hold pending renal biopsy.     Cardiology recommending INR <1.5 for biopsy, or when nephrology/IR thinks it's safe.     Pharmacy will continue to monitor daily and adjust therapy as indicated.  Please contact the pharmacist at [phone] or [phone] for outpatient recommendations if needed.               
Pharmacist Note - Warfarin Dosing  Consult provided for this 58 y.o.male to manage warfarin for  AVR    INR Goal: 2 - 3 (followed by Dr. Rodriguez)    Home regimen/ tablet size: 5 mg Tues/Thurs; 7.5 mg all other days of the week    Drugs that may increase INR: Doxycycline (home med) - not reordered  Drugs that may decrease INR: None  Other current anticoagulants/ drugs that may increase bleeding risk: None  Risk factors: None  Daily INR ordered through: 5/27    Recent Labs     05/24/24  0626 05/25/24  0518 05/26/24  0715   HGB 7.8*  --  7.6*   INR 2.6* 2.5* 2.0*     Date               INR                  Dose  5/20  2.3  7.5 mg  5/21  2.8        5 mg  5/22  3.0  HOLD - pending renal biopsy  5/23                 2.8                  Hold  5/24                 2.6                  Continuing to hold       5/25                 2.5                  hold              5/26                 2                     hold                                                         Assessment/ Plan:  Coumadin is to be on hold pending renal biopsy.     Cardiology recommending INR <1.5 for biopsy.     Pharmacy will continue to monitor daily and adjust therapy as indicated.  Please contact the pharmacist at [phone] or [phone] for outpatient recommendations if needed.       
Pharmacist Note - Warfarin Dosing  Consult provided for this 58 y.o.male to manage warfarin for  AVR    INR Goal: 2 - 3 (followed by Dr. Rodriguez)    Home regimen/ tablet size: 5 mg Tues/Thurs; 7.5 mg all other days of the week    Drugs that may increase INR: Doxycycline (home med) - not reordered  Drugs that may decrease INR: None  Other current anticoagulants/ drugs that may increase bleeding risk: None  Risk factors: None  Daily INR ordered through: 5/27    Recent Labs     05/25/24  0518 05/26/24  0715 05/27/24  0705   HGB  --  7.6* 7.5*   INR 2.5* 2.0* 1.9*     Date               INR                  Dose  5/20  2.3  7.5 mg  5/21  2.8        5 mg  5/22  3.0  HOLD - pending renal biopsy  5/23                 2.8                  Hold  5/24                 2.6                  Continuing to hold       5/25                 2.5                  Hold              5/26                 2                     Hold     5/27                 1.9                  Hold                                                      Assessment/ Plan:  Coumadin is to be on hold pending renal biopsy.     Cardiology recommending INR <1.5 for biopsy.     Pharmacy will continue to monitor daily and adjust therapy as indicated.  Please contact the pharmacist at [phone] or [phone] for outpatient recommendations if needed.         
Pharmacist Note - Warfarin Dosing  Consult provided for this 58 y.o.male to manage warfarin for  AVR    INR Goal: 2 - 3 (followed by Dr. Rodriguez)    Home regimen/ tablet size: 5 mg Tues/Thurs; 7.5 mg all other days of the week    Drugs that may increase INR: Doxycycline (home med) - not reordered  Drugs that may decrease INR: None  Other current anticoagulants/ drugs that may increase bleeding risk: None  Risk factors: None  Daily INR ordered through: 5/27    Recent Labs     05/26/24  0715 05/27/24  0705 05/28/24  0530   HGB 7.6* 7.5* 7.3*   INR 2.0* 1.9* 2.1*     Date               INR                  Dose  5/20  2.3  7.5 mg  5/21  2.8        5 mg  5/22  3.0  HOLD - pending renal biopsy  5/23                 2.8                  Hold  5/24                 2.6                  Continuing to hold       5/25                 2.5                  Hold              5/26                 2                     Hold     5/27                 1.9                  Hold      5/28                 2.1                  5 mg                                                   Assessment/ Plan:  Resume warfarin today per NP.  Will order warfarin 5 mg x 1 dose.    Pharmacy will continue to monitor daily and adjust therapy as indicated.  Please contact the pharmacist at [phone] or [phone] for outpatient recommendations if needed.             
Pharmacist Note - Warfarin Dosing  Consult provided for this 58 y.o.male to manage warfarin for  AVR    INR Goal: 2 - 3 (followed by Dr. Rodriguez)    Home regimen/ tablet size: 5 mg Tues/Thurs; 7.5 mg all other days of the week    Drugs that may increase INR: Doxycycline (home med) - not reordered  Drugs that may decrease INR: None  Other current anticoagulants/ drugs that may increase bleeding risk: None  Risk factors: None  Daily INR ordered through: 5/27    Recent Labs     05/26/24  0715 05/27/24  0705 05/28/24  0530   HGB 7.6* 7.5* 7.3*   INR 2.0* 1.9* 2.1*     Date               INR                  Dose  5/20  2.3  7.5 mg  5/21  2.8        5 mg  5/22  3.0  HOLD - pending renal biopsy  5/23                 2.8                  Hold  5/24                 2.6                  Continuing to hold       5/25                 2.5                  Hold              5/26                 2                     Hold     5/27                 1.9                  Hold      5/28                 2.1                  Hold                                                   Assessment/ Plan:  Coumadin is to be on hold pending renal biopsy.     Cardiology recommending INR <1.5 for biopsy.     Pharmacy will continue to monitor daily and adjust therapy as indicated.  Please contact the pharmacist at [phone] or [phone] for outpatient recommendations if needed.           
Pharmacist Note - Warfarin Dosing  Consult provided for this 58 y.o.male to manage warfarin for  AVR    INR Goal: 2.5- 3.5 (followed by Dr. Rodriguez)    Home regimen/ tablet size: 5 mg Tues/Thurs; 7.5 mg all other days of the week    Drugs that may increase INR: Doxycycline (home med) - not reordered  Drugs that may decrease INR: None  Other current anticoagulants/ drugs that may increase bleeding risk: None  Risk factors: None  Daily INR ordered through: 5/27    Recent Labs     05/27/24  0705 05/28/24  0530 05/29/24  0615 05/29/24  0917   HGB 7.5* 7.3* 7.5*  --    INR 1.9* 2.1*  --  2.1*     Date               INR                  Dose  5/20  2.3  7.5 mg  5/21  2.8        5 mg  5/22  3.0  HOLD - pending renal biopsy  5/23                 2.8                  Hold  5/24                 2.6                  Continuing to hold       5/25                 2.5                  Hold              5/26                 2                     Hold     5/27                 1.9                  Hold      5/28                 2.1                  5 mg            5/29                 2.1                  7.5 mg                                         Assessment/ Plan:  Will order warfarin 7.5 mg x 1 dose.    Pharmacy will continue to monitor daily and adjust therapy as indicated.  Please contact the pharmacist at [phone] or [phone] for outpatient recommendations if needed.               
Pharmacist Note - Warfarin Dosing  Consult provided for this 58 y.o.male to manage warfarin for  AVR    INR Goal: 2.5- 3.5 (followed by Dr. Rodriguez)    Home regimen/ tablet size: 5 mg Tues/Thurs; 7.5 mg all other days of the week    Drugs that may increase INR: Doxycycline (home med) - not reordered  Drugs that may decrease INR: None  Other current anticoagulants/ drugs that may increase bleeding risk: None  Risk factors: None  Daily INR ordered through: 5/27    Recent Labs     05/28/24  0530 05/29/24  0615 05/29/24  0917 05/30/24  0452   HGB 7.3* 7.5*  --  7.2*   INR 2.1*  --  2.1* 2.1*     Date               INR                  Dose  5/20  2.3  7.5 mg  5/21  2.8        5 mg  5/22  3.0  HOLD - pending renal biopsy  5/23                 2.8                  Hold  5/24                 2.6                  Continuing to hold       5/25                 2.5                  Hold              5/26                 2                     Hold     5/27                 1.9                  Hold      5/28                 2.1                  5 mg            5/29                 2.1                  7.5 mg   5/30                 2.1                  7.5 mg                                        Assessment/ Plan:  Will order warfarin 7.5 mg x 1 dose.    Pharmacy will continue to monitor daily and adjust therapy as indicated.  Please contact the pharmacist at [phone] or [phone] for outpatient recommendations if needed.                 
Pharmacist Note - Warfarin Dosing  Consult provided for this 58 y.o.male to manage warfarin for Tissue Heart Valve    INR Goal: 2 - 3    Home regimen/ tablet size: 5 mg Tues/Thurs; 7.5 mg all other days of the week    Drugs that may increase INR: Doxycycline(home med)  Drugs that may decrease INR: None  Other current anticoagulants/ drugs that may increase bleeding risk: None  Risk factors: None  Daily INR ordered through: 5/27    Recent Labs     05/20/24  1322   HGB 8.4*   INR 2.3*     Date               INR                  Dose  5/20  2.3  7.5 mg                                                                               Assessment/ Plan:  Will order warfarin 7.5 mg PO x 1 dose.    Pharmacy will continue to monitor daily and adjust therapy as indicated.  Please contact the pharmacist at [phone] or [phone] for outpatient recommendations if needed.     Serge Bentley, RadhaD    
Pharmacist Note - Warfarin Dosing  Consult provided for this 58 y.o.male to manage warfarin for Tissue Heart Valve    INR Goal: 2 - 3    Home regimen/ tablet size: 5 mg Tues/Thurs; 7.5 mg all other days of the week    Drugs that may increase INR: Doxycycline(home med)  Drugs that may decrease INR: None  Other current anticoagulants/ drugs that may increase bleeding risk: None  Risk factors: None  Daily INR ordered through: 5/27    Recent Labs     05/20/24  1322 05/21/24  0620   HGB 8.4* 6.1*   INR 2.3* 2.8*     Date               INR                  Dose  5/20  2.3  7.5 mg  5/21  2.8  5 mg                                                                               Assessment/ Plan:  Will order warfarin 5 mg PO x 1 dose.    Pharmacy will continue to monitor daily and adjust therapy as indicated.  Please contact the pharmacist at [phone] or [phone] for outpatient recommendations if needed.     Mohan Sullivan, PharmD  Clinical Pharmacist, Orthopedics and Med/Surg  [facility] ([id number])    
Physical Therapy    Chart reviewed in prep for PT intervention, pt is currently off the unit for kidney biopsy, will continue to follow  CRISTA CHARLES PT    
Physical Therapy    Chart reviewed in prep for PT intervention. Pt with HgB of 6.9 this morning and to receive 1 unit of blood. Will defer PT at this time and continue to follow.   CRISTA CHARLES, PT    
RENAL  PROGRESS NOTE        Subjective:    Feels better  NPO    Objective:   VITALS SIGNS:    BP (!) 115/57   Pulse 69   Temp 97.7 °F (36.5 °C) (Oral)   Resp 17   Ht 1.956 m (6' 5\")   Wt (!) 151.3 kg (333 lb 8.9 oz)   SpO2 97%   BMI 39.55 kg/m²             Temp (24hrs), Av.4 °F (36.9 °C), Min:97.7 °F (36.5 °C), Max:99.7 °F (37.6 °C)         PHYSICAL EXAM:  NAD  Rt knee area covered    DATA REVIEW:     INTAKE / OUTPUT:   Last shift:      701 - 1900  In: 250 [P.O.:250]  Out: -   Last 3 shifts: 1901 -  0700  In: 3507.7 [I.V.:3174.2]  Out: 1200 [Urine:1200]    Intake/Output Summary (Last 24 hours) at 6/3/2024 0936  Last data filed at 6/3/2024 0834  Gross per 24 hour   Intake 583.5 ml   Output 1200 ml   Net -616.5 ml         LABS:   LABS:  Recent Labs     24  04324  0514 24  010    136 134*   K 3.7 3.8 3.8   * 110* 108   CO2 20* 22 21   BUN 22* 27* 30*   CREATININE 1.67* 2.00* 2.17*   CALCIUM 9.7 10.0 10.3*   PHOS 3.3 4.0 3.2   MG 1.4* 1.6 1.7     Recent Labs     24  0439 24  1555 24  0514 24  0102   WBC 11.1  --  11.2* 12.3*   HGB 7.3* 8.0* 6.8* 7.1*   HCT 23.2* 25.6* 21.1* 22.1*     --  306 298     No results for input(s): \"KU\", \"CLU\", \"CREAU\" in the last 720 hours.    Invalid input(s): \"MARK\", \"PROU\"      Assessment:   NIKKIE  CKD-3a   Proteinuria- 1.7 grams  Hematuria on recent dipstick UA-but urine microscopy only 0-2 RBC  Acute on chronic anemia  Hypokalemia  Hyponatremia- mild  Hypercalcemia: immobility? Sent off paraproteinemia w/u. Ca better  Hypocomplementemia at C4 complement    Borderline hypotension  Pre DM  RLE Cellulitis with wound around R knee  SOB  Hx of Mechanical AVR on coumadin-X valve    PLAN:    Creat better ,close to baseline  Patient had higher IgG4 ,ultra high RF,neg cryo,neg HepC,? Difficult to heal wound ....will need kidney bx;waiting for Inr(better at 1.4)  Asked IR if there are ok for bx;waiting 
RENAL  PROGRESS NOTE        Subjective:   Uneventful night  No macrohematuria    Objective:   VITALS SIGNS:    /61   Pulse 71   Temp 97.7 °F (36.5 °C) (Oral)   Resp 17   Ht 1.956 m (6' 5\")   Wt (!) 148.8 kg (328 lb)   SpO2 100%   BMI 38.90 kg/m²             Temp (24hrs), Av °F (36.7 °C), Min:97.7 °F (36.5 °C), Max:98.4 °F (36.9 °C)         PHYSICAL EXAM:  NAD  Rt knee area covered    DATA REVIEW:     INTAKE / OUTPUT:   Last shift:      No intake/output data recorded.  Last 3 shifts: 1901 -  0700  In: 250 [P.O.:250]  Out: 2400 [Urine:2400]    Intake/Output Summary (Last 24 hours) at 2024 0853  Last data filed at 2024 0322  Gross per 24 hour   Intake --   Output 1200 ml   Net -1200 ml           LABS:   LABS:  Recent Labs     24  0522 24  0439 24  0514 24  0102    136 136 134*   K 3.6 3.7 3.8 3.8   * 109* 110* 108   CO2 22 20* 22 21   BUN 21* 22* 27* 30*   CREATININE 1.67* 1.67* 2.00* 2.17*   CALCIUM 10.3* 9.7 10.0 10.3*   PHOS  --  3.3 4.0 3.2   MG  --  1.4* 1.6 1.7       Recent Labs     24  0522 24  1902 24  0439 24  1555 24  0514   WBC  --  12.3* 11.1  --  11.2*   HGB 7.5* 8.1* 7.3*   < > 6.8*   HCT 22.9* 25.2* 23.2*   < > 21.1*   PLT  --  338 302  --  306    < > = values in this interval not displayed.       No results for input(s): \"KU\", \"CLU\", \"CREAU\" in the last 720 hours.    Invalid input(s): \"MARK\", \"PROU\"      Assessment:   NIKKIE  CKD-3a   Proteinuria- 1.7 grams  Hematuria on recent dipstick UA-but urine microscopy only 0-2 RBC  Acute on chronic anemia  Hypokalemia  Hyponatremia- mild  Hypercalcemia: immobility? Sent off paraproteinemia w/u. Ca better  Hypocomplementemia at C4 complement    Borderline hypotension  Pre DM  RLE Cellulitis with wound around R knee  SOB  Hx of Mechanical AVR on coumadin-X valve    PLAN:    Creat better ,close to baseline  Patient had higher IgG4 ,ultra high RF,neg cryo,neg HepC,? 
Renal Dosing/Monitoring  Medication: Crestor   Current regimen:  40 every 24 hr  Recent Labs     05/20/24  1322 05/21/24  0620   CREATININE 5.33* 5.45*   BUN 51* 55*     Estimated CrCl:  24 ml/min  Plan: Change to 10 mg for CrCl  with respect to renal status (CrCl <30 mL/min) per [facility]/[employer]-approved Renal Dosing Adjustment protocol.  Pharmacy will continue to monitor this patient daily for changes in clinical status and renal function.    Mohan Sullivan, PharmD  Clinical Pharmacist, Orthopedics and Med/Surg  [facility] ([phone])    
Spiritual Care Partner Volunteer visited patient at [facility] in [facility] ORTHO JOINT on 5/21/2024    Chaplain Narinder, MDiv, [facility]  [facility]  Paging service: [phone] (DIRK)   
TRANSFER - IN REPORT:    Verbal report received from BEENA Gibson on Javier Ugalde  [facility]      Report consisted of patient's Situation, Background, Assessment and   Recommendations(SBAR).     Information from the following report(s) Nurse Handoff Report was reviewed with the receiving nurse.    Opportunity for questions and clarification was provided.      Assessment completed upon patient's arrival to unit and care assumed.     Pt placed in transportation.      1240:  Lisandra Mccauley PA-C in with pt at bedside obtaining consent; DNR suspended for procedure.    1335:  Procedure completed and pt brought back to [facility] for recovery.  Pt tolerated procedure well.    1415:  TRANSFER - OUT REPORT:    Verbal report given to BEENA Young on Javier Ugalde  being [facility] for routine post-op       Report consisted of patient's Situation, Background, Assessment and   Recommendations(SBAR).     Information from the following report(s) Nurse Handoff Report was reviewed with the receiving nurse.    1440:  Dr. Crowe, Nephrologist, in with pt at bedside.  Physician requesting pt remain NPO with sips of ginger ale/ice water only until 1900.  Pt to remain on bedrest until 1930.         Lines:   Peripheral IV 05/22/24 Left Hand (Active)   Site Assessment Clean, dry & intact 06/03/24 0834   Line Status Infusing 06/03/24 0834   Line Care Connections checked and tightened 06/03/24 0834   Phlebitis Assessment No symptoms 06/03/24 0834   Infiltration Assessment 0 06/03/24 0834   Alcohol Cap Used Yes 06/03/24 0834   Dressing Status Clean, dry & intact 06/03/24 0834   Dressing Type Transparent 06/03/24 0834   Dressing Intervention New 05/22/24 0428        Opportunity for questions and clarification was provided. Will transport pt back to room post completion of VS completion per protocol.     1510:  VSS. Pt placed in transportation back to room.  Pt denies any pain at present time.  
Unable to weigh pt. this am due to symptomatic with low BP.  
Vascular noninvasive studies were reviewed.    Based on these blood flow should be adequate for wound healing and there is no role for any reperfusion efforts by vascular surgery.     Thank you very much for allowing me to participate in the care of this patient. If there are any questions or concerns please do not hesitate to call or re-consult.    Office number: [phone]    Vascular surgery will sign-off at this time.    
    Subjective:  No events overnight    ROS:   No chest pain    Exam:  BP (!) 95/51   Pulse 67   Temp 98.2 °F (36.8 °C) (Oral)   Resp 16   Ht 1.956 m (6' 5\")   Wt (!) 143 kg (315 lb 4.1 oz)   SpO2 96%   BMI 37.38 kg/m²     WB/WN in NAD  Clear  RRR, click S2  +RLE Edema. Wound over R lateral knee area  AOx3    Labs/Data:    Lab Results   Component Value Date    WBC 15.0 (H) 05/28/2024    HGB 7.3 (L) 05/28/2024    HCT 22.1 (L) 05/28/2024    MCV 83.1 05/28/2024     05/28/2024       Lab Results   Component Value Date/Time     05/28/2024 05:30 AM    K 3.6 05/28/2024 05:30 AM     05/28/2024 05:30 AM    CO2 22 05/28/2024 05:30 AM    BUN 40 05/28/2024 05:30 AM    CREATININE 3.19 05/28/2024 05:30 AM    GLUCOSE 96 05/28/2024 05:30 AM    GLUCOSE 121 05/15/2024 03:56 PM    CALCIUM 10.1 05/28/2024 05:30 AM    LABGLOM 22 05/28/2024 05:30 AM    LABGLOM 28 04/06/2024 09:04 AM    LABGLOM 42 12/01/2023 09:42 AM        Wt Readings from Last 3 Encounters:   05/28/24 (!) 143 kg (315 lb 4.1 oz)   05/20/24 (!) 147 kg (324 lb)   05/15/24 (!) 147 kg (324 lb)         Intake/Output Summary (Last 24 hours) at 5/28/2024 1059  Last data filed at 5/28/2024 1027  Gross per 24 hour   Intake 1800 ml   Output 3100 ml   Net -1300 ml         Patient seen and examined. Chart reviewed. Labs, data and other pertinent notes reviewed in last 24 hrs.    PMH/SH/FH reviewed and unchanged compared to H&P    Tomás Painter MD  [facility]         
mild  - Nephrology following, appreciate recommendations  - For renal biopsy when INR less than 1.5, plan for bx on Monday 6/3  - Avoiding nephrotoxins  - Creatinine was 5.33 at presentation, it looks like baseline is in the 3 range    Hypokalemia - resolved  Hypercalcemia - resolved, s/p calcitonin, however still > 11.3 when corrected    Hypotension on chronic HTN  - Holding metoprolol  - Continuing midodrine    Anemia of chronic disease  - 5/22 received 1 unit packed red cells for hemoglobin of 6.9  - hgb stable  - Has received 3 doses of iron sucrose  - Continuing folic acid and will consider further transfusion for hemoglobin less than 7     Elevated d-dimer  - No DVT on venous duplex  - VQ negative for PE    S/p mechanical AVR (March 2022 by Dr. Verma)  Hx of CHB, s/p PPM  Episode of V. Tach on 5/26  P Afib  - coumadin on hold for renal bx, pt has ax X valve that does not require bridging  - Follows with Dr. Rodriguez  - Rapid response on 5/26 with reports of V. Tach, cardiology reconsulted  - device interrogation no arrhthymias detected, no afib   - needs BiV ICD upgrade in the future   - needs life vest per cardiology, this has been ordered by Dr. Rabago on 5/29  - 5/30 pt started on amiodarone, on discharge coumadin will need to be adjusted as amiodarone will increase INR. He will need to follow closely with [facility].   - 6/1 life vest in place     T2DM  - A1c 5.7  - c/w ssi  - does not appear to be on any med's since 2023 when following with Dr. Muller  - consulted DM     HLD - c/w pravastatin     Constipation - c/w Bowel regimen     Arthritis  Chronic pain  - PM reviewed, not on chronic opiates  - prn tramadol     Obesity, Body mass index is 38.61 kg/m².   - BMI of 37  - Counseled on healthy dietary choices        Code status: DNR  Prophylaxis: SCD  Care Plan discussed with: Patient, nurse, attending  Anticipated Disposition: TBD     Principal Problem:    Cellulitis of right lower extremity  Active 
(L) 4.10 - 5.70 M/uL    Hemoglobin 7.1 (L) 12.1 - 17.0 g/dL    Hematocrit 22.1 (L) 36.6 - 50.3 %    MCV 84.4 80.0 - 99.0 FL    MCH 27.1 26.0 - 34.0 PG    MCHC 32.1 30.0 - 36.5 g/dL    RDW 15.9 (H) 11.5 - 14.5 %    Platelets 298 150 - 400 K/uL    MPV 8.3 (L) 8.9 - 12.9 FL    Nucleated RBCs 0.0 0  WBC    nRBC 0.00 0.00 - 0.01 K/uL   Comprehensive Metabolic Panel    Collection Time: 06/01/24  1:02 AM   Result Value Ref Range    Sodium 134 (L) 136 - 145 mmol/L    Potassium 3.8 3.5 - 5.1 mmol/L    Chloride 108 97 - 108 mmol/L    CO2 21 21 - 32 mmol/L    Anion Gap 5 5 - 15 mmol/L    Glucose 97 65 - 100 mg/dL    BUN 30 (H) 6 - 20 MG/DL    Creatinine 2.17 (H) 0.70 - 1.30 MG/DL    BUN/Creatinine Ratio 14 12 - 20      Est, Glom Filt Rate 35 (L) >60 ml/min/1.73m2    Calcium 10.3 (H) 8.5 - 10.1 MG/DL    Total Bilirubin 0.6 0.2 - 1.0 MG/DL    ALT 40 12 - 78 U/L    AST 43 (H) 15 - 37 U/L    Alk Phosphatase 72 45 - 117 U/L    Total Protein 6.9 6.4 - 8.2 g/dL    Albumin 2.1 (L) 3.5 - 5.0 g/dL    Globulin 4.8 (H) 2.0 - 4.0 g/dL    Albumin/Globulin Ratio 0.4 (L) 1.1 - 2.2     Magnesium    Collection Time: 06/01/24  1:02 AM   Result Value Ref Range    Magnesium 1.7 1.6 - 2.4 mg/dL   POCT Glucose    Collection Time: 06/01/24  7:30 AM   Result Value Ref Range    POC Glucose 97 65 - 117 mg/dL    Performed by: FAYE Lewis    POCT Glucose    Collection Time: 06/01/24 11:47 AM   Result Value Ref Range    POC Glucose 93 65 - 117 mg/dL    Performed by: Declan Reynolds (CON)           Intake/Output Summary (Last 24 hours) at 6/1/2024 1357  Last data filed at 6/1/2024 0730  Gross per 24 hour   Intake 1200 ml   Output 2825 ml   Net -1625 ml          Data Review:   Recent Labs     06/01/24  0102   *   K 3.8   BUN 30*   CREATININE 2.17*   WBC 12.3*   HGB 7.1*   HCT 22.1*      INR 1.7*       No current facility-administered medications on file prior to encounter.     Current Outpatient Medications on File Prior to Encounter 
306 150 - 400 K/uL    MPV 8.8 (L) 8.9 - 12.9 FL    Nucleated RBCs 0.0 0  WBC    nRBC 0.00 0.00 - 0.01 K/uL   Comprehensive Metabolic Panel    Collection Time: 06/02/24  5:14 AM   Result Value Ref Range    Sodium 136 136 - 145 mmol/L    Potassium 3.8 3.5 - 5.1 mmol/L    Chloride 110 (H) 97 - 108 mmol/L    CO2 22 21 - 32 mmol/L    Anion Gap 4 (L) 5 - 15 mmol/L    Glucose 96 65 - 100 mg/dL    BUN 27 (H) 6 - 20 MG/DL    Creatinine 2.00 (H) 0.70 - 1.30 MG/DL    BUN/Creatinine Ratio 14 12 - 20      Est, Glom Filt Rate 38 (L) >60 ml/min/1.73m2    Calcium 10.0 8.5 - 10.1 MG/DL    Total Bilirubin 0.7 0.2 - 1.0 MG/DL    ALT 45 12 - 78 U/L    AST 45 (H) 15 - 37 U/L    Alk Phosphatase 70 45 - 117 U/L    Total Protein 6.2 (L) 6.4 - 8.2 g/dL    Albumin 2.2 (L) 3.5 - 5.0 g/dL    Globulin 4.0 2.0 - 4.0 g/dL    Albumin/Globulin Ratio 0.6 (L) 1.1 - 2.2     Magnesium    Collection Time: 06/02/24  5:14 AM   Result Value Ref Range    Magnesium 1.6 1.6 - 2.4 mg/dL   POCT Glucose    Collection Time: 06/02/24  5:29 AM   Result Value Ref Range    POC Glucose 105 65 - 117 mg/dL    Performed by: CHANDRAKANT Johnson    Protime-INR    Collection Time: 06/02/24  5:36 AM   Result Value Ref Range    INR 1.5 (H) 0.9 - 1.1      Protime 15.2 (H) 9.0 - 11.1 sec   PREPARE RBC (CROSSMATCH), 1 Units    Collection Time: 06/02/24  8:15 AM   Result Value Ref Range    History Check Historical check performed    TYPE AND SCREEN    Collection Time: 06/02/24  9:52 AM   Result Value Ref Range    Crossmatch expiration date 06/05/2024,1280     ABO/Rh B NEGATIVE     Antibody Screen NEG     Unit Number [id number]     Product Code Blood Bank RC LR,2     Unit Divison 00     Dispense Status Blood Bank ISSUED     Unit Issue Date/Time [id number]     Product Code Blood Bank [id number]     Blood Bank Unit Type and Rh B NEG     Blood Bank ISBT Product Blood Type 1700     Blood Bank Blood Product Expiration Date 121628366348     Crossmatch Result Compatible    POCT 
below:          Constitutional:  No acute distress, cooperative, pleasant    ENT:  Oral mucosa moist, oropharynx benign.    Resp:  CTA bilaterally. No wheezing/rhonchi/rales. No accessory muscle use.    CV:  Regular rhythm, normal rate, no murmurs, gallops, rubs    GI:  Soft, non distended, non tender. normoactive bowel sounds    Musculoskeletal:  RLE edematous from the knee distally, non-pitting, bandage on the lateral aspect of the LLE is C/D/I, warm, 2+ pulses throughout    Neurologic:  Moves all extremities.  AAOx3, CN II-XII grossly intact            Data Review:    Review and/or order of clinical lab test  Review and/or order of tests in the radiology section of CPT  Review and/or order of tests in the medicine section of CPT      Labs:     Recent Labs     05/23/24 0338 05/24/24  0626   WBC 14.6* 17.3*   HGB 7.5* 7.8*   HCT 22.1* 24.1*    373       Recent Labs     05/23/24 0338 05/24/24  0626   * 133*   K 3.5 3.7    105   CO2 20* 19*   BUN 52* 47*   MG 2.2  --    PHOS 5.0*  --        No results for input(s): \"ALT\", \"TP\", \"GLOB\", \"GGT\" in the last 72 hours.    Invalid input(s): \"SGOT\", \"GPT\", \"AP\", \"TBIL\", \"TBILI\", \"ALB\", \"AML\", \"AMYP\", \"LPSE\", \"HLPSE\"  Recent Labs     05/23/24 0338 05/24/24  0626 05/25/24  0518   INR 2.8* 2.6* 2.5*        No results for input(s): \"TIBC\" in the last 72 hours.    Invalid input(s): \"FE\", \"PSAT\", \"FERR\"     No results found for: \"RBCF\"   No results for input(s): \"PH\", \"PCO2\", \"PO2\" in the last 72 hours.  No results for input(s): \"CPK\" in the last 72 hours.    Invalid input(s): \"CPKMB\", \"CKNDX\", \"TROIQ\"  Lab Results   Component Value Date/Time    CHOL 303 12/01/2023 09:42 AM    CHOL 346 06/03/2022 09:12 AM    HDL 45 12/01/2023 09:42 AM     12/01/2023 09:42 AM     No results found for: \"GLUCPOC\"  [unfilled]      Medications Reviewed:     Current Facility-Administered Medications   Medication Dose Route Frequency    HOLD warfarin 5/25   Other Once    
37*   MG 1.9 1.9 2.0   PHOS 4.8* 4.5 3.7       Recent Labs     05/27/24  0705 05/28/24  0530 05/29/24  0615   ALT 23 24 30   GLOB 5.4* 4.3* 5.1*       Recent Labs     05/27/24  0705 05/28/24  0530 05/29/24  0917   INR 1.9* 2.1* 2.1*        No results for input(s): \"TIBC\" in the last 72 hours.    Invalid input(s): \"FE\", \"PSAT\", \"FERR\"   No results found for: \"RBCF\"   No results for input(s): \"PH\", \"PCO2\", \"PO2\" in the last 72 hours.  No results for input(s): \"CPK\" in the last 72 hours.    Invalid input(s): \"CPKMB\", \"CKNDX\", \"TROIQ\"  Lab Results   Component Value Date/Time    CHOL 303 12/01/2023 09:42 AM    CHOL 346 06/03/2022 09:12 AM    HDL 45 12/01/2023 09:42 AM     12/01/2023 09:42 AM     No results found for: \"GLUCPOC\"        Medications Reviewed:     Current Facility-Administered Medications   Medication Dose Route Frequency    0.9 % sodium chloride infusion   IntraVENous Continuous    warfarin (COUMADIN) tablet 7.5 mg  7.5 mg Oral Once    bacitracin ointment   Topical Daily    sennosides (SENOKOT) 8.8 MG/5ML syrup 15 mL  15 mL Oral Daily    sennosides (SENOKOT) 8.8 MG/5ML syrup 5 mL  5 mL Oral Once    midodrine (PROAMATINE) tablet 5 mg  5 mg Oral TID WC    folic acid (FOLVITE) tablet 1 mg  1 mg Oral Daily    0.9 % sodium chloride infusion   IntraVENous PRN    insulin lispro (HUMALOG,ADMELOG) injection vial 0-4 Units  0-4 Units SubCUTAneous TID WC    insulin lispro (HUMALOG,ADMELOG) injection vial 0-4 Units  0-4 Units SubCUTAneous Nightly    glucose chewable tablet 16 g  4 tablet Oral PRN    dextrose bolus 10% 125 mL  125 mL IntraVENous PRN    Or    dextrose bolus 10% 250 mL  250 mL IntraVENous PRN    glucagon injection 1 mg  1 mg SubCUTAneous PRN    dextrose 10 % infusion   IntraVENous Continuous PRN    ammonium lactate (LAC-HYDRIN) 12 % lotion   Topical Q12H    traMADol (ULTRAM) tablet 25 mg  25 mg Oral Q6H PRN    rosuvastatin (CRESTOR) tablet 10 mg  10 mg Oral Daily    ondansetron (ZOFRAN) injection 4 
sennosides (SENOKOT) 8.8 MG/5ML syrup 15 mL  15 mL Oral Daily    sennosides (SENOKOT) 8.8 MG/5ML syrup 5 mL  5 mL Oral Once    midodrine (PROAMATINE) tablet 5 mg  5 mg Oral TID     folic acid (FOLVITE) tablet 1 mg  1 mg Oral Daily    0.9 % sodium chloride infusion   IntraVENous PRN    insulin lispro (HUMALOG,ADMELOG) injection vial 0-4 Units  0-4 Units SubCUTAneous TID WC    insulin lispro (HUMALOG,ADMELOG) injection vial 0-4 Units  0-4 Units SubCUTAneous Nightly    glucose chewable tablet 16 g  4 tablet Oral PRN    dextrose bolus 10% 125 mL  125 mL IntraVENous PRN    Or    dextrose bolus 10% 250 mL  250 mL IntraVENous PRN    glucagon injection 1 mg  1 mg SubCUTAneous PRN    dextrose 10 % infusion   IntraVENous Continuous PRN    ammonium lactate (LAC-HYDRIN) 12 % lotion   Topical Q12H    traMADol (ULTRAM) tablet 25 mg  25 mg Oral Q6H PRN    rosuvastatin (CRESTOR) tablet 10 mg  10 mg Oral Daily    ondansetron (ZOFRAN) injection 4 mg  4 mg IntraVENous Q6H PRN    sodium chloride flush 0.9 % injection 5-40 mL  5-40 mL IntraVENous 2 times per day    sodium chloride flush 0.9 % injection 5-40 mL  5-40 mL IntraVENous PRN    0.9 % sodium chloride infusion   IntraVENous PRN    polyethylene glycol (GLYCOLAX) packet 17 g  17 g Oral Daily PRN    acetaminophen (TYLENOL) tablet 650 mg  650 mg Oral Q6H PRN    Or    acetaminophen (TYLENOL) suppository 650 mg  650 mg Rectal Q6H PRN    warfarin placeholder: dosing by pharmacy   Other RX Placeholder    albuterol (PROVENTIL) (2.5 MG/3ML) 0.083% nebulizer solution 2.5 mg  2.5 mg Nebulization Q6H PRN    [Held by provider] metoprolol succinate (TOPROL XL) extended release tablet 50 mg  50 mg Oral Daily    pantoprazole (PROTONIX) tablet 40 mg  40 mg Oral QAM AC     ______________________________________________________________________  EXPECTED LENGTH OF STAY: 9  ACTUAL LENGTH OF STAY:          6                 David M Milligram, PA-C     
(HUMALOG,ADMELOG) injection vial 0-4 Units  0-4 Units SubCUTAneous Nightly    glucose chewable tablet 16 g  4 tablet Oral PRN    dextrose bolus 10% 125 mL  125 mL IntraVENous PRN    Or    dextrose bolus 10% 250 mL  250 mL IntraVENous PRN    glucagon injection 1 mg  1 mg SubCUTAneous PRN    dextrose 10 % infusion   IntraVENous Continuous PRN    ammonium lactate (LAC-HYDRIN) 12 % lotion   Topical Q12H    traMADol (ULTRAM) tablet 25 mg  25 mg Oral Q6H PRN    rosuvastatin (CRESTOR) tablet 10 mg  10 mg Oral Daily    ondansetron (ZOFRAN) injection 4 mg  4 mg IntraVENous Q6H PRN    sodium chloride flush 0.9 % injection 5-40 mL  5-40 mL IntraVENous 2 times per day    sodium chloride flush 0.9 % injection 5-40 mL  5-40 mL IntraVENous PRN    0.9 % sodium chloride infusion   IntraVENous PRN    acetaminophen (TYLENOL) tablet 650 mg  650 mg Oral Q6H PRN    Or    acetaminophen (TYLENOL) suppository 650 mg  650 mg Rectal Q6H PRN    albuterol (PROVENTIL) (2.5 MG/3ML) 0.083% nebulizer solution 2.5 mg  2.5 mg Nebulization Q6H PRN    [Held by provider] metoprolol succinate (TOPROL XL) extended release tablet 50 mg  50 mg Oral Daily    pantoprazole (PROTONIX) tablet 40 mg  40 mg Oral QAM AC     ______________________________________________________________________  EXPECTED LENGTH OF STAY: Unable to retrieve estimated LOS  ACTUAL LENGTH OF STAY:          14                 Nida Isabel, JIM - NP     
10 mg  10 mg Oral Daily    ondansetron (ZOFRAN) injection 4 mg  4 mg IntraVENous Q6H PRN    sodium chloride flush 0.9 % injection 5-40 mL  5-40 mL IntraVENous 2 times per day    sodium chloride flush 0.9 % injection 5-40 mL  5-40 mL IntraVENous PRN    0.9 % sodium chloride infusion   IntraVENous PRN    polyethylene glycol (GLYCOLAX) packet 17 g  17 g Oral Daily PRN    acetaminophen (TYLENOL) tablet 650 mg  650 mg Oral Q6H PRN    Or    acetaminophen (TYLENOL) suppository 650 mg  650 mg Rectal Q6H PRN    albuterol (PROVENTIL) (2.5 MG/3ML) 0.083% nebulizer solution 2.5 mg  2.5 mg Nebulization Q6H PRN    [Held by provider] metoprolol succinate (TOPROL XL) extended release tablet 50 mg  50 mg Oral Daily    pantoprazole (PROTONIX) tablet 40 mg  40 mg Oral [facility]     ______________________________________________________________________  EXPECTED LENGTH OF STAY: Unable to retrieve estimated LOS  ACTUAL LENGTH OF STAY:          12                 JIM Mcmanus NP     
40 mg Oral QAM AC    rosuvastatin (CRESTOR) tablet 40 mg  40 mg Oral Daily     ______________________________________________________________________  EXPECTED LENGTH OF STAY: Unable to retrieve estimated LOS  ACTUAL LENGTH OF STAY:          1                 JIM Jasso NP     
Value Ref Range    Ventricular Rate 81 BPM    Atrial Rate 81 BPM    P-R Interval 196 ms    QRS Duration 150 ms    Q-T Interval 444 ms    QTc Calculation (Bazett) 515 ms    P Axis 34 degrees    R Axis -42 degrees    T Axis 129 degrees    Diagnosis       Normal sinus rhythm  Left axis deviation  Right bundle branch block  Left ventricular hypertrophy with QRS widening and repolarization abnormality  When compared with ECG of 20-MAY-2024 14:51,  Sinus rhythm has replaced Electronic ventricular pacemaker                   
dose today 5/22.   Other Once    sennosides (SENOKOT) 8.8 MG/5ML syrup 10 mL  10 mL Oral Daily    0.9 % sodium chloride infusion   IntraVENous PRN    insulin lispro (HUMALOG,ADMELOG) injection vial 0-4 Units  0-4 Units SubCUTAneous TID WC    insulin lispro (HUMALOG,ADMELOG) injection vial 0-4 Units  0-4 Units SubCUTAneous Nightly    glucose chewable tablet 16 g  4 tablet Oral PRN    dextrose bolus 10% 125 mL  125 mL IntraVENous PRN    Or    dextrose bolus 10% 250 mL  250 mL IntraVENous PRN    glucagon injection 1 mg  1 mg SubCUTAneous PRN    dextrose 10 % infusion   IntraVENous Continuous PRN    ammonium lactate (LAC-HYDRIN) 12 % lotion   Topical Q12H    traMADol (ULTRAM) tablet 25 mg  25 mg Oral Q6H PRN    rosuvastatin (CRESTOR) tablet 10 mg  10 mg Oral Daily    ondansetron (ZOFRAN) injection 4 mg  4 mg IntraVENous Q6H PRN    sodium chloride flush 0.9 % injection 5-40 mL  5-40 mL IntraVENous 2 times per day    sodium chloride flush 0.9 % injection 5-40 mL  5-40 mL IntraVENous PRN    0.9 % sodium chloride infusion   IntraVENous PRN    polyethylene glycol (GLYCOLAX) packet 17 g  17 g Oral Daily PRN    acetaminophen (TYLENOL) tablet 650 mg  650 mg Oral Q6H PRN    Or    acetaminophen (TYLENOL) suppository 650 mg  650 mg Rectal Q6H PRN    cefepime (MAXIPIME) 1,000 mg in sodium chloride 0.9 % 50 mL IVPB (mini-bag)  1,000 mg IntraVENous Q24H    warfarin placeholder: dosing by pharmacy   Other RX Placeholder    Vancomycin - Pharmacy to Dose   Other RX Placeholder    albuterol (PROVENTIL) (2.5 MG/3ML) 0.083% nebulizer solution 2.5 mg  2.5 mg Nebulization Q6H PRN    [Held by provider] metoprolol succinate (TOPROL XL) extended release tablet 50 mg  50 mg Oral Daily    pantoprazole (PROTONIX) tablet 40 mg  40 mg Oral QAM AC     ______________________________________________________________________  EXPECTED LENGTH OF STAY: Unable to retrieve estimated LOS  ACTUAL LENGTH OF STAY:          2                 JIM Jasso - 
rosuvastatin (CRESTOR) tablet 10 mg  10 mg Oral Daily    ondansetron (ZOFRAN) injection 4 mg  4 mg IntraVENous Q6H PRN    sodium chloride flush 0.9 % injection 5-40 mL  5-40 mL IntraVENous 2 times per day    sodium chloride flush 0.9 % injection 5-40 mL  5-40 mL IntraVENous PRN    0.9 % sodium chloride infusion   IntraVENous PRN    polyethylene glycol (GLYCOLAX) packet 17 g  17 g Oral Daily PRN    acetaminophen (TYLENOL) tablet 650 mg  650 mg Oral Q6H PRN    Or    acetaminophen (TYLENOL) suppository 650 mg  650 mg Rectal Q6H PRN    albuterol (PROVENTIL) (2.5 MG/3ML) 0.083% nebulizer solution 2.5 mg  2.5 mg Nebulization Q6H PRN    [Held by provider] metoprolol succinate (TOPROL XL) extended release tablet 50 mg  50 mg Oral Daily    pantoprazole (PROTONIX) tablet 40 mg  40 mg Oral [facility]     ______________________________________________________________________  EXPECTED LENGTH OF STAY: Unable to retrieve estimated LOS  ACTUAL LENGTH OF STAY:          11                 Yaquelin Dee PA-C

## 2024-06-04 NOTE — PLAN OF CARE
Problem: Physical Therapy - Adult  Goal: By Discharge: Performs mobility at highest level of function for planned discharge setting.  See evaluation for individualized goals.  Description: FUNCTIONAL STATUS PRIOR TO ADMISSION: Patient was modified independent using a single point cane for at least 2 years and for the last few week using a rolling walker for functional mobility.    HOME SUPPORT PRIOR TO ADMISSION: The patient lived alone and did not require assistance.    Physical Therapy Goals  Initiated 5/21/2024  1.  Patient will move from supine to sit and sit to supine in bed with modified independence within 7 day(s).    2.  Patient will perform sit to stand with modified independence within 7 day(s).  3.  Patient will transfer from bed to chair and chair to bed with modified independence using the least restrictive device within 7 day(s).  4.  Patient will ambulate with modified independence for 150 feet with the least restrictive device within 7 day(s).   5.  Patient will ascend/descend 4 stairs with 2 handrail(s) with modified independence within 7 day(s).   Outcome: Progressing    PHYSICAL THERAPY TREATMENT    Patient: Javier Ugalde (58 y.o. male)  Date: 5/28/2024  Diagnosis: Cellulitis of right lower extremity [L03.115]  Acute renal failure superimposed on chronic kidney disease, unspecified acute renal failure type, unspecified CKD stage (HCC) [N17.9, N18.9] Cellulitis of right lower extremity      Precautions: Fall Risk                      ASSESSMENT:  Patient continues to benefit from skilled PT services and is progressing towards goals. Pt agreeable to attempt stairs as pt is planning to discharge home tomorrow.  Pt requires repeated cues to stand more erect vs. flexed forward at hips and to stay closer to RW.  Pt performed stairs with CGA with moderate effort but safe - no loss of balance.  Recommend HHPT and increased assistance from family - sister to be staying with patient at discharge.     
  Problem: Physical Therapy - Adult  Goal: By Discharge: Performs mobility at highest level of function for planned discharge setting.  See evaluation for individualized goals.  Description: FUNCTIONAL STATUS PRIOR TO ADMISSION: Patient was modified independent using a single point cane for at least 2 years and for the last few week using a rolling walker for functional mobility.    HOME SUPPORT PRIOR TO ADMISSION: The patient lived alone and did not require assistance.    Physical Therapy Goals  Initiated 5/21/2024  1.  Patient will move from supine to sit and sit to supine in bed with modified independence within 7 day(s).    2.  Patient will perform sit to stand with modified independence within 7 day(s).  3.  Patient will transfer from bed to chair and chair to bed with modified independence using the least restrictive device within 7 day(s).  4.  Patient will ambulate with modified independence for 150 feet with the least restrictive device within 7 day(s).   5.  Patient will ascend/descend 4 stairs with 2 handrail(s) with modified independence within 7 day(s).   Outcome: Progressing   PHYSICAL THERAPY EVALUATION    Patient: Javier Ugalde (58 y.o. male)  Date: 5/21/2024  Primary Diagnosis: Cellulitis of right lower extremity [L03.115]  Acute renal failure superimposed on chronic kidney disease, unspecified acute renal failure type, unspecified CKD stage (HCC) [N17.9, N18.9]       Precautions: Restrictions/Precautions: Fall Risk                      ASSESSMENT :   DEFICITS/IMPAIRMENTS:   The patient is limited by decreased functional mobility, ROM, strength, activity tolerance, endurance, balance, RLE wound with drainage, edema, pain, S/P admission for cellulitis RLE and acute renal failure on chronic renal failure.  Pt limited with activity due to SOB and HR 130s with minimal activity.     PLOF:  Pt lives alone and reports he has used a SPC for the last 2 years.  He has been using a rolling walker for the last 
  Problem: Physical Therapy - Adult  Goal: By Discharge: Performs mobility at highest level of function for planned discharge setting.  See evaluation for individualized goals.  Description: FUNCTIONAL STATUS PRIOR TO ADMISSION: Patient was modified independent using a single point cane for at least 2 years and for the last few week using a rolling walker for functional mobility.    HOME SUPPORT PRIOR TO ADMISSION: The patient lived alone and did not require assistance.    Physical Therapy Goals  Initiated 5/21/2024  1.  Patient will move from supine to sit and sit to supine in bed with modified independence within 7 day(s).    2.  Patient will perform sit to stand with modified independence within 7 day(s).  3.  Patient will transfer from bed to chair and chair to bed with modified independence using the least restrictive device within 7 day(s).  4.  Patient will ambulate with modified independence for 150 feet with the least restrictive device within 7 day(s).   5.  Patient will ascend/descend 4 stairs with 2 handrail(s) with modified independence within 7 day(s).   Outcome: Progressing   PHYSICAL THERAPY TREATMENT    Patient: Javier Ugalde (58 y.o. male)  Date: 5/23/2024  Diagnosis: Cellulitis of right lower extremity [L03.115]  Acute renal failure superimposed on chronic kidney disease, unspecified acute renal failure type, unspecified CKD stage (HCC) [N17.9, N18.9] Cellulitis of right lower extremity      Precautions: Fall Risk                      ASSESSMENT:  Patient continues to benefit from skilled PT services and is progressing towards goals. Patient presented in chair and returned to chair. Patient ambulated with RW 75 ft, antalgic but steady, slightly slow. A little lightheadedness with initial standing, but quickly resolved. No signs or symptoms of orthostatic hypotension.          PLAN:  Patient continues to benefit from skilled intervention to address the above impairments.  Continue treatment per 
  Problem: Physical Therapy - Adult  Goal: By Discharge: Performs mobility at highest level of function for planned discharge setting.  See evaluation for individualized goals.  Description: FUNCTIONAL STATUS PRIOR TO ADMISSION: Patient was modified independent using a single point cane for at least 2 years and for the last few week using a rolling walker for functional mobility.    HOME SUPPORT PRIOR TO ADMISSION: The patient lived alone and did not require assistance.    Physical Therapy Goals  Initiated 5/21/2024  1.  Patient will move from supine to sit and sit to supine in bed with modified independence within 7 day(s).    2.  Patient will perform sit to stand with modified independence within 7 day(s).  3.  Patient will transfer from bed to chair and chair to bed with modified independence using the least restrictive device within 7 day(s).  4.  Patient will ambulate with modified independence for 150 feet with the least restrictive device within 7 day(s).   5.  Patient will ascend/descend 4 stairs with 2 handrail(s) with modified independence within 7 day(s).   Outcome: Progressing  PHYSICAL THERAPY TREATMENT    Patient: Javier Ugalde (58 y.o. male)  Date: 5/24/2024  Diagnosis: Cellulitis of right lower extremity [L03.115]  Acute renal failure superimposed on chronic kidney disease, unspecified acute renal failure type, unspecified CKD stage (HCC) [N17.9, N18.9] Cellulitis of right lower extremity      Precautions: Fall Risk                    ASSESSMENT:  Patient continues to benefit from skilled PT services and is slowly progressing towards goals. Presents with pain, impaired balance, impaired gait, decreased activity tolerance, and overall decline in functional mobility.  Agreeable to work with therapy.  Transferred supine>sit and sit<>stand with SBA.  Session focused on ambulation with CGA using RW and VC for upright posture and walker proximity/management.  Pt with slow, shuffled, antalgic gait with 
  Problem: Physical Therapy - Adult  Goal: By Discharge: Performs mobility at highest level of function for planned discharge setting.  See evaluation for individualized goals.  Description: FUNCTIONAL STATUS PRIOR TO ADMISSION: Patient was modified independent using a single point cane for at least 2 years and for the last few week using a rolling walker for functional mobility.    HOME SUPPORT PRIOR TO ADMISSION: The patient lived alone and did not require assistance.    Physical Therapy Goals  Reviewed 5/29/2024  1.  Patient will transfer from bed to chair and chair to bed with modified independence using the least restrictive device within 7 day(s).  2.  Patient will ambulate with modified independence for 150 feet with the least restrictive device within 7 day(s).   3.  Patient will ascend/descend 4 stairs with 2 handrail(s) with modified independence within 7 day(s).    Initiated 5/21/2024  1.  Patient will move from supine to sit and sit to supine in bed with modified independence within 7 day(s).    2.  Patient will perform sit to stand with modified independence within 7 day(s).  3.  Patient will transfer from bed to chair and chair to bed with modified independence using the least restrictive device within 7 day(s).  4.  Patient will ambulate with modified independence for 150 feet with the least restrictive device within 7 day(s).   5.  Patient will ascend/descend 4 stairs with 2 handrail(s) with modified independence within 7 day(s).   Outcome: Progressing       PHYSICAL THERAPY TREATMENT: WEEKLY REASSESSMENT    Patient: Javier Ugalde (58 y.o. male)  Date: 5/29/2024  Diagnosis: Cellulitis of right lower extremity [L03.115]  Acute renal failure superimposed on chronic kidney disease, unspecified acute renal failure type, unspecified CKD stage (HCC) [N17.9, N18.9] Cellulitis of right lower extremity      Precautions: Fall Risk                      ASSESSMENT:  Patient continues to benefit from skilled PT 
  Problem: Safety - Adult  Goal: Free from fall injury  5/27/2024 2152 by Kathryn Crisostomo RN  Outcome: Progressing  5/27/2024 0903 by Jessica Mcfarland RN  Outcome: Progressing     Problem: Discharge Planning  Goal: Discharge to home or other facility with appropriate resources  5/27/2024 2152 by Kathryn Crisostomo RN  Outcome: Progressing  5/27/2024 0903 by Jessica Mcfarland RN  Outcome: Progressing     Problem: Pain  Goal: Verbalizes/displays adequate comfort level or baseline comfort level  5/27/2024 2152 by Kathryn Crisostomo RN  Outcome: Progressing  5/27/2024 0903 by Jessica Mcfarland RN  Outcome: Progressing     Problem: Chronic Conditions and Co-morbidities  Goal: Patient's chronic conditions and co-morbidity symptoms are monitored and maintained or improved  5/27/2024 2152 by Kathryn Crisostomo RN  Outcome: Progressing  5/27/2024 0903 by Jessica Mcfarland RN  Outcome: Progressing     Problem: Skin/Tissue Integrity  Goal: Absence of new skin breakdown  Description: 1.  Monitor for areas of redness and/or skin breakdown  2.  Assess vascular access sites hourly  3.  Every 4-6 hours minimum:  Change oxygen saturation probe site  4.  Every 4-6 hours:  If on nasal continuous positive airway pressure, respiratory therapy assess nares and determine need for appliance change or resting period.  5/27/2024 2152 by Kathryn Crisostomo RN  Outcome: Progressing  5/27/2024 0903 by Jessica Mcfarland RN  Outcome: Progressing     
  Problem: Safety - Adult  Goal: Free from fall injury  5/28/2024 0840 by Jessica Mcfarland RN  Outcome: Progressing     Problem: Discharge Planning  Goal: Discharge to home or other facility with appropriate resources  5/28/2024 0840 by Jessica Mcfarland RN  Outcome: Progressing     Problem: Pain  Goal: Verbalizes/displays adequate comfort level or baseline comfort level  5/28/2024 0840 by Jessica Mcfarland RN  Outcome: Progressing     Problem: Chronic Conditions and Co-morbidities  Goal: Patient's chronic conditions and co-morbidity symptoms are monitored and maintained or improved  5/28/2024 0840 by Jessica Mcfarland RN  Outcome: Progressing     Problem: Skin/Tissue Integrity  Goal: Absence of new skin breakdown  Description: 1.  Monitor for areas of redness and/or skin breakdown  2.  Assess vascular access sites hourly  3.  Every 4-6 hours minimum:  Change oxygen saturation probe site  4.  Every 4-6 hours:  If on nasal continuous positive airway pressure, respiratory therapy assess nares and determine need for appliance change or resting period.  5/28/2024 0840 by Jessica Mcfarland RN  Outcome: Progressing     
  Problem: Safety - Adult  Goal: Free from fall injury  5/28/2024 2157 by Rip Alcocer RN  Outcome: Progressing  5/28/2024 0840 by Jessica Mcfarland RN  Outcome: Progressing     Problem: Discharge Planning  Goal: Discharge to home or other facility with appropriate resources  5/28/2024 2157 by Rip Alcocer RN  Outcome: Progressing  Flowsheets (Taken 5/28/2024 2100)  Discharge to home or other facility with appropriate resources:   Identify barriers to discharge with patient and caregiver   Arrange for needed discharge resources and transportation as appropriate   Identify discharge learning needs (meds, wound care, etc)  5/28/2024 0840 by Jessica Mcfarland RN  Outcome: Progressing     Problem: Pain  Goal: Verbalizes/displays adequate comfort level or baseline comfort level  5/28/2024 2157 by Rip Alcocer RN  Outcome: Progressing  5/28/2024 0840 by Jessica Mcfarland RN  Outcome: Progressing     Problem: Chronic Conditions and Co-morbidities  Goal: Patient's chronic conditions and co-morbidity symptoms are monitored and maintained or improved  5/28/2024 2157 by Rip Alcocer RN  Outcome: Progressing  Flowsheets (Taken 5/28/2024 2100)  Care Plan - Patient's Chronic Conditions and Co-Morbidity Symptoms are Monitored and Maintained or Improved:   Monitor and assess patient's chronic conditions and comorbid symptoms for stability, deterioration, or improvement   Collaborate with multidisciplinary team to address chronic and comorbid conditions and prevent exacerbation or deterioration   Update acute care plan with appropriate goals if chronic or comorbid symptoms are exacerbated and prevent overall improvement and discharge  5/28/2024 0840 by Jessica Mcfarland RN  Outcome: Progressing     Problem: Physical Therapy - Adult  Goal: By Discharge: Performs mobility at highest level of function for planned discharge setting.  See evaluation for individualized goals.  Description: FUNCTIONAL STATUS PRIOR TO ADMISSION: Patient was 
  Problem: Safety - Adult  Goal: Free from fall injury  5/29/2024 0903 by Jessica Mcfarland RN  Outcome: Progressing     Problem: Discharge Planning  Goal: Discharge to home or other facility with appropriate resources  5/29/2024 0903 by Jessica Mcfarland RN  Outcome: Progressing     Problem: Pain  Goal: Verbalizes/displays adequate comfort level or baseline comfort level  5/29/2024 0903 by Jessica Mcfarland RN  Outcome: Progressing     Problem: Chronic Conditions and Co-morbidities  Goal: Patient's chronic conditions and co-morbidity symptoms are monitored and maintained or improved  5/29/2024 0903 by Jessica Mcfarland RN  Outcome: Progressing     Problem: Skin/Tissue Integrity  Goal: Absence of new skin breakdown  Description: 1.  Monitor for areas of redness and/or skin breakdown  2.  Assess vascular access sites hourly  3.  Every 4-6 hours minimum:  Change oxygen saturation probe site  4.  Every 4-6 hours:  If on nasal continuous positive airway pressure, respiratory therapy assess nares and determine need for appliance change or resting period.  5/29/2024 0903 by Jessica Mcfarland RN  Outcome: Progressing     
  Problem: Safety - Adult  Goal: Free from fall injury  6/2/2024 1244 by Agatha May RN  Outcome: Progressing  6/2/2024 0756 by Manan Lewis RN  Outcome: Progressing  Flowsheets (Taken 6/2/2024 0753 by Agatha May RN)  Free From Fall Injury: Instruct family/caregiver on patient safety     Problem: Discharge Planning  Goal: Discharge to home or other facility with appropriate resources  6/2/2024 1244 by Agatha May RN  Outcome: Progressing  6/2/2024 0756 by Manan Lewis, RN  Outcome: Progressing  Flowsheets (Taken 6/2/2024 0753 by Agatha May, RN)  Discharge to home or other facility with appropriate resources: Identify barriers to discharge with patient and caregiver     Problem: Pain  Goal: Verbalizes/displays adequate comfort level or baseline comfort level  6/2/2024 1244 by Agatha May RN  Outcome: Progressing  6/2/2024 0756 by Manan Lewis RN  Outcome: Progressing     
  Problem: Safety - Adult  Goal: Free from fall injury  6/2/2024 2238 by Manan Lewis RN  Outcome: Progressing  6/2/2024 1244 by Agatha May RN  Outcome: Progressing     Problem: Discharge Planning  Goal: Discharge to home or other facility with appropriate resources  6/2/2024 2238 by Manan Lewis RN  Outcome: Progressing  6/2/2024 1244 by Agatha May RN  Outcome: Progressing     Problem: Pain  Goal: Verbalizes/displays adequate comfort level or baseline comfort level  6/2/2024 1244 by Agatha May RN  Outcome: Progressing     Problem: Chronic Conditions and Co-morbidities  Goal: Patient's chronic conditions and co-morbidity symptoms are monitored and maintained or improved  Outcome: Progressing     
  Problem: Safety - Adult  Goal: Free from fall injury  6/3/2024 0922 by Paige Hernandez RN  Outcome: Progressing  6/2/2024 2238 by Manan Lewis RN  Outcome: Progressing     Problem: Discharge Planning  Goal: Discharge to home or other facility with appropriate resources  6/3/2024 0922 by Paige Hernandez RN  Outcome: Progressing  6/2/2024 2238 by Manan Lewis RN  Outcome: Progressing     Problem: Pain  Goal: Verbalizes/displays adequate comfort level or baseline comfort level  Outcome: Progressing     Problem: Chronic Conditions and Co-morbidities  Goal: Patient's chronic conditions and co-morbidity symptoms are monitored and maintained or improved  6/3/2024 0922 by Paige Hernandez RN  Outcome: Progressing  6/2/2024 2238 by Manan Lewis RN  Outcome: Progressing     Problem: Skin/Tissue Integrity  Goal: Absence of new skin breakdown  Description: 1.  Monitor for areas of redness and/or skin breakdown  2.  Assess vascular access sites hourly  3.  Every 4-6 hours minimum:  Change oxygen saturation probe site  4.  Every 4-6 hours:  If on nasal continuous positive airway pressure, respiratory therapy assess nares and determine need for appliance change or resting period.  Outcome: Progressing     
  Problem: Safety - Adult  Goal: Free from fall injury  Outcome: Progressing     Problem: Discharge Planning  Goal: Discharge to home or other facility with appropriate resources  Outcome: Progressing     Problem: Pain  Goal: Verbalizes/displays adequate comfort level or baseline comfort level  Outcome: Progressing     
  Problem: Safety - Adult  Goal: Free from fall injury  Outcome: Progressing     Problem: Discharge Planning  Goal: Discharge to home or other facility with appropriate resources  Outcome: Progressing     Problem: Pain  Goal: Verbalizes/displays adequate comfort level or baseline comfort level  Outcome: Progressing     Problem: Chronic Conditions and Co-morbidities  Goal: Patient's chronic conditions and co-morbidity symptoms are monitored and maintained or improved  Outcome: Progressing     Problem: Skin/Tissue Integrity  Goal: Absence of new skin breakdown  Description: 1.  Monitor for areas of redness and/or skin breakdown  2.  Assess vascular access sites hourly  3.  Every 4-6 hours minimum:  Change oxygen saturation probe site  4.  Every 4-6 hours:  If on nasal continuous positive airway pressure, respiratory therapy assess nares and determine need for appliance change or resting period.  Outcome: Progressing     
  Problem: Safety - Adult  Goal: Free from fall injury  Outcome: Progressing     Problem: Discharge Planning  Goal: Discharge to home or other facility with appropriate resources  Outcome: Progressing  Flowsheets (Taken 6/4/2024 0839)  Discharge to home or other facility with appropriate resources: Identify discharge learning needs (meds, wound care, etc)     Problem: Pain  Goal: Verbalizes/displays adequate comfort level or baseline comfort level  Outcome: Progressing     Problem: Chronic Conditions and Co-morbidities  Goal: Patient's chronic conditions and co-morbidity symptoms are monitored and maintained or improved  Outcome: Progressing  Flowsheets (Taken 6/4/2024 0839)  Care Plan - Patient's Chronic Conditions and Co-Morbidity Symptoms are Monitored and Maintained or Improved: Monitor and assess patient's chronic conditions and comorbid symptoms for stability, deterioration, or improvement     Problem: Skin/Tissue Integrity  Goal: Absence of new skin breakdown  Description: 1.  Monitor for areas of redness and/or skin breakdown  2.  Assess vascular access sites hourly  3.  Every 4-6 hours minimum:  Change oxygen saturation probe site  4.  Every 4-6 hours:  If on nasal continuous positive airway pressure, respiratory therapy assess nares and determine need for appliance change or resting period.  Outcome: Progressing     
  Problem: Safety - Adult  Goal: Free from fall injury  Outcome: Progressing     Problem: Pain  Goal: Verbalizes/displays adequate comfort level or baseline comfort level  Outcome: Progressing     
  Problem: Safety - Adult  Goal: Free from fall injury  Outcome: Progressing  Flowsheets (Taken 5/21/2024 0900)  Free From Fall Injury: Instruct family/caregiver on patient safety     Problem: Pain  Goal: Verbalizes/displays adequate comfort level or baseline comfort level  Outcome: Progressing  Flowsheets (Taken 5/21/2024 0900)  Verbalizes/displays adequate comfort level or baseline comfort level:   Encourage patient to monitor pain and request assistance   Assess pain using appropriate pain scale   Administer analgesics based on type and severity of pain and evaluate response     Problem: Chronic Conditions and Co-morbidities  Goal: Patient's chronic conditions and co-morbidity symptoms are monitored and maintained or improved  Outcome: Progressing  Flowsheets (Taken 5/21/2024 0830)  Care Plan - Patient's Chronic Conditions and Co-Morbidity Symptoms are Monitored and Maintained or Improved: Monitor and assess patient's chronic conditions and comorbid symptoms for stability, deterioration, or improvement     
  Problem: Safety - Adult  Goal: Free from fall injury  Outcome: Progressing  Flowsheets (Taken 6/1/2024 0747)  Free From Fall Injury: Instruct family/caregiver on patient safety     Problem: Discharge Planning  Goal: Discharge to home or other facility with appropriate resources  Outcome: Progressing  Flowsheets (Taken 6/1/2024 0747)  Discharge to home or other facility with appropriate resources: Identify barriers to discharge with patient and caregiver     Problem: Pain  Goal: Verbalizes/displays adequate comfort level or baseline comfort level  Outcome: Progressing     
OCCUPATIONAL THERAPY TREATMENT  Patient: Javier Ugalde (58 y.o. male)  Date: 5/23/2024  Primary Diagnosis: Cellulitis of right lower extremity [L03.115]  Acute renal failure superimposed on chronic kidney disease, unspecified acute renal failure type, unspecified CKD stage (HCC) [N17.9, N18.9]       Precautions: Fall Risk                Chart, occupational therapy assessment, plan of care, and goals were reviewed.    ASSESSMENT  Patient continues to benefit from skilled OT services and is progressing towards goals. Pt demonstrated improved energy and motivation to bathe. He required set up for UE bathing and supervision with set up for LE bathing progressing towards completing activities with less assistance. BP in supine was 98/49 but improved to 116/65 when maintaining sitting EOB. Pt remains high fall risk due to cellulitis and weight bearing toleration in LE. Discharge recommendation include continued therapy in a rehab setting due to living alone and comorbidites.           PLAN :  Patient continues to benefit from skilled intervention to address the above impairments. Continue treatment per established plan of care to address goals.    Recommend with staff: Continue OOB as tolerated and improve activity tolerance with ADLs OOB.    Recommend next OT session: Participate in grooming and ADL activities OOB.    Recommendation for discharge: (in order for the patient to meet his/her long term goals): Therapy 3 hours/day 5-7 days/week intensive rehab therapy    Other factors to consider for discharge: lives alone and high risk for falls    IF patient discharges home will need the following DME: TBD       SUBJECTIVE:   Patient stated “Im not feeling too bad today.”    OBJECTIVE DATA SUMMARY:     Functional Mobility and Transfers for ADLs:  Bed Mobility:  Bed Mobility Training  Supine to Sit: Supervision;Additional time     Transfers:   Transfer Training  Transfer Training: Yes  Overall Level of Assistance: Stand-by 
progressing towards goals. Patient tolerated session well. HR 80 bpm at rest, increasing to 120's with activity, asymptomatic. Life vest donned. Patient completed transfers with Mod I and ambulated 150' with SBA, demonstrating forward flexed trunk, wide THOMAS, and decreased navneet. Encouraged patient to mobilize OOB 3x/day over the weekend and to complete functional therex as tolerated.        PLAN:  Patient continues to benefit from skilled intervention to address the above impairments.  Continue treatment per established plan of care.        Recommendation for discharge: (in order for the patient to meet his/her long term goals): Therapy 2x a week in the home    Other factors to consider for discharge: no additional factors    IF patient discharges home will need the following DME: patient owns DME required for discharge       SUBJECTIVE:   Patient stated, \"Im going to change the battery.\"    OBJECTIVE DATA SUMMARY:   Critical Behavior:  Orientation  Overall Orientation Status: Within Normal Limits  Orientation Level: Oriented X4  Cognition  Overall Cognitive Status: WNL    Functional Mobility Training:  Bed Mobility:  Bed Mobility Training  Bed Mobility Training: Yes  Supine to Sit: Modified independent  Scooting: Modified independent  Transfers:  Transfer Training  Transfer Training: Yes  Sit to Stand: Modified independent  Stand to Sit: Modified independent  Balance:  Balance  Sitting: Intact  Standing - Static: Constant support;Good  Standing - Dynamic: Constant support;Fair   Ambulation/Gait Training:     Gait  Gait Training: Yes  Overall Level of Assistance: Stand-by assistance  Distance (ft): 150 Feet  Assistive Device: Gait belt;Walker, rolling  Interventions: Safety awareness training;Verbal cues;Tactile cues  Base of Support: Widened  Speed/Navneet: Pace decreased (< 100 feet/min)  Gait Abnormalities: Altered arm swing;Decreased step clearance (Forward flexed at the trunk)        Instructed in:  Sit to 
with multidisciplinary team to address chronic and comorbid conditions and prevent exacerbation or deterioration   Update acute care plan with appropriate goals if chronic or comorbid symptoms are exacerbated and prevent overall improvement and discharge     Problem: Physical Therapy - Adult  Goal: By Discharge: Performs mobility at highest level of function for planned discharge setting.  See evaluation for individualized goals.  Description: FUNCTIONAL STATUS PRIOR TO ADMISSION: Patient was modified independent using a single point cane for at least 2 years and for the last few week using a rolling walker for functional mobility.    HOME SUPPORT PRIOR TO ADMISSION: The patient lived alone and did not require assistance.    Physical Therapy Goals  Reviewed 5/29/2024  1.  Patient will transfer from bed to chair and chair to bed with modified independence using the least restrictive device within 7 day(s).  2.  Patient will ambulate with modified independence for 150 feet with the least restrictive device within 7 day(s).   3.  Patient will ascend/descend 4 stairs with 2 handrail(s) with modified independence within 7 day(s).    Initiated 5/21/2024  1.  Patient will move from supine to sit and sit to supine in bed with modified independence within 7 day(s).    2.  Patient will perform sit to stand with modified independence within 7 day(s).  3.  Patient will transfer from bed to chair and chair to bed with modified independence using the least restrictive device within 7 day(s).  4.  Patient will ambulate with modified independence for 150 feet with the least restrictive device within 7 day(s).   5.  Patient will ascend/descend 4 stairs with 2 handrail(s) with modified independence within 7 day(s).   5/31/2024 1521 by Chase Ramos, PT  Outcome: Progressing     Problem: Skin/Tissue Integrity  Goal: Absence of new skin breakdown  Description: 1.  Monitor for areas of redness and/or skin breakdown  2.  Assess vascular 
Jaylin Jernigan, OT  Outcome: Progressing     
OOB in the chair)    Transfers:      Transfer Training  Transfer Training: Yes  Overall Level of Assistance: Stand-by assistance  Sit to Stand: Stand-by assistance  Stand to Sit: Stand-by assistance  Stand Pivot Transfers: Stand-by assistance  Bed to Chair: Stand-by assistance  Toilet Transfer: Stand-by assistance (to the [facility])          Functional Mobility: Stand by assistance          Balance:      Balance  Sitting: Intact  Standing: Impaired  Standing - Static: Constant support;Good  Standing - Dynamic: Constant support;Fair      ADL Assessment:          Feeding: Independent       Grooming: Setup       UE Bathing: Supervision            LE Bathing: Moderate assistance  LE Bathing Skilled Clinical Factors: difficulty with LB access    UE Dressing: Supervision       LE Dressing: Maximum assistance       Toileting: Minimal assistance            Functional Mobility: Stand by assistance  Functional Mobility Skilled Clinical Factors: provided barriatric RW         ADL Intervention and task modifications:    Pt progressed to the chair with additional time and verbal cues.        [facility] \"6 Clicks\"                                                       Daily Activity Inpatient Short Form  AM-PAC Daily Activity - Inpatient   How much help is needed for putting on and taking off regular lower body clothing?: A Lot  How much help is needed for bathing (which includes washing, rinsing, drying)?: A Lot  How much help is needed for toileting (which includes using toilet, bedpan, or urinal)?: A Little  How much help is needed for putting on and taking off regular upper body clothing?: A Little  How much help is needed for taking care of personal grooming?: A Little  How much help for eating meals?: None  [facility]: 17  AM-PAC Inpatient ADL T-Scale Score : 37.26  ADL Inpatient CMS 0-100% Score: 50.11  ADL Inpatient CMS G-Code Modifier : CK     Interpretation of Tool:  Represents 
Breathing – normal variations in rate and rhythm, unlabored; grunting absent or intermittent and improving; intercostal, supracostal and subcostal muscles with normal excursion and not retracting; breath sounds are clear or mildly bronchovesicular, symmetric, with adequate intensity and without rales.

## 2024-06-04 NOTE — DIABETES MGMT
[facility]  [facility]  DIABETES MANAGEMENT     SIGNING OFF     Setting Blood glucose targets   Non-critical care 100 - 180 mg/dl      Evaluation and Action Plan   This 58 year old AA male was admitted  5/20/24 from ER after experiencing fever and right leg swelling in the setting of cellulitis. Nephrology evaluated - CKD 3b - with varying creatinine. CT guided kidney biopsy was under consideration. Cardiology suggesting defibrillator.     As for BG management, patient has Type 2 diabetes and has not required intervention since admission. In light of decreased kidney function, any drug action will be prolonged.     Blood glucose pattern:      Currently, no medication intervention required here or post-discharge.    No charge.    Signing off.    Tana Mena DNP, RN, ACNS-BC, BC-ADM, CDCES  Clinical Nurse Specialist-Diabetes & Endocrine disorders    [facility] (In-patient CNS consult service)  (o) [phone]    
Bgs  5/28/24 FBG 96    Assessment and Nursing Intervention   Nursing Diagnosis Risk for unstable blood glucose pattern   Nursing Intervention Domain 8888 Decision-making Support   Nursing Interventions Examined current inpatient diabetes/blood glucose control   Explored factors facilitating and impeding inpatient management  Explored corrective strategies with patient and responsible inpatient provider   Informed patient of rational for insulin strategy while hospitalized     Billing Code(s)   [] 88143 IP subsequent hospital care - 50 minutes    [] 87396 IP subsequent hospital care - 35 minutes   [] 01631 IP subsequent hospital care - 25 minutes  [] 83811 IP initial hospital care - 55 minutes  [x] 87924 IP initial hospital care - 40 minutes      Before making these care recommendations, I personally reviewed the hospitalization record, including notes, laboratory & diagnostic data and current medications, and examined the patient at the bedside.  Total minutes: 40    JIM Reyez - CNS  Clinical Nurse Specialist - Diabetes & endocrine disorders  [facility]  Access via [employer]

## 2024-06-04 NOTE — WOUND CARE
Wound Care Note:     Wound care follow up for right lower leg wound.      Chart shows:  Admitted for cellulitis of right lower extremity  Past Medical History:   Diagnosis Date    Arthritis     Cellulitis     Chronic pain     COVID-19 vaccine series completed 4/2/21, 5/7/21    MODERNA    Diabetes (HCC)     Elevated hemoglobin A1c 06/07/2021    6.4    Hyperlipidemia     Hypertension     Hypertension complicating diabetes (HCC)      WBC = 12.3 on 6/4/24  Admitted from home    Assessment:   Patient is A&O x 4, communicative, continent with no assistance needed in repositioning.    Bed: [facility]  Diet: Adult diet regular; high fiber with nutritional supplements  Patient reports some discomfort when cleaning wound.      Bilateral heels skin intact and without erythema.  Buttock and sacral skin were not assessed.    Palpable DP pulse.      1. POA right lateral knee wound beds with a little more pink tissue, small to moderate serous drainage, wound edges are open, kristyn-wound with edema.  Triad and small sacral border applied.      Patient repositioned supine.     Recommendations:    Right lateral knee- Every other day apply VASHE moistened gauze and let sit 5 to 10 minutes, wipe wound bed clean using same gauze, apply Triad and cover.    Bilateral lower legs- Daily cleanse with soap and water using a wash cloth to remove loose, dry skin.  Every 12 hours liberally apply Lac-Hydrin.    Skin Care & Pressure Prevention:  Minimize layers of linen/pads under patient to optimize support surface.    Turn/reposition approximately every 2 hours and offload heels.  Manage incontinence / promote continence   Nourishing Skin Cream to dry skin, minimize use of briefs when able    Discussed above plan with patient & BEENA Gibson    Transition of Care: Plan to follow as needed while admitted to hospital.    Monae \"Margaret\" BRYANT Limon, RN, CWON  Certified Wound and Ostomy Nurse  office

## 2024-06-19 ENCOUNTER — HOSPITAL ENCOUNTER (OUTPATIENT)
Facility: HOSPITAL | Age: 59
Discharge: HOME | End: 2024-06-19
Attending: FAMILY MEDICINE
Payer: MEDICARE

## 2024-06-19 VITALS
HEIGHT: 77 IN | TEMPERATURE: 98.7 F | HEART RATE: 89 BPM | SYSTOLIC BLOOD PRESSURE: 110 MMHG | BODY MASS INDEX: 37.19 KG/M2 | RESPIRATION RATE: 16 BRPM | DIASTOLIC BLOOD PRESSURE: 67 MMHG | WEIGHT: 315 LBS

## 2024-06-19 DIAGNOSIS — L97.212 NON-PRESSURE CHRONIC ULCER OF RIGHT CALF WITH FAT LAYER EXPOSED (HCC): Primary | ICD-10-CM

## 2024-06-19 PROCEDURE — 11042 DBRDMT SUBQ TIS 1ST 20SQCM/<: CPT

## 2024-06-19 PROCEDURE — 11045 DBRDMT SUBQ TISS EACH ADDL: CPT

## 2024-06-19 RX ORDER — CIPROFLOXACIN 500 MG/1
500 TABLET, FILM COATED ORAL 2 TIMES DAILY
Qty: 14 TABLET | Refills: 0 | Status: SHIPPED | OUTPATIENT
Start: 2024-06-19 | End: 2024-06-26

## 2024-06-19 ASSESSMENT — PAIN SCALES - GENERAL: PAINLEVEL_OUTOF10: 0

## 2024-06-19 NOTE — PROGRESS NOTES
Wound Center  Progress Note / Procedure Note      Chief Complaint:  Javier Ugalde is a 58 y.o.  male  with R leg lateral wound of >1 months duration.      Assessment/Plan     58 y.o. male with DM2- controlled, s/p leg cellulitis    -R lower leg lateral chronic ulcer.  Full thickness  Slough/granular  Necessitates debridement  for wound healing and to prevent/heal infection  Ulcer needs debridement- see note below    -cellulitis  Seems to have slightly more redness and swelling than baseline  Low grade fever last night  Culture of wound done  Start cipro 500mg twice daily for 7 days (GFR 48), to R/A after results of culture  To ER - if sx/signs worsen  Check INR more frequently    -Leg edema/venous insufficiency  Discussed:  Compression  Elevation  Continue PT     -chronic warfarin use (AVR, Afib)  INR 1.6        Following discussed with patient / sister  Needs :  Serial debridement- prn    Good local wound care  Dressing: alginate, drawtex, ABD pad  Frequency : three times a week    Tubi x 2, then coban to secure in place proximally    Continue Home Health    -Edema management    -Nutrition optimization    -Good Diabetic control    -Good offloading    Patient/  understood and agrees with plan. Questions answered.    Serial visits and serial debridements also discussed.    Follow up with me in 1 week    Subjective:       HPI:     In Feb, new BP started  Leg swelling appeared after  Then worked with cardio and kidney MD to change meds and bring leg swelling down. Had some dry skin there, statred peeling it- bleeding++ then became infected  Went to PCP - stared on doxycycline, kept getting worse, PCP told him to to ER  In hosp for 2 weeks  Now home   Has HH- used zinc and foam on wound    Fever, chills last night    History/Chart/Medications reviewed  ADMITTING DIAGNOSES & HOSPITAL COURSE:   Per H&P on 5/20: Javier Ugalde is a 58 y.o. male with arthritis, chronic pain, T2DM, HTN, CKD (Dr Ashraf), HLD, s/p mechanical

## 2024-06-19 NOTE — FLOWSHEET NOTE
06/19/24 1425   Anesthetic   Anesthetic 4% Lidocaine Liquid Topical   Peripheral Vascular   RLE Edema +1   RLE Neurovascular Assessment   Capillary Refill Less than/Equal to 3 seconds   Color Appropriate for Ethnicity   Temperature Warm   RLE Sensation  Full sensation   R Pedal Pulse +2   Wound 06/19/24 Leg Right;Lateral Cluster   Date First Assessed/Time First Assessed: 06/19/24 1428   Present on Original Admission: Yes  Wound Approximate Age at First Assessment (Weeks): 4 weeks  Primary Wound Type: Traumatic  Location: Leg  Wound Location Orientation: Right;Lateral  Wound Tera...   Wound Image    Wound Etiology Other   Dressing Status Old drainage noted   Wound Cleansed Soap and water   Offloading for Diabetic Foot Ulcers Offloading not required   Wound Length (cm) 8.4 cm   Wound Width (cm) 11.4 cm   Wound Depth (cm) 0.4 cm   Wound Surface Area (cm^2) 95.76 cm^2   Wound Volume (cm^3) 38.304 cm^3   Wound Assessment [place]/red;Slough   Drainage Amount Moderate (25-50%)   Drainage Description Serosanguinous   Odor None   Emely-wound Assessment Edematous;Blanchable erythema   Margins Undefined edges   Wound Thickness Description not for Pressure Injury Full thickness   Pain Assessment   Pain Assessment None - Denies Pain   Pain Level 0     /67   Pulse 89   Temp 98.7 °F (37.1 °C) (Temporal)   Resp 16   Ht 1.956 m (6' 5\")   Wt (!) 148.8 kg (328 lb)   BMI 38.90 kg/m²

## 2024-06-19 NOTE — PATIENT INSTRUCTIONS
Discharge Instructions for  [facility]  [address] [address]  Phone: [phone] Fax: [phone]    NAME:  Javier Ugalde  YOB: 1965  MEDICAL RECORD NUMBER:  [id number]  DATE:  June 19, 2024    WOUND CARE ORDERS:  Right lower leg wound-Cleanse with baby shampoo. Rinse and pat dry. Apply 2 layers of alginate to wound bed. Cover with drawtex and abd. Secure with coban wrap. Apply double layer tubigrip size G.     Keep leg elevated.      Home Health to change 2 times a week on Monday and Friday.    Activity:  [x] Elevate leg(s) above the level of the heart when sitting.  [x] Avoid prolonged standing in one place.   [x] Do no get dressing/wrap wet.       Dietary:  [] Diet as tolerated      [x] Diabetic Diet            [] Increase Protein: examples (Meat, cheese, eggs, greek yogurt, fish, nuts)          [] Teo Therapeutic Nutrition Powder  [] Other:  [] Dial a Dietician : Call [employer] at [phone] enter code (249) when prompted. M-F 9am-5pm EST.      Return Appointment:  [x] Return Appointment: With Dr. Romi Rosales in 1 Week  [] Nurse Visit  [] Ordered tests:      Electronically signed Oneida Polanco RN on 6/19/2024 at 2:50 PM     Wound Care Center Information: Should you experience any significant changes in your wound(s) or have questions about your wound care, please contact the [facility] at MONDAY - FRIDAY 8:00 am - 4:30.  If you need help with your wound outside these hours and cannot wait until we are again available, contact your PCP or go to the hospital emergency room.     PLEASE NOTE: IF YOU ARE UNABLE TO OBTAIN WOUND SUPPLIES, CONTINUE TO USE THE SUPPLIES YOU HAVE AVAILABLE UNTIL YOU ARE ABLE TO REACH US. IT IS MOST IMPORTANT TO KEEP THE WOUND COVERED AT ALL TIMES.     Physician Signature:_______________________    Date: ___________ Time:  ____________

## 2024-06-19 NOTE — FLOWSHEET NOTE
06/19/24 1521   Wound 06/19/24 Leg Right;Lateral Cluster   Date First Assessed/Time First Assessed: 06/19/24 1428   Present on Original Admission: Yes  Wound Approximate Age at First Assessment (Weeks): 4 weeks  Primary Wound Type: Traumatic  Location: Leg  Wound Location Orientation: Right;Lateral  Wound Tera...   Dressing Status New dressing applied   Dressing/Treatment Alginate  (drawtex, coban)   Offloading for Diabetic Foot Ulcers Offloading not required

## 2024-06-21 LAB
BACTERIA SPEC CULT: NORMAL
GRAM STN SPEC: NORMAL
GRAM STN SPEC: NORMAL
SERVICE CMNT-IMP: NORMAL

## 2024-06-26 ENCOUNTER — HOSPITAL ENCOUNTER (OUTPATIENT)
Facility: HOSPITAL | Age: 59
Discharge: HOME | End: 2024-06-26
Attending: FAMILY MEDICINE
Payer: MEDICARE

## 2024-06-26 VITALS
TEMPERATURE: 98.7 F | DIASTOLIC BLOOD PRESSURE: 66 MMHG | SYSTOLIC BLOOD PRESSURE: 111 MMHG | RESPIRATION RATE: 15 BRPM | HEART RATE: 80 BPM

## 2024-06-26 DIAGNOSIS — L97.212 NON-PRESSURE CHRONIC ULCER OF RIGHT CALF WITH FAT LAYER EXPOSED (HCC): Primary | ICD-10-CM

## 2024-06-26 PROCEDURE — 11042 DBRDMT SUBQ TIS 1ST 20SQCM/<: CPT

## 2024-06-26 PROCEDURE — 11045 DBRDMT SUBQ TISS EACH ADDL: CPT

## 2024-06-26 RX ORDER — LIDOCAINE 40 MG/G
CREAM TOPICAL ONCE
OUTPATIENT
Start: 2024-06-26 | End: 2024-06-26

## 2024-06-26 RX ORDER — GINSENG 100 MG
CAPSULE ORAL ONCE
OUTPATIENT
Start: 2024-06-26 | End: 2024-06-26

## 2024-06-26 RX ORDER — LIDOCAINE 40 MG/G
CREAM TOPICAL ONCE
Status: CANCELLED | OUTPATIENT
Start: 2024-06-26 | End: 2024-06-26

## 2024-06-26 RX ORDER — GENTAMICIN SULFATE 1 MG/G
OINTMENT TOPICAL ONCE
Status: CANCELLED | OUTPATIENT
Start: 2024-06-26 | End: 2024-06-26

## 2024-06-26 RX ORDER — LIDOCAINE 50 MG/G
OINTMENT TOPICAL ONCE
OUTPATIENT
Start: 2024-06-26 | End: 2024-06-26

## 2024-06-26 RX ORDER — LIDOCAINE HYDROCHLORIDE 20 MG/ML
JELLY TOPICAL ONCE
OUTPATIENT
Start: 2024-06-26 | End: 2024-06-26

## 2024-06-26 RX ORDER — LIDOCAINE HYDROCHLORIDE 40 MG/ML
SOLUTION TOPICAL ONCE
OUTPATIENT
Start: 2024-06-26 | End: 2024-06-26

## 2024-06-26 RX ORDER — SODIUM CHLOR/HYPOCHLOROUS ACID 0.033 %
SOLUTION, IRRIGATION IRRIGATION ONCE
Status: CANCELLED | OUTPATIENT
Start: 2024-06-26 | End: 2024-06-26

## 2024-06-26 RX ORDER — TRIAMCINOLONE ACETONIDE 1 MG/G
OINTMENT TOPICAL ONCE
OUTPATIENT
Start: 2024-06-26 | End: 2024-06-26

## 2024-06-26 RX ORDER — BETAMETHASONE DIPROPIONATE 0.5 MG/G
CREAM TOPICAL ONCE
OUTPATIENT
Start: 2024-06-26 | End: 2024-06-26

## 2024-06-26 RX ORDER — CIPROFLOXACIN 500 MG/1
500 TABLET, FILM COATED ORAL 2 TIMES DAILY
Qty: 14 TABLET | Refills: 0 | Status: SHIPPED | OUTPATIENT
Start: 2024-06-26 | End: 2024-07-03

## 2024-06-26 RX ORDER — LIDOCAINE HYDROCHLORIDE 20 MG/ML
JELLY TOPICAL ONCE
Status: CANCELLED | OUTPATIENT
Start: 2024-06-26 | End: 2024-06-26

## 2024-06-26 RX ORDER — LIDOCAINE 50 MG/G
OINTMENT TOPICAL ONCE
Status: CANCELLED | OUTPATIENT
Start: 2024-06-26 | End: 2024-06-26

## 2024-06-26 RX ORDER — CLOBETASOL PROPIONATE 0.5 MG/G
OINTMENT TOPICAL ONCE
Status: CANCELLED | OUTPATIENT
Start: 2024-06-26 | End: 2024-06-26

## 2024-06-26 RX ORDER — SODIUM CHLOR/HYPOCHLOROUS ACID 0.033 %
SOLUTION, IRRIGATION IRRIGATION ONCE
OUTPATIENT
Start: 2024-06-26 | End: 2024-06-26

## 2024-06-26 RX ORDER — IBUPROFEN 200 MG
TABLET ORAL ONCE
OUTPATIENT
Start: 2024-06-26 | End: 2024-06-26

## 2024-06-26 RX ORDER — BACITRACIN ZINC AND POLYMYXIN B SULFATE 500; 1000 [USP'U]/G; [USP'U]/G
OINTMENT TOPICAL ONCE
OUTPATIENT
Start: 2024-06-26 | End: 2024-06-26

## 2024-06-26 RX ORDER — CIPROFLOXACIN 500 MG/1
500 TABLET, FILM COATED ORAL 2 TIMES DAILY
Qty: 20 TABLET | Refills: 0 | OUTPATIENT
Start: 2024-06-26 | End: 2024-06-26

## 2024-06-26 RX ORDER — BACITRACIN ZINC AND POLYMYXIN B SULFATE 500; 1000 [USP'U]/G; [USP'U]/G
OINTMENT TOPICAL ONCE
Status: CANCELLED | OUTPATIENT
Start: 2024-06-26 | End: 2024-06-26

## 2024-06-26 RX ORDER — BETAMETHASONE DIPROPIONATE 0.5 MG/G
CREAM TOPICAL ONCE
Status: CANCELLED | OUTPATIENT
Start: 2024-06-26 | End: 2024-06-26

## 2024-06-26 RX ORDER — IBUPROFEN 200 MG
TABLET ORAL ONCE
Status: CANCELLED | OUTPATIENT
Start: 2024-06-26 | End: 2024-06-26

## 2024-06-26 RX ORDER — CLOBETASOL PROPIONATE 0.5 MG/G
OINTMENT TOPICAL ONCE
OUTPATIENT
Start: 2024-06-26 | End: 2024-06-26

## 2024-06-26 RX ORDER — GENTAMICIN SULFATE 1 MG/G
OINTMENT TOPICAL ONCE
OUTPATIENT
Start: 2024-06-26 | End: 2024-06-26

## 2024-06-26 RX ORDER — GINSENG 100 MG
CAPSULE ORAL ONCE
Status: CANCELLED | OUTPATIENT
Start: 2024-06-26 | End: 2024-06-26

## 2024-06-26 RX ORDER — TRIAMCINOLONE ACETONIDE 1 MG/G
OINTMENT TOPICAL ONCE
Status: CANCELLED | OUTPATIENT
Start: 2024-06-26 | End: 2024-06-26

## 2024-06-26 RX ORDER — LIDOCAINE HYDROCHLORIDE 40 MG/ML
SOLUTION TOPICAL ONCE
Status: CANCELLED | OUTPATIENT
Start: 2024-06-26 | End: 2024-06-26

## 2024-06-26 NOTE — PROGRESS NOTES
Wound Center  Progress Note / Procedure Note      Chief Complaint:  Javier Ugalde is a 58 y.o.  male  with R leg lateral wound of >1 months duration.      Assessment/Plan     58 y.o. male with DM2- controlled, s/p leg cellulitis    -R lower leg lateral chronic ulcer.  Full thickness  Few more areas- cluster distally, less necrotic  Slough/granular  Necessitates debridement  for wound healing and to prevent/heal infection  Ulcer needs debridement- see note below    -cellulitis  Impoved redness and swelling  Low grade fever last night  Culture of wound reviewed: moderated mixed enterix arabella  Extend cipro 500mg twice daily for 7 days (GFR 48) for another 7 days  To ER - if sx/signs worsen  Check INR more frequently    -Leg edema/venous insufficiency  Discussed:  Compression  Elevation  Continue PT     -chronic warfarin use (AVR, Afib)  INR 1.6 (6/17/24)        Following discussed with patient / sister  Needs :  Serial debridement- prn    Good local wound care  Dressing: alginatex2, drawtex, ABD pad  Frequency : three times a week    Tubi x 2, then coban to secure in place proximally    Continue Home Health    -Edema management    -Nutrition optimization    -Good Diabetic control    -Good offloading    Patient/  understood and agrees with plan. Questions answered.    Serial visits and serial debridements also discussed.    Follow up with me in 1 week    Subjective:   Since last visit  Had low grade fever nightly until Monday, has stopped now  HH didn't have proper supplies so dressing has been soaking through  Our dressing from wed stayed on till Friday  Tolerating abx  Having INR checked next Monday    HPI:     In Feb, new BP started  Leg swelling appeared after  Then worked with cardio and kidney MD to change meds and bring leg swelling down. Had some dry skin there, statred peeling it- bleeding++ then became infected  Went to PCP - stared on doxycycline, kept getting worse, PCP told him to to ER  In hosp for 2

## 2024-06-26 NOTE — FLOWSHEET NOTE
06/26/24 1403   Anesthetic   Anesthetic 4% Lidocaine Cream   Right Leg Edema Point of Measurement   Leg circumference 54.5 cm   Ankle circumference 30 cm   Peripheral Vascular   Edema Right lower extremity   RLE Edema +3;Pitting   RLE Neurovascular Assessment   Capillary Refill Less than/Equal to 3 seconds   Color Appropriate for Ethnicity;Yellow-Brown/Hemosiderin Staining   Temperature Warm   RLE Sensation  Full sensation   R Pedal Pulse +2   LLE Neurovascular Assessment   Capillary Refill Less than/Equal to 3 seconds   Color Appropriate for Ethnicity   Temperature Warm   LLE Sensation  Full sensation   Wound 06/19/24 Leg Right;Lateral Cluster   Date First Assessed/Time First Assessed: 06/19/24 1428   Present on Original Admission: Yes  Wound Approximate Age at First Assessment (Weeks): 4 weeks  Primary Wound Type: Traumatic  Location: Leg  Wound Location Orientation: Right;Lateral  Wound Tera...   Wound Image    Wound Etiology Other   Dressing Status Old drainage noted   Wound Cleansed Soap and water   Offloading for Diabetic Foot Ulcers Offloading not ordered   Wound Length (cm) 13 cm   Wound Width (cm) 11.8 cm   Wound Depth (cm) 0.3 cm   Wound Surface Area (cm^2) 153.4 cm^2   Change in Wound Size % (l*w) -60.19   Wound Volume (cm^3) 46.02 cm^3   Wound Healing % -20   Wound Assessment Pink/red;Slough;Granulation tissue   Drainage Amount [place] (>75 % saturated)   Drainage Description Serosanguinous   Odor Mild   Emely-wound Assessment Edematous;Blanchable erythema;Induration;Maceration   Margins Defined edges   Wound Thickness Description not for Pressure Injury Full thickness     /66   Pulse 80   Temp 98.7 °F (37.1 °C) (Temporal)   Resp 15

## 2024-06-26 NOTE — PATIENT INSTRUCTIONS
Discharge Instructions for  [facility]  [address] [address]  Phone: [phone] Fax: [phone]    NAME:  Javier Ugalde  YOB: 1965  MEDICAL RECORD NUMBER:  [id number]  DATE:  June 26, 2024    WOUND CARE ORDERS:  Right lower leg wound-Cleanse with baby shampoo. Rinse and pat dry. Apply 2 layers of alginate to wound bed. Pack deeper area of wound with alginate.Cover with drawtex and abd. Secure with coban wrap. Apply double layer tubigrip size G.     Keep leg elevated.      Home Health to change 2 times a week on Monday and Friday.    Activity:  [x] Elevate leg(s) above the level of the heart when sitting.  [x] Avoid prolonged standing in one place.   [x] Do no get dressing/wrap wet.       Dietary:  [] Diet as tolerated      [x] Diabetic Diet            [] Increase Protein: examples (Meat, cheese, eggs, greek yogurt, fish, nuts)          [] Teo Therapeutic Nutrition Powder  [] Other:  [] Dial a Dietician : Call [email or web address] at [phone] enter code (249) when prompted. M-F 9am-5pm EST.      Return Appointment:  [x] Return Appointment: With Dr. Romi Rosales in 1 Week  [] Nurse Visit  [] Ordered tests:      Electronically signed Oneida Polanco RN on 6/26/2024 at 2:13 PM     Wound Care Center Information: Should you experience any significant changes in your wound(s) or have questions about your wound care, please contact the [facility] at MONDAY - FRIDAY 8:00 am - 4:30.  If you need help with your wound outside these hours and cannot wait until we are again available, contact your PCP or go to the hospital emergency room.     PLEASE NOTE: IF YOU ARE UNABLE TO OBTAIN WOUND SUPPLIES, CONTINUE TO USE THE SUPPLIES YOU HAVE AVAILABLE UNTIL YOU ARE ABLE TO REACH US. IT IS MOST IMPORTANT TO KEEP THE WOUND COVERED AT ALL TIMES.     Physician Signature:_______________________    Date: ___________ Time:  ____________

## 2024-06-26 NOTE — FLOWSHEET NOTE
06/26/24 1441   Right Leg Edema Point of Measurement   Compression Therapy Tubular elastic support bandage   Tubular Elastic Support Bandage Compression Pressure Medium   Wound 06/19/24 Leg Right;Lateral Cluster   Date First Assessed/Time First Assessed: 06/19/24 1428   Present on Original Admission: Yes  Wound Approximate Age at First Assessment (Weeks): 4 weeks  Primary Wound Type: Traumatic  Location: Leg  Wound Location Orientation: Right;Lateral  Wound Tera...   Wound Etiology Other   Dressing Status Old drainage noted   Dressing/Treatment Alginate;ABD  (Drawtex)   Offloading for Diabetic Foot Ulcers Offloading not required     Discharge Condition: Stable    Pain: 0    Ambulatory Status: Walker    Discharge Destination: home    Transportation:car    Accompanied by: SELF    Discharge instructions reviewed with SELF and copy or written instructions have been provided. All questions/concerns have been addressed at this time.

## 2024-07-03 ENCOUNTER — HOSPITAL ENCOUNTER (INPATIENT)
Facility: HOSPITAL | Age: 59
LOS: 15 days | Discharge: HOME | DRG: 560 | End: 2024-07-18
Attending: STUDENT IN AN ORGANIZED HEALTH CARE EDUCATION/TRAINING PROGRAM | Admitting: INTERNAL MEDICINE
Payer: MEDICARE

## 2024-07-03 ENCOUNTER — APPOINTMENT (OUTPATIENT)
Facility: HOSPITAL | Age: 59
DRG: 560 | End: 2024-07-03
Payer: MEDICARE

## 2024-07-03 ENCOUNTER — HOSPITAL ENCOUNTER (OUTPATIENT)
Facility: HOSPITAL | Age: 59
Discharge: HOME | End: 2024-07-03
Attending: FAMILY MEDICINE
Payer: MEDICARE

## 2024-07-03 VITALS — DIASTOLIC BLOOD PRESSURE: 65 MMHG | TEMPERATURE: 98.1 F | RESPIRATION RATE: 18 BRPM | SYSTOLIC BLOOD PRESSURE: 121 MMHG

## 2024-07-03 DIAGNOSIS — L03.115 CELLULITIS OF RIGHT LOWER EXTREMITY: ICD-10-CM

## 2024-07-03 DIAGNOSIS — R60.0 EDEMA OF LOWER EXTREMITY: Primary | ICD-10-CM

## 2024-07-03 DIAGNOSIS — L97.212 NON-PRESSURE CHRONIC ULCER OF RIGHT CALF WITH FAT LAYER EXPOSED (HCC): Primary | ICD-10-CM

## 2024-07-03 LAB
ALBUMIN SERPL-MCNC: 3.5 G/DL (ref 3.5–5)
ALBUMIN/GLOB SERPL: 0.7 (ref 1.1–2.2)
ALP SERPL-CCNC: 74 U/L (ref 45–117)
ALT SERPL-CCNC: 26 U/L (ref 12–78)
ANION GAP SERPL CALC-SCNC: 5 MMOL/L (ref 5–15)
AST SERPL-CCNC: 22 U/L (ref 15–37)
BASOPHILS # BLD: 0.1 K/UL (ref 0–0.1)
BASOPHILS NFR BLD: 1 % (ref 0–1)
BILIRUB SERPL-MCNC: 0.8 MG/DL (ref 0.2–1)
BUN SERPL-MCNC: 17 MG/DL (ref 6–20)
BUN/CREAT SERPL: 8 (ref 12–20)
CALCIUM SERPL-MCNC: 9.8 MG/DL (ref 8.5–10.1)
CHLORIDE SERPL-SCNC: 103 MMOL/L (ref 97–108)
CO2 SERPL-SCNC: 26 MMOL/L (ref 21–32)
COMMENT:: NORMAL
CREAT SERPL-MCNC: 2.04 MG/DL (ref 0.7–1.3)
DIFFERENTIAL METHOD BLD: ABNORMAL
EOSINOPHIL # BLD: 0.1 K/UL (ref 0–0.4)
EOSINOPHIL NFR BLD: 1 % (ref 0–7)
ERYTHROCYTE [DISTWIDTH] IN BLOOD BY AUTOMATED COUNT: 15.5 % (ref 11.5–14.5)
EST. AVERAGE GLUCOSE BLD GHB EST-MCNC: 111 MG/DL
FERRITIN SERPL-MCNC: 1556 NG/ML (ref 26–388)
FOLATE SERPL-MCNC: 43.3 NG/ML (ref 5–21)
GLOBULIN SER CALC-MCNC: 5.2 G/DL (ref 2–4)
GLUCOSE BLD STRIP.AUTO-MCNC: 102 MG/DL (ref 65–117)
GLUCOSE BLD STRIP.AUTO-MCNC: 109 MG/DL (ref 65–117)
GLUCOSE SERPL-MCNC: 99 MG/DL (ref 65–100)
HBA1C MFR BLD: 5.5 % (ref 4–5.6)
HCT VFR BLD AUTO: 28.6 % (ref 36.6–50.3)
HGB BLD-MCNC: 9.2 G/DL (ref 12.1–17)
IMM GRANULOCYTES # BLD AUTO: 0.1 K/UL (ref 0–0.04)
IMM GRANULOCYTES NFR BLD AUTO: 1 % (ref 0–0.5)
INR PPP: 3.7 (ref 0.9–1.1)
IRON SATN MFR SERPL: 13 % (ref 20–50)
IRON SERPL-MCNC: 35 UG/DL (ref 35–150)
LACTATE SERPL-SCNC: 1.2 MMOL/L (ref 0.4–2)
LYMPHOCYTES # BLD: 1.5 K/UL (ref 0.8–3.5)
LYMPHOCYTES NFR BLD: 12 % (ref 12–49)
MAGNESIUM SERPL-MCNC: 2.1 MG/DL (ref 1.6–2.4)
MCH RBC QN AUTO: 28 PG (ref 26–34)
MCHC RBC AUTO-ENTMCNC: 32.2 G/DL (ref 30–36.5)
MCV RBC AUTO: 86.9 FL (ref 80–99)
MONOCYTES # BLD: 1.2 K/UL (ref 0–1)
MONOCYTES NFR BLD: 10 % (ref 5–13)
NEUTS SEG # BLD: 9.2 K/UL (ref 1.8–8)
NEUTS SEG NFR BLD: 75 % (ref 32–75)
NRBC # BLD: 0 K/UL (ref 0–0.01)
NRBC BLD-RTO: 0 PER 100 WBC
PHOSPHATE SERPL-MCNC: 2.7 MG/DL (ref 2.6–4.7)
PLATELET # BLD AUTO: 423 K/UL (ref 150–400)
PMV BLD AUTO: 9 FL (ref 8.9–12.9)
POTASSIUM SERPL-SCNC: 3.6 MMOL/L (ref 3.5–5.1)
PROT SERPL-MCNC: 8.7 G/DL (ref 6.4–8.2)
PROTHROMBIN TIME: 35.3 SEC (ref 9–11.1)
RBC # BLD AUTO: 3.29 M/UL (ref 4.1–5.7)
SERVICE CMNT-IMP: NORMAL
SERVICE CMNT-IMP: NORMAL
SODIUM SERPL-SCNC: 134 MMOL/L (ref 136–145)
SPECIMEN HOLD: NORMAL
TIBC SERPL-MCNC: 260 UG/DL (ref 250–450)
TSH SERPL DL<=0.05 MIU/L-ACNC: 12 UIU/ML (ref 0.36–3.74)
VIT B12 SERPL-MCNC: 878 PG/ML (ref 193–986)
WBC # BLD AUTO: 12.2 K/UL (ref 4.1–11.1)

## 2024-07-03 PROCEDURE — 84443 ASSAY THYROID STIM HORMONE: CPT

## 2024-07-03 PROCEDURE — 85025 COMPLETE CBC W/AUTO DIFF WBC: CPT

## 2024-07-03 PROCEDURE — 2060000000 HC ICU INTERMEDIATE R&B

## 2024-07-03 PROCEDURE — 83550 IRON BINDING TEST: CPT

## 2024-07-03 PROCEDURE — 83735 ASSAY OF MAGNESIUM: CPT

## 2024-07-03 PROCEDURE — 83036 HEMOGLOBIN GLYCOSYLATED A1C: CPT

## 2024-07-03 PROCEDURE — 83605 ASSAY OF LACTIC ACID: CPT

## 2024-07-03 PROCEDURE — 2580000003 HC RX 258: Performed by: STUDENT IN AN ORGANIZED HEALTH CARE EDUCATION/TRAINING PROGRAM

## 2024-07-03 PROCEDURE — 99213 OFFICE O/P EST LOW 20 MIN: CPT

## 2024-07-03 PROCEDURE — 87040 BLOOD CULTURE FOR BACTERIA: CPT

## 2024-07-03 PROCEDURE — 80053 COMPREHEN METABOLIC PANEL: CPT

## 2024-07-03 PROCEDURE — 96374 THER/PROPH/DIAG INJ IV PUSH: CPT

## 2024-07-03 PROCEDURE — 85610 PROTHROMBIN TIME: CPT

## 2024-07-03 PROCEDURE — 99285 EMERGENCY DEPT VISIT HI MDM: CPT

## 2024-07-03 PROCEDURE — 71045 X-RAY EXAM CHEST 1 VIEW: CPT

## 2024-07-03 PROCEDURE — 84100 ASSAY OF PHOSPHORUS: CPT

## 2024-07-03 PROCEDURE — 96375 TX/PRO/DX INJ NEW DRUG ADDON: CPT

## 2024-07-03 PROCEDURE — 6360000002 HC RX W HCPCS: Performed by: STUDENT IN AN ORGANIZED HEALTH CARE EDUCATION/TRAINING PROGRAM

## 2024-07-03 PROCEDURE — 83540 ASSAY OF IRON: CPT

## 2024-07-03 PROCEDURE — 6370000000 HC RX 637 (ALT 250 FOR IP): Performed by: INTERNAL MEDICINE

## 2024-07-03 PROCEDURE — 82746 ASSAY OF FOLIC ACID SERUM: CPT

## 2024-07-03 PROCEDURE — 82728 ASSAY OF FERRITIN: CPT

## 2024-07-03 PROCEDURE — 6370000000 HC RX 637 (ALT 250 FOR IP): Performed by: STUDENT IN AN ORGANIZED HEALTH CARE EDUCATION/TRAINING PROGRAM

## 2024-07-03 PROCEDURE — 82607 VITAMIN B-12: CPT

## 2024-07-03 PROCEDURE — 36415 COLL VENOUS BLD VENIPUNCTURE: CPT

## 2024-07-03 PROCEDURE — 93005 ELECTROCARDIOGRAM TRACING: CPT | Performed by: INTERNAL MEDICINE

## 2024-07-03 PROCEDURE — 82962 GLUCOSE BLOOD TEST: CPT

## 2024-07-03 RX ORDER — MIDODRINE HYDROCHLORIDE 5 MG/1
5 TABLET ORAL
Status: DISCONTINUED | OUTPATIENT
Start: 2024-07-03 | End: 2024-07-06

## 2024-07-03 RX ORDER — AMIODARONE HYDROCHLORIDE 200 MG/1
400 TABLET ORAL DAILY
Status: DISCONTINUED | OUTPATIENT
Start: 2024-07-04 | End: 2024-07-18 | Stop reason: HOSPADM

## 2024-07-03 RX ORDER — FOLIC ACID 1 MG/1
1 TABLET ORAL DAILY
Status: DISCONTINUED | OUTPATIENT
Start: 2024-07-03 | End: 2024-07-18 | Stop reason: HOSPADM

## 2024-07-03 RX ORDER — SODIUM CHLORIDE 0.9 % (FLUSH) 0.9 %
5-40 SYRINGE (ML) INJECTION PRN
Status: DISCONTINUED | OUTPATIENT
Start: 2024-07-03 | End: 2024-07-18 | Stop reason: HOSPADM

## 2024-07-03 RX ORDER — ACETAMINOPHEN 650 MG/1
650 SUPPOSITORY RECTAL EVERY 6 HOURS PRN
Status: DISCONTINUED | OUTPATIENT
Start: 2024-07-03 | End: 2024-07-18 | Stop reason: HOSPADM

## 2024-07-03 RX ORDER — LIDOCAINE HYDROCHLORIDE 40 MG/ML
SOLUTION TOPICAL ONCE
OUTPATIENT
Start: 2024-07-03 | End: 2024-07-03

## 2024-07-03 RX ORDER — GINSENG 100 MG
CAPSULE ORAL ONCE
OUTPATIENT
Start: 2024-07-03 | End: 2024-07-03

## 2024-07-03 RX ORDER — OXYCODONE HYDROCHLORIDE AND ACETAMINOPHEN 5; 325 MG/1; MG/1
1 TABLET ORAL
Status: COMPLETED | OUTPATIENT
Start: 2024-07-03 | End: 2024-07-03

## 2024-07-03 RX ORDER — HYDROMORPHONE HYDROCHLORIDE 1 MG/ML
1 INJECTION, SOLUTION INTRAMUSCULAR; INTRAVENOUS; SUBCUTANEOUS EVERY 4 HOURS PRN
Status: DISCONTINUED | OUTPATIENT
Start: 2024-07-03 | End: 2024-07-18 | Stop reason: HOSPADM

## 2024-07-03 RX ORDER — BETAMETHASONE DIPROPIONATE 0.5 MG/G
CREAM TOPICAL ONCE
OUTPATIENT
Start: 2024-07-03 | End: 2024-07-03

## 2024-07-03 RX ORDER — SODIUM CHLORIDE 0.9 % (FLUSH) 0.9 %
5-40 SYRINGE (ML) INJECTION EVERY 12 HOURS SCHEDULED
Status: DISCONTINUED | OUTPATIENT
Start: 2024-07-03 | End: 2024-07-18 | Stop reason: HOSPADM

## 2024-07-03 RX ORDER — POTASSIUM CHLORIDE 7.45 MG/ML
10 INJECTION INTRAVENOUS PRN
Status: DISCONTINUED | OUTPATIENT
Start: 2024-07-03 | End: 2024-07-18 | Stop reason: HOSPADM

## 2024-07-03 RX ORDER — SODIUM CHLORIDE 9 MG/ML
INJECTION, SOLUTION INTRAVENOUS PRN
Status: DISCONTINUED | OUTPATIENT
Start: 2024-07-03 | End: 2024-07-18 | Stop reason: HOSPADM

## 2024-07-03 RX ORDER — ALBUTEROL SULFATE 2.5 MG/3ML
2.5 SOLUTION RESPIRATORY (INHALATION) EVERY 6 HOURS PRN
Status: DISCONTINUED | OUTPATIENT
Start: 2024-07-03 | End: 2024-07-18 | Stop reason: HOSPADM

## 2024-07-03 RX ORDER — ACETAMINOPHEN 325 MG/1
650 TABLET ORAL EVERY 6 HOURS PRN
Status: DISCONTINUED | OUTPATIENT
Start: 2024-07-03 | End: 2024-07-18 | Stop reason: HOSPADM

## 2024-07-03 RX ORDER — BACITRACIN ZINC AND POLYMYXIN B SULFATE 500; 1000 [USP'U]/G; [USP'U]/G
OINTMENT TOPICAL ONCE
OUTPATIENT
Start: 2024-07-03 | End: 2024-07-03

## 2024-07-03 RX ORDER — TRIAMCINOLONE ACETONIDE 1 MG/G
OINTMENT TOPICAL ONCE
OUTPATIENT
Start: 2024-07-03 | End: 2024-07-03

## 2024-07-03 RX ORDER — INSULIN LISPRO 100 [IU]/ML
0-8 INJECTION, SOLUTION INTRAVENOUS; SUBCUTANEOUS
Status: DISCONTINUED | OUTPATIENT
Start: 2024-07-03 | End: 2024-07-18 | Stop reason: HOSPADM

## 2024-07-03 RX ORDER — ROSUVASTATIN CALCIUM 40 MG/1
40 TABLET, COATED ORAL DAILY
Status: DISCONTINUED | OUTPATIENT
Start: 2024-07-03 | End: 2024-07-18 | Stop reason: HOSPADM

## 2024-07-03 RX ORDER — ONDANSETRON 2 MG/ML
4 INJECTION INTRAMUSCULAR; INTRAVENOUS EVERY 6 HOURS PRN
Status: DISCONTINUED | OUTPATIENT
Start: 2024-07-03 | End: 2024-07-18 | Stop reason: HOSPADM

## 2024-07-03 RX ORDER — MAGNESIUM SULFATE IN WATER 40 MG/ML
2000 INJECTION, SOLUTION INTRAVENOUS PRN
Status: DISCONTINUED | OUTPATIENT
Start: 2024-07-03 | End: 2024-07-18 | Stop reason: HOSPADM

## 2024-07-03 RX ORDER — POLYETHYLENE GLYCOL 3350 17 G/17G
17 POWDER, FOR SOLUTION ORAL DAILY PRN
Status: DISCONTINUED | OUTPATIENT
Start: 2024-07-03 | End: 2024-07-18 | Stop reason: HOSPADM

## 2024-07-03 RX ORDER — LIDOCAINE 50 MG/G
OINTMENT TOPICAL ONCE
OUTPATIENT
Start: 2024-07-03 | End: 2024-07-03

## 2024-07-03 RX ORDER — HYDROCODONE BITARTRATE AND ACETAMINOPHEN 5; 325 MG/1; MG/1
1 TABLET ORAL EVERY 6 HOURS PRN
Status: DISCONTINUED | OUTPATIENT
Start: 2024-07-03 | End: 2024-07-18 | Stop reason: HOSPADM

## 2024-07-03 RX ORDER — ONDANSETRON 4 MG/1
4 TABLET, ORALLY DISINTEGRATING ORAL EVERY 8 HOURS PRN
Status: DISCONTINUED | OUTPATIENT
Start: 2024-07-03 | End: 2024-07-18 | Stop reason: HOSPADM

## 2024-07-03 RX ORDER — POTASSIUM CHLORIDE 750 MG/1
40 TABLET, FILM COATED, EXTENDED RELEASE ORAL PRN
Status: DISCONTINUED | OUTPATIENT
Start: 2024-07-03 | End: 2024-07-18 | Stop reason: HOSPADM

## 2024-07-03 RX ORDER — IBUPROFEN 200 MG
TABLET ORAL ONCE
OUTPATIENT
Start: 2024-07-03 | End: 2024-07-03

## 2024-07-03 RX ORDER — SODIUM CHLOR/HYPOCHLOROUS ACID 0.033 %
SOLUTION, IRRIGATION IRRIGATION ONCE
OUTPATIENT
Start: 2024-07-03 | End: 2024-07-03

## 2024-07-03 RX ORDER — ALBUTEROL SULFATE 90 UG/1
2 AEROSOL, METERED RESPIRATORY (INHALATION) EVERY 6 HOURS PRN
Status: DISCONTINUED | OUTPATIENT
Start: 2024-07-03 | End: 2024-07-03 | Stop reason: SDUPTHER

## 2024-07-03 RX ORDER — LIDOCAINE HYDROCHLORIDE 20 MG/ML
JELLY TOPICAL ONCE
OUTPATIENT
Start: 2024-07-03 | End: 2024-07-03

## 2024-07-03 RX ORDER — GENTAMICIN SULFATE 1 MG/G
OINTMENT TOPICAL ONCE
OUTPATIENT
Start: 2024-07-03 | End: 2024-07-03

## 2024-07-03 RX ORDER — INSULIN LISPRO 100 [IU]/ML
0-4 INJECTION, SOLUTION INTRAVENOUS; SUBCUTANEOUS NIGHTLY
Status: DISCONTINUED | OUTPATIENT
Start: 2024-07-03 | End: 2024-07-18 | Stop reason: HOSPADM

## 2024-07-03 RX ORDER — PANTOPRAZOLE SODIUM 40 MG/1
40 TABLET, DELAYED RELEASE ORAL
Status: DISCONTINUED | OUTPATIENT
Start: 2024-07-04 | End: 2024-07-18 | Stop reason: HOSPADM

## 2024-07-03 RX ORDER — CLOBETASOL PROPIONATE 0.5 MG/G
OINTMENT TOPICAL ONCE
OUTPATIENT
Start: 2024-07-03 | End: 2024-07-03

## 2024-07-03 RX ORDER — LIDOCAINE 40 MG/G
CREAM TOPICAL ONCE
OUTPATIENT
Start: 2024-07-03 | End: 2024-07-03

## 2024-07-03 RX ORDER — FERROUS SULFATE 325(65) MG
325 TABLET ORAL
Status: DISCONTINUED | OUTPATIENT
Start: 2024-07-04 | End: 2024-07-18 | Stop reason: HOSPADM

## 2024-07-03 RX ADMIN — WATER 2000 MG: 1 INJECTION INTRAMUSCULAR; INTRAVENOUS; SUBCUTANEOUS at 15:10

## 2024-07-03 RX ADMIN — OXYCODONE HYDROCHLORIDE AND ACETAMINOPHEN 1 TABLET: 5; 325 TABLET ORAL at 15:46

## 2024-07-03 RX ADMIN — VANCOMYCIN HYDROCHLORIDE 2500 MG: 10 INJECTION, POWDER, LYOPHILIZED, FOR SOLUTION INTRAVENOUS at 15:48

## 2024-07-03 RX ADMIN — MIDODRINE HYDROCHLORIDE 5 MG: 5 TABLET ORAL at 18:49

## 2024-07-03 ASSESSMENT — PAIN DESCRIPTION - ORIENTATION: ORIENTATION: RIGHT

## 2024-07-03 ASSESSMENT — PAIN - FUNCTIONAL ASSESSMENT
PAIN_FUNCTIONAL_ASSESSMENT: PREVENTS OR INTERFERES SOME ACTIVE ACTIVITIES AND ADLS
PAIN_FUNCTIONAL_ASSESSMENT: 0-10

## 2024-07-03 ASSESSMENT — PAIN DESCRIPTION - LOCATION: LOCATION: KNEE;LEG

## 2024-07-03 ASSESSMENT — PAIN DESCRIPTION - DESCRIPTORS: DESCRIPTORS: ACHING;DISCOMFORT;SORE

## 2024-07-03 ASSESSMENT — PAIN SCALES - GENERAL: PAINLEVEL_OUTOF10: 2

## 2024-07-03 ASSESSMENT — PAIN DESCRIPTION - ONSET: ONSET: ON-GOING

## 2024-07-03 ASSESSMENT — PAIN DESCRIPTION - PAIN TYPE: TYPE: ACUTE PAIN

## 2024-07-03 ASSESSMENT — PAIN DESCRIPTION - FREQUENCY: FREQUENCY: CONTINUOUS

## 2024-07-03 NOTE — PATIENT INSTRUCTIONS
Discharge Instructions for  [facility]  [address] [address]  Phone: [phone] Fax: [phone]    NAME:  Javier Ugalde  YOB: 1965  MEDICAL RECORD NUMBER:  [id number]  DATE:  July 3, 2024    WOUND CARE ORDERS:  Right lower leg wound-Cleanse with baby shampoo. Rinse and pat dry. Apply 2 layers of alginate to wound bed. Pack deeper area of wound with alginate.Cover with drawtex and abd. Secure with coban wrap. Apply double layer tubigrip size G.     Keep leg elevated.      Home Health to change 2 times a week on Monday and Friday.    Go to Emergency Department for IV antibiotics.     Activity:  [x] Elevate leg(s) above the level of the heart when sitting.  [x] Avoid prolonged standing in one place.   [x] Do no get dressing/wrap wet.       Dietary:  [] Diet as tolerated      [x] Diabetic Diet            [] Increase Protein: examples (Meat, cheese, eggs, greek yogurt, fish, nuts)          [] Teo Therapeutic Nutrition Powder  [] Other:  [] Dial a Dietician : Call [email or web address] at [phone] enter code (249) when prompted. M-F 9am-5pm EST.      Return Appointment:  [x] Return Appointment: With Dr. Romi Rosales in 1 Week  [] Nurse Visit  [] Ordered tests:      Electronically signed Oneida Polanco RN on 7/3/2024 at 10:19 AM     Wound Care Center Information: Should you experience any significant changes in your wound(s) or have questions about your wound care, please contact the [facility] at MONDAY - FRIDAY 8:00 am - 4:30.  If you need help with your wound outside these hours and cannot wait until we are again available, contact your PCP or go to the hospital emergency room.     PLEASE NOTE: IF YOU ARE UNABLE TO OBTAIN WOUND SUPPLIES, CONTINUE TO USE THE SUPPLIES YOU HAVE AVAILABLE UNTIL YOU ARE ABLE TO REACH US. IT IS MOST IMPORTANT TO KEEP THE WOUND COVERED AT ALL TIMES.     Physician Signature:_______________________    Date:

## 2024-07-03 NOTE — ED NOTES
ED TO INPATIENT SBAR HANDOFF    Patient Name: Javier Ugalde   :  1965  58 y.o.   MRN:  [id number]  ED Room #:  ER22/22  Family/Caregiver Present no       Situation  Code Status: Full Code     Allergies: Patient has no known allergies.  Weight: Patient Vitals for the past 96 hrs (Last 3 readings):   Weight   24 1159 (!) 145.8 kg (321 lb 6.9 oz)     Arrived from: home  Chief Complaint:   Chief Complaint   Patient presents with    Wound Infection     Hospital Problem/Diagnosis:  Principal Problem:    Cellulitis of right leg  Resolved Problems:    * No resolved hospital problems. *    Imaging:   XR CHEST PORTABLE    (Results Pending)     Abnormal labs:   Abnormal Labs Reviewed   CBC WITH AUTO DIFFERENTIAL - Abnormal; Notable for the following components:       Result Value    WBC 12.2 (*)     RBC 3.29 (*)     Hemoglobin 9.2 (*)     Hematocrit 28.6 (*)     RDW 15.5 (*)     Platelets 423 (*)     Immature Granulocytes % 1 (*)     Neutrophils Absolute 9.2 (*)     Monocytes Absolute 1.2 (*)     Immature Granulocytes Absolute 0.1 (*)     All other components within normal limits   COMPREHENSIVE METABOLIC PANEL - Abnormal; Notable for the following components:    Sodium 134 (*)     Creatinine 2.04 (*)     BUN/Creatinine Ratio 8 (*)     Est, Glom Filt Rate 38 (*)     Total Protein 8.7 (*)     Globulin 5.2 (*)     Albumin/Globulin Ratio 0.7 (*)     All other components within normal limits     Abnormal Assessment Findings: swelling and wounds to right leg    SAFETY    Mobility: no current mobility problem   ED Fall Risk: Presents to emergency department  because of falls (Syncope, seizure, or loss of consciousness): No, Age > 70: No, Altered Mental Status, Intoxication with alcohol or substance confusion (Disorientation, impaired judgment, poor safety awaremess, or inability to follow instructions): No, Impaired Mobility: Ambulates or transfers with assistive devices or assistance; Unable to ambulate or transer.:

## 2024-07-03 NOTE — PROGRESS NOTES
Pharmacist Note - Warfarin Dosing  Consult provided for this 58 y.o.male to manage warfarin for Aortic valve replacement    INR Goal: 2 - 3    Home regimen/ tablet size: 5 mg Tuesday/Thursday, 7.5 mg Rest of week    Drugs that may increase INR: Amiodarone  Drugs that may decrease INR: None  Other current anticoagulants/ drugs that may increase bleeding risk: None  Risk factors: None  Daily INR ordered through: x99    Recent Labs     07/03/24  1220   HGB 9.2*   INR 3.7*     Date               INR                  Dose  7/3  3.7  HELD                                                                                Assessment/ Plan:  Will hold warfarin given elevated INR    Pharmacy will continue to monitor daily and adjust therapy as indicated.  Please contact the pharmacist at [phone] for outpatient recommendations if needed.

## 2024-07-03 NOTE — FLOWSHEET NOTE
07/03/24 1111   Wound 07/03/24 Thigh Distal;Right;Lateral cluster   Date First Assessed/Time First Assessed: 07/03/24 1028   Present on Original Admission: No  Wound Approximate Age at First Assessment (Weeks): 1 weeks  Location: Thigh  Wound Location Orientation: Distal;Right;Lateral  Wound Description (Comments): cl...   Dressing Status New dressing applied   Dressing/Treatment Alginate;Coban/self-adherent bandages  (drawtex, tubigrip)   Offloading for Diabetic Foot Ulcers Offloading not required   Wound 06/19/24 Leg Right;Lateral Cluster   Date First Assessed/Time First Assessed: 06/19/24 1428   Present on Original Admission: Yes  Wound Approximate Age at First Assessment (Weeks): 4 weeks  Primary Wound Type: Traumatic  Location: Leg  Wound Location Orientation: Right;Lateral  Wound Tera...   Dressing Status New dressing applied   Dressing/Treatment Alginate;Coban/self-adherent bandages  (drawtex, tubigrip)   Offloading for Diabetic Foot Ulcers Offloading not required       
Length (cm) 7.6 cm   Wound Width (cm) 12.2 cm   Wound Depth (cm) 0.4 cm   Wound Surface Area (cm^2) 92.72 cm^2   Change in Wound Size % (l*w) 3.17   Wound Volume (cm^3) 37.088 cm^3   Wound Healing % 3   Wound Assessment Eschar dry;[place]/red;Slough   Drainage Amount Large (50-75% saturated)   Drainage Description Serosanguinous   Odor None   Emely-wound Assessment Hyperpigmented;Dry/flaky   Margins Defined edges   Wound Thickness Description not for Pressure Injury Full thickness   Pain Assessment   Pain Assessment None - Denies Pain     /65   Temp 98.1 °F (36.7 °C) (Temporal)   Resp 18

## 2024-07-03 NOTE — ED PROVIDER NOTES
[facility] EMERGENCY DEP  EMERGENCY DEPARTMENT ENCOUNTER      Pt Name: Javier Ugalde  MRN: [id number]  Birthdate 1965  Date of evaluation: 7/3/2024  Provider: Love Camilo DO    CHIEF COMPLAINT       Chief Complaint   Patient presents with    Wound Infection         HISTORY OF PRESENT ILLNESS    HPI    Javier Ugalde is a 58 y.o. male with a history significant for hypertension, hyperlipidemia, diabetes, CKD, aortic valve replacement, history of pacemaker currently with a LifeVest who presents to the emergency department for evaluation of a wound.  Patient notes on Friday he developed a wound to his right lateral thigh.  Endorses redness, swelling and tracking to the anterior leg.  Also endorses some pain with movement.  No trauma or injury.  He has chronic swelling to the knee, denies any knee pain and has full range of motion of the knee.    Nursing Notes were reviewed.    REVIEW OF SYSTEMS       Review of Systems   Constitutional:  Negative for fever.   Gastrointestinal:  Negative for vomiting.   Musculoskeletal:  Positive for arthralgias.   Skin:  Positive for wound.   Neurological:  Negative for speech difficulty.           PAST MEDICAL HISTORY     Past Medical History:   Diagnosis Date    Arthritis     Atrial fib/flutter, transient (HCC)     Cellulitis     Chronic pain     COVID-19 vaccine series completed 4/2/21, 5/7/21    MODERNA    Diabetes (HCC)     Elevated hemoglobin A1c 06/07/2021    6.4    Hyperlipidemia     Hypertension     Hypertension complicating diabetes (HCC)          SURGICAL HISTORY       Past Surgical History:   Procedure Laterality Date    BUNIONECTOMY Right 1985    BUNIONECTOMY Left 1983    CT BIOPSY RENAL  6/3/2024    CT BIOPSY RENAL 6/3/2024 [facility] RAD CT    ORTHOPEDIC SURGERY Right 1985    FOOT SURGERY TO CURRECT DEFORMITY    TOTAL KNEE ARTHROPLASTY           CURRENT MEDICATIONS       Previous Medications    ALBUTEROL SULFATE HFA (PROVENTIL HFA) 108 (90 BASE) MCG/ACT INHALER

## 2024-07-03 NOTE — H&P
History and Physical    Date of Service:  7/3/2024  Primary Care Provider: Brielle Muller MD  Source of information: The patient on review of EMR    Chief Complaint: Wound Infection      History of Presenting Illness:   Javier Ugalde is a 58 y.o. male with past medical history significant for type 2 diabetes, aortic valve replacement with mechanical valve, hypertension, status post pacemaker insertion, dyslipidemia, CKD, ventricular tachycardia s/p LifeVest, dyslipidemia presented at the emergency room with right leg swelling and redness.  Patient was admitted to this hospital last month, was admitted and treated for right lower extremity cellulitis, completed 5 days of cefepime and was then discharged home on doxycycline and the patient finished the course of this antibiotic.  He was seen at wound care clinic today, patient wound is progressively getting worse despite being on oral antibiotic, she was sent to the emergency room from wound care clinic for evaluation for admission.  Patient reported subjective fever without chills or rigors at room.  Patient was started on cefepime and vancomycin and referred to the hospitalist service for admission.     REVIEW OF SYSTEMS:  REVIEW OF SYSTEMS:  HEAD, EYES, EARS, NOSE AND THROAT:  No headache.  No dizziness.  No blurring of vision.  No photophobia.  RESPIRATORY SYSTEM:  No shortness of breath.  No cough.  No hemoptysis.  CARDIOVASCULAR SYSTEM:  No chest pain.  No orthopnea.  No palpitations.  GASTROINTESTINAL SYSTEM:  No nausea or vomiting.  No diarrhea.  No constipation.  GENITOURINARY SYSTEM:  No dysuria, no urgency, and no frequency.     All other systems are reviewed and they are negative.        Past Medical History:   Diagnosis Date    Arthritis     Atrial fib/flutter, transient (HCC)     Cellulitis     Chronic pain     COVID-19 vaccine series completed 4/2/21, 5/7/21    MODERNA    Diabetes (HCC)     Elevated hemoglobin A1c 06/07/2021    6.4

## 2024-07-03 NOTE — PROGRESS NOTES
Wound Center  Progress Note / Procedure Note      Chief Complaint:  Javier Ugalde is a 58 y.o.  male  with R leg lateral wound of >1 months duration.      Assessment/Plan     58 y.o. male with DM2- controlled, s/p leg cellulitis    -R lower leg lateral chronic ulcer.  Full thickness  Original area looks improved  Slough/granular  Necessitates debridement  for wound healing and to prevent/heal infection  Ulcer needs debridement- see note below    -cellulitis  Lower leg swelling improved, BUT  NOW spread to knee and thigh- laterally warm, swelling, indurated  Adv going to the ER- has been on cipro for two weeks  Recommend few days of iv abx  Spoke to ER nurse Camilla- to send patient over    -Leg edema/venous insufficiency  Discussed:  Compression  Elevation  Continue PT     -chronic warfarin use (AVR, Afib)  INR 1.6 (6/17/24)        Following discussed with patient / sister  Needs :  Serial debridement- prn    Good local wound care  Dressing: alginatex2, drawtex, ABD pad  Frequency : three times a week    Tubi x 2, then coban to secure in place proximally    Continue Home Health    -Edema management    -Nutrition optimization    -Good Diabetic control    -Good offloading    Patient/  understood and agrees with plan. Questions answered.    Serial visits and serial debridements also discussed.    Follow up with me in 1 week  TIME:  total time for today's E/M service used for level of service is documented below.    This time includes physician non-face-to-face service time visit on the date of service and includes    Preparing to see the patient (eg, review of tests)  Obtaining and/or reviewing separately obtained history  Performing a medically necessary appropriate examination and/or evaluation  Counseling and educating the patient/family/caregiver  Ordering medications, tests, or procedures  Referring and communicating with other health care professionals as needed  Documenting clinical information in the electronic or

## 2024-07-03 NOTE — ED TRIAGE NOTES
Pt arrives in wheelchair to triage rojo c/o R wound infection below knee cap. Per pt - pts wound care nurse advised him to be seen in the ER for further evaul and antibiotics. Pt arrives with life vest    Pt has hx of cellulitis - diagnosed about a month ago

## 2024-07-03 NOTE — ED PROVIDER NOTES
Type 2, CKD, AVR, wearing life vest.  Referred by wound care due to ongoing cellulitis/swelling R leg.  Was hospitalized here last month for cellulitis and shortness of breath for 2 weeks.  Failed doxy outpatient, last pill today.      12:02 PM  I have evaluated the patient as the Provider in Rapid Medical Evaluation (RME). I have reviewed his vital signs and the triage nurse assessment. I have talked with the patient and any available family and advised that I am the provider in triage and have ordered the appropriate study to initiate their work up based on the clinical presentation during my assessment. I have advised that the patient will be accommodated in the Main ED as soon as possible. I have also requested to contact the triage nurse or myself immediately if the patient experiences any changes in their condition during this brief waiting period.  PERFECTO Sifuentes, Trevon PRESLEY PA-C  07/03/24 1200

## 2024-07-03 NOTE — PROGRESS NOTES
Pharmacist Note - Vancomycin Dosing    Consult provided for this 58 y.o. male for indication of SSTI - .  Antibiotic regimen(s): Cefepime, Vancomycin  Patient on vancomycin PTA? NO     Recent Labs     24  1220   WBC 12.2*   CREATININE 2.04*   BUN 17     Frequency of BMP: Daily CMP x1 tomorrow () BMP x 3 ordered to start   Height: 195.6 cm  Weight: 145.8 kg  Est CrCl: 64 ml/min; UO: - ml/kg/hr  Temp (24hrs), Av.1 °F (36.7 °C), Min:98 °F (36.7 °C), Max:98.1 °F (36.7 °C)    Cultures:  7/3: Blood Culture x 1 - NG<24h PRELIMINARY  7/3: Blood Culture x 2 - NG<24h PRELIMINARY    MRSA Swab ordered (if applicable)? N/A    The plan below is expected to result in a target range of AUC/BLACK 400-600    Therapy will be initiated with a loading dose of 2500 mg IV x 1 to be followed by a maintenance dose of 1500 mg IV every 21 hours.  Pharmacy to follow patient daily and order levels / make dose adjustments as appropriate.        *Vancomycin has been dosed used Bayesian kinetics software to target an AUC/BLACK of 400-600, which provides adequate exposure for an assumed infection due to MRSA with an BLACK of 1 or less while reducing the risk of nephrotoxicity as seen with traditional trough based dosing goals.

## 2024-07-04 ENCOUNTER — APPOINTMENT (OUTPATIENT)
Facility: HOSPITAL | Age: 59
DRG: 560 | End: 2024-07-04
Payer: MEDICARE

## 2024-07-04 PROBLEM — E11.9 TYPE 2 DIABETES MELLITUS WITHOUT COMPLICATION (HCC): Status: ACTIVE | Noted: 2024-07-04

## 2024-07-04 LAB
ALBUMIN SERPL-MCNC: 2.6 G/DL (ref 3.5–5)
ALBUMIN/GLOB SERPL: 0.6 (ref 1.1–2.2)
ALP SERPL-CCNC: 56 U/L (ref 45–117)
ALT SERPL-CCNC: 18 U/L (ref 12–78)
AMPHET UR QL SCN: NEGATIVE
ANION GAP SERPL CALC-SCNC: 5 MMOL/L (ref 5–15)
APPEARANCE UR: CLEAR
AST SERPL-CCNC: 22 U/L (ref 15–37)
BACTERIA URNS QL MICRO: NEGATIVE /HPF
BARBITURATES UR QL SCN: NEGATIVE
BASOPHILS # BLD: 0.1 K/UL (ref 0–0.1)
BASOPHILS NFR BLD: 1 % (ref 0–1)
BENZODIAZ UR QL: NEGATIVE
BILIRUB SERPL-MCNC: 0.6 MG/DL (ref 0.2–1)
BILIRUB UR QL: NEGATIVE
BUN SERPL-MCNC: 18 MG/DL (ref 6–20)
BUN/CREAT SERPL: 10 (ref 12–20)
CALCIUM SERPL-MCNC: 9.2 MG/DL (ref 8.5–10.1)
CANNABINOIDS UR QL SCN: NEGATIVE
CHLORIDE SERPL-SCNC: 106 MMOL/L (ref 97–108)
CO2 SERPL-SCNC: 24 MMOL/L (ref 21–32)
COCAINE UR QL SCN: NEGATIVE
COLOR UR: ABNORMAL
CREAT SERPL-MCNC: 1.78 MG/DL (ref 0.7–1.3)
DIFFERENTIAL METHOD BLD: ABNORMAL
EKG ATRIAL RATE: 67 BPM
EKG DIAGNOSIS: NORMAL
EKG P AXIS: 49 DEGREES
EKG P-R INTERVAL: 196 MS
EKG Q-T INTERVAL: 510 MS
EKG QRS DURATION: 148 MS
EKG QTC CALCULATION (BAZETT): 538 MS
EKG R AXIS: -39 DEGREES
EKG T AXIS: 124 DEGREES
EKG VENTRICULAR RATE: 67 BPM
EOSINOPHIL # BLD: 0.2 K/UL (ref 0–0.4)
EOSINOPHIL NFR BLD: 2 % (ref 0–7)
EPITH CASTS URNS QL MICRO: ABNORMAL /LPF
ERYTHROCYTE [DISTWIDTH] IN BLOOD BY AUTOMATED COUNT: 15.5 % (ref 11.5–14.5)
GLOBULIN SER CALC-MCNC: 4.4 G/DL (ref 2–4)
GLUCOSE BLD STRIP.AUTO-MCNC: 103 MG/DL (ref 65–117)
GLUCOSE BLD STRIP.AUTO-MCNC: 119 MG/DL (ref 65–117)
GLUCOSE BLD STRIP.AUTO-MCNC: 132 MG/DL (ref 65–117)
GLUCOSE BLD STRIP.AUTO-MCNC: 86 MG/DL (ref 65–117)
GLUCOSE SERPL-MCNC: 96 MG/DL (ref 65–100)
GLUCOSE UR STRIP.AUTO-MCNC: NEGATIVE MG/DL
HCT VFR BLD AUTO: 23.2 % (ref 36.6–50.3)
HGB BLD-MCNC: 7.4 G/DL (ref 12.1–17)
HGB UR QL STRIP: NEGATIVE
HYALINE CASTS URNS QL MICRO: ABNORMAL /LPF (ref 0–5)
IMM GRANULOCYTES # BLD AUTO: 0.1 K/UL (ref 0–0.04)
IMM GRANULOCYTES NFR BLD AUTO: 1 % (ref 0–0.5)
INR PPP: 3.6 (ref 0.9–1.1)
KETONES UR QL STRIP.AUTO: NEGATIVE MG/DL
LEUKOCYTE ESTERASE UR QL STRIP.AUTO: NEGATIVE
LYMPHOCYTES # BLD: 0.9 K/UL (ref 0.8–3.5)
LYMPHOCYTES NFR BLD: 10 % (ref 12–49)
Lab: NORMAL
MCH RBC QN AUTO: 27.5 PG (ref 26–34)
MCHC RBC AUTO-ENTMCNC: 31.9 G/DL (ref 30–36.5)
MCV RBC AUTO: 86.2 FL (ref 80–99)
METHADONE UR QL: NEGATIVE
MONOCYTES # BLD: 1 K/UL (ref 0–1)
MONOCYTES NFR BLD: 11 % (ref 5–13)
NEUTS SEG # BLD: 6.8 K/UL (ref 1.8–8)
NEUTS SEG NFR BLD: 75 % (ref 32–75)
NITRITE UR QL STRIP.AUTO: NEGATIVE
NRBC # BLD: 0 K/UL (ref 0–0.01)
NRBC BLD-RTO: 0 PER 100 WBC
OPIATES UR QL: NEGATIVE
PCP UR QL: NEGATIVE
PH UR STRIP: 5.5 (ref 5–8)
PLATELET # BLD AUTO: 320 K/UL (ref 150–400)
PMV BLD AUTO: 9.3 FL (ref 8.9–12.9)
POTASSIUM SERPL-SCNC: 3.5 MMOL/L (ref 3.5–5.1)
PROT SERPL-MCNC: 7 G/DL (ref 6.4–8.2)
PROT UR STRIP-MCNC: 30 MG/DL
PROTHROMBIN TIME: 35.1 SEC (ref 9–11.1)
RBC # BLD AUTO: 2.69 M/UL (ref 4.1–5.7)
RBC #/AREA URNS HPF: ABNORMAL /HPF (ref 0–5)
SERVICE CMNT-IMP: ABNORMAL
SERVICE CMNT-IMP: ABNORMAL
SERVICE CMNT-IMP: NORMAL
SERVICE CMNT-IMP: NORMAL
SODIUM SERPL-SCNC: 135 MMOL/L (ref 136–145)
SP GR UR REFRACTOMETRY: 1.02 (ref 1–1.03)
URINE CULTURE IF INDICATED: ABNORMAL
UROBILINOGEN UR QL STRIP.AUTO: 0.2 EU/DL (ref 0.2–1)
WBC # BLD AUTO: 9 K/UL (ref 4.1–11.1)
WBC URNS QL MICRO: ABNORMAL /HPF (ref 0–4)

## 2024-07-04 PROCEDURE — 82962 GLUCOSE BLOOD TEST: CPT

## 2024-07-04 PROCEDURE — 36415 COLL VENOUS BLD VENIPUNCTURE: CPT

## 2024-07-04 PROCEDURE — 85025 COMPLETE CBC W/AUTO DIFF WBC: CPT

## 2024-07-04 PROCEDURE — 87070 CULTURE OTHR SPECIMN AEROBIC: CPT

## 2024-07-04 PROCEDURE — 6360000002 HC RX W HCPCS: Performed by: HOSPITALIST

## 2024-07-04 PROCEDURE — 6370000000 HC RX 637 (ALT 250 FOR IP): Performed by: INTERNAL MEDICINE

## 2024-07-04 PROCEDURE — 85610 PROTHROMBIN TIME: CPT

## 2024-07-04 PROCEDURE — 6360000002 HC RX W HCPCS: Performed by: INTERNAL MEDICINE

## 2024-07-04 PROCEDURE — 99222 1ST HOSP IP/OBS MODERATE 55: CPT | Performed by: INTERNAL MEDICINE

## 2024-07-04 PROCEDURE — 6370000000 HC RX 637 (ALT 250 FOR IP): Performed by: HOSPITALIST

## 2024-07-04 PROCEDURE — 2580000003 HC RX 258

## 2024-07-04 PROCEDURE — 6360000002 HC RX W HCPCS

## 2024-07-04 PROCEDURE — 2060000000 HC ICU INTERMEDIATE R&B

## 2024-07-04 PROCEDURE — 73700 CT LOWER EXTREMITY W/O DYE: CPT

## 2024-07-04 PROCEDURE — 2580000003 HC RX 258: Performed by: INTERNAL MEDICINE

## 2024-07-04 PROCEDURE — P9047 ALBUMIN (HUMAN), 25%, 50ML: HCPCS | Performed by: HOSPITALIST

## 2024-07-04 PROCEDURE — 81001 URINALYSIS AUTO W/SCOPE: CPT

## 2024-07-04 PROCEDURE — 87205 SMEAR GRAM STAIN: CPT

## 2024-07-04 PROCEDURE — 80053 COMPREHEN METABOLIC PANEL: CPT

## 2024-07-04 PROCEDURE — 80307 DRUG TEST PRSMV CHEM ANLYZR: CPT

## 2024-07-04 RX ORDER — SENNOSIDES A AND B 8.6 MG/1
1 TABLET, FILM COATED ORAL NIGHTLY PRN
Status: DISCONTINUED | OUTPATIENT
Start: 2024-07-04 | End: 2024-07-18 | Stop reason: HOSPADM

## 2024-07-04 RX ORDER — ALBUMIN (HUMAN) 12.5 G/50ML
25 SOLUTION INTRAVENOUS ONCE
Status: COMPLETED | OUTPATIENT
Start: 2024-07-04 | End: 2024-07-04

## 2024-07-04 RX ADMIN — FERROUS SULFATE TAB 325 MG (65 MG ELEMENTAL FE) 325 MG: 325 (65 FE) TAB at 06:15

## 2024-07-04 RX ADMIN — MIDODRINE HYDROCHLORIDE 5 MG: 5 TABLET ORAL at 18:49

## 2024-07-04 RX ADMIN — POLYETHYLENE GLYCOL 3350 17 G: 17 POWDER, FOR SOLUTION ORAL at 20:38

## 2024-07-04 RX ADMIN — CEFEPIME 2000 MG: 2 INJECTION, POWDER, FOR SOLUTION INTRAVENOUS at 18:49

## 2024-07-04 RX ADMIN — AMIODARONE HYDROCHLORIDE 400 MG: 200 TABLET ORAL at 08:37

## 2024-07-04 RX ADMIN — Medication 1 MG: at 08:38

## 2024-07-04 RX ADMIN — POTASSIUM CHLORIDE 40 MEQ: 750 TABLET, FILM COATED, EXTENDED RELEASE ORAL at 19:27

## 2024-07-04 RX ADMIN — SENNOSIDES 8.6 MG: 8.6 TABLET, FILM COATED ORAL at 20:38

## 2024-07-04 RX ADMIN — PANTOPRAZOLE SODIUM 40 MG: 40 TABLET, DELAYED RELEASE ORAL at 06:15

## 2024-07-04 RX ADMIN — MIDODRINE HYDROCHLORIDE 5 MG: 5 TABLET ORAL at 12:34

## 2024-07-04 RX ADMIN — HYDROCODONE BITARTRATE AND ACETAMINOPHEN 1 TABLET: 5; 325 TABLET ORAL at 12:34

## 2024-07-04 RX ADMIN — ALBUMIN (HUMAN) 25 G: 0.25 INJECTION, SOLUTION INTRAVENOUS at 08:41

## 2024-07-04 RX ADMIN — CEFEPIME 2000 MG: 2 INJECTION, POWDER, FOR SOLUTION INTRAVENOUS at 05:33

## 2024-07-04 RX ADMIN — ROSUVASTATIN 40 MG: 40 TABLET, FILM COATED ORAL at 08:38

## 2024-07-04 RX ADMIN — VANCOMYCIN HYDROCHLORIDE 1500 MG: 1.5 INJECTION, POWDER, LYOPHILIZED, FOR SOLUTION INTRAVENOUS at 16:51

## 2024-07-04 RX ADMIN — HYDROCODONE BITARTRATE AND ACETAMINOPHEN 1 TABLET: 5; 325 TABLET ORAL at 20:59

## 2024-07-04 RX ADMIN — MIDODRINE HYDROCHLORIDE 5 MG: 5 TABLET ORAL at 08:37

## 2024-07-04 RX ADMIN — SODIUM CHLORIDE, PRESERVATIVE FREE 10 ML: 5 INJECTION INTRAVENOUS at 08:38

## 2024-07-04 RX ADMIN — SODIUM CHLORIDE, PRESERVATIVE FREE 10 ML: 5 INJECTION INTRAVENOUS at 20:38

## 2024-07-04 ASSESSMENT — PAIN DESCRIPTION - LOCATION
LOCATION: LEG

## 2024-07-04 ASSESSMENT — PAIN SCALES - GENERAL
PAINLEVEL_OUTOF10: 3
PAINLEVEL_OUTOF10: 3
PAINLEVEL_OUTOF10: 1
PAINLEVEL_OUTOF10: 0
PAINLEVEL_OUTOF10: 6
PAINLEVEL_OUTOF10: 3

## 2024-07-04 ASSESSMENT — PAIN DESCRIPTION - ORIENTATION
ORIENTATION: RIGHT

## 2024-07-04 ASSESSMENT — PAIN DESCRIPTION - DESCRIPTORS
DESCRIPTORS: ACHING
DESCRIPTORS: ACHING;DISCOMFORT
DESCRIPTORS: ACHING

## 2024-07-04 NOTE — PLAN OF CARE
Problem: Discharge Planning  Goal: Discharge to home or other facility with appropriate resources  Outcome: Progressing     Problem: Pain  Goal: Verbalizes/displays adequate comfort level or baseline comfort level  Outcome: Progressing     Problem: Skin/Tissue Integrity - Adult  Goal: Skin integrity remains intact  Outcome: Progressing  Flowsheets (Taken 7/4/2024 9256)  Skin Integrity Remains Intact: Monitor for areas of redness and/or skin breakdown

## 2024-07-04 NOTE — PROGRESS NOTES
Pharmacist Note - Warfarin Dosing  Consult provided for this 58 y.o.male to manage warfarin for Aortic valve replacement    INR Goal: 2 - 3    Home regimen/ tablet size: 5 mg Tuesday/Thursday, 7.5 mg Rest of week    Drugs that may increase INR: Amiodarone  Drugs that may decrease INR: None  Other current anticoagulants/ drugs that may increase bleeding risk: None  Risk factors: None  Daily INR ordered through: 10/10/24    Recent Labs     07/03/24  1220 07/04/24  0611   HGB 9.2* 7.4*   INR 3.7* 3.6*     Date               INR                  Dose  7/3  3.7  HELD   7/4  3.6  HELD                                                                                Assessment/ Plan:  Will hold warfarin given elevated INR    Pharmacy will continue to monitor daily and adjust therapy as indicated.  Please contact the pharmacist at [phone] for outpatient recommendations if needed.

## 2024-07-04 NOTE — CONSULTS
Infectious Disease Consult    Impression/Plan     58 y.o. male with past medical Hx of type 2 diabetes, aortic valve replacement with mechanical valve, hypertension, status post pacemaker insertion, dyslipidemia, CKD, ventricular tachycardia s/p LifeVest, dyslipidemia who was admitted for RLE wound. Infectious disease services was consulted to assist with antibiotic management of infected right leg wound below knee and popliteal area.     Right lower extremity cellulitis/ulcer  Right popliteal area wound  Recent admission with improvement on cefepime  On outpatient Tx with ciprofloxacin  New right popliteal wound with serous drainage, indurated, fluctuance    Continue vancomycin, cefepime. Monitor renal function closely  CT of right leg w/o contrast to eval for abscess  Follow wound cx   Wound care consult    Leukocytosis  Due to above  Blood Cx pending  Follow CBC    DM II. A1c 5.7, controlled                       Anti-infectives:   Vancomycin  Cefepime    Subjective:   Date of Consultation:  July 4, 2024  Date of Admission: 7/3/2024   Referring Physician: Devonte Cast    Patient is a 58 y.o. male with past medical Hx of type 2 diabetes, aortic valve replacement with mechanical valve, hypertension, status post pacemaker insertion, dyslipidemia, CKD, ventricular tachycardia s/p LifeVest, dyslipidemia, right total knee replacement 2021 who was admitted for RLE wound. Infectious disease services was consulted to assist with antibiotic management of infected right leg wound popliteal area.     Patient was recently admitted early June for right lower extremity cellulitis after failing outpatient doxycycline.  Evaluated by vascular for lower extremity edema deemed likely chronic, no role for any reperfusion efforts.  Completed 5 days of cefepime.  Followed by wound care clinic Dr. Rosales who started him on ciprofloxacin on 6/19.  Reports right leg wound below knee is improving. Noted new hard area on right leg  popliteal area on 7/17 which started to drain on Monday. Seen by Dr. Rosales on 7/3 and was sent to emergency room.  Reports yellow drainage from popliteal wound. Reports 'fever' of 99 without chills. Denies nausea, vomiting. Reports right leg swelling started shortly after being placed on losartan which has been discontinued.   ED workup with WBC 12.2, afebrile.  Blood cultures pending.  Empirically started on vancomycin and cefepime.  Wound care consult pending.    On examination, right lower extremity edema, skin warm touch. Right popliteal area with fluctuance and indurated, serous drainage. Right anterior/lateral below the right knee wound with moist red wound bed. Wound cx obtained from popliteal wound.       Patient Active Problem List   Diagnosis    Localized osteoarthritis of right knee    Primary osteoarthritis of right knee    Coagulation defect, unspecified (HCC)    Hypertension complicating diabetes ([facility])    Controlled diabetes mellitus type 2 with complications ([facility])    Severe obesity (BMI 35.0-39.9) with comorbidity ([facility])    Edema    Edema of lower extremity    Proteinuria    Cellulitis of right lower extremity    Stage 3b chronic kidney disease ([facility])    Bandemia    History of total bilateral knee replacement (TKR)    S/P AVR (aortic valve replacement)    NIKKIE (acute kidney injury) ([facility])    Stage 3a chronic kidney disease ([facility])    Non-pressure chronic ulcer of right calf with fat layer exposed ([facility])    Cellulitis of right leg     Past Medical History:   Diagnosis Date    Arthritis     Atrial fib/flutter, transient (HCC)     Cellulitis     Chronic pain     COVID-19 vaccine series completed 4/2/21, 5/7/21    MODERNA    Diabetes (HCC)     Elevated hemoglobin A1c 06/07/2021    6.4    Hyperlipidemia     Hypertension     Hypertension complicating diabetes (HCC)       Family History   Problem Relation Age of Onset    Heart Disease Father     No Known Problems Sister     Diabetes Mother     Anesth Problems Neg

## 2024-07-04 NOTE — PROGRESS NOTES
[facility] Adult  Hospitalist Group                                                                                          Hospitalist Progress Note  Devonte Cast MD  Answering service: [phone] OR [phone] from in house phone        Date of Service:  2024  NAME:  Javier Ugalde  :  1965  MRN:  [id number]       Admission Summary:     \"Javier Ugalde is a 58 y.o. male with past medical history significant for type 2 diabetes, aortic valve replacement with mechanical valve, hypertension, status post pacemaker insertion, dyslipidemia, CKD, ventricular tachycardia s/p LifeVest, dyslipidemia presented at the emergency room with right leg swelling and redness.  Patient was admitted to this hospital last month, was admitted and treated for right lower extremity cellulitis, completed 5 days of cefepime and was then discharged home on doxycycline and the patient finished the course of this antibiotic.  He was seen at wound care clinic today, patient wound is progressively getting worse despite being on oral antibiotic, she was sent to the emergency room from wound care clinic for evaluation for admission.  Patient reported subjective fever without chills or rigors at room.  Patient was started on cefepime and vancomycin and referred to the hospitalist service for admission.\"       Interval history / Subjective:     Patient seen and examined at the bedside.  He stated that his right leg swelling the same.  No fever, chills or sweating.  No left-sided chest pain or difficulty of breathing.     Assessment & Plan:     Right leg swelling,  multiple infected wound with surrounding cellulitis   -Leukocytosis improving, afebrile,  -On IV cefepime, IV vancomycin, pharmacist on board for dosing  -Follow-up on blood culture  -wound care nurse consulted  -Altered right leg doppler study and CT of right extremity  -ID on board    Hx Aortic Valve replacement with mechanical valve  -Stable,  -On  imaging, and have provided independent interpretation of the same.     Labs:     Recent Labs     07/03/24  1220 07/04/24  0611   WBC 12.2* 9.0   HGB 9.2* 7.4*   HCT 28.6* 23.2*   * 320     Recent Labs     07/03/24  1220 07/04/24  0611   * 135*   K 3.6 3.5    106   CO2 26 24   BUN 17 18   MG 2.1  --    PHOS 2.7  --      Recent Labs     07/03/24  1220 07/04/24  0611   ALT 26 18   GLOB 5.2* 4.4*     Recent Labs     07/03/24  1220 07/04/24  0611   INR 3.7* 3.6*      Recent Labs     07/03/24  1220   TIBC 260      No results found for: \"RBCF\"   No results for input(s): \"PH\", \"PCO2\", \"PO2\" in the last 72 hours.  No results for input(s): \"CPK\" in the last 72 hours.    Invalid input(s): \"CPKMB\", \"CKNDX\", \"TROIQ\"  Lab Results   Component Value Date/Time    CHOL 303 12/01/2023 09:42 AM    CHOL 346 06/03/2022 09:12 AM    HDL 45 12/01/2023 09:42 AM     12/01/2023 09:42 AM     No results found for: \"GLUCPOC\"  [unfilled]    Notes reviewed from all clinical/nonclinical/nursing services involved in patient's clinical care. Care coordination discussions were held with appropriate clinical/nonclinical/ nursing providers based on care coordination needs.         Patients current active Medications were reviewed, considered, added and adjusted based on the clinical condition today.      Home Medications were reconciled to the best of my ability given all available resources at the time of admission. Route is PO if not otherwise noted.      Admission Status:[id number]:::1}      Medications Reviewed:     Current Facility-Administered Medications   Medication Dose Route Frequency    warfarin to be HELD 7/4 given elevated INR 3.6 (goal 2-3)   Other Once    [START ON 7/5/2024] vancomycin random level 7/5 with AM labs   Other Once    amiodarone (CORDARONE) tablet 400 mg  400 mg Oral Daily    ferrous sulfate (IRON 325) tablet 325 mg  325 mg Oral QAM AC    folic acid (FOLVITE) tablet 1 mg  1 mg Oral Daily

## 2024-07-05 ENCOUNTER — APPOINTMENT (OUTPATIENT)
Facility: HOSPITAL | Age: 59
DRG: 560 | End: 2024-07-05
Attending: HOSPITALIST
Payer: MEDICARE

## 2024-07-05 LAB
ALBUMIN SERPL-MCNC: 2.7 G/DL (ref 3.5–5)
ALBUMIN/GLOB SERPL: 0.6 (ref 1.1–2.2)
ALP SERPL-CCNC: 71 U/L (ref 45–117)
ALT SERPL-CCNC: 23 U/L (ref 12–78)
ANION GAP SERPL CALC-SCNC: 5 MMOL/L (ref 5–15)
AST SERPL-CCNC: 35 U/L (ref 15–37)
BACTERIA SPEC CULT: NORMAL
BACTERIA SPEC CULT: NORMAL
BASOPHILS # BLD: 0.1 K/UL (ref 0–0.1)
BASOPHILS NFR BLD: 1 % (ref 0–1)
BILIRUB SERPL-MCNC: 0.5 MG/DL (ref 0.2–1)
BUN SERPL-MCNC: 17 MG/DL (ref 6–20)
BUN/CREAT SERPL: 11 (ref 12–20)
CALCIUM SERPL-MCNC: 9.3 MG/DL (ref 8.5–10.1)
CHLORIDE SERPL-SCNC: 107 MMOL/L (ref 97–108)
CO2 SERPL-SCNC: 25 MMOL/L (ref 21–32)
CREAT SERPL-MCNC: 1.6 MG/DL (ref 0.7–1.3)
DIFFERENTIAL METHOD BLD: ABNORMAL
ECHO BSA: 2.81 M2
EOSINOPHIL # BLD: 0.3 K/UL (ref 0–0.4)
EOSINOPHIL NFR BLD: 4 % (ref 0–7)
ERYTHROCYTE [DISTWIDTH] IN BLOOD BY AUTOMATED COUNT: 15.2 % (ref 11.5–14.5)
GLOBULIN SER CALC-MCNC: 4.5 G/DL (ref 2–4)
GLUCOSE BLD STRIP.AUTO-MCNC: 112 MG/DL (ref 65–117)
GLUCOSE BLD STRIP.AUTO-MCNC: 83 MG/DL (ref 65–117)
GLUCOSE BLD STRIP.AUTO-MCNC: 97 MG/DL (ref 65–117)
GLUCOSE BLD STRIP.AUTO-MCNC: 99 MG/DL (ref 65–117)
GLUCOSE SERPL-MCNC: 105 MG/DL (ref 65–100)
HCT VFR BLD AUTO: 24.5 % (ref 36.6–50.3)
HGB BLD-MCNC: 7.9 G/DL (ref 12.1–17)
IMM GRANULOCYTES # BLD AUTO: 0 K/UL (ref 0–0.04)
IMM GRANULOCYTES NFR BLD AUTO: 1 % (ref 0–0.5)
INR PPP: 2.6 (ref 0.9–1.1)
LYMPHOCYTES # BLD: 1.5 K/UL (ref 0.8–3.5)
LYMPHOCYTES NFR BLD: 20 % (ref 12–49)
MCH RBC QN AUTO: 27.7 PG (ref 26–34)
MCHC RBC AUTO-ENTMCNC: 32.2 G/DL (ref 30–36.5)
MCV RBC AUTO: 86 FL (ref 80–99)
MONOCYTES # BLD: 0.9 K/UL (ref 0–1)
MONOCYTES NFR BLD: 12 % (ref 5–13)
NEUTS SEG # BLD: 4.8 K/UL (ref 1.8–8)
NEUTS SEG NFR BLD: 62 % (ref 32–75)
NRBC # BLD: 0 K/UL (ref 0–0.01)
NRBC BLD-RTO: 0 PER 100 WBC
PLATELET # BLD AUTO: 333 K/UL (ref 150–400)
PMV BLD AUTO: 9.2 FL (ref 8.9–12.9)
POTASSIUM SERPL-SCNC: 4 MMOL/L (ref 3.5–5.1)
PROT SERPL-MCNC: 7.2 G/DL (ref 6.4–8.2)
PROTHROMBIN TIME: 25.8 SEC (ref 9–11.1)
RBC # BLD AUTO: 2.85 M/UL (ref 4.1–5.7)
SERVICE CMNT-IMP: NORMAL
SODIUM SERPL-SCNC: 137 MMOL/L (ref 136–145)
VANCOMYCIN SERPL-MCNC: 16.4 UG/ML
WBC # BLD AUTO: 7.5 K/UL (ref 4.1–11.1)

## 2024-07-05 PROCEDURE — 82962 GLUCOSE BLOOD TEST: CPT

## 2024-07-05 PROCEDURE — 85025 COMPLETE CBC W/AUTO DIFF WBC: CPT

## 2024-07-05 PROCEDURE — 93971 EXTREMITY STUDY: CPT

## 2024-07-05 PROCEDURE — 6360000002 HC RX W HCPCS: Performed by: INTERNAL MEDICINE

## 2024-07-05 PROCEDURE — 6370000000 HC RX 637 (ALT 250 FOR IP): Performed by: HOSPITALIST

## 2024-07-05 PROCEDURE — 2580000003 HC RX 258: Performed by: INTERNAL MEDICINE

## 2024-07-05 PROCEDURE — 80053 COMPREHEN METABOLIC PANEL: CPT

## 2024-07-05 PROCEDURE — 99232 SBSQ HOSP IP/OBS MODERATE 35: CPT | Performed by: INTERNAL MEDICINE

## 2024-07-05 PROCEDURE — 36415 COLL VENOUS BLD VENIPUNCTURE: CPT

## 2024-07-05 PROCEDURE — 6360000002 HC RX W HCPCS

## 2024-07-05 PROCEDURE — 2060000000 HC ICU INTERMEDIATE R&B

## 2024-07-05 PROCEDURE — 80202 ASSAY OF VANCOMYCIN: CPT

## 2024-07-05 PROCEDURE — 2580000003 HC RX 258

## 2024-07-05 PROCEDURE — 85610 PROTHROMBIN TIME: CPT

## 2024-07-05 PROCEDURE — 6370000000 HC RX 637 (ALT 250 FOR IP): Performed by: INTERNAL MEDICINE

## 2024-07-05 RX ORDER — WARFARIN SODIUM 5 MG/1
5 TABLET ORAL
Status: COMPLETED | OUTPATIENT
Start: 2024-07-05 | End: 2024-07-05

## 2024-07-05 RX ADMIN — MIDODRINE HYDROCHLORIDE 5 MG: 5 TABLET ORAL at 13:07

## 2024-07-05 RX ADMIN — CEFEPIME 2000 MG: 2 INJECTION, POWDER, FOR SOLUTION INTRAVENOUS at 05:31

## 2024-07-05 RX ADMIN — MIDODRINE HYDROCHLORIDE 5 MG: 5 TABLET ORAL at 08:44

## 2024-07-05 RX ADMIN — AMIODARONE HYDROCHLORIDE 400 MG: 200 TABLET ORAL at 08:44

## 2024-07-05 RX ADMIN — FERROUS SULFATE TAB 325 MG (65 MG ELEMENTAL FE) 325 MG: 325 (65 FE) TAB at 06:29

## 2024-07-05 RX ADMIN — VANCOMYCIN HYDROCHLORIDE 1500 MG: 1.5 INJECTION, POWDER, LYOPHILIZED, FOR SOLUTION INTRAVENOUS at 18:06

## 2024-07-05 RX ADMIN — ROSUVASTATIN 40 MG: 40 TABLET, FILM COATED ORAL at 08:44

## 2024-07-05 RX ADMIN — HYDROCODONE BITARTRATE AND ACETAMINOPHEN 1 TABLET: 5; 325 TABLET ORAL at 18:08

## 2024-07-05 RX ADMIN — WARFARIN SODIUM 5 MG: 5 TABLET ORAL at 18:08

## 2024-07-05 RX ADMIN — CEFEPIME 2000 MG: 2 INJECTION, POWDER, FOR SOLUTION INTRAVENOUS at 18:07

## 2024-07-05 RX ADMIN — Medication 1 MG: at 08:44

## 2024-07-05 RX ADMIN — SODIUM CHLORIDE, PRESERVATIVE FREE 10 ML: 5 INJECTION INTRAVENOUS at 21:59

## 2024-07-05 RX ADMIN — PANTOPRAZOLE SODIUM 40 MG: 40 TABLET, DELAYED RELEASE ORAL at 06:29

## 2024-07-05 RX ADMIN — MIDODRINE HYDROCHLORIDE 5 MG: 5 TABLET ORAL at 18:08

## 2024-07-05 RX ADMIN — HYDROCODONE BITARTRATE AND ACETAMINOPHEN 1 TABLET: 5; 325 TABLET ORAL at 08:44

## 2024-07-05 RX ADMIN — SODIUM CHLORIDE, PRESERVATIVE FREE 10 ML: 5 INJECTION INTRAVENOUS at 08:46

## 2024-07-05 ASSESSMENT — PAIN DESCRIPTION - ORIENTATION
ORIENTATION: RIGHT
ORIENTATION: RIGHT

## 2024-07-05 ASSESSMENT — PAIN SCALES - GENERAL
PAINLEVEL_OUTOF10: 0
PAINLEVEL_OUTOF10: 5
PAINLEVEL_OUTOF10: 6
PAINLEVEL_OUTOF10: 7
PAINLEVEL_OUTOF10: 2

## 2024-07-05 ASSESSMENT — PAIN DESCRIPTION - PAIN TYPE: TYPE: ACUTE PAIN

## 2024-07-05 ASSESSMENT — PAIN SCALES - WONG BAKER
WONGBAKER_NUMERICALRESPONSE: NO HURT

## 2024-07-05 ASSESSMENT — PAIN DESCRIPTION - DESCRIPTORS
DESCRIPTORS: ACHING
DESCRIPTORS: ACHING

## 2024-07-05 ASSESSMENT — PAIN DESCRIPTION - LOCATION
LOCATION: LEG
LOCATION: LEG

## 2024-07-05 NOTE — PROGRESS NOTES
Spiritual Care Assessment/Progress Note  [facility]    Name: Javier Ugalde MRN: [id number]    Age: 58 y.o.     Sex: male   Language: English     Date: 7/5/2024            Total Time Calculated: 5 min              Spiritual Assessment begun in [facility] MED SURG  Service Provided For: Patient  Referral/Consult From: Rounding  Encounter Overview/Reason: Initial Encounter    Spiritual beliefs:      [] Involved in a barbara tradition/spiritual practice:      [] Supported by a barbara community:      [] Claims no spiritual orientation:      [] Seeking spiritual identity:           [] Adheres to an individual form of spirituality:      [] Not able to assess:                Identified resources for coping and support system:   Support System: Unknown       [] Prayer                  [] Devotional reading               [] Music                  [] Guided Imagery     [] Pet visits                                        [] Other: (COMMENT)     Specific area/focus of visit   Encounter:    Crisis:    Spiritual/Emotional needs:    Ritual, Rites and Sacraments:    Grief, Loss, and Adjustments:    Ethics/Mediation:    Behavioral Health:    Palliative Care:    Advance Care Planning:           Narrative:     The  attempted a visit. The patient was being assisted by other medical staff.    Ella Yu M.Div., Th.M., M.A.C.E.   Intern  Spiritual Health Services  Paging Service [phone] (DIRK)

## 2024-07-05 NOTE — PROGRESS NOTES
Pharmacist Note - Warfarin Dosing  Consult provided for this 58 y.o.male to manage warfarin for Aortic valve replacement    INR Goal: 2 - 3    Home regimen/ tablet size: 5 mg Tuesday/Thursday, 7.5 mg Rest of week    Drugs that may increase INR: Amiodarone  Drugs that may decrease INR: None  Other current anticoagulants/ drugs that may increase bleeding risk: None  Risk factors: None  Daily INR ordered through: 10/10/24    Recent Labs     07/03/24  1220 07/04/24  0611 07/05/24  0500   HGB 9.2* 7.4* 7.9*   INR 3.7* 3.6* 2.6*     Date               INR                  Dose  7/3  3.7  HELD   7/4  3.6  HELD   7/5  2.6  5 mg                                                                               Assessment/ Plan:  INR within therapeutic range.  Resume warfarin dosing at 5 mg x 1 dose.     Pharmacy will continue to monitor daily and adjust therapy as indicated.  Please contact the pharmacist at [phone] for outpatient recommendations if needed.

## 2024-07-05 NOTE — CONSULTS
ORTHOPEDIC SURGERY CONSULT    Subjective:     Date of Consultation:  July 5, 2024    Javier Ugalde is a 58 y.o. male who is being seen for right knee infection. He denies having any pain in the knee. His right knee was replaced by Dr. Zarate in 2021. He has had increased swelling in the right leg for the last several months. He developed some wounds around the knee and was admitted 5/20/2024 - 6/4/2024 for right leg cellulitis. He's been followed by wound care as an outpatient since then. He has not followed up with Dr. Zarate. He was re-admitted for right leg cellulitis on 7/3/2024. A CT of the right knee 7/4/24 shows \"Total knee arthroplasty with CT findings compatible with loosening/osteolysis. Pseudocapsule thickening and effusion, cannot exclude septic arthritis. Multiple rim-enhancing complex fluid collections about the knee, could reflect juxta articular abscesses.\" Orthopedic surgery has been consulted to rule out septic right knee joint.      Patient Active Problem List    Diagnosis Date Noted    Type 2 diabetes mellitus without complication ([facility]) 07/04/2024    Cellulitis of right leg 07/03/2024    Non-pressure chronic ulcer of right calf with fat layer exposed ([facility]) 06/19/2024    Bandemia 06/03/2024    History of total bilateral knee replacement (TKR) 06/03/2024    S/P AVR (aortic valve replacement) 06/03/2024    NIKKIE (acute kidney injury) ([facility]) 06/03/2024    Stage 3a chronic kidney disease ([facility]) 06/03/2024    Stage 3b chronic kidney disease ([facility]) 05/28/2024    Cellulitis of right lower extremity 05/20/2024    Proteinuria 06/08/2023    Coagulation defect, unspecified ([facility]) 06/01/2023    Hypertension complicating diabetes ([facility]) 06/01/2023    Controlled diabetes mellitus type 2 with complications ([facility]) 06/01/2023    Severe obesity (BMI 35.0-39.9) with comorbidity ([facility]) 06/01/2023    Edema 06/01/2023    Edema of lower extremity 06/01/2023    Localized osteoarthritis of right knee 06/15/2021    Primary  Dr. Zarate  Cannot rule out septic joint without aspirating the knee  Complicating this is the chronic wounds and cellulitis surrounding the knee and we risk potentially seeding the knee with aspiration procedure. Will consult IR for aspiration of the knee   Will follow results of fluid analysis and make further recommendations    KG Rascon  Orthopedic Surgery PA  Orthopedic Trauma Service  [facility]  Today's Date: July 5, 2024

## 2024-07-05 NOTE — CARE COORDINATION
Care Management Initial Assessment       RUR:22%  Readmission? No  1st IM letter given? Yes - 7/4/24 07/05/24 1628   Service Assessment   Patient Orientation Alert and Oriented   Cognition Alert   History Provided By Patient   Primary Caregiver Self   Patient's Healthcare Decision Maker is: Legal Next of Kin  (Simin Lawler, [phone] (sister))   PCP Verified by CM Yes  (Brielle Muller)   Last Visit to PCP Within last 3 months   Can patient return to prior living arrangement Unknown at present   Ability to make needs known: Good   Family able to assist with home care needs: No   Financial Resources Medicaid;Medicare   Social/Functional History   Lives With Alone   Type of Home House     CM met with patient at bedside to introduce self and role. Patient owns a walker, cane, BP cuff, and glucometer. Patient provided CM with new insurance cards, made copies and placed them on the patient's chart, notified registration. Patient was receiving home health services through [facility] and was using the [facility] on [facility]. Patient is able to transport himself to his appointments.       CM will follow patient progress and assist as needed with JASON plan.     Emilee Chatterjee, BSN, RN, ONC, CMSRN  Nurse Care Manager [phone]

## 2024-07-05 NOTE — PLAN OF CARE
Problem: Skin/Tissue Integrity - Adult  Goal: Skin integrity remains intact  Outcome: Progressing     Intervention: Encourage using pillow to support injured knee and protect the skin underneath    Problem: Musculoskeletal - Adult  Goal: Return mobility to safest level of function  Outcome: Progressing

## 2024-07-05 NOTE — PROGRESS NOTES
Infectious Diseases Follow Up    7/5/2024    INFECTIOUS DISEASE Attending:     I agree with the above infectious disease daily progress note in its entirety as authored by and discussed in detail with the nurse practitioner.   I have reviewed pertinent laboratory studies, microbiology cultures, radiologic reports with review of the consultations and progress notes as appropriate.   I agree with today's subjective findings, physical examination, assessment and plan of care as described above and discussed extensively with the nurse practitioner.       Consult Dr. Zarate from orthopedics for I/D, evaluation of underlying right TKA as presentation is highly suspect of prosthetic joint infection.    Needs I/D and send deep cultures.    Continue Vanco/Cefepime for now.    Assessment & Plan:     58 y.o. male with past medical Hx of type 2 diabetes, aortic valve replacement with mechanical valve, hypertension, status post pacemaker insertion, dyslipidemia, CKD, ventricular tachycardia s/p LifeVest, dyslipidemia who was admitted for RLE wound. Infectious disease services was consulted to assist with antibiotic management of infected right leg wound below knee and popliteal area.      Right lower extremity cellulitis/ulcer  Multiple right posterior knee fluid collection  Hx of right knee replacement 2021  Recent admission with improvement on cefepime  On outpatient Tx with ciprofloxacin  New right popliteal wound with serous drainage, indurated, fluctuance  Wound cx NGTD  CT multiple fluid collections posterior knee with largest 40.1 cm x 12.1 cm x 3.0 cm      Continue vancomycin, cefepime. Monitor renal function closely  Follow wound cx , follow blood cx  Concern for infected hardware with prosthetic knee/septic arthritis and multiple fluid collections, consulted ortho. Will need I&D for collections  Venous duplex right leg pending    Leukocytosis   resolved     DM II. A1c 5.7, controlled          Subjective:     Patient

## 2024-07-05 NOTE — PROGRESS NOTES
[facility]  Hospitalist Group                                                                                          Hospitalist Progress Note  Devonte Cast MD  Answering service: [phone] OR [phone] from in house phone        Date of Service:  2024  NAME:  Javier Ugalde  :  1965  MRN:  [id number]       Admission Summary:     \"Javier Ugalde is a 58 y.o. male with past medical history significant for type 2 diabetes, aortic valve replacement with mechanical valve, hypertension, status post pacemaker insertion, dyslipidemia, CKD, ventricular tachycardia s/p LifeVest, dyslipidemia presented at the emergency room with right leg swelling and redness.  Patient was admitted to this hospital last month, was admitted and treated for right lower extremity cellulitis, completed 5 days of cefepime and was then discharged home on doxycycline and the patient finished the course of this antibiotic.  He was seen at wound care clinic today, patient wound is progressively getting worse despite being on oral antibiotic, she was sent to the emergency room from wound care clinic for evaluation for admission.  Patient reported subjective fever without chills or rigors at room.  Patient was started on cefepime and vancomycin and referred to the hospitalist service for admission.\"       Interval history / Subjective:     Patient seen and examined at the bedside.  He stated that he feels the same, right leg swelling unchanged.  No fever, chills or sweating.  No left-sided chest pain or difficulty of breathing.     Assessment & Plan:     Right leg swelling, right popliteal and knee area infected wounds with surrounding cellulitis and possible abscess,  Hx of right total knee arthroplasty, with possible right knee septic arthritis   -Leukocytosis improving, afebrile,  -On IV cefepime, IV vancomycin, pharmacist on board for dosing  -Follow-up on blood and wound culture  -continue local wound care  (COUMADIN) tablet 5 mg  5 mg Oral Once    senna (SENOKOT) tablet 8.6 mg  1 tablet Oral Nightly PRN    amiodarone (CORDARONE) tablet 400 mg  400 mg Oral Daily    ferrous sulfate (IRON 325) tablet 325 mg  325 mg Oral QAM AC    folic acid (FOLVITE) tablet 1 mg  1 mg Oral Daily    HYDROcodone-acetaminophen (NORCO) 5-325 MG per tablet 1 tablet  1 tablet Oral Q6H PRN    midodrine (PROAMATINE) tablet 5 mg  5 mg Oral TID WC    pantoprazole (PROTONIX) tablet 40 mg  40 mg Oral QAM AC    rosuvastatin (CRESTOR) tablet 40 mg  40 mg Oral Daily    sodium chloride flush 0.9 % injection 5-40 mL  5-40 mL IntraVENous 2 times per day    sodium chloride flush 0.9 % injection 5-40 mL  5-40 mL IntraVENous PRN    0.9 % sodium chloride infusion   IntraVENous PRN    potassium chloride (KLOR-CON) extended release tablet 40 mEq  40 mEq Oral PRN    Or    potassium bicarb-citric acid (EFFER-K) effervescent tablet 40 mEq  40 mEq Oral PRN    Or    potassium chloride 10 mEq/100 mL IVPB (Peripheral Line)  10 mEq IntraVENous PRN    magnesium sulfate 2000 mg in 50 mL IVPB premix  2,000 mg IntraVENous PRN    ondansetron (ZOFRAN-ODT) disintegrating tablet 4 mg  4 mg Oral Q8H PRN    Or    ondansetron (ZOFRAN) injection 4 mg  4 mg IntraVENous Q6H PRN    polyethylene glycol (GLYCOLAX) packet 17 g  17 g Oral Daily PRN    acetaminophen (TYLENOL) tablet 650 mg  650 mg Oral Q6H PRN    Or    acetaminophen (TYLENOL) suppository 650 mg  650 mg Rectal Q6H PRN    ceFEPIme (MAXIPIME) 2,000 mg in sodium chloride 0.9 % 100 mL IVPB (mini-bag)  2,000 mg IntraVENous Q12H    insulin lispro (HUMALOG,ADMELOG) injection vial 0-8 Units  0-8 Units SubCUTAneous TID WC    insulin lispro (HUMALOG,ADMELOG) injection vial 0-4 Units  0-4 Units SubCUTAneous Nightly    HYDROmorphone HCl PF (DILAUDID) injection 1 mg  1 mg IntraVENous Q4H PRN    Warfarin - Pharmacy to Dose   Other RX Placeholder    vancomycin (VANCOCIN) 1,500 mg in sodium chloride 0.9 % 250 mL IVPB ([id number])  1,500 mg

## 2024-07-05 NOTE — WOUND CARE
Wound Care Note:     New consult placed by physician request for right leg wound    Chart shows:  Admitted for cellulitis of right leg  Past Medical History:   Diagnosis Date    Arthritis     Atrial fib/flutter, transient (HCC)     Cellulitis     Chronic pain     COVID-19 vaccine series completed 4/2/21, 5/7/21    MODERNA    Diabetes (HCC)     Elevated hemoglobin A1c 06/07/2021    6.4    Hyperlipidemia     Hypertension     Hypertension complicating diabetes (HCC)      Wound Culture obtained on 7/4/24.  WBC = 7.5 on 7/5/24  Admitted from home    Assessment:   Patient is A&O x 4, communicative, continent with some assistance needed in repositioning.  Bed: [facility]  Diet: Adult diet regular; 3 carb choices  Patient reports pain with cleaning and probing wounds.  Topical lidocaine applied.        Bilateral heels skin intact and without erythema.  Buttocks and sacral were not assessed.    Palpable DP pulses bilaterally.      1. POA right lateral lower leg wound is an area measuring 6.5 cm x 11 cm x 0.3 cm, wound beds are mostly pink with a small area of dark slough, small serous drainage, wound edges are open, kristyn-wound with edema and hyperpigmentation.  Optifoam Gentle NB AG and roll gauze applied.      2.  POA right posterior thigh wound measures 1 cm x 2 cm x 0.5, wound bed with some pink and superficial slough, wound edges are open, kristyn-wound with edema and hyperpigmentation, large amount of serous with hint of green drainage.  Only able to probe depth, patient unable to tolerate wound to be probed in all directions.  Optifoam Gentle NB AG, 4 x 4's, ABD pad and roll gauze applied.      CT scan  \"Soft tissues: Several periarticular rim-enhancing complex fluid collections as  follows:  *  40.1 cm x 12.1 cm x 3.0 cm collection laterally  *  9.6 cm x 5.6 cm x 2.0 cm collection medially  *  8.6 cm x 6.6 cm x 3.2 cm bilobed collection posterior to the distal

## 2024-07-05 NOTE — PROGRESS NOTES
Pharmacist Note - Vancomycin Dosing  Therapy day 3  Indication: Right leg swelling,  right popliteal and knee infected wounds with surrounding cellulitis   CT of right knee- in process  Current regimen: 1500 mg IV Q24H    Recent Labs     07/03/24  1220 07/04/24  0611 07/05/24  0500   WBC 12.2* 9.0 7.5   CREATININE 2.04* 1.78* 1.60*   BUN 17 18 17       A random vancomycin level of 16.4 mcg/mL was obtained and from this level, the patient's AUC24 is calculated to be 444 with the current regimen.     Goal target range AUC/BLACK 400-500      Plan: Continue current regimen. Pharmacy will continue to monitor this patient daily for changes in clinical status and renal function.      *Random vancomycin levels are used to calculate AUC/BLACK, this level should not be interpreted as a trough. Vancomycin has been dosed using Bayesian kinetics software to target an AUC24:BLACK of 400-600, which provides adequate exposure for as assumed infection due to MRSA with an BLACK of 1 or less while reducing the risk of nephrotoxicity as seen with traditional trough based dosing goals.

## 2024-07-06 LAB
ALBUMIN SERPL-MCNC: 2.9 G/DL (ref 3.5–5)
ALBUMIN/GLOB SERPL: 0.8 (ref 1.1–2.2)
ALP SERPL-CCNC: 67 U/L (ref 45–117)
ALT SERPL-CCNC: 24 U/L (ref 12–78)
ANION GAP SERPL CALC-SCNC: 5 MMOL/L (ref 5–15)
AST SERPL-CCNC: 30 U/L (ref 15–37)
BACTERIA SPEC CULT: NORMAL
BASOPHILS # BLD: 0.1 K/UL (ref 0–0.1)
BASOPHILS NFR BLD: 1 % (ref 0–1)
BILIRUB SERPL-MCNC: 0.5 MG/DL (ref 0.2–1)
BUN SERPL-MCNC: 17 MG/DL (ref 6–20)
BUN/CREAT SERPL: 11 (ref 12–20)
CALCIUM SERPL-MCNC: 9.6 MG/DL (ref 8.5–10.1)
CHLORIDE SERPL-SCNC: 106 MMOL/L (ref 97–108)
CO2 SERPL-SCNC: 26 MMOL/L (ref 21–32)
CREAT SERPL-MCNC: 1.53 MG/DL (ref 0.7–1.3)
DIFFERENTIAL METHOD BLD: ABNORMAL
EOSINOPHIL # BLD: 0.2 K/UL (ref 0–0.4)
EOSINOPHIL NFR BLD: 3 % (ref 0–7)
ERYTHROCYTE [DISTWIDTH] IN BLOOD BY AUTOMATED COUNT: 15.2 % (ref 11.5–14.5)
GLOBULIN SER CALC-MCNC: 3.8 G/DL (ref 2–4)
GLUCOSE BLD STRIP.AUTO-MCNC: 101 MG/DL (ref 65–117)
GLUCOSE BLD STRIP.AUTO-MCNC: 106 MG/DL (ref 65–117)
GLUCOSE BLD STRIP.AUTO-MCNC: 112 MG/DL (ref 65–117)
GLUCOSE BLD STRIP.AUTO-MCNC: 92 MG/DL (ref 65–117)
GLUCOSE SERPL-MCNC: 90 MG/DL (ref 65–100)
GRAM STN SPEC: NORMAL
GRAM STN SPEC: NORMAL
HCT VFR BLD AUTO: 25.8 % (ref 36.6–50.3)
HGB BLD-MCNC: 8.2 G/DL (ref 12.1–17)
IMM GRANULOCYTES # BLD AUTO: 0 K/UL (ref 0–0.04)
IMM GRANULOCYTES NFR BLD AUTO: 0 % (ref 0–0.5)
INR PPP: 2.1 (ref 0.9–1.1)
LYMPHOCYTES # BLD: 1.4 K/UL (ref 0.8–3.5)
LYMPHOCYTES NFR BLD: 19 % (ref 12–49)
MCH RBC QN AUTO: 27.3 PG (ref 26–34)
MCHC RBC AUTO-ENTMCNC: 31.8 G/DL (ref 30–36.5)
MCV RBC AUTO: 86 FL (ref 80–99)
MONOCYTES # BLD: 0.9 K/UL (ref 0–1)
MONOCYTES NFR BLD: 12 % (ref 5–13)
NEUTS SEG # BLD: 4.8 K/UL (ref 1.8–8)
NEUTS SEG NFR BLD: 65 % (ref 32–75)
NRBC # BLD: 0 K/UL (ref 0–0.01)
NRBC BLD-RTO: 0 PER 100 WBC
PLATELET # BLD AUTO: 333 K/UL (ref 150–400)
PMV BLD AUTO: 9 FL (ref 8.9–12.9)
POTASSIUM SERPL-SCNC: 4.2 MMOL/L (ref 3.5–5.1)
PROT SERPL-MCNC: 6.7 G/DL (ref 6.4–8.2)
PROTHROMBIN TIME: 21 SEC (ref 9–11.1)
RBC # BLD AUTO: 3 M/UL (ref 4.1–5.7)
SERVICE CMNT-IMP: NORMAL
SODIUM SERPL-SCNC: 137 MMOL/L (ref 136–145)
WBC # BLD AUTO: 7.4 K/UL (ref 4.1–11.1)

## 2024-07-06 PROCEDURE — 6370000000 HC RX 637 (ALT 250 FOR IP): Performed by: INTERNAL MEDICINE

## 2024-07-06 PROCEDURE — 85610 PROTHROMBIN TIME: CPT

## 2024-07-06 PROCEDURE — 6370000000 HC RX 637 (ALT 250 FOR IP): Performed by: HOSPITALIST

## 2024-07-06 PROCEDURE — 2060000000 HC ICU INTERMEDIATE R&B

## 2024-07-06 PROCEDURE — 6360000002 HC RX W HCPCS

## 2024-07-06 PROCEDURE — 36415 COLL VENOUS BLD VENIPUNCTURE: CPT

## 2024-07-06 PROCEDURE — 82962 GLUCOSE BLOOD TEST: CPT

## 2024-07-06 PROCEDURE — 80053 COMPREHEN METABOLIC PANEL: CPT

## 2024-07-06 PROCEDURE — 6360000002 HC RX W HCPCS: Performed by: INTERNAL MEDICINE

## 2024-07-06 PROCEDURE — 2580000003 HC RX 258

## 2024-07-06 PROCEDURE — 2580000003 HC RX 258: Performed by: INTERNAL MEDICINE

## 2024-07-06 PROCEDURE — 85025 COMPLETE CBC W/AUTO DIFF WBC: CPT

## 2024-07-06 RX ORDER — WARFARIN SODIUM 7.5 MG/1
7.5 TABLET ORAL
Status: COMPLETED | OUTPATIENT
Start: 2024-07-06 | End: 2024-07-06

## 2024-07-06 RX ORDER — MIDODRINE HYDROCHLORIDE 5 MG/1
10 TABLET ORAL
Status: DISCONTINUED | OUTPATIENT
Start: 2024-07-06 | End: 2024-07-11

## 2024-07-06 RX ADMIN — WARFARIN SODIUM 7.5 MG: 7.5 TABLET ORAL at 16:36

## 2024-07-06 RX ADMIN — MIDODRINE HYDROCHLORIDE 10 MG: 5 TABLET ORAL at 13:11

## 2024-07-06 RX ADMIN — ROSUVASTATIN 40 MG: 40 TABLET, FILM COATED ORAL at 10:13

## 2024-07-06 RX ADMIN — SENNOSIDES 8.6 MG: 8.6 TABLET, FILM COATED ORAL at 10:12

## 2024-07-06 RX ADMIN — MIDODRINE HYDROCHLORIDE 10 MG: 5 TABLET ORAL at 10:12

## 2024-07-06 RX ADMIN — HYDROCODONE BITARTRATE AND ACETAMINOPHEN 1 TABLET: 5; 325 TABLET ORAL at 10:12

## 2024-07-06 RX ADMIN — VANCOMYCIN HYDROCHLORIDE 1500 MG: 1.5 INJECTION, POWDER, LYOPHILIZED, FOR SOLUTION INTRAVENOUS at 16:42

## 2024-07-06 RX ADMIN — FERROUS SULFATE TAB 325 MG (65 MG ELEMENTAL FE) 325 MG: 325 (65 FE) TAB at 05:16

## 2024-07-06 RX ADMIN — MIDODRINE HYDROCHLORIDE 10 MG: 5 TABLET ORAL at 16:36

## 2024-07-06 RX ADMIN — CEFEPIME 2000 MG: 2 INJECTION, POWDER, FOR SOLUTION INTRAVENOUS at 05:16

## 2024-07-06 RX ADMIN — AMIODARONE HYDROCHLORIDE 400 MG: 200 TABLET ORAL at 10:12

## 2024-07-06 RX ADMIN — PANTOPRAZOLE SODIUM 40 MG: 40 TABLET, DELAYED RELEASE ORAL at 05:16

## 2024-07-06 RX ADMIN — CEFEPIME 2000 MG: 2 INJECTION, POWDER, FOR SOLUTION INTRAVENOUS at 16:40

## 2024-07-06 RX ADMIN — SODIUM CHLORIDE, PRESERVATIVE FREE 10 ML: 5 INJECTION INTRAVENOUS at 20:24

## 2024-07-06 RX ADMIN — Medication 1 MG: at 10:13

## 2024-07-06 ASSESSMENT — PAIN SCALES - WONG BAKER
WONGBAKER_NUMERICALRESPONSE: NO HURT

## 2024-07-06 ASSESSMENT — PAIN SCALES - GENERAL
PAINLEVEL_OUTOF10: 0
PAINLEVEL_OUTOF10: 0
PAINLEVEL_OUTOF10: 2
PAINLEVEL_OUTOF10: 7
PAINLEVEL_OUTOF10: 2

## 2024-07-06 NOTE — PROGRESS NOTES
[facility] [place] [facility]  Hospitalist Group                                                                                          Hospitalist Progress Note  Devonte Cast MD  Answering service: [phone] OR [phone] from in house phone        Date of Service:  2024  NAME:  Javier Ugalde  :  1965  MRN:  [id number]       Admission Summary:     \"Javier Ugalde is a 58 y.o. male with past medical history significant for type 2 diabetes, aortic valve replacement with mechanical valve, hypertension, status post pacemaker insertion, dyslipidemia, CKD, ventricular tachycardia s/p LifeVest, dyslipidemia presented at the emergency room with right leg swelling and redness.  Patient was admitted to this hospital last month, was admitted and treated for right lower extremity cellulitis, completed 5 days of cefepime and was then discharged home on doxycycline and the patient finished the course of this antibiotic.  He was seen at wound care clinic today, patient wound is progressively getting worse despite being on oral antibiotic, she was sent to the emergency room from wound care clinic for evaluation for admission.  Patient reported subjective fever without chills or rigors at room.  Patient was started on cefepime and vancomycin and referred to the hospitalist service for admission.\"       Interval history / Subjective:     Patient seen and examined at the bedside.  He stated that his right leg swelling unchanged.  No fever, chills or sweating.  No left-sided chest pain or difficulty of breathing.     Assessment & Plan:     Right leg swelling, right popliteal and knee area infected wounds with surrounding cellulitis and possible abscess,  Hx of right total knee arthroplasty, with possible right knee septic arthritis   -Leukocytosis improved, afebrile,  -On IV cefepime, IV vancomycin, pharmacist on board for dosing  -blood and wound culture no growth so far  -continue local wound care per wound care

## 2024-07-06 NOTE — PLAN OF CARE
Problem: Skin/Tissue Integrity - Adult  Goal: Skin integrity remains intact  Outcome: Progressing   Intervention: discuss the importance of keeping good skin hygiene with appropriate dressing changes positioning to increase blood flow the compromised area

## 2024-07-06 NOTE — PROGRESS NOTES
Pharmacist Note - Warfarin Dosing  Consult provided for this 58 y.o.male to manage warfarin for Aortic valve replacement    INR Goal: 2 - 3    Home regimen/ tablet size: 5 mg Tuesday/Thursday, 7.5 mg Rest of week    Drugs that may increase INR: Amiodarone  Drugs that may decrease INR: None  Other current anticoagulants/ drugs that may increase bleeding risk: None  Risk factors: None  Daily INR ordered through: 10/10/24    Recent Labs     07/04/24  0611 07/05/24  0500 07/06/24  0505   HGB 7.4* 7.9* 8.2*   INR 3.6* 2.6* 2.1*     Date               INR                  Dose  7/3  3.7  HELD   7/4  3.6  HELD   7/5  2.6  5 mg  7/6                   2.1                  7.5 mg                                                                               Assessment/ Plan:  Will order 7.5 mg PO x 1 as per home dosing.    Pharmacy will continue to monitor daily and adjust therapy as indicated.  Please contact the pharmacist at [phone] for outpatient recommendations if needed.

## 2024-07-07 LAB
ALBUMIN SERPL-MCNC: 2.8 G/DL (ref 3.5–5)
ALBUMIN/GLOB SERPL: 0.7 (ref 1.1–2.2)
ALP SERPL-CCNC: 67 U/L (ref 45–117)
ALT SERPL-CCNC: 27 U/L (ref 12–78)
ANION GAP SERPL CALC-SCNC: 5 MMOL/L (ref 5–15)
AST SERPL-CCNC: 29 U/L (ref 15–37)
BILIRUB SERPL-MCNC: 0.5 MG/DL (ref 0.2–1)
BUN SERPL-MCNC: 18 MG/DL (ref 6–20)
BUN/CREAT SERPL: 12 (ref 12–20)
CALCIUM SERPL-MCNC: 9.4 MG/DL (ref 8.5–10.1)
CHLORIDE SERPL-SCNC: 105 MMOL/L (ref 97–108)
CO2 SERPL-SCNC: 26 MMOL/L (ref 21–32)
CREAT SERPL-MCNC: 1.56 MG/DL (ref 0.7–1.3)
GLOBULIN SER CALC-MCNC: 3.8 G/DL (ref 2–4)
GLUCOSE BLD STRIP.AUTO-MCNC: 106 MG/DL (ref 65–117)
GLUCOSE BLD STRIP.AUTO-MCNC: 116 MG/DL (ref 65–117)
GLUCOSE BLD STRIP.AUTO-MCNC: 146 MG/DL (ref 65–117)
GLUCOSE BLD STRIP.AUTO-MCNC: 97 MG/DL (ref 65–117)
GLUCOSE SERPL-MCNC: 109 MG/DL (ref 65–100)
INR PPP: 2.1 (ref 0.9–1.1)
POTASSIUM SERPL-SCNC: 3.9 MMOL/L (ref 3.5–5.1)
PROT SERPL-MCNC: 6.6 G/DL (ref 6.4–8.2)
PROTHROMBIN TIME: 21.4 SEC (ref 9–11.1)
SERVICE CMNT-IMP: ABNORMAL
SERVICE CMNT-IMP: NORMAL
SODIUM SERPL-SCNC: 136 MMOL/L (ref 136–145)

## 2024-07-07 PROCEDURE — 6370000000 HC RX 637 (ALT 250 FOR IP): Performed by: HOSPITALIST

## 2024-07-07 PROCEDURE — 80053 COMPREHEN METABOLIC PANEL: CPT

## 2024-07-07 PROCEDURE — 2580000003 HC RX 258: Performed by: INTERNAL MEDICINE

## 2024-07-07 PROCEDURE — 94760 N-INVAS EAR/PLS OXIMETRY 1: CPT

## 2024-07-07 PROCEDURE — 36415 COLL VENOUS BLD VENIPUNCTURE: CPT

## 2024-07-07 PROCEDURE — 6360000002 HC RX W HCPCS

## 2024-07-07 PROCEDURE — 6370000000 HC RX 637 (ALT 250 FOR IP): Performed by: INTERNAL MEDICINE

## 2024-07-07 PROCEDURE — 2060000000 HC ICU INTERMEDIATE R&B

## 2024-07-07 PROCEDURE — 82962 GLUCOSE BLOOD TEST: CPT

## 2024-07-07 PROCEDURE — 6360000002 HC RX W HCPCS: Performed by: INTERNAL MEDICINE

## 2024-07-07 PROCEDURE — 2580000003 HC RX 258

## 2024-07-07 PROCEDURE — 85610 PROTHROMBIN TIME: CPT

## 2024-07-07 RX ORDER — WARFARIN SODIUM 7.5 MG/1
7.5 TABLET ORAL
Status: COMPLETED | OUTPATIENT
Start: 2024-07-07 | End: 2024-07-07

## 2024-07-07 RX ADMIN — ROSUVASTATIN 40 MG: 40 TABLET, FILM COATED ORAL at 08:35

## 2024-07-07 RX ADMIN — MIDODRINE HYDROCHLORIDE 10 MG: 5 TABLET ORAL at 08:35

## 2024-07-07 RX ADMIN — Medication 1 MG: at 08:35

## 2024-07-07 RX ADMIN — WARFARIN SODIUM 7.5 MG: 7.5 TABLET ORAL at 18:33

## 2024-07-07 RX ADMIN — MIDODRINE HYDROCHLORIDE 10 MG: 5 TABLET ORAL at 14:12

## 2024-07-07 RX ADMIN — SODIUM CHLORIDE, PRESERVATIVE FREE 10 ML: 5 INJECTION INTRAVENOUS at 08:35

## 2024-07-07 RX ADMIN — FERROUS SULFATE TAB 325 MG (65 MG ELEMENTAL FE) 325 MG: 325 (65 FE) TAB at 06:37

## 2024-07-07 RX ADMIN — VANCOMYCIN HYDROCHLORIDE 1500 MG: 1.5 INJECTION, POWDER, LYOPHILIZED, FOR SOLUTION INTRAVENOUS at 16:56

## 2024-07-07 RX ADMIN — MIDODRINE HYDROCHLORIDE 10 MG: 5 TABLET ORAL at 16:52

## 2024-07-07 RX ADMIN — CEFEPIME 2000 MG: 2 INJECTION, POWDER, FOR SOLUTION INTRAVENOUS at 04:29

## 2024-07-07 RX ADMIN — PANTOPRAZOLE SODIUM 40 MG: 40 TABLET, DELAYED RELEASE ORAL at 06:37

## 2024-07-07 RX ADMIN — AMIODARONE HYDROCHLORIDE 400 MG: 200 TABLET ORAL at 08:35

## 2024-07-07 RX ADMIN — SODIUM CHLORIDE, PRESERVATIVE FREE 10 ML: 5 INJECTION INTRAVENOUS at 20:17

## 2024-07-07 RX ADMIN — CEFEPIME 2000 MG: 2 INJECTION, POWDER, FOR SOLUTION INTRAVENOUS at 16:56

## 2024-07-07 ASSESSMENT — PAIN SCALES - WONG BAKER: WONGBAKER_NUMERICALRESPONSE: NO HURT

## 2024-07-07 NOTE — PROGRESS NOTES
Pharmacist Note - Warfarin Dosing  Consult provided for this 58 y.o.male to manage warfarin for Aortic valve replacement    INR Goal: 2 - 3    Home regimen/ tablet size: 5 mg Tuesday/Thursday, 7.5 mg Rest of week    Drugs that may increase INR: Amiodarone  Drugs that may decrease INR: None  Other current anticoagulants/ drugs that may increase bleeding risk: None  Risk factors: None  Daily INR ordered through: 10/10/24    Recent Labs     07/05/24  0500 07/06/24  0505 07/07/24  0534   HGB 7.9* 8.2*  --    INR 2.6* 2.1* 2.1*     Date               INR                  Dose  7/3  3.7  HELD   7/4  3.6  HELD   7/5  2.6  5 mg  7/6                   2.1                  7.5 mg  7/7                   2.1                  7.5 mg                                                                               Assessment/ Plan:  Will order 7.5 mg PO x 1 as per home dosing.    Pharmacy will continue to monitor daily and adjust therapy as indicated.  Please contact the pharmacist at [phone] for outpatient recommendations if needed.

## 2024-07-07 NOTE — PLAN OF CARE
Problem: Discharge Planning  Goal: Discharge to home or other facility with appropriate resources  7/7/2024 1002 by Rona Escalera RN  Outcome: Progressing  Flowsheets (Taken 7/7/2024 0835)  Discharge to home or other facility with appropriate resources: Identify barriers to discharge with patient and caregiver  7/7/2024 0034 by Mohan Christy RN  Outcome: Progressing     Problem: Pain  Goal: Verbalizes/displays adequate comfort level or baseline comfort level  7/7/2024 1002 by Rona Escalera RN  Outcome: Progressing  7/7/2024 0034 by Mohan Christy RN  Outcome: Progressing  Flowsheets (Taken 7/7/2024 0005)  Verbalizes/displays adequate comfort level or baseline comfort level: Assess pain using appropriate pain scale     Problem: Skin/Tissue Integrity - Adult  Goal: Skin integrity remains intact  7/7/2024 1002 by Rona Escalera RN  Outcome: Progressing  Flowsheets  Taken 7/7/2024 0835 by Rona Escalera RN  Skin Integrity Remains Intact: Monitor for areas of redness and/or skin breakdown  Taken 7/7/2024 0435 by Sapphire Trotter) RN  Skin Integrity Remains Intact: Monitor for areas of redness and/or skin breakdown  7/7/2024 0034 by Mohan Christy RN  Outcome: Progressing  Flowsheets (Taken 7/7/2024 0000)  Skin Integrity Remains Intact: Monitor for areas of redness and/or skin breakdown  Goal: Incisions, wounds, or drain sites healing without S/S of infection  7/7/2024 1002 by Rona Escalera RN  Outcome: Progressing  Flowsheets  Taken 7/7/2024 0835 by Rona Escalera RN  Incisions, Wounds, or Drain Sites Healing Without Sign and Symptoms of Infection: Implement wound care per orders  Taken 7/7/2024 0435 by Sapphire Trotter (Ha) RN  Incisions, Wounds, or Drain Sites Healing Without Sign and Symptoms of Infection: Implement wound care per orders  7/7/2024 0034 by Mohan Christy RN  Outcome: Progressing  Flowsheets (Taken 7/7/2024 0000)  Incisions, Wounds, or Drain Sites Healing Without Sign and Symptoms of Infection:  Implement wound care per orders     Problem: Musculoskeletal - Adult  Goal: Return mobility to safest level of function  7/7/2024 1002 by Rona Escalera RN  Outcome: Progressing  Flowsheets (Taken 7/7/2024 0835)  Return Mobility to Safest Level of Function: Assess patient stability and activity tolerance for standing, transferring and ambulating with or without assistive devices  7/7/2024 0034 by Mohan Christy RN  Outcome: Progressing  Goal: Maintain proper alignment of affected body part  7/7/2024 1002 by Rona Escalera RN  Outcome: Progressing  Flowsheets (Taken 7/7/2024 0835)  Maintain proper alignment of affected body part: Support and protect limb and body alignment per provider's orders  7/7/2024 0034 by Mohan Christy RN  Outcome: Progressing  Goal: Return ADL status to a safe level of function  7/7/2024 1002 by Rona Escalera RN  Outcome: Progressing  Flowsheets (Taken 7/7/2024 0835)  Return ADL Status to a Safe Level of Function: Administer medication as ordered  7/7/2024 0034 by Mohan Christy RN  Outcome: Progressing     Problem: Infection - Adult  Goal: Absence of infection at discharge  7/7/2024 1002 by Rona Escalera RN  Outcome: Progressing  Flowsheets  Taken 7/7/2024 0835 by Rona Escalera RN  Absence of infection at discharge: Assess and monitor for signs and symptoms of infection  Taken 7/7/2024 0435 by Sapphire Trotter RN (Ha)  Absence of infection at discharge: Assess and monitor for signs and symptoms of infection  7/7/2024 0034 by Mohan Christy RN  Outcome: Progressing  Flowsheets (Taken 7/7/2024 0000)  Absence of infection at discharge: Assess and monitor for signs and symptoms of infection  Goal: Absence of infection during hospitalization  7/7/2024 1002 by Rona Escalera RN  Outcome: Progressing  Flowsheets (Taken 7/7/2024 0835)  Absence of infection during hospitalization: Assess and monitor for signs and symptoms of infection  7/7/2024 0034 by Mohan Christy RN  Outcome:

## 2024-07-07 NOTE — PROGRESS NOTES
[facility]                                                                                          Hospitalist Progress Note  Devonte Cast MD  Answering service: [phone] OR [phone] from in house phone        Date of Service:  2024  NAME:  Javier Ugalde  :  1965  MRN:  [id number]       Admission Summary:     \"Javier Ugalde is a 58 y.o. male with past medical history significant for type 2 diabetes, aortic valve replacement with mechanical valve, hypertension, status post pacemaker insertion, dyslipidemia, CKD, ventricular tachycardia s/p LifeVest, dyslipidemia presented at the emergency room with right leg swelling and redness.  Patient was admitted to this hospital last month, was admitted and treated for right lower extremity cellulitis, completed 5 days of cefepime and was then discharged home on doxycycline and the patient finished the course of this antibiotic.  He was seen at wound care clinic today, patient wound is progressively getting worse despite being on oral antibiotic, she was sent to the emergency room from wound care clinic for evaluation for admission.  Patient reported subjective fever without chills or rigors at room.  Patient was started on cefepime and vancomycin and referred to the hospitalist service for admission.\"       Interval history / Subjective:     Patient seen and examined at the bedside.  He stated that his leg swelling going down, pain improved. No chest pain or shortness of breath     Assessment & Plan:     Right leg swelling, right popliteal and knee area infected wounds with surrounding cellulitis and possible abscess,  Hx of right total knee arthroplasty, with possible right knee septic arthritis   -Leukocytosis improved, afebrile,  -On IV cefepime, IV vancomycin, pharmacist on board for dosing  -blood and wound culture no growth so far  -continue local wound care per wound care team  -Right leg doppler and CT of     vancomycin (VANCOCIN) 1,500 mg in sodium chloride 0.9 % 250 mL IVPB ([id number])  1,500 mg IntraVENous Q24H    Vancomycin - Pharmacy to Dose  1 each Other RX Placeholder    albuterol (PROVENTIL) (2.5 MG/3ML) 0.083% nebulizer solution 2.5 mg  2.5 mg Nebulization Q6H PRN     ______________________________________________________________________  EXPECTED LENGTH OF STAY: Unable to retrieve estimated LOS  ACTUAL LENGTH OF STAY:          4                 Devonte Cast MD

## 2024-07-08 ENCOUNTER — APPOINTMENT (OUTPATIENT)
Facility: HOSPITAL | Age: 59
DRG: 560 | End: 2024-07-08
Payer: MEDICARE

## 2024-07-08 LAB
ALBUMIN SERPL-MCNC: 2.9 G/DL (ref 3.5–5)
ALBUMIN/GLOB SERPL: 0.7 (ref 1.1–2.2)
ALP SERPL-CCNC: 66 U/L (ref 45–117)
ALT SERPL-CCNC: 25 U/L (ref 12–78)
ANION GAP SERPL CALC-SCNC: 6 MMOL/L (ref 5–15)
AST SERPL-CCNC: 28 U/L (ref 15–37)
BACTERIA SPEC CULT: NORMAL
BACTERIA SPEC CULT: NORMAL
BASOPHILS # BLD: 0.1 K/UL (ref 0–0.1)
BASOPHILS NFR BLD: 1 % (ref 0–1)
BILIRUB SERPL-MCNC: 0.7 MG/DL (ref 0.2–1)
BUN SERPL-MCNC: 18 MG/DL (ref 6–20)
BUN/CREAT SERPL: 11 (ref 12–20)
CALCIUM SERPL-MCNC: 9.9 MG/DL (ref 8.5–10.1)
CHLORIDE SERPL-SCNC: 105 MMOL/L (ref 97–108)
CO2 SERPL-SCNC: 25 MMOL/L (ref 21–32)
CREAT SERPL-MCNC: 1.59 MG/DL (ref 0.7–1.3)
DIFFERENTIAL METHOD BLD: ABNORMAL
EOSINOPHIL # BLD: 0.1 K/UL (ref 0–0.4)
EOSINOPHIL NFR BLD: 1 % (ref 0–7)
ERYTHROCYTE [DISTWIDTH] IN BLOOD BY AUTOMATED COUNT: 15.2 % (ref 11.5–14.5)
GLOBULIN SER CALC-MCNC: 4.4 G/DL (ref 2–4)
GLUCOSE BLD STRIP.AUTO-MCNC: 131 MG/DL (ref 65–117)
GLUCOSE BLD STRIP.AUTO-MCNC: 83 MG/DL (ref 65–117)
GLUCOSE BLD STRIP.AUTO-MCNC: 96 MG/DL (ref 65–117)
GLUCOSE BLD STRIP.AUTO-MCNC: 98 MG/DL (ref 65–117)
GLUCOSE SERPL-MCNC: 98 MG/DL (ref 65–100)
HCT VFR BLD AUTO: 23.8 % (ref 36.6–50.3)
HGB BLD-MCNC: 7.5 G/DL (ref 12.1–17)
IMM GRANULOCYTES # BLD AUTO: 0 K/UL (ref 0–0.04)
IMM GRANULOCYTES NFR BLD AUTO: 0 % (ref 0–0.5)
INR PPP: 2.3 (ref 0.9–1.1)
LYMPHOCYTES # BLD: 1.5 K/UL (ref 0.8–3.5)
LYMPHOCYTES NFR BLD: 15 % (ref 12–49)
MCH RBC QN AUTO: 27.5 PG (ref 26–34)
MCHC RBC AUTO-ENTMCNC: 31.5 G/DL (ref 30–36.5)
MCV RBC AUTO: 87.2 FL (ref 80–99)
MONOCYTES # BLD: 1.2 K/UL (ref 0–1)
MONOCYTES NFR BLD: 12 % (ref 5–13)
NEUTS SEG # BLD: 6.7 K/UL (ref 1.8–8)
NEUTS SEG NFR BLD: 71 % (ref 32–75)
NRBC # BLD: 0 K/UL (ref 0–0.01)
NRBC BLD-RTO: 0 PER 100 WBC
PLATELET # BLD AUTO: 344 K/UL (ref 150–400)
PMV BLD AUTO: 9.2 FL (ref 8.9–12.9)
POTASSIUM SERPL-SCNC: 3.7 MMOL/L (ref 3.5–5.1)
PROT SERPL-MCNC: 7.3 G/DL (ref 6.4–8.2)
PROTHROMBIN TIME: 22.7 SEC (ref 9–11.1)
RBC # BLD AUTO: 2.73 M/UL (ref 4.1–5.7)
SERVICE CMNT-IMP: ABNORMAL
SERVICE CMNT-IMP: NORMAL
SODIUM SERPL-SCNC: 136 MMOL/L (ref 136–145)
WBC # BLD AUTO: 9.6 K/UL (ref 4.1–11.1)

## 2024-07-08 PROCEDURE — 87070 CULTURE OTHR SPECIMN AEROBIC: CPT

## 2024-07-08 PROCEDURE — 2580000003 HC RX 258: Performed by: INTERNAL MEDICINE

## 2024-07-08 PROCEDURE — 6370000000 HC RX 637 (ALT 250 FOR IP): Performed by: INTERNAL MEDICINE

## 2024-07-08 PROCEDURE — 6360000002 HC RX W HCPCS: Performed by: INTERNAL MEDICINE

## 2024-07-08 PROCEDURE — 82962 GLUCOSE BLOOD TEST: CPT

## 2024-07-08 PROCEDURE — 2580000003 HC RX 258

## 2024-07-08 PROCEDURE — 36415 COLL VENOUS BLD VENIPUNCTURE: CPT

## 2024-07-08 PROCEDURE — 80053 COMPREHEN METABOLIC PANEL: CPT

## 2024-07-08 PROCEDURE — 0S9C3ZX DRAINAGE OF RIGHT KNEE JOINT, PERCUTANEOUS APPROACH, DIAGNOSTIC: ICD-10-PCS | Performed by: STUDENT IN AN ORGANIZED HEALTH CARE EDUCATION/TRAINING PROGRAM

## 2024-07-08 PROCEDURE — 6370000000 HC RX 637 (ALT 250 FOR IP): Performed by: HOSPITALIST

## 2024-07-08 PROCEDURE — 6360000004 HC RX CONTRAST MEDICATION

## 2024-07-08 PROCEDURE — 85025 COMPLETE CBC W/AUTO DIFF WBC: CPT

## 2024-07-08 PROCEDURE — 2500000003 HC RX 250 WO HCPCS

## 2024-07-08 PROCEDURE — 2060000000 HC ICU INTERMEDIATE R&B

## 2024-07-08 PROCEDURE — 87075 CULTR BACTERIA EXCEPT BLOOD: CPT

## 2024-07-08 PROCEDURE — 73560 X-RAY EXAM OF KNEE 1 OR 2: CPT

## 2024-07-08 PROCEDURE — 87205 SMEAR GRAM STAIN: CPT

## 2024-07-08 PROCEDURE — 2500000003 HC RX 250 WO HCPCS: Performed by: STUDENT IN AN ORGANIZED HEALTH CARE EDUCATION/TRAINING PROGRAM

## 2024-07-08 PROCEDURE — 6360000002 HC RX W HCPCS

## 2024-07-08 PROCEDURE — 85610 PROTHROMBIN TIME: CPT

## 2024-07-08 PROCEDURE — 20610 DRAIN/INJ JOINT/BURSA W/O US: CPT

## 2024-07-08 PROCEDURE — 99232 SBSQ HOSP IP/OBS MODERATE 35: CPT | Performed by: INTERNAL MEDICINE

## 2024-07-08 RX ORDER — LIDOCAINE HYDROCHLORIDE 10 MG/ML
5 INJECTION, SOLUTION EPIDURAL; INFILTRATION; INTRACAUDAL; PERINEURAL ONCE
Status: COMPLETED | OUTPATIENT
Start: 2024-07-08 | End: 2024-07-08

## 2024-07-08 RX ORDER — WARFARIN SODIUM 7.5 MG/1
7.5 TABLET ORAL
Status: COMPLETED | OUTPATIENT
Start: 2024-07-08 | End: 2024-07-08

## 2024-07-08 RX ORDER — SODIUM CHLORIDE 9 MG/ML
10 INJECTION, SOLUTION INTRAMUSCULAR; INTRAVENOUS; SUBCUTANEOUS ONCE
Status: COMPLETED | OUTPATIENT
Start: 2024-07-08 | End: 2024-07-08

## 2024-07-08 RX ADMIN — IOHEXOL 30 ML: 300 INJECTION, SOLUTION INTRAVENOUS at 11:09

## 2024-07-08 RX ADMIN — HYDROCODONE BITARTRATE AND ACETAMINOPHEN 1 TABLET: 5; 325 TABLET ORAL at 13:00

## 2024-07-08 RX ADMIN — MIDODRINE HYDROCHLORIDE 10 MG: 5 TABLET ORAL at 13:00

## 2024-07-08 RX ADMIN — LIDOCAINE HYDROCHLORIDE 5 ML: 10 INJECTION, SOLUTION EPIDURAL; INFILTRATION; INTRACAUDAL; PERINEURAL at 11:08

## 2024-07-08 RX ADMIN — FERROUS SULFATE TAB 325 MG (65 MG ELEMENTAL FE) 325 MG: 325 (65 FE) TAB at 06:00

## 2024-07-08 RX ADMIN — VANCOMYCIN HYDROCHLORIDE 1500 MG: 1.5 INJECTION, POWDER, LYOPHILIZED, FOR SOLUTION INTRAVENOUS at 16:24

## 2024-07-08 RX ADMIN — MIDODRINE HYDROCHLORIDE 10 MG: 5 TABLET ORAL at 09:32

## 2024-07-08 RX ADMIN — SODIUM CHLORIDE, PRESERVATIVE FREE 10 ML: 5 INJECTION INTRAVENOUS at 09:32

## 2024-07-08 RX ADMIN — CEFEPIME 2000 MG: 2 INJECTION, POWDER, FOR SOLUTION INTRAVENOUS at 16:23

## 2024-07-08 RX ADMIN — AMIODARONE HYDROCHLORIDE 400 MG: 200 TABLET ORAL at 09:32

## 2024-07-08 RX ADMIN — WARFARIN SODIUM 7.5 MG: 7.5 TABLET ORAL at 17:55

## 2024-07-08 RX ADMIN — MIDODRINE HYDROCHLORIDE 10 MG: 5 TABLET ORAL at 16:16

## 2024-07-08 RX ADMIN — Medication 1 MG: at 09:32

## 2024-07-08 RX ADMIN — LIDOCAINE HYDROCHLORIDE 5 ML: 10 INJECTION, SOLUTION EPIDURAL; INFILTRATION; INTRACAUDAL; PERINEURAL at 11:01

## 2024-07-08 RX ADMIN — CEFEPIME 2000 MG: 2 INJECTION, POWDER, FOR SOLUTION INTRAVENOUS at 05:00

## 2024-07-08 RX ADMIN — SODIUM CHLORIDE, PRESERVATIVE FREE 10 ML: 5 INJECTION INTRAVENOUS at 21:27

## 2024-07-08 RX ADMIN — PANTOPRAZOLE SODIUM 40 MG: 40 TABLET, DELAYED RELEASE ORAL at 06:00

## 2024-07-08 RX ADMIN — LIDOCAINE HYDROCHLORIDE 5 ML: 10 INJECTION, SOLUTION EPIDURAL; INFILTRATION; INTRACAUDAL; PERINEURAL at 11:19

## 2024-07-08 RX ADMIN — ROSUVASTATIN 40 MG: 40 TABLET, FILM COATED ORAL at 09:32

## 2024-07-08 RX ADMIN — SODIUM CHLORIDE 10 ML: 9 INJECTION INTRAMUSCULAR; INTRAVENOUS; SUBCUTANEOUS at 11:40

## 2024-07-08 ASSESSMENT — PAIN DESCRIPTION - ORIENTATION: ORIENTATION: RIGHT

## 2024-07-08 ASSESSMENT — PAIN DESCRIPTION - LOCATION: LOCATION: LEG

## 2024-07-08 NOTE — PROGRESS NOTES
Lab called to let RN know that not enough fluid from aspiration was collected, lab will be canceling the fluid LDH, fluid total protein, and fluid crystal. MD aware. Nursing care continues

## 2024-07-08 NOTE — PROGRESS NOTES
Infectious Diseases Follow Up    7/8/2024    INFECTIOUS DISEASE Attending:     I agree with the above infectious disease daily progress note in its entirety as authored by and discussed in detail with the nurse practitioner.   I have reviewed pertinent laboratory studies, microbiology cultures, radiologic reports with review of the consultations and progress notes as appropriate.   I agree with today's subjective findings, physical examination, assessment and plan of care as described above and discussed extensively with the nurse practitioner.       Vanco/Cefepime to continue pending studies from IR guided drainage.    Will reach out to Dr. Zarate as presentation is highly suspect for right knee prosthetic joint infection being source of abscesses.    Assessment & Plan:     58 y.o. male with past medical Hx of type 2 diabetes, aortic valve replacement with mechanical valve, hypertension, status post pacemaker insertion, dyslipidemia, CKD, ventricular tachycardia s/p LifeVest, dyslipidemia who was admitted for RLE wound. Infectious disease services was consulted to assist with antibiotic management of infected right leg wound below knee and popliteal area.      Right lower extremity cellulitis/ulcer  Multiple right posterior knee fluid collection  Hx of right knee replacement 2021  Recent admission with improvement on cefepime  On outpatient Tx with ciprofloxacin  New right popliteal wound with serous drainage, indurated, fluctuance  Wound cx NGTD, Blood cx NGTD  CT multiple fluid collections posterior knee with largest 40.1 cm x 12.1 cm x 3.0 cm   S/p aspiration of right knee (7/8_       Continue vancomycin, cefepime. Monitor renal function closely    Concern for infected hardware with prosthetic knee/septic arthritis and multiple fluid collections, consulted ortho. Needs I&D for collections  Follow aspirate cx      Leukocytosis   resolved     DM II. A1c 5.7, controlled          Subjective:     Patient in bed,

## 2024-07-08 NOTE — CARE COORDINATION
07/08/24 0902   Readmission Assessment   Number of Days since last admission? 8-30 days   Previous Disposition Home with Home Health   Who is being Interviewed Patient   What was the patient's/caregiver's perception as to why they think they needed to return back to the hospital? Other (Comment)  (Patient and wound care center felt like he needed to present to ER for further evaluation)   Did you visit your Primary Care Physician after you left the hospital, before you returned this time? Yes   Did you see a specialist, such as Cardiac, Pulmonary, Orthopedic Physician, etc. after you left the hospital? Yes  (Wound Care Physician)   Who advised the patient to return to the hospital? Physician   Does the patient report anything that got in the way of taking their medications? No   In our efforts to provide the best possible care to you and others like you, can you think of anything that we could have done to help you after you left the hospital the first time, so that you might not have needed to return so soon? Additional Community resources available for illness support     NADINE JacobN, RN, ONC, CMSRN  Nurse Care Manager [phone]

## 2024-07-08 NOTE — PROGRESS NOTES
[facility]                                                                                          Hospitalist Progress Note  Devonte Cast MD  Answering service: [phone] OR [phone] from in house phone        Date of Service:  2024  NAME:  Javier Ugalde  :  1965  MRN:  [id number]       Admission Summary:     \"Javier Ugalde is a 58 y.o. male with past medical history significant for type 2 diabetes, aortic valve replacement with mechanical valve, hypertension, status post pacemaker insertion, dyslipidemia, CKD, ventricular tachycardia s/p LifeVest, dyslipidemia presented at the emergency room with right leg swelling and redness.  Patient was admitted to this hospital last month, was admitted and treated for right lower extremity cellulitis, completed 5 days of cefepime and was then discharged home on doxycycline and the patient finished the course of this antibiotic.  He was seen at wound care clinic today, patient wound is progressively getting worse despite being on oral antibiotic, she was sent to the emergency room from wound care clinic for evaluation for admission.  Patient reported subjective fever without chills or rigors at room.  Patient was started on cefepime and vancomycin and referred to the hospitalist service for admission.\"       Interval history / Subjective:     Patient seen and examined at the bedside.  He stated that his leg swelling going down, pain improved. No chest pain or shortness of breath.     Assessment & Plan:     Right leg swelling, right popliteal and knee area infected wounds with surrounding cellulitis and possible abscess,  Possible right knee septic arthritis, hx of right total knee arthroplasty  -Leukocytosis improved, afebrile,  -On IV cefepime, IV vancomycin, pharmacist on board for dosing  -blood and wound culture no growth so far  -continue local wound care per wound care team  -Right leg doppler and CT of right  chloride 0.9 % 250 mL IVPB ([id number])  1,500 mg IntraVENous Q24H    Vancomycin - Pharmacy to Dose  1 each Other RX Placeholder    albuterol (PROVENTIL) (2.5 MG/3ML) 0.083% nebulizer solution 2.5 mg  2.5 mg Nebulization Q6H PRN     ______________________________________________________________________  EXPECTED LENGTH OF STAY: Unable to retrieve estimated LOS  ACTUAL LENGTH OF STAY:          5                 Devonte Cast MD

## 2024-07-08 NOTE — PROGRESS NOTES
Pharmacist Note - Warfarin Dosing  Consult provided for this 58 y.o.male to manage warfarin for Aortic valve replacement    INR Goal: 2 - 3    Home regimen/ tablet size: 5 mg Tuesday/Thursday, 7.5 mg Rest of week    Drugs that may increase INR: Amiodarone  Drugs that may decrease INR: None  Other current anticoagulants/ drugs that may increase bleeding risk: None  Risk factors: None  Daily INR ordered through: 10/10/24    Recent Labs     07/06/24  0505 07/07/24  0534 07/08/24  0701   HGB 8.2*  --  7.5*   INR 2.1* 2.1* 2.3*     Date               INR                  Dose  7/3  3.7  HELD   7/4  3.6  HELD   7/5  2.6  5 mg  7/6                   2.1                  7.5 mg  7/7                   2.1                  7.5 mg  7/8                   2.3                  7.5 mg                                                                               Assessment/ Plan:  Will order 7.5 mg PO x 1 as per home dosing.    Pharmacy will continue to monitor daily and adjust therapy as indicated.  Please contact the pharmacist at [phone] for outpatient recommendations if needed.

## 2024-07-08 NOTE — PROGRESS NOTES
Pt orders acknowledged and chart reviewed. Pt refusing therapy at this time, citing multiple reasons including pain, dissatisfaction with the bandaging of RLE, and \"improper walker.\" Pt pleasantly requests defer to tomorrow.   Marilyn Ramirez, DPT, PT

## 2024-07-08 NOTE — PLAN OF CARE
Problem: Discharge Planning  Goal: Discharge to home or other facility with appropriate resources  Outcome: Progressing  Flowsheets (Taken 7/8/2024 0932)  Discharge to home or other facility with appropriate resources: Identify barriers to discharge with patient and caregiver     Problem: Pain  Goal: Verbalizes/displays adequate comfort level or baseline comfort level  Outcome: Progressing     Problem: Skin/Tissue Integrity - Adult  Goal: Skin integrity remains intact  Outcome: Progressing  Flowsheets (Taken 7/8/2024 0932)  Skin Integrity Remains Intact: Monitor for areas of redness and/or skin breakdown  Goal: Incisions, wounds, or drain sites healing without S/S of infection  Outcome: Progressing  Flowsheets (Taken 7/8/2024 0932)  Incisions, Wounds, or Drain Sites Healing Without Sign and Symptoms of Infection: Implement wound care per orders     Problem: Musculoskeletal - Adult  Goal: Return mobility to safest level of function  Outcome: Progressing  Flowsheets (Taken 7/8/2024 0932)  Return Mobility to Safest Level of Function: Assess patient stability and activity tolerance for standing, transferring and ambulating with or without assistive devices  Goal: Maintain proper alignment of affected body part  Outcome: Progressing  Flowsheets (Taken 7/8/2024 0932)  Maintain proper alignment of affected body part: Support and protect limb and body alignment per provider's orders  Goal: Return ADL status to a safe level of function  Outcome: Progressing  Flowsheets (Taken 7/8/2024 0932)  Return ADL Status to a Safe Level of Function: Administer medication as ordered     Problem: Infection - Adult  Goal: Absence of infection at discharge  Outcome: Progressing  Flowsheets (Taken 7/8/2024 0932)  Absence of infection at discharge: Assess and monitor for signs and symptoms of infection  Goal: Absence of infection during hospitalization  Outcome: Progressing  Flowsheets (Taken 7/8/2024 0932)  Absence of infection during

## 2024-07-09 LAB
ALBUMIN SERPL-MCNC: 2.9 G/DL (ref 3.5–5)
ALBUMIN/GLOB SERPL: 0.6 (ref 1.1–2.2)
ALP SERPL-CCNC: 69 U/L (ref 45–117)
ALT SERPL-CCNC: 23 U/L (ref 12–78)
ANION GAP SERPL CALC-SCNC: 7 MMOL/L (ref 5–15)
AST SERPL-CCNC: 29 U/L (ref 15–37)
BILIRUB SERPL-MCNC: 0.7 MG/DL (ref 0.2–1)
BUN SERPL-MCNC: 21 MG/DL (ref 6–20)
BUN/CREAT SERPL: 12 (ref 12–20)
CALCIUM SERPL-MCNC: 9.8 MG/DL (ref 8.5–10.1)
CHLORIDE SERPL-SCNC: 106 MMOL/L (ref 97–108)
CO2 SERPL-SCNC: 24 MMOL/L (ref 21–32)
CREAT SERPL-MCNC: 1.69 MG/DL (ref 0.7–1.3)
GLOBULIN SER CALC-MCNC: 4.6 G/DL (ref 2–4)
GLUCOSE BLD STRIP.AUTO-MCNC: 103 MG/DL (ref 65–117)
GLUCOSE BLD STRIP.AUTO-MCNC: 109 MG/DL (ref 65–117)
GLUCOSE BLD STRIP.AUTO-MCNC: 86 MG/DL (ref 65–117)
GLUCOSE BLD STRIP.AUTO-MCNC: 96 MG/DL (ref 65–117)
GLUCOSE SERPL-MCNC: 95 MG/DL (ref 65–100)
INR PPP: 2.6 (ref 0.9–1.1)
POTASSIUM SERPL-SCNC: 3.8 MMOL/L (ref 3.5–5.1)
PROT SERPL-MCNC: 7.5 G/DL (ref 6.4–8.2)
PROTHROMBIN TIME: 25.2 SEC (ref 9–11.1)
SERVICE CMNT-IMP: NORMAL
SODIUM SERPL-SCNC: 137 MMOL/L (ref 136–145)
VANCOMYCIN SERPL-MCNC: 22.1 UG/ML

## 2024-07-09 PROCEDURE — 36415 COLL VENOUS BLD VENIPUNCTURE: CPT

## 2024-07-09 PROCEDURE — 97530 THERAPEUTIC ACTIVITIES: CPT

## 2024-07-09 PROCEDURE — 80053 COMPREHEN METABOLIC PANEL: CPT

## 2024-07-09 PROCEDURE — 6370000000 HC RX 637 (ALT 250 FOR IP): Performed by: HOSPITALIST

## 2024-07-09 PROCEDURE — 97110 THERAPEUTIC EXERCISES: CPT

## 2024-07-09 PROCEDURE — 99232 SBSQ HOSP IP/OBS MODERATE 35: CPT | Performed by: INTERNAL MEDICINE

## 2024-07-09 PROCEDURE — 2060000000 HC ICU INTERMEDIATE R&B

## 2024-07-09 PROCEDURE — 2580000003 HC RX 258: Performed by: INTERNAL MEDICINE

## 2024-07-09 PROCEDURE — 6370000000 HC RX 637 (ALT 250 FOR IP): Performed by: INTERNAL MEDICINE

## 2024-07-09 PROCEDURE — 97161 PT EVAL LOW COMPLEX 20 MIN: CPT

## 2024-07-09 PROCEDURE — 85610 PROTHROMBIN TIME: CPT

## 2024-07-09 PROCEDURE — 82962 GLUCOSE BLOOD TEST: CPT

## 2024-07-09 PROCEDURE — 97116 GAIT TRAINING THERAPY: CPT

## 2024-07-09 PROCEDURE — 97165 OT EVAL LOW COMPLEX 30 MIN: CPT

## 2024-07-09 PROCEDURE — 80202 ASSAY OF VANCOMYCIN: CPT

## 2024-07-09 PROCEDURE — 6360000002 HC RX W HCPCS: Performed by: INTERNAL MEDICINE

## 2024-07-09 RX ORDER — WARFARIN SODIUM 5 MG/1
5 TABLET ORAL
Status: COMPLETED | OUTPATIENT
Start: 2024-07-09 | End: 2024-07-09

## 2024-07-09 RX ORDER — AMMONIUM LACTATE 12 G/100G
LOTION TOPICAL EVERY 12 HOURS
Status: DISCONTINUED | OUTPATIENT
Start: 2024-07-09 | End: 2024-07-18 | Stop reason: HOSPADM

## 2024-07-09 RX ADMIN — MIDODRINE HYDROCHLORIDE 10 MG: 5 TABLET ORAL at 07:57

## 2024-07-09 RX ADMIN — FERROUS SULFATE TAB 325 MG (65 MG ELEMENTAL FE) 325 MG: 325 (65 FE) TAB at 07:57

## 2024-07-09 RX ADMIN — CEFEPIME 2000 MG: 2 INJECTION, POWDER, FOR SOLUTION INTRAVENOUS at 18:07

## 2024-07-09 RX ADMIN — WARFARIN SODIUM 5 MG: 5 TABLET ORAL at 18:03

## 2024-07-09 RX ADMIN — Medication: at 22:18

## 2024-07-09 RX ADMIN — MIDODRINE HYDROCHLORIDE 10 MG: 5 TABLET ORAL at 12:52

## 2024-07-09 RX ADMIN — ROSUVASTATIN 40 MG: 40 TABLET, FILM COATED ORAL at 10:00

## 2024-07-09 RX ADMIN — AMIODARONE HYDROCHLORIDE 400 MG: 200 TABLET ORAL at 10:00

## 2024-07-09 RX ADMIN — Medication 1 MG: at 10:00

## 2024-07-09 RX ADMIN — MIDODRINE HYDROCHLORIDE 10 MG: 5 TABLET ORAL at 18:03

## 2024-07-09 RX ADMIN — CEFEPIME 2000 MG: 2 INJECTION, POWDER, FOR SOLUTION INTRAVENOUS at 10:06

## 2024-07-09 RX ADMIN — SODIUM CHLORIDE, PRESERVATIVE FREE 10 ML: 5 INJECTION INTRAVENOUS at 10:01

## 2024-07-09 RX ADMIN — PANTOPRAZOLE SODIUM 40 MG: 40 TABLET, DELAYED RELEASE ORAL at 07:57

## 2024-07-09 RX ADMIN — SODIUM CHLORIDE, PRESERVATIVE FREE 10 ML: 5 INJECTION INTRAVENOUS at 22:12

## 2024-07-09 RX ADMIN — SODIUM CHLORIDE 1250 MG: 9 INJECTION, SOLUTION INTRAVENOUS at 18:06

## 2024-07-09 RX ADMIN — Medication: at 10:00

## 2024-07-09 NOTE — PROGRESS NOTES
Infectious Diseases Follow Up    7/9/2024    INFECTIOUS DISEASE Attending:     I agree with the above infectious disease daily progress note in its entirety as authored by and discussed in detail with the nurse practitioner.   I have reviewed pertinent laboratory studies, microbiology cultures, radiologic reports with review of the consultations and progress notes as appropriate.   I agree with today's subjective findings, physical examination, assessment and plan of care as described above and discussed extensively with the nurse practitioner.       Vanco/Cefepime to continue.    Follow arthrocentesis cultures.    Appreciate orthopedics input as I am concerned that without further source control it may be very difficult to eradicate infection.    Assessment & Plan:     58 y.o. male with past medical Hx of type 2 diabetes, aortic valve replacement with mechanical valve, hypertension, status post pacemaker insertion, dyslipidemia, CKD, ventricular tachycardia s/p LifeVest, dyslipidemia who was admitted for RLE wound. Infectious disease services was consulted to assist with antibiotic management of infected right leg wound below knee and popliteal area.      Right lower extremity cellulitis/ulcer  Multiple right posterior knee fluid collection  Hx of right knee replacement 2021  Recent admission with improvement on cefepime  On outpatient Tx with ciprofloxacin  New right popliteal wound with serous drainage, indurated, fluctuance  Wound cx NGTD, Blood cx NGTD  CT multiple fluid collections posterior knee with largest 40.1 cm x 12.1 cm x 3.0 cm   S/p aspiration of right knee (7/8), aspirate cx NGTD  XR right knee with lucency concerning for infection       Continue vancomycin, cefepime. Monitor renal function closely    Concern for infected hardware with prosthetic knee/septic arthritis and multiple fluid collections as well as lucency seen on plain film. 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, defer to  ondansetron (ZOFRAN) injection 4 mg, 4 mg, IntraVENous, Q6H PRN, José Luis Pulido MD    polyethylene glycol (GLYCOLAX) packet 17 g, 17 g, Oral, Daily PRN, José Luis Pulido MD, 17 g at 07/04/24 2038    acetaminophen (TYLENOL) tablet 650 mg, 650 mg, Oral, Q6H PRN **OR** acetaminophen (TYLENOL) suppository 650 mg, 650 mg, Rectal, Q6H PRN, José Luis Pulido MD    insulin lispro (HUMALOG,ADMELOG) injection vial 0-8 Units, 0-8 Units, SubCUTAneous, TID WC, José Luis Pulido MD    insulin lispro (HUMALOG,ADMELOG) injection vial 0-4 Units, 0-4 Units, SubCUTAneous, Nightly, José Luis Pulido MD    HYDROmorphone HCl PF (DILAUDID) injection 1 mg, 1 mg, IntraVENous, Q4H PRN, José Luis Pulido MD    Warfarin - Pharmacy to Dose, , Other, [facility]    Vancomycin - Pharmacy to Dose, 1 each, Other, [facility]    albuterol (PROVENTIL) (2.5 MG/3ML) 0.083% nebulizer solution 2.5 mg, 2.5 mg, Nebulization, Q6H PRN, José Luis Pulido MD      Antibiotics:    Vancomycin  Cefepime      Case discussed with:    Patient, JIM Perea - NP    A total time of 35 minutes was spent on today's encounter.  Greater than 50% of the time was spent on the following:  Preparing for visit and chart review.  Obtaining and/or reviewing separately obtained history  Performing a medically appropriate exam and/or evaluation  Counseling and educating a patient/family/caregiver as noted above  Referring and communicating with other professionals (not separately reported)  Independently interpreting results (not separately reported) and communicating results to the patient/family/caregiver  Care coordination (not separately reported) as noted above  Documenting clinical information in the electronic health records (e.g. problem list, visit note) on the day of the encounter

## 2024-07-09 NOTE — CARE COORDINATION
Transition of Care Plan:    RUR: 21%  Prior Level of Functioning: Home health [facility]  DME needed: Patient owns DME required for discharge  IM/IMM Medicare/Jaron letter given: 7/4/24  ICaregiver Contact: Simin Lawler, , [phone]  Barriers to discharge:  Pending cultures and final infectious disease plan    CM sent referral to [facility] in case patient will need long term antibiotics. Patient's benefits have been checked and are in-network. [facility] can provide a co-pay once they know the final orders for antibiotics.    Emilee Chatterjee, BSN, RN, ONC, CMSRN  Nurse Care Manager [phone]

## 2024-07-09 NOTE — PROGRESS NOTES
[facility]                                                                                          Hospitalist Progress Note  Devonte Cast MD  Answering service: [phone] OR [phone] from in house phone        Date of Service:  2024  NAME:  Javier Ugalde  :  1965  MRN:  [id number]       Admission Summary:     \"Javier Ugalde is a 58 y.o. male with past medical history significant for type 2 diabetes, aortic valve replacement with mechanical valve, hypertension, status post pacemaker insertion, dyslipidemia, CKD, ventricular tachycardia s/p LifeVest, dyslipidemia presented at the emergency room with right leg swelling and redness.  Patient was admitted to this hospital last month, was admitted and treated for right lower extremity cellulitis, completed 5 days of cefepime and was then discharged home on doxycycline and the patient finished the course of this antibiotic.  He was seen at wound care clinic today, patient wound is progressively getting worse despite being on oral antibiotic, she was sent to the emergency room from wound care clinic for evaluation for admission.  Patient reported subjective fever without chills or rigors at room.  Patient was started on cefepime and vancomycin and referred to the hospitalist service for admission.\"       Interval history / Subjective:     Patient seen and examined at the bedside.  He stated that he feels better, his leg swelling going down.Right pain improved.  No chest pain or shortness of breath.     Assessment & Plan:     Right leg swelling, right popliteal and knee area infected wounds with surrounding cellulitis and possible abscess,  Possible right knee septic arthritis, hx of right total knee arthroplasty  -Leukocytosis improved, afebrile,  -On IV cefepime, IV vancomycin, pharmacist on board for dosing  -blood and wound culture no growth so far  -continue local wound care per wound care team  -Right leg

## 2024-07-09 NOTE — PLAN OF CARE
Problem: Physical Therapy - Adult  Goal: By Discharge: Performs mobility at highest level of function for planned discharge setting.  See evaluation for individualized goals.  Description: FUNCTIONAL STATUS PRIOR TO ADMISSION: Patient was modified independent using a rolling walker for functional mobility.    HOME SUPPORT PRIOR TO ADMISSION: The patient lived alone with no local support.    Physical Therapy Goals  Initiated 7/9/2024  1.  Patient will move from supine to sit and sit to supine and roll side to side in bed with modified independence within 7 day(s).    2.  Patient will perform sit to stand with modified independence within 7 day(s).  3.  Patient will transfer from bed to chair and chair to bed with modified independence using the least restrictive device within 7 day(s).  4.  Patient will ambulate with modified independence for 300 feet with the least restrictive device within 7 day(s).   5.  Patient will ascend/descend 5 stairs with 1 handrail(s) with modified independence within 7 day(s).   Outcome: Progressing   PHYSICAL THERAPY EVALUATION    Patient: Javier Ugalde (58 y.o. male)  Date: 7/9/2024  Primary Diagnosis: Cellulitis of right leg [L03.115]  Cellulitis of right lower extremity [L03.115]       Precautions: Restrictions/Precautions: Fall Risk                      ASSESSMENT :   DEFICITS/IMPAIRMENTS:   The patient is limited by decreased  mobility compared to baseline after being admitted with cellulitis and wounds on RLE.  His PMHx is significant for R TKA in 2021, AVR, CAD, pacemaker, LifeVest, back pain.      At this time, he is tolerating the pain related to his wounds very well and is able to ambulate with the walker with good overall balance and fair endurance.  He reports having had issues with SOB and lightheadedness at home prior to admission, but no complaints today.  He has limited motion at the R knee due to edema throughout the leg and encouraged gentle ROM of the knee at this

## 2024-07-09 NOTE — PROGRESS NOTES
Pharmacist Note - Warfarin Dosing  Consult provided for this 58 y.o.male to manage warfarin for aortic valve replacement.    INR Goal: 2 - 3    Home regimen: 7.5 mg x 5 days; 5 mg on Tues/Thurs    Drugs that may increase INR: Amiodarone  Drugs that may decrease INR: None  Other medications that may increase bleeding risk: None  Risk factors: None  Daily INR ordered through: 10/10/24     Recent Labs     07/07/24  0534 07/08/24  0701 07/09/24  0419   HGB  --  7.5*  --    INR 2.1* 2.3* 2.6*     Date               INR                  Dose  7/3  3.7  Held   7/4  3.6  Held   7/5  2.6  5 mg    7/6  2.1  7.5 mg    7/7  2.1  7.5 mg    7/8  2.3  7.5 mg    7/9  2.6  5 mg                                                                                 Assessment/ Plan:  Will order warfarin 5 mg PO x 1 dose.    Pharmacy will continue to monitor daily and adjust therapy as indicated.  Please contact the pharmacist at [phone] for outpatient recommendations if needed.

## 2024-07-09 NOTE — PROGRESS NOTES
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     Goal target range AUC/BLACK 400-600      Plan: hange to 1250 mg IV Q24H.  The AUC/BLACK is therapeutic. However, the 12 hr level and AUC/BLACK have trended up significantly since 7/5/24.     *Random vancomycin levels are used to calculate AUC/BLACK, this level should not be interpreted as a trough. Vancomycin has been dosed using Bayesian kinetics software to target an AUC24:BLACK of 400-600, which provides adequate exposure for as assumed infection due to MRSA with an BLACK of 1 or less while reducing the risk of nephrotoxicity as seen with traditional trough based dosing goals.

## 2024-07-09 NOTE — PLAN OF CARE
Problem: Discharge Planning  Goal: Discharge to home or other facility with appropriate resources  Outcome: Progressing  Flowsheets (Taken 7/9/2024 1000)  Discharge to home or other facility with appropriate resources: Identify barriers to discharge with patient and caregiver     Problem: Pain  Goal: Verbalizes/displays adequate comfort level or baseline comfort level  Outcome: Progressing     Problem: Skin/Tissue Integrity - Adult  Goal: Skin integrity remains intact  Outcome: Progressing  Flowsheets (Taken 7/9/2024 1000)  Skin Integrity Remains Intact: Monitor for areas of redness and/or skin breakdown  Goal: Incisions, wounds, or drain sites healing without S/S of infection  Outcome: Progressing  Flowsheets (Taken 7/9/2024 1000)  Incisions, Wounds, or Drain Sites Healing Without Sign and Symptoms of Infection: Implement wound care per orders     Problem: Musculoskeletal - Adult  Goal: Return mobility to safest level of function  Outcome: Progressing  Flowsheets (Taken 7/9/2024 1000)  Return Mobility to Safest Level of Function: Assess patient stability and activity tolerance for standing, transferring and ambulating with or without assistive devices  Goal: Maintain proper alignment of affected body part  Outcome: Progressing  Flowsheets (Taken 7/9/2024 1000)  Maintain proper alignment of affected body part: Support and protect limb and body alignment per provider's orders  Goal: Return ADL status to a safe level of function  Outcome: Progressing  Flowsheets (Taken 7/9/2024 1000)  Return ADL Status to a Safe Level of Function: Administer medication as ordered     Problem: Infection - Adult  Goal: Absence of infection at discharge  Outcome: Progressing  Flowsheets (Taken 7/9/2024 1000)  Absence of infection at discharge: Assess and monitor for signs and symptoms of infection  Goal: Absence of infection during hospitalization  Outcome: Progressing  Flowsheets (Taken 7/9/2024 1000)  Absence of infection during

## 2024-07-09 NOTE — PROGRESS NOTES
Orthopedic Progress Note    S: Pain is localized to area of lateral leg where wounds are located. Pt state the knee joint it self has never been painful with movement; no real change to his ambulatory status. No complaints of loosening, shifting, weakness in the R Knee. Replacement was in 2021, no issues with the knee since. He developed severe edema in the leg which led to skin breakdown and has been dealing with nonhealing wounds since that time. Unfortunately new wounds developed on the distal thigh which prompted him to come back to the ED for evaluation.     O: NAD, respirations unlabored; wounds to the lateral leg, most proximal and distal to the knee joint, with surrounding erythema, induration and fluctuance. Active drainage appears serous. AROM of knee is without grimace, no micromotion pain.     Patient Vitals for the past 4 hrs:   Temp Pulse BP   07/08/24 2158 -- 62 --   07/08/24 2101 98.2 °F (36.8 °C) 70 (!) 109/52   07/08/24 1953 -- 60 --     Recent Labs     07/06/24  0505 07/07/24  0534 07/08/24  0701   HGB 8.2*  --  7.5*   HCT 25.8*  --  23.8*     --  344   BUN 17 18 18   K 4.2 3.9 3.7    136 136       XR ARTHR/ASP/INJ MAJOR JNT/BURSA WO US  Narrative: EXAM:  XR ARTHR/ASP/INJ MAJOR JNT/BURSA WO US    INDICATION: Rule out right knee septic arthritis    COMPARISON: CT right knee 7/4/2024.    TECHNIQUE: Right knee fluoroscopically guided aspiration the fluid. Fluoroscopy  dose (air kerma): 18.509 mGy.    FINDINGS:   The procedure including the risk of bleeding and infection was explained to the  patient. Verbal and written informed consent was obtained. The possibility of  insufficient sample was explained to the patient. Patient is status post right  total knee arthroplasty. With fluoroscopic guidance, the skin was marked and  prepped and draped in sterile fashion.    Utilizing a 25-gauge needle approximately 5 mL of 1 percent lidocaine was  injected into the superficial and deep soft  tissues about the right knee joint  space. Utilizing an 18-gauge spinal needle under fluoroscopic guidance,  approximately 2 mL of iodinated contrast was injected to confirm needle  placement in the joint space. Subsequently, a few drops of serosanguineous fluid  were aspirated.     The patient tolerated the procedure well without immediate complication and was  discharged from the department in unchanged condition.  Impression: Successful fluoroscopically guided aspiration of the right knee.    Electronically signed by DOROTHY MCDOWELL       A/P:  - CT shows fluid collections not showing direct communication with the joint. XR guided arthrocentesis was done today and did not yield any significant amount of fluid; cultures pending, gram stain without WBC or orgs. WBC has be stable wnl since starting abx on admission. Dr. Zarate has reviewed the CT, requests plain films to evaluate hardware and surrounding bone.   - There are no immediate plans for surgery; IR can be consulted to aspirate existing fluid collections with possible drain placement while we await cultures from the knee aspiration. If those are positive he would need to proceed with washout, poly exchange with possible hardware removal and formal I&D of leg wounds at that time.   - Wound Care consult for lateral leg wound recommendations.     KG Currie  Orthopedic Trauma Service  [facility]

## 2024-07-09 NOTE — PROGRESS NOTES
OCCUPATIONAL THERAPY EVALUATION/DISCHARGE  Patient: Javier Ugalde (58 y.o. male)  Date: 7/9/2024  Primary Diagnosis: Cellulitis of right leg [L03.115]  Cellulitis of right lower extremity [L03.115]         Precautions: Fall Risk     ASSESSMENT :  Based on the objective data below, the patient presents with new DME use, impaired balance, but good LE access, no LOB during mobility, and is at functional baseline for self care tasks at this time. Patient received semi supine in bed and agreeable to working with therapy. Patient is tall and has bariatric RW at baseline, obtained one for use while admitted. Patient transferred to EOB and stood with bariatric RW. Patient declines grooming and toileting needs at this time but able to demonstrate good LE access in sitting prior to transfer. Patient ambulatory in hallway and completed short loop in hallway including small space navigation with supervision. Returned to room and transferred to recliner so patient can continue to elevate BLE as needed. Noted drainage on back of R knee wrapping and patient reporting he never received breakfast, nursing made aware of both. Discussed with patient who reports he is at his baseline for self care tasks and has no OT concerns for discharge. PT eval pending. Patient reports he typically walks without DME at baseline but started using RW and cane more as symptoms worsened. Recommend discharge home with no OT needs when medically appropriate.     Functional Outcome Measure:  AM-PAC Inpatient Daily Activity Raw Score: 24/24 which is indicative of Cutoff score 39.4 (19) correlates to a good likelihood of discharging home versus a facility.      Further skilled acute occupational therapy is not indicated at this time.     PLAN :  Recommend with staff: up with bariatric RW, supervision for transfers, up to bathroom for toileting and up to chair for meals    Recommendation for discharge: (in order for the patient to meet his/her long term

## 2024-07-10 ENCOUNTER — APPOINTMENT (OUTPATIENT)
Facility: HOSPITAL | Age: 59
DRG: 560 | End: 2024-07-10
Payer: MEDICARE

## 2024-07-10 LAB
ALBUMIN SERPL-MCNC: 2.9 G/DL (ref 3.5–5)
ALBUMIN/GLOB SERPL: 0.7 (ref 1.1–2.2)
ALP SERPL-CCNC: 70 U/L (ref 45–117)
ALT SERPL-CCNC: 26 U/L (ref 12–78)
ANION GAP SERPL CALC-SCNC: 6 MMOL/L (ref 5–15)
APPEARANCE FLD: ABNORMAL
AST SERPL-CCNC: 28 U/L (ref 15–37)
BASOPHILS # BLD: 0 K/UL (ref 0–0.1)
BASOPHILS NFR BLD: 0 % (ref 0–1)
BILIRUB SERPL-MCNC: 0.7 MG/DL (ref 0.2–1)
BUN SERPL-MCNC: 19 MG/DL (ref 6–20)
BUN/CREAT SERPL: 12 (ref 12–20)
CALCIUM SERPL-MCNC: 9.4 MG/DL (ref 8.5–10.1)
CHLORIDE SERPL-SCNC: 106 MMOL/L (ref 97–108)
CO2 SERPL-SCNC: 24 MMOL/L (ref 21–32)
COLOR FLD: ABNORMAL
CREAT SERPL-MCNC: 1.63 MG/DL (ref 0.7–1.3)
DIFFERENTIAL METHOD BLD: ABNORMAL
EOSINOPHIL # BLD: 0.1 K/UL (ref 0–0.4)
EOSINOPHIL NFR BLD: 1 % (ref 0–7)
ERYTHROCYTE [DISTWIDTH] IN BLOOD BY AUTOMATED COUNT: 15.2 % (ref 11.5–14.5)
GLOBULIN SER CALC-MCNC: 3.9 G/DL (ref 2–4)
GLUCOSE BLD STRIP.AUTO-MCNC: 102 MG/DL (ref 65–117)
GLUCOSE BLD STRIP.AUTO-MCNC: 133 MG/DL (ref 65–117)
GLUCOSE BLD STRIP.AUTO-MCNC: 142 MG/DL (ref 65–117)
GLUCOSE BLD STRIP.AUTO-MCNC: 92 MG/DL (ref 65–117)
GLUCOSE SERPL-MCNC: 92 MG/DL (ref 65–100)
HCT VFR BLD AUTO: 23.4 % (ref 36.6–50.3)
HGB BLD-MCNC: 7.6 G/DL (ref 12.1–17)
IMM GRANULOCYTES # BLD AUTO: 0 K/UL
IMM GRANULOCYTES NFR BLD AUTO: 0 %
INR PPP: 2.6 (ref 0.9–1.1)
LYMPHOCYTES # BLD: 1.2 K/UL (ref 0.8–3.5)
LYMPHOCYTES NFR BLD: 12 % (ref 12–49)
MCH RBC QN AUTO: 28 PG (ref 26–34)
MCHC RBC AUTO-ENTMCNC: 32.5 G/DL (ref 30–36.5)
MCV RBC AUTO: 86.3 FL (ref 80–99)
MONOCYTES # BLD: 0.8 K/UL (ref 0–1)
MONOCYTES NFR BLD: 8 % (ref 5–13)
NEUTS SEG # BLD: 7.6 K/UL (ref 1.8–8)
NEUTS SEG NFR BLD: 79 % (ref 32–75)
NRBC # BLD: 0 K/UL (ref 0–0.01)
NRBC BLD-RTO: 0 PER 100 WBC
NUC CELL # FLD: 0 /CU MM
OTHER CELLS NFR FLD MANUAL: 0 %
PLATELET # BLD AUTO: 335 K/UL (ref 150–400)
PMV BLD AUTO: 9.5 FL (ref 8.9–12.9)
POTASSIUM SERPL-SCNC: 3.9 MMOL/L (ref 3.5–5.1)
PROT SERPL-MCNC: 6.8 G/DL (ref 6.4–8.2)
PROTHROMBIN TIME: 25.2 SEC (ref 9–11.1)
RBC # BLD AUTO: 2.71 M/UL (ref 4.1–5.7)
RBC # FLD: >100 /CU MM
RBC MORPH BLD: ABNORMAL
SERVICE CMNT-IMP: ABNORMAL
SERVICE CMNT-IMP: ABNORMAL
SERVICE CMNT-IMP: NORMAL
SERVICE CMNT-IMP: NORMAL
SODIUM SERPL-SCNC: 136 MMOL/L (ref 136–145)
SPECIMEN SOURCE FLD: ABNORMAL
TOTAL CELLS COUNTED SPEC: 0
WBC # BLD AUTO: 9.7 K/UL (ref 4.1–11.1)

## 2024-07-10 PROCEDURE — 10030 IMG GID FLU COLL DRG SFT TIS: CPT

## 2024-07-10 PROCEDURE — 6370000000 HC RX 637 (ALT 250 FOR IP): Performed by: INTERNAL MEDICINE

## 2024-07-10 PROCEDURE — 87070 CULTURE OTHR SPECIMN AEROBIC: CPT

## 2024-07-10 PROCEDURE — 2060000000 HC ICU INTERMEDIATE R&B

## 2024-07-10 PROCEDURE — 6360000002 HC RX W HCPCS: Performed by: INTERNAL MEDICINE

## 2024-07-10 PROCEDURE — 36415 COLL VENOUS BLD VENIPUNCTURE: CPT

## 2024-07-10 PROCEDURE — 0J9N3ZZ DRAINAGE OF RIGHT LOWER LEG SUBCUTANEOUS TISSUE AND FASCIA, PERCUTANEOUS APPROACH: ICD-10-PCS | Performed by: PHYSICIAN ASSISTANT

## 2024-07-10 PROCEDURE — 85610 PROTHROMBIN TIME: CPT

## 2024-07-10 PROCEDURE — 2580000003 HC RX 258: Performed by: INTERNAL MEDICINE

## 2024-07-10 PROCEDURE — 89050 BODY FLUID CELL COUNT: CPT

## 2024-07-10 PROCEDURE — 82962 GLUCOSE BLOOD TEST: CPT

## 2024-07-10 PROCEDURE — 87205 SMEAR GRAM STAIN: CPT

## 2024-07-10 PROCEDURE — 85025 COMPLETE CBC W/AUTO DIFF WBC: CPT

## 2024-07-10 PROCEDURE — 80053 COMPREHEN METABOLIC PANEL: CPT

## 2024-07-10 PROCEDURE — 6370000000 HC RX 637 (ALT 250 FOR IP): Performed by: HOSPITALIST

## 2024-07-10 RX ORDER — WARFARIN SODIUM 7.5 MG/1
7.5 TABLET ORAL
Status: COMPLETED | OUTPATIENT
Start: 2024-07-10 | End: 2024-07-10

## 2024-07-10 RX ADMIN — CEFEPIME 2000 MG: 2 INJECTION, POWDER, FOR SOLUTION INTRAVENOUS at 17:26

## 2024-07-10 RX ADMIN — MIDODRINE HYDROCHLORIDE 10 MG: 5 TABLET ORAL at 07:27

## 2024-07-10 RX ADMIN — Medication: at 09:10

## 2024-07-10 RX ADMIN — SODIUM CHLORIDE 1250 MG: 9 INJECTION, SOLUTION INTRAVENOUS at 16:29

## 2024-07-10 RX ADMIN — WARFARIN SODIUM 7.5 MG: 7.5 TABLET ORAL at 17:26

## 2024-07-10 RX ADMIN — SODIUM CHLORIDE, PRESERVATIVE FREE 10 ML: 5 INJECTION INTRAVENOUS at 09:10

## 2024-07-10 RX ADMIN — MIDODRINE HYDROCHLORIDE 10 MG: 5 TABLET ORAL at 12:55

## 2024-07-10 RX ADMIN — ROSUVASTATIN 40 MG: 40 TABLET, FILM COATED ORAL at 09:10

## 2024-07-10 RX ADMIN — FERROUS SULFATE TAB 325 MG (65 MG ELEMENTAL FE) 325 MG: 325 (65 FE) TAB at 07:27

## 2024-07-10 RX ADMIN — CEFEPIME 2000 MG: 2 INJECTION, POWDER, FOR SOLUTION INTRAVENOUS at 02:32

## 2024-07-10 RX ADMIN — AMIODARONE HYDROCHLORIDE 400 MG: 200 TABLET ORAL at 09:10

## 2024-07-10 RX ADMIN — MIDODRINE HYDROCHLORIDE 10 MG: 5 TABLET ORAL at 17:26

## 2024-07-10 RX ADMIN — CEFEPIME 2000 MG: 2 INJECTION, POWDER, FOR SOLUTION INTRAVENOUS at 09:09

## 2024-07-10 RX ADMIN — PANTOPRAZOLE SODIUM 40 MG: 40 TABLET, DELAYED RELEASE ORAL at 07:27

## 2024-07-10 RX ADMIN — Medication 1 MG: at 09:10

## 2024-07-10 RX ADMIN — HYDROCODONE BITARTRATE AND ACETAMINOPHEN 1 TABLET: 5; 325 TABLET ORAL at 22:03

## 2024-07-10 RX ADMIN — Medication: at 22:04

## 2024-07-10 ASSESSMENT — PAIN DESCRIPTION - LOCATION: LOCATION: LEG

## 2024-07-10 ASSESSMENT — PAIN DESCRIPTION - ORIENTATION: ORIENTATION: RIGHT

## 2024-07-10 ASSESSMENT — PAIN SCALES - GENERAL
PAINLEVEL_OUTOF10: 0
PAINLEVEL_OUTOF10: 4

## 2024-07-10 NOTE — PROGRESS NOTES
Received call from lab stating they are unable to perform cell count on fluid sent from right lower leg today due to sample being too clotted

## 2024-07-10 NOTE — PROGRESS NOTES
[facility]                                                                                          Hospitalist Progress Note  Devonte Cast MD  Answering service: [phone] OR [phone] from in house phone        Date of Service:  7/10/2024  NAME:  Javier Ugalde  :  1965  MRN:  [id number]       Admission Summary:     \"Javier Ugalde is a 58 y.o. male with past medical history significant for type 2 diabetes, aortic valve replacement with mechanical valve, hypertension, status post pacemaker insertion, dyslipidemia, CKD, ventricular tachycardia s/p LifeVest, dyslipidemia presented at the emergency room with right leg swelling and redness.  Patient was admitted to this hospital last month, was admitted and treated for right lower extremity cellulitis, completed 5 days of cefepime and was then discharged home on doxycycline and the patient finished the course of this antibiotic.  He was seen at wound care clinic today, patient wound is progressively getting worse despite being on oral antibiotic, she was sent to the emergency room from wound care clinic for evaluation for admission.  Patient reported subjective fever without chills or rigors at room.  Patient was started on cefepime and vancomycin and referred to the hospitalist service for admission.\"       Interval history / Subjective:     Patient seen and examined at the bedside.  He stated that he feels better, his leg swelling going down.Right pain improved.  No chest pain or shortness of breath.  Patient just had bedside drainage of right leg/thigh fluid collection with placement of drain     Assessment & Plan:     Right leg swelling, right popliteal and knee area infected wounds with surrounding cellulitis and possible abscess,  Possible right knee septic arthritis, hx of right total knee arthroplasty  -Leukocytosis improved, afebrile,  -On IV cefepime, IV vancomycin, pharmacist on board for dosing  -blood  mEq  40 mEq Oral PRN    Or    potassium bicarb-citric acid (EFFER-K) effervescent tablet 40 mEq  40 mEq Oral PRN    Or    potassium chloride 10 mEq/100 mL IVPB (Peripheral Line)  10 mEq IntraVENous PRN    magnesium sulfate 2000 mg in 50 mL IVPB premix  2,000 mg IntraVENous PRN    ondansetron (ZOFRAN-ODT) disintegrating tablet 4 mg  4 mg Oral Q8H PRN    Or    ondansetron (ZOFRAN) injection 4 mg  4 mg IntraVENous Q6H PRN    polyethylene glycol (GLYCOLAX) packet 17 g  17 g Oral Daily PRN    acetaminophen (TYLENOL) tablet 650 mg  650 mg Oral Q6H PRN    Or    acetaminophen (TYLENOL) suppository 650 mg  650 mg Rectal Q6H PRN    insulin lispro (HUMALOG,ADMELOG) injection vial 0-8 Units  0-8 Units SubCUTAneous TID WC    insulin lispro (HUMALOG,ADMELOG) injection vial 0-4 Units  0-4 Units SubCUTAneous Nightly    HYDROmorphone HCl PF (DILAUDID) injection 1 mg  1 mg IntraVENous Q4H PRN    Warfarin - Pharmacy to Dose   Other RX Placeholder    Vancomycin - Pharmacy to Dose  1 each Other RX Placeholder    albuterol (PROVENTIL) (2.5 MG/3ML) 0.083% nebulizer solution 2.5 mg  2.5 mg Nebulization Q6H PRN     ______________________________________________________________________  EXPECTED LENGTH OF STAY: Unable to retrieve estimated LOS  ACTUAL LENGTH OF STAY:          7                 Devonte Cast MD

## 2024-07-10 NOTE — PLAN OF CARE
Problem: Skin/Tissue Integrity - Adult  Goal: Skin integrity remains intact  Outcome: Progressing  Flowsheets  Taken 7/10/2024 1042  Skin Integrity Remains Intact: Monitor for areas of redness and/or skin breakdown  Taken 7/10/2024 0800  Skin Integrity Remains Intact:   Monitor for areas of redness and/or skin breakdown   Assess vascular access sites hourly     Problem: Skin/Tissue Integrity - Adult  Goal: Incisions, wounds, or drain sites healing without S/S of infection  Outcome: Progressing  Flowsheets  Taken 7/10/2024 1042  Incisions, Wounds, or Drain Sites Healing Without Sign and Symptoms of Infection: ADMISSION and DAILY: Assess and document risk factors for pressure ulcer development  Taken 7/10/2024 0800  Incisions, Wounds, or Drain Sites Healing Without Sign and Symptoms of Infection: ADMISSION and DAILY: Assess and document risk factors for pressure ulcer development

## 2024-07-10 NOTE — PROGRESS NOTES
The  attempted visit. The patient was being assisted by the medical staff. The 's visit was incomplete.     Ella Yu M.Div., Th.M., M.A.C.E.   Intern  [facility]  Paging Service [phone] (DIRK)

## 2024-07-10 NOTE — PROGRESS NOTES
Orthopedic Progress Note    S: Pain unchanged, mild; Denies numbness, tingling, focal weakness, cp, sob, fever, chills    O: NAD, respirations unlabored; Dressings with staining, dry; leg with swelling erythema, edema, mild ttp; knee warm, no erythema. NVI    Patient Vitals for the past 4 hrs:   Temp Pulse BP   07/09/24 2012 98.4 °F (36.9 °C) 68 (!) 130/57   07/09/24 1944 -- 65 --   07/09/24 1800 -- 67 --     Recent Labs     07/08/24  0701 07/09/24  0419   HGB 7.5*  --    HCT 23.8*  --      --    BUN 18 21*   K 3.7 3.8    137       XR KNEE RIGHT (1-2 VIEWS)  Narrative: EXAM: XR KNEE RIGHT (1-2 VIEWS)  ACC#: [id number]     INDICATION: R knee infection, []     COMPARISON: None.  .    FINDINGS: Two views of the right knee demonstrate changes of right knee  arthroplasty. The hardware is intact and aligned. Mild lucency is seen along the  medial aspect of the distal femoral component. There is no periprosthetic  fracture. There is soft tissue swelling along the lateral aspect of the knee.  Impression: Lucency is seen along the medial aspect of the femoral component which could be  related to infection. There is soft tissue swelling along the lateral aspect of  the knee.    Electronically signed by VIDHI NUGENT  XR ARTHR/ASP/INJ MAJOR JNT/BURSA WO US  Narrative: EXAM:  XR ARTHR/ASP/INJ MAJOR JNT/BURSA WO US    INDICATION: Rule out right knee septic arthritis    COMPARISON: CT right knee 7/4/2024.    TECHNIQUE: Right knee fluoroscopically guided aspiration the fluid. Fluoroscopy  dose (air kerma): 18.509 mGy.    FINDINGS:   The procedure including the risk of bleeding and infection was explained to the  patient. Verbal and written informed consent was obtained. The possibility of  insufficient sample was explained to the patient. Patient is status post right  total knee arthroplasty. With fluoroscopic guidance, the skin was marked and  prepped and draped in sterile fashion.    Utilizing a 25-gauge needle

## 2024-07-10 NOTE — PROGRESS NOTES
Infectious Diseases Follow Up    7/10/2024    INFECTIOUS DISEASE Attending:     I agree with the above infectious disease daily progress note in its entirety as authored by and discussed in detail with the nurse practitioner.   I have reviewed pertinent laboratory studies, microbiology cultures, radiologic reports with review of the consultations and progress notes as appropriate.   I agree with today's subjective findings, physical examination, assessment and plan of care as described above and discussed extensively with the nurse practitioner.       Vanco/Cefepime to continue.    Follow arthrocentesis cultures.    Appreciate orthopedics input as I am concerned that without further source control it may be very difficult to eradicate infection.    Assessment & Plan:     58 y.o. male with past medical Hx of type 2 diabetes, aortic valve replacement with mechanical valve, hypertension, status post pacemaker insertion, dyslipidemia, CKD, ventricular tachycardia s/p LifeVest, dyslipidemia who was admitted for RLE wound. Infectious disease services was consulted to assist with antibiotic management of infected right leg wound below knee and popliteal area.      Right lower extremity cellulitis/ulcer  Multiple right posterior knee fluid collection  Hx of right knee replacement 2021  Recent admission with improvement on cefepime. On outpatient Tx with ciprofloxacin  New right popliteal wound with serous drainage, indurated, fluctuance  Wound cx NGTD, Blood cx NGTD  CT multiple fluid collections posterior knee with largest 40.1 cm x 12.1 cm x 3.0 cm   S/p aspiration of right knee (7/8), aspirate cx NGTD  XR right knee with lucency concerning for infection  S/p drainage of abscess by IR, abscess cx pending       Continue vancomycin, cefepime. Monitor renal function closely    Concern for infected hardware with prosthetic knee/septic arthritis and multiple fluid collections as well as lucency seen on plain film. Highly

## 2024-07-10 NOTE — PROGRESS NOTES
Pharmacist Note - Warfarin Dosing  Consult provided for this 58 y.o.male to manage warfarin for aortic valve replacement.    INR Goal: 2 - 3    Home regimen: 7.5 mg x 5 days; 5 mg on Tues/Thurs    Drugs that may increase INR: Amiodarone  Drugs that may decrease INR: None  Other medications that may increase bleeding risk: None  Risk factors: None  Daily INR ordered through: 10/10/24     Recent Labs     07/08/24  0701 07/09/24  0419 07/10/24  0450   HGB 7.5*  --  7.6*   INR 2.3* 2.6* 2.6*     Date               INR                  Dose  7/3  3.7  Held   7/4  3.6  Held   7/5  2.6  5 mg    7/6  2.1  7.5 mg    7/7  2.1  7.5 mg    7/8  2.3  7.5 mg    7/9  2.6  5 mg    7/10  2.6  7.5 mg                                                                                 Assessment/ Plan:  Will order warfarin 7.5 mg PO x 1 dose.    Pharmacy will continue to monitor daily and adjust therapy as indicated.  Please contact the pharmacist at [phone] for outpatient recommendations if needed.

## 2024-07-10 NOTE — PROGRESS NOTES
Pt refusing therapy at this time, stating \"I'm doing nothing with this in\" regarding drain. Adamant regarding tomorrow.     Marilyn Ramirez, DPT, PT

## 2024-07-11 LAB
ALBUMIN SERPL-MCNC: 2.9 G/DL (ref 3.5–5)
ALBUMIN/GLOB SERPL: 0.7 (ref 1.1–2.2)
ALP SERPL-CCNC: 77 U/L (ref 45–117)
ALT SERPL-CCNC: 30 U/L (ref 12–78)
ANION GAP SERPL CALC-SCNC: 7 MMOL/L (ref 5–15)
AST SERPL-CCNC: 27 U/L (ref 15–37)
BILIRUB SERPL-MCNC: 0.6 MG/DL (ref 0.2–1)
BUN SERPL-MCNC: 19 MG/DL (ref 6–20)
BUN/CREAT SERPL: 12 (ref 12–20)
CALCIUM SERPL-MCNC: 9.5 MG/DL (ref 8.5–10.1)
CHLORIDE SERPL-SCNC: 107 MMOL/L (ref 97–108)
CO2 SERPL-SCNC: 23 MMOL/L (ref 21–32)
CREAT SERPL-MCNC: 1.54 MG/DL (ref 0.7–1.3)
GLOBULIN SER CALC-MCNC: 4 G/DL (ref 2–4)
GLUCOSE BLD STRIP.AUTO-MCNC: 115 MG/DL (ref 65–117)
GLUCOSE BLD STRIP.AUTO-MCNC: 127 MG/DL (ref 65–117)
GLUCOSE BLD STRIP.AUTO-MCNC: 99 MG/DL (ref 65–117)
GLUCOSE BLD STRIP.AUTO-MCNC: 99 MG/DL (ref 65–117)
GLUCOSE SERPL-MCNC: 92 MG/DL (ref 65–100)
INR PPP: 2.7 (ref 0.9–1.1)
POTASSIUM SERPL-SCNC: 4 MMOL/L (ref 3.5–5.1)
PROT SERPL-MCNC: 6.9 G/DL (ref 6.4–8.2)
PROTHROMBIN TIME: 26.6 SEC (ref 9–11.1)
SERVICE CMNT-IMP: ABNORMAL
SERVICE CMNT-IMP: NORMAL
SODIUM SERPL-SCNC: 137 MMOL/L (ref 136–145)

## 2024-07-11 PROCEDURE — 2580000003 HC RX 258: Performed by: INTERNAL MEDICINE

## 2024-07-11 PROCEDURE — 6360000002 HC RX W HCPCS: Performed by: INTERNAL MEDICINE

## 2024-07-11 PROCEDURE — 6370000000 HC RX 637 (ALT 250 FOR IP): Performed by: INTERNAL MEDICINE

## 2024-07-11 PROCEDURE — 6370000000 HC RX 637 (ALT 250 FOR IP): Performed by: FAMILY MEDICINE

## 2024-07-11 PROCEDURE — 6370000000 HC RX 637 (ALT 250 FOR IP): Performed by: HOSPITALIST

## 2024-07-11 PROCEDURE — 85610 PROTHROMBIN TIME: CPT

## 2024-07-11 PROCEDURE — 80053 COMPREHEN METABOLIC PANEL: CPT

## 2024-07-11 PROCEDURE — 36415 COLL VENOUS BLD VENIPUNCTURE: CPT

## 2024-07-11 PROCEDURE — 2060000000 HC ICU INTERMEDIATE R&B

## 2024-07-11 PROCEDURE — 82962 GLUCOSE BLOOD TEST: CPT

## 2024-07-11 RX ORDER — LACTULOSE 10 G/15ML
30 SOLUTION ORAL 2 TIMES DAILY
Status: DISCONTINUED | OUTPATIENT
Start: 2024-07-11 | End: 2024-07-14

## 2024-07-11 RX ORDER — WARFARIN SODIUM 5 MG/1
5 TABLET ORAL ONCE
Status: COMPLETED | OUTPATIENT
Start: 2024-07-11 | End: 2024-07-11

## 2024-07-11 RX ORDER — MIDODRINE HYDROCHLORIDE 5 MG/1
5 TABLET ORAL
Status: DISCONTINUED | OUTPATIENT
Start: 2024-07-11 | End: 2024-07-18 | Stop reason: HOSPADM

## 2024-07-11 RX ADMIN — CEFEPIME 2000 MG: 2 INJECTION, POWDER, FOR SOLUTION INTRAVENOUS at 08:40

## 2024-07-11 RX ADMIN — SODIUM CHLORIDE 1250 MG: 9 INJECTION, SOLUTION INTRAVENOUS at 16:36

## 2024-07-11 RX ADMIN — SODIUM CHLORIDE, PRESERVATIVE FREE 10 ML: 5 INJECTION INTRAVENOUS at 21:12

## 2024-07-11 RX ADMIN — SENNOSIDES 8.6 MG: 8.6 TABLET, FILM COATED ORAL at 04:04

## 2024-07-11 RX ADMIN — CEFEPIME 2000 MG: 2 INJECTION, POWDER, FOR SOLUTION INTRAVENOUS at 01:08

## 2024-07-11 RX ADMIN — ROSUVASTATIN 40 MG: 40 TABLET, FILM COATED ORAL at 08:35

## 2024-07-11 RX ADMIN — FERROUS SULFATE TAB 325 MG (65 MG ELEMENTAL FE) 325 MG: 325 (65 FE) TAB at 07:04

## 2024-07-11 RX ADMIN — PANTOPRAZOLE SODIUM 40 MG: 40 TABLET, DELAYED RELEASE ORAL at 07:04

## 2024-07-11 RX ADMIN — Medication: at 08:37

## 2024-07-11 RX ADMIN — Medication 1 MG: at 08:35

## 2024-07-11 RX ADMIN — MIDODRINE HYDROCHLORIDE 10 MG: 5 TABLET ORAL at 07:03

## 2024-07-11 RX ADMIN — MIDODRINE HYDROCHLORIDE 5 MG: 5 TABLET ORAL at 17:41

## 2024-07-11 RX ADMIN — CEFEPIME 2000 MG: 2 INJECTION, POWDER, FOR SOLUTION INTRAVENOUS at 17:43

## 2024-07-11 RX ADMIN — LACTULOSE 30 G: 20 SOLUTION ORAL at 13:11

## 2024-07-11 RX ADMIN — AMIODARONE HYDROCHLORIDE 400 MG: 200 TABLET ORAL at 08:35

## 2024-07-11 RX ADMIN — Medication: at 21:12

## 2024-07-11 RX ADMIN — HYDROCODONE BITARTRATE AND ACETAMINOPHEN 1 TABLET: 5; 325 TABLET ORAL at 17:47

## 2024-07-11 RX ADMIN — SODIUM CHLORIDE, PRESERVATIVE FREE 10 ML: 5 INJECTION INTRAVENOUS at 08:37

## 2024-07-11 RX ADMIN — MIDODRINE HYDROCHLORIDE 10 MG: 5 TABLET ORAL at 13:11

## 2024-07-11 RX ADMIN — WARFARIN SODIUM 5 MG: 5 TABLET ORAL at 17:47

## 2024-07-11 ASSESSMENT — PAIN DESCRIPTION - DESCRIPTORS: DESCRIPTORS: ACHING;SORE

## 2024-07-11 ASSESSMENT — PAIN DESCRIPTION - LOCATION: LOCATION: SHOULDER

## 2024-07-11 ASSESSMENT — PAIN DESCRIPTION - ORIENTATION: ORIENTATION: RIGHT

## 2024-07-11 ASSESSMENT — PAIN SCALES - GENERAL: PAINLEVEL_OUTOF10: 7

## 2024-07-11 NOTE — PROGRESS NOTES
Infectious Diseases Follow Up    2024      Assessment & Plan:     58 y.o. male with past medical Hx of type 2 diabetes, aortic valve replacement with mechanical valve, hypertension, status post pacemaker insertion, dyslipidemia, CKD, ventricular tachycardia s/p LifeVest, dyslipidemia who was admitted for RLE wound. Infectious disease services was consulted to assist with antibiotic management of infected right leg wound below knee and popliteal area.      Right lower extremity cellulitis/ulcer  Multiple right posterior knee fluid collection  Hx of right knee replacement   Recent admission with improvement on cefepime. On outpatient Tx with ciprofloxacin  New right popliteal wound with serous drainage, indurated, fluctuance  Wound cx NGTD, Blood cx NGTD  CT multiple fluid collections posterior knee with largest 40.1 cm x 12.1 cm x 3.0 cm   S/p aspiration of right knee (), aspirate cx NGTD  XR right knee with lucency concerning for infection  S/p drainage of abscess by IR, abscess cx NGTD       Continue vancomycin, cefepime. Monitor renal function closely    Concern for infected hardware with prosthetic knee/septic arthritis and multiple fluid collections as well as lucency seen on plain film. 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  D/w Dr. Juares, agree with MRI of right leg to eval extent of infection. D/w Dr. Carnes.   Follow abscess cx    Leukocytosis   resolved     DM II. A1c 5.7, controlled          Subjective:     Patient in bed, no complaints. RLE drain with sanguineous fluid    Objective:     Vitals: BP (!) 125/48   Pulse 60   Temp 98.2 °F (36.8 °C) (Oral)   Resp 17   Ht 1.956 m (6' 5\")   Wt (!) 141.9 kg (312 lb 12.8 oz)   SpO2 100%   BMI 37.09 kg/m²      Tmax:  Temp (24hrs), Av.1 °F (36.7 °C), Min:97.5 °F (36.4 °C), Max:98.4 °F (36.9 °C)      Exam:   Patient is intubated:  no    Exam:    General:  Alert, cooperative, well nourished, NAD   Eyes:  Sclera  (SENOKOT) tablet 8.6 mg, 1 tablet, Oral, Nightly PRN, Devonte Cast MD, 8.6 mg at 07/11/24 0404    amiodarone (CORDARONE) tablet 400 mg, 400 mg, Oral, Daily, José Luis Pulido MD, 400 mg at 07/11/24 0835    ferrous sulfate (IRON 325) tablet 325 mg, 325 mg, Oral, RAKANM Tess MITCHELL Razaak A, MD, 325 mg at 07/11/24 0704    folic acid (FOLVITE) tablet 1 mg, 1 mg, Oral, Daily, José Luis Pulido MD, 1 mg at 07/11/24 0835    HYDROcodone-acetaminophen (NORCO) 5-325 MG per tablet 1 tablet, 1 tablet, Oral, Q6H PRN, José Luis Pulido MD, 1 tablet at 07/10/24 2203    pantoprazole (PROTONIX) tablet 40 mg, 40 mg, Oral, THIAGO MITCHELL, José Luis Pulido MD, 40 mg at 07/11/24 0704    rosuvastatin (CRESTOR) tablet 40 mg, 40 mg, Oral, Daily, José Luis Pulido MD, 40 mg at 07/11/24 0835    sodium chloride flush 0.9 % injection 5-40 mL, 5-40 mL, IntraVENous, 2 times per day, José Luis Pulido MD, 10 mL at 07/11/24 0837    sodium chloride flush 0.9 % injection 5-40 mL, 5-40 mL, IntraVENous, PRN, José Luis Pulido MD    0.9 % sodium chloride infusion, , IntraVENous, PRN, José Luis Pulido MD    potassium chloride (KLOR-CON) extended release tablet 40 mEq, 40 mEq, Oral, PRN, 40 mEq at 07/04/24 1927 **OR** potassium bicarb-citric acid (EFFER-K) effervescent tablet 40 mEq, 40 mEq, Oral, PRN **OR** potassium chloride 10 mEq/100 mL IVPB (Peripheral Line), 10 mEq, IntraVENous, PRN, José Luis Pulido MD    magnesium sulfate 2000 mg in 50 mL IVPB premix, 2,000 mg, IntraVENous, PRN, José Luis Pulido MD    ondansetron (ZOFRAN-ODT) disintegrating tablet 4 mg, 4 mg, Oral, Q8H PRN **OR** ondansetron (ZOFRAN) injection 4 mg, 4 mg, IntraVENous, Q6H PRN, José Luis Pulido MD    polyethylene glycol (GLYCOLAX) packet 17 g, 17 g, Oral, Daily PRN, José Luis Pulido MD, 17 g at 07/04/24 2038    acetaminophen (TYLENOL) tablet 650 mg, 650 mg, Oral, Q6H PRN **OR** acetaminophen (TYLENOL) suppository 650 mg, 650 mg, Rectal, Q6H PRN, José Luis Pulido MD

## 2024-07-11 NOTE — PROGRESS NOTES
Pharmacist Note - Warfarin Dosing  Consult provided for this 58 y.o.male to manage warfarin for aortic valve replacement.    INR Goal: 2 - 3    Home regimen: 7.5 mg x 5 days; 5 mg on Tues/Thurs    Drugs that may increase INR: Amiodarone  Drugs that may decrease INR: None  Other medications that may increase bleeding risk: None  Risk factors: None  Daily INR ordered through: 10/10/24     Recent Labs     07/09/24  0419 07/10/24  0450 07/11/24  0447   HGB  --  7.6*  --    INR 2.6* 2.6* 2.7*     Date               INR                  Dose  7/3  3.7  Held   7/4  3.6  Held   7/5  2.6  5 mg    7/6  2.1  7.5 mg    7/7  2.1  7.5 mg    7/8  2.3  7.5 mg    7/9  2.6  5 mg    7/10  2.6  7.5 mg    7/11  2.7  5 mg                                                                                 Assessment/ Plan:  Will order warfarin 5 mg PO x 1 dose.    Pharmacy will continue to monitor daily and adjust therapy as indicated.  Please contact the pharmacist at [phone] or [phone] for outpatient recommendations if needed.

## 2024-07-11 NOTE — CARE COORDINATION
Transition of Care Plan:    RUR: 21%  Prior Level of Functioning: Independent   Disposition: Home with home health services for wound care and potential IV ABX.  If SNF or IPR: Date FOC offered: N/A  Follow up appointments: PCP   DME needed: Patient owns DME required for discharge  Transportation at discharge: Medical transport (if IV ABX needed)  IM/IMM Medicare/ letter given: Will need 2nd IM prior to discharge  Is patient a [place] and connected with [facility]? N/A   If yes, was [place] transfer form completed and [facility] notified? N/A  Caregiver Contact: Simin Lawler, Sister, [phone]  Discharge Caregiver contacted prior to discharge? CM to contact if needed  Care Conference needed? Not at this time  Barriers to discharge:  Medical clearance, waiting on cultures, possible IX ABX for 4-6 weeks post-discharge    Per CM note from 7/9: CM sent referral to [facility] in case patient will need long term antibiotics. Patient's benefits have been checked and are in-network. [facility] can provide a co-pay once they know the final orders for antibiotics.     EMA discussed pt's discharge plan with medical team in IDR at 10:00 AM. Pt to remain inpatient through the weekend. Plan to discharge home early next week with possible IV ABX.    EMA called pt's PCP at [phone] to schedule a hospital follow up appt on his behalf. EMA left a VM requesting a call back.     EMA sent a referral to [facility] for pt via [employer]. [facility] confirmed that the patient is active with them, but they are sending the referral for review.    Case management continuing to follow for discharge planning.    Raeann Zamora LMSW,   [phone]

## 2024-07-11 NOTE — PROGRESS NOTES
51 - 75%     Supplement Intake:  No data found.      Weight Hx:  Wt Readings from Last 10 Encounters:   07/04/24 (!) 141.9 kg (312 lb 12.8 oz)   06/21/24 (!) 151 kg (333 lb)   06/19/24 (!) 148.8 kg (328 lb)   06/04/24 (!) 148.8 kg (328 lb)   05/20/24 (!) 147 kg (324 lb)   05/15/24 (!) 147 kg (324 lb)   04/09/24 (!) 159.2 kg (351 lb)   04/06/24 (!) 159.3 kg (351 lb 3.1 oz)   01/03/24 (!) 140.6 kg (310 lb)   10/18/23 (!) 140.6 kg (310 lb)       Recent Labs     07/09/24  0419 07/10/24  0450 07/11/24  0447   GLUCOSE 95 92 92   BUN 21* 19 19   CREATININE 1.69* 1.63* 1.54*    136 137   K 3.8 3.9 4.0    106 107   CO2 24 24 23   CALCIUM 9.8 9.4 9.5       Recent Labs     07/08/24  1712 07/08/24  2121 07/09/24  0627 07/09/24  1111 07/09/24  1628 07/09/24  2048 07/10/24  0557 07/10/24  1114 07/10/24  1702 07/10/24  2046 07/11/24  0625 07/11/24  1229   POCGLU 98 131* 96 86 109 103 102 92 133* 142* 99 99       Lab Results   Component Value Date/Time    LABA1C 5.5 07/03/2024 12:20 PM    LABA1C 5.7 05/28/2024 05:30 AM    LABA1C 6.6 04/06/2024 09:04 AM     07/03/2024 12:20 PM           Adwoa Mary RD  Available via [employer]

## 2024-07-11 NOTE — PROGRESS NOTES
nephrotoxins    Defined by Kidney Disease Improving Global Outcomes (KDIGO) clinical practice   guideline for acute kidney injury:  -Increase in SCr by greater than or equal to 0.3 mg/dl within 48 hours; or  -Increase or decrease in SCr to greater than or equal to 1.5 times baseline,   which is known or presumed to have occurred within the prior 7 days; or  -Urine volume < 0.5ml/kg/h for 6 hours.    Thank you,  Nishi Berger, RN, BSN, CRCR, CCDS, SMART  Clinical   [email or web address] Ratings  Options provided:  -- Acute kidney injury evidenced by, Please document evidence as well as a   numerical baseline creatinine, if known.  -- Currently resolved acute kidney injury was evidenced by, Please document   evidence as well as a numerical baseline creatinine, if known.  -- Acute kidney injury ruled out after study  -- Other - I will add my own diagnosis  -- Disagree - Not applicable / Not valid  -- Disagree - Clinically unable to determine / Unknown  -- Refer to Clinical Documentation Reviewer    PROVIDER RESPONSE TEXT:    Acute kidney injury was ruled out after study.    Query created by: Nishi Berger on 7/8/2024 10:02 AM      Electronically signed by:  Devonte Buenrostro MD 7/10/2024 8:13   PM

## 2024-07-11 NOTE — PROGRESS NOTES
[facility] Adult  Hospitalist Group                                                                                          Hospitalist Progress Note  Nicolás Juares MD  Answering service: [phone] OR [phone] from in house phone        Date of Service:  2024  NAME:  Javier Ugalde  :  1965  MRN:  [id number]       Admission Summary:     \"Javier Ugalde is a 58 y.o. male with past medical history significant for type 2 diabetes, aortic valve replacement with mechanical valve, hypertension, status post pacemaker insertion, dyslipidemia, CKD, ventricular tachycardia s/p LifeVest, dyslipidemia presented at the emergency room with right leg swelling and redness.  Patient was admitted to this hospital last month, was admitted and treated for right lower extremity cellulitis, completed 5 days of cefepime and was then discharged home on doxycycline and the patient finished the course of this antibiotic.  He was seen at wound care clinic today, patient wound is progressively getting worse despite being on oral antibiotic, she was sent to the emergency room from wound care clinic for evaluation for admission.  Patient reported subjective fever without chills or rigors at room.  Patient was started on cefepime and vancomycin and referred to the hospitalist service for admission.\"       Interval history / Subjective:     Patient seen and examined at the bedside.  Reviewed his vitals labs test results and care plan.   Right leg swelling going down.Right leg pain improved.    No chest pain or shortness of breath.  Patient does report constipation     Assessment & Plan:     Right leg swelling, right popliteal and knee area infected wounds with surrounding cellulitis and possible abscess,  Possible right knee septic arthritis, hx of right total knee arthroplasty  Continue IV antibiotics managed by ID follow cultures  -Right leg doppler negative for DVT   Reviewed radiology for right leg and

## 2024-07-12 ENCOUNTER — APPOINTMENT (OUTPATIENT)
Facility: HOSPITAL | Age: 59
DRG: 560 | End: 2024-07-12
Payer: MEDICARE

## 2024-07-12 LAB
ALBUMIN SERPL-MCNC: 3 G/DL (ref 3.5–5)
ALBUMIN/GLOB SERPL: 0.7 (ref 1.1–2.2)
ALP SERPL-CCNC: 74 U/L (ref 45–117)
ALT SERPL-CCNC: 23 U/L (ref 12–78)
ANION GAP SERPL CALC-SCNC: 7 MMOL/L (ref 5–15)
AST SERPL-CCNC: 24 U/L (ref 15–37)
BACTERIA SPEC CULT: NORMAL
BACTERIA SPEC CULT: NORMAL
BASOPHILS # BLD: 0.1 K/UL (ref 0–0.1)
BASOPHILS NFR BLD: 1 % (ref 0–1)
BILIRUB SERPL-MCNC: 0.8 MG/DL (ref 0.2–1)
BUN SERPL-MCNC: 18 MG/DL (ref 6–20)
BUN/CREAT SERPL: 12 (ref 12–20)
CALCIUM SERPL-MCNC: 10 MG/DL (ref 8.5–10.1)
CHLORIDE SERPL-SCNC: 106 MMOL/L (ref 97–108)
CO2 SERPL-SCNC: 22 MMOL/L (ref 21–32)
CREAT SERPL-MCNC: 1.54 MG/DL (ref 0.7–1.3)
DIFFERENTIAL METHOD BLD: ABNORMAL
EOSINOPHIL # BLD: 0.1 K/UL (ref 0–0.4)
EOSINOPHIL NFR BLD: 1 % (ref 0–7)
ERYTHROCYTE [DISTWIDTH] IN BLOOD BY AUTOMATED COUNT: 15.3 % (ref 11.5–14.5)
GLOBULIN SER CALC-MCNC: 4.1 G/DL (ref 2–4)
GLUCOSE BLD STRIP.AUTO-MCNC: 108 MG/DL (ref 65–117)
GLUCOSE BLD STRIP.AUTO-MCNC: 110 MG/DL (ref 65–117)
GLUCOSE BLD STRIP.AUTO-MCNC: 115 MG/DL (ref 65–117)
GLUCOSE BLD STRIP.AUTO-MCNC: 119 MG/DL (ref 65–117)
GLUCOSE SERPL-MCNC: 93 MG/DL (ref 65–100)
GRAM STN SPEC: NORMAL
GRAM STN SPEC: NORMAL
HCT VFR BLD AUTO: 25.4 % (ref 36.6–50.3)
HGB BLD-MCNC: 8.2 G/DL (ref 12.1–17)
IMM GRANULOCYTES # BLD AUTO: 0.1 K/UL (ref 0–0.04)
IMM GRANULOCYTES NFR BLD AUTO: 1 % (ref 0–0.5)
INR PPP: 2.7 (ref 0.9–1.1)
LYMPHOCYTES # BLD: 1.6 K/UL (ref 0.8–3.5)
LYMPHOCYTES NFR BLD: 14 % (ref 12–49)
MCH RBC QN AUTO: 27.7 PG (ref 26–34)
MCHC RBC AUTO-ENTMCNC: 32.3 G/DL (ref 30–36.5)
MCV RBC AUTO: 85.8 FL (ref 80–99)
MONOCYTES # BLD: 1.5 K/UL (ref 0–1)
MONOCYTES NFR BLD: 13 % (ref 5–13)
NEUTS SEG # BLD: 8 K/UL (ref 1.8–8)
NEUTS SEG NFR BLD: 70 % (ref 32–75)
NRBC # BLD: 0 K/UL (ref 0–0.01)
NRBC BLD-RTO: 0 PER 100 WBC
PLATELET # BLD AUTO: 314 K/UL (ref 150–400)
PMV BLD AUTO: 9.4 FL (ref 8.9–12.9)
POTASSIUM SERPL-SCNC: 3.9 MMOL/L (ref 3.5–5.1)
PROT SERPL-MCNC: 7.1 G/DL (ref 6.4–8.2)
PROTHROMBIN TIME: 26.4 SEC (ref 9–11.1)
RBC # BLD AUTO: 2.96 M/UL (ref 4.1–5.7)
SERVICE CMNT-IMP: ABNORMAL
SERVICE CMNT-IMP: NORMAL
SODIUM SERPL-SCNC: 135 MMOL/L (ref 136–145)
VANCOMYCIN SERPL-MCNC: 20.6 UG/ML
WBC # BLD AUTO: 11.4 K/UL (ref 4.1–11.1)

## 2024-07-12 PROCEDURE — 2060000000 HC ICU INTERMEDIATE R&B

## 2024-07-12 PROCEDURE — 97116 GAIT TRAINING THERAPY: CPT

## 2024-07-12 PROCEDURE — 82962 GLUCOSE BLOOD TEST: CPT

## 2024-07-12 PROCEDURE — 6370000000 HC RX 637 (ALT 250 FOR IP): Performed by: FAMILY MEDICINE

## 2024-07-12 PROCEDURE — 36415 COLL VENOUS BLD VENIPUNCTURE: CPT

## 2024-07-12 PROCEDURE — 2580000003 HC RX 258: Performed by: INTERNAL MEDICINE

## 2024-07-12 PROCEDURE — 80202 ASSAY OF VANCOMYCIN: CPT

## 2024-07-12 PROCEDURE — 80053 COMPREHEN METABOLIC PANEL: CPT

## 2024-07-12 PROCEDURE — 6370000000 HC RX 637 (ALT 250 FOR IP): Performed by: INTERNAL MEDICINE

## 2024-07-12 PROCEDURE — 6360000002 HC RX W HCPCS: Performed by: INTERNAL MEDICINE

## 2024-07-12 PROCEDURE — 85025 COMPLETE CBC W/AUTO DIFF WBC: CPT

## 2024-07-12 PROCEDURE — 99232 SBSQ HOSP IP/OBS MODERATE 35: CPT | Performed by: INTERNAL MEDICINE

## 2024-07-12 PROCEDURE — 2580000003 HC RX 258: Performed by: PHYSICIAN ASSISTANT

## 2024-07-12 PROCEDURE — 85610 PROTHROMBIN TIME: CPT

## 2024-07-12 RX ORDER — SODIUM CHLORIDE 0.9 % (FLUSH) 0.9 %
10-20 SYRINGE (ML) INJECTION EVERY 8 HOURS
Status: DISCONTINUED | OUTPATIENT
Start: 2024-07-12 | End: 2024-07-18 | Stop reason: HOSPADM

## 2024-07-12 RX ORDER — WARFARIN SODIUM 7.5 MG/1
7.5 TABLET ORAL
Status: COMPLETED | OUTPATIENT
Start: 2024-07-12 | End: 2024-07-12

## 2024-07-12 RX ADMIN — Medication: at 21:12

## 2024-07-12 RX ADMIN — CEFEPIME 2000 MG: 2 INJECTION, POWDER, FOR SOLUTION INTRAVENOUS at 09:18

## 2024-07-12 RX ADMIN — AMIODARONE HYDROCHLORIDE 400 MG: 200 TABLET ORAL at 09:14

## 2024-07-12 RX ADMIN — MIDODRINE HYDROCHLORIDE 5 MG: 5 TABLET ORAL at 18:49

## 2024-07-12 RX ADMIN — CEFEPIME 2000 MG: 2 INJECTION, POWDER, FOR SOLUTION INTRAVENOUS at 19:33

## 2024-07-12 RX ADMIN — PANTOPRAZOLE SODIUM 40 MG: 40 TABLET, DELAYED RELEASE ORAL at 05:21

## 2024-07-12 RX ADMIN — LACTULOSE 30 G: 20 SOLUTION ORAL at 21:12

## 2024-07-12 RX ADMIN — MIDODRINE HYDROCHLORIDE 5 MG: 5 TABLET ORAL at 12:19

## 2024-07-12 RX ADMIN — Medication: at 09:19

## 2024-07-12 RX ADMIN — SODIUM CHLORIDE, PRESERVATIVE FREE 10 ML: 5 INJECTION INTRAVENOUS at 18:51

## 2024-07-12 RX ADMIN — SODIUM CHLORIDE 1250 MG: 9 INJECTION, SOLUTION INTRAVENOUS at 16:22

## 2024-07-12 RX ADMIN — FERROUS SULFATE TAB 325 MG (65 MG ELEMENTAL FE) 325 MG: 325 (65 FE) TAB at 05:21

## 2024-07-12 RX ADMIN — ROSUVASTATIN 40 MG: 40 TABLET, FILM COATED ORAL at 09:14

## 2024-07-12 RX ADMIN — Medication 1 MG: at 09:14

## 2024-07-12 RX ADMIN — WARFARIN SODIUM 7.5 MG: 7.5 TABLET ORAL at 18:49

## 2024-07-12 RX ADMIN — SODIUM CHLORIDE, PRESERVATIVE FREE 10 ML: 5 INJECTION INTRAVENOUS at 21:09

## 2024-07-12 RX ADMIN — CEFEPIME 2000 MG: 2 INJECTION, POWDER, FOR SOLUTION INTRAVENOUS at 00:38

## 2024-07-12 RX ADMIN — MIDODRINE HYDROCHLORIDE 5 MG: 5 TABLET ORAL at 06:38

## 2024-07-12 NOTE — PROGRESS NOTES
Patient arrived at MRI with a life vest on.  Life vest are not MR conditional, therefore MRI can not be done today.  Informed patient's nurse, Maya of this.  Told her that orders to take the life vest off are needed before MRI can be done.

## 2024-07-12 NOTE — PROGRESS NOTES
from Aspirate     Gram Stain [[id number]] Collected: 07/10/24 1000    Order Status: Canceled Specimen: Aspirate     Culture, Body Fluid [[id number]] Collected: 07/10/24 1000    Order Status: Completed Specimen: Body Fluid Updated: 07/11/24 0813     Special Requests NO SPECIAL REQUESTS        Gram Stain 4+ WBCS SEEN         NO ORGANISMS SEEN        Culture NO GROWTH THUS FAR       Culture, Body Fluid [[id number]] Collected: 07/08/24 1130    Order Status: Completed Specimen: Body Fluid from Synovial Fluid Updated: 07/12/24 0633     Special Requests NO SPECIAL REQUESTS        Gram Stain NO WBCS SEEN         NO ORGANISMS SEEN        Culture       NO GROWTH 4 DAYS Culture performed on Fluid swab specimen          Culture, Anaerobic [[id number]] Collected: 07/08/24 1130    Order Status: Completed Specimen: Knee Updated: 07/12/24 0633     Special Requests NO SPECIAL REQUESTS        Culture NO GROWTH 4 DAYS       Culture, Wound [[id number]] Collected: 07/04/24 1157    Order Status: Completed Specimen: Leg Updated: 07/06/24 1218     Special Requests NO SPECIAL REQUESTS        Gram Stain 1+ WBCS SEEN         NO ORGANISMS SEEN        Culture NO GROWTH 2 DAYS       Culture, MRSA, Screening [[id number]] Collected: 07/03/24 2352    Order Status: Completed Specimen: Nares Updated: 07/05/24 0050     Special Requests NO SPECIAL REQUESTS        Culture MRSA NOT PRESENT               Screening of patient nares for MRSA is for surveillance purposes and, if positive, to facilitate isolation considerations in high risk settings. It is not intended for automatic decolonization interventions per se as regimens are not sufficiently effective to warrant routine use.      Blood Culture 1 [[id number]] Collected: 07/03/24 1220    Order Status: Completed Specimen: Blood Updated: 07/08/24 1254     Special Requests --        NO SPECIAL REQUESTS  RIGHT  Antecubital       Culture NO GROWTH 5 DAYS       Blood Culture 2 [[id number]] Collected:  Medications   Medication Dose Route Frequency    sodium chloride flush 0.9 % injection 10-20 mL  10-20 mL IntraCATHeter Q8H    lactulose (CHRONULAC) 10 GM/15ML solution 30 g  30 g Oral BID    midodrine (PROAMATINE) tablet 5 mg  5 mg Oral TID WC    ceFEPIme (MAXIPIME) 2,000 mg in sodium chloride 0.9 % 100 mL IVPB (mini-bag)  2,000 mg IntraVENous Q8H    vancomycin (VANCOCIN) 1,250 mg in sodium chloride 0.9 % 250 mL IVPB ([id number])  1,250 mg IntraVENous Q24H    ammonium lactate (LAC-HYDRIN) 12 % lotion   Topical Q12H    iohexol (OMNIPAQUE) injection 10 mL  10 mL Intra-artICUlar ONCE PRN    senna (SENOKOT) tablet 8.6 mg  1 tablet Oral Nightly PRN    amiodarone (CORDARONE) tablet 400 mg  400 mg Oral Daily    ferrous sulfate (IRON 325) tablet 325 mg  325 mg Oral QAM AC    folic acid (FOLVITE) tablet 1 mg  1 mg Oral Daily    HYDROcodone-acetaminophen (NORCO) 5-325 MG per tablet 1 tablet  1 tablet Oral Q6H PRN    pantoprazole (PROTONIX) tablet 40 mg  40 mg Oral QAM AC    rosuvastatin (CRESTOR) tablet 40 mg  40 mg Oral Daily    sodium chloride flush 0.9 % injection 5-40 mL  5-40 mL IntraVENous 2 times per day    sodium chloride flush 0.9 % injection 5-40 mL  5-40 mL IntraVENous PRN    0.9 % sodium chloride infusion   IntraVENous PRN    potassium chloride (KLOR-CON) extended release tablet 40 mEq  40 mEq Oral PRN    Or    potassium bicarb-citric acid (EFFER-K) effervescent tablet 40 mEq  40 mEq Oral PRN    Or    potassium chloride 10 mEq/100 mL IVPB (Peripheral Line)  10 mEq IntraVENous PRN    magnesium sulfate 2000 mg in 50 mL IVPB premix  2,000 mg IntraVENous PRN    ondansetron (ZOFRAN-ODT) disintegrating tablet 4 mg  4 mg Oral Q8H PRN    Or    ondansetron (ZOFRAN) injection 4 mg  4 mg IntraVENous Q6H PRN    polyethylene glycol (GLYCOLAX) packet 17 g  17 g Oral Daily PRN    acetaminophen (TYLENOL) tablet 650 mg  650 mg Oral Q6H PRN    Or    acetaminophen (TYLENOL) suppository 650 mg  650 mg Rectal Q6H PRN    insulin lispro

## 2024-07-12 NOTE — PROGRESS NOTES
Pharmacist Note - Warfarin Dosing  Consult provided for this 58 y.o.male to manage warfarin for aortic valve replacement.    INR Goal: 2 - 3    Home regimen: 7.5 mg x 5 days; 5 mg on Tues/Thurs    Drugs that may increase INR: Amiodarone  Drugs that may decrease INR: None  Other medications that may increase bleeding risk: None  Risk factors: None  Daily INR ordered through: 10/10/24     Recent Labs     07/10/24  0450 07/11/24  0447 07/12/24  0600   HGB 7.6*  --   --    INR 2.6* 2.7* 2.7*     Date               INR                  Dose  7/3  3.7  Held   7/4  3.6  Held   7/5  2.6  5 mg    7/6  2.1  7.5 mg    7/7  2.1  7.5 mg    7/8  2.3  7.5 mg    7/9  2.6  5 mg    7/10  2.6  7.5 mg    7/11  2.7  5 mg    7/12   2.7  7.5 mg                                                                                 Assessment/ Plan:  Will order warfarin 7.5 mg PO x 1 dose per the outpatient regimen.    Pharmacy will continue to monitor daily and adjust therapy as indicated.  Please contact the pharmacist at [phone] or [phone] for outpatient recommendations if needed.

## 2024-07-12 NOTE — WOUND CARE
Wound Care Note:     Follow up visit for leg wounds    Chart shows:  Admitted from home for cellulitis of right leg  Past Medical History:   Diagnosis Date    Arthritis     Atrial fib/flutter, transient (HCC)     Cellulitis     Chronic pain     COVID-19 vaccine series completed 4/2/21, 5/7/21    MODERNA    Diabetes (HCC)     Elevated hemoglobin A1c 06/07/2021    6.4    Hyperlipidemia     Hypertension     Hypertension complicating diabetes (HCC)      WBC = 11.4 on 7/12/24    Assessment:   Patient is Alert, communicative, continent with some assistance needed in repositioning.    Bed: P500  Diet: Adult diet regular; 3 carb choices  Patient reports pain from wounds is decreased, tolerated wound care without topical lidocaine        Bilateral heels skin intact and without erythema.  Buttocks and sacral were not assessed.    1. POA right lateral lower leg wound is an area measuring 6.5 cm x 12 cm x 0.1 cm, field of 4, wound beds are red with dark areas in center depicted below, small serous exudate, kristyn-wound with edema and hyperpigmentation.  Cleansed with Vashe wet gauze,  applied Optifoam Gentle NB AG secured with roll gauze         Hemovac to lower leg    2.  POA right posterior thigh wound measures 1 cm x 2 cm x 0.1, wound bed with some pink and superficial slough, kristyn-wound with edema and hyperpigmentation, small amount of serous exudate but patient reports drainage from this wound varies and was enough to get on the bed yesterday.  Cleansed with Vashe wet gauze, applied Optifoam Gentle NB AG, 4 x 4's, ABD pad and roll gauze.         Recommendations:    Right lateral leg and posterior thigh wounds- Daily and as needed for breakthrough drainage, cleanse with VASHE, apply Optifoam Gentle NB AG, apply several 4 x 4's and ABD pad for posterior wound and secure with roll gauze and tape.    Skin Care & Pressure Prevention:  Minimize layers of linen/pads under  patient to optimize support surface.    Turn/reposition approximately every 2 hours and offload heels.  Manage incontinence / promote continence   Nourishing Skin Cream to dry skin, minimize use of briefs when able    Discussed above plan with patient     Transition of Care: Plan to follow as needed while admitted to hospital.    Anne Sturgeon  MSN, [facility]  Office [phone]   Available through [employer]

## 2024-07-12 NOTE — CARE COORDINATION
Transition of Care Plan:    RUR: 21%  Prior Level of Functioning: Independent  Disposition: Home with Home Health Services and IV antibiotics  If SNF or IPR: Date FOC offered: N/A  Follow up appointments: Monday July 22, 2024 @ 11:30 with Dr. Brielle Muller  DME needed: Patient owns DME required for discharge  Transportation at discharge: BLS Stretcher Transport  IM/IMM Medicare/[facility] letter given: 7/4/24, will need second letter prior to discharge  Caregiver Contact: Simin Lawler, sister, [phone]  Barriers to discharge:  Surgical vs Non-Surgical Pathway, Pending Culture Results, Infectious Disease Final Antibiotics Plan    CM received return call from patient's PCP office. Patient will have JASON appointment on Monday July 22, 2024 @ 11:30 am with Dr. Brielle Muller. CM updated AVS.     Emilee Chatterjee, NADINEN, RN, ONC, CMSRN  Nurse Care Manager [phone]

## 2024-07-12 NOTE — PROGRESS NOTES
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     Goal target range AUC/BLACK 400-600      Plan: Continue current regimen. Pharmacy will continue to monitor this patient daily for changes in clinical status and renal function.    *Random vancomycin levels are used to calculate AUC/BLACK, this level should not be interpreted as a trough. Vancomycin has been dosed using Bayesian kinetics software to target an AUC24:BLACK of 400-600, which provides adequate exposure for as assumed infection due to MRSA with an BLACK of 1 or less while reducing the risk of nephrotoxicity as seen with traditional trough based dosing goals.

## 2024-07-12 NOTE — PLAN OF CARE
Problem: Physical Therapy - Adult  Goal: By Discharge: Performs mobility at highest level of function for planned discharge setting.  See evaluation for individualized goals.  Description: FUNCTIONAL STATUS PRIOR TO ADMISSION: Patient was modified independent using a rolling walker for functional mobility.    HOME SUPPORT PRIOR TO ADMISSION: The patient lived alone with no local support.    Physical Therapy Goals  Initiated 7/9/2024  1.  Patient will move from supine to sit and sit to supine and roll side to side in bed with modified independence within 7 day(s).    2.  Patient will perform sit to stand with modified independence within 7 day(s).  3.  Patient will transfer from bed to chair and chair to bed with modified independence using the least restrictive device within 7 day(s).  4.  Patient will ambulate with modified independence for 300 feet with the least restrictive device within 7 day(s).   5.  Patient will ascend/descend 5 stairs with 1 handrail(s) with modified independence within 7 day(s).   Outcome: Progressing     PHYSICAL THERAPY TREATMENT    Patient: Javier Ugalde (58 y.o. male)  Date: 7/12/2024  Diagnosis: Cellulitis of right leg [L03.115]  Cellulitis of right lower extremity [L03.115] Cellulitis of right leg      Precautions: Fall Risk                      ASSESSMENT:  Patient continues to benefit from skilled PT services and is slowly progressing towards goals. Patient received in bed and agreeable to treatment. He overall requires supervision to SBA for mobility with use of rolling walker. He demonstrates a slow but steady gait pattern, slightly limited by drain/lines/leads. Ambulates 150 ft with RW And no sign of LOB. Requires a seated rest break after. Agreeable to remain up in chair. Educated patient to continue to have supervision with mobility secondary to lines and leads and that he would benefit from ambulating with staff daily over the weekend. Will continue to follow.           PLAN:  Patient continues to benefit from skilled intervention to address the above impairments.  Continue treatment per established plan of care.    Recommend with staff: therapy recommendations for staff: Recommend mobility with staff assist x1 using rolling walker. Recommend ambulating in [place] with stand by assist 1-2x daily to maintain current function.    Recommend for next PT session: stair training, gait training    Recommendation for discharge: (in order for the patient to meet his/her long term goals): Therapy 2x a week in the home, however, may require supervision, cognitive and/or physical assistance due to the following concerns listed below:    Other factors to consider for discharge: concern for safely navigating or managing the home environment    IF patient discharges home will need the following DME: patient owns DME required for discharge       SUBJECTIVE:   Patient stated, \"I feel a little slower than usual.\"    OBJECTIVE DATA SUMMARY:   Critical Behavior:  Orientation  Overall Orientation Status: Within Normal Limits  Orientation Level: Oriented X4  Cognition  Overall Cognitive Status: WNL    Functional Mobility Training:  Bed Mobility:  Bed Mobility Training  Rolling: Modified independent  Supine to Sit: Modified independent  Scooting: Modified independent  Transfers:  Transfer Training  Transfer Training: Yes  Sit to Stand: Supervision  Stand to Sit: Supervision  Stand Pivot Transfers: Supervision  Balance:  Balance  Sitting: Intact  Standing: Impaired  Standing - Static: Constant support;Good  Standing - Dynamic: Constant support;Fair   Ambulation/Gait Training:     Gait  Gait Training: Yes  Overall Level of Assistance: Stand-by assistance;Additional time  Distance (ft): 150 Feet  Assistive Device: Gait belt;Walker, rolling  Interventions: Safety awareness training;Verbal cues;Tactile cues  Base of Support: Widened  Speed/Sandy: Pace decreased (< 100 feet/min)  Step Length: Right

## 2024-07-12 NOTE — PROGRESS NOTES
Infectious Diseases Follow Up    7/12/2024    INFECTIOUS DISEASE Attending:     I agree with the above infectious disease daily progress note in its entirety as authored by and discussed in detail with the nurse practitioner.   I have reviewed pertinent laboratory studies, microbiology cultures, radiologic reports with review of the consultations and progress notes as appropriate.   I agree with today's subjective findings, physical examination, assessment and plan of care as described above and discussed extensively with the nurse practitioner.       MRI right knee.    Continue Vanco/Cefepime.    Re-evaluation with orthopedics after MRI of right knee completed.    If no further intervention planned, will continue 4 weeks IV Daptomycin and Ceftriaxone.    Will be difficult to eradicate infection without optimized source control which it is highly suspect involves right knee replacement.    Assessment & Plan:     58 y.o. male with past medical Hx of type 2 diabetes, aortic valve replacement with mechanical valve, hypertension, status post pacemaker insertion, dyslipidemia, CKD, ventricular tachycardia s/p LifeVest, dyslipidemia who was admitted for RLE wound. Infectious disease services was consulted to assist with antibiotic management of infected right leg wound below knee and popliteal area.      Right lower extremity cellulitis/ulcer  Multiple right posterior knee fluid collection  Hx of right knee replacement 2021  Recent admission with improvement on cefepime. On outpatient Tx with ciprofloxacin  New right popliteal wound with serous drainage, indurated, fluctuance  Wound cx NGTD, Blood cx NGTD  CT multiple fluid collections posterior knee with largest 40.1 cm x 12.1 cm x 3.0 cm   S/p aspiration of right knee (7/8), aspirate cx NGTD  XR right knee with lucency concerning for infection  S/p drainage of abscess by IR, abscess cx NGTD       Continue vancomycin, cefepime. Monitor renal function closely    Concern  for infected hardware with prosthetic knee/septic arthritis and multiple fluid collections as well as lucency seen on plain film. 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   MRI of right leg to eval extent of infection and possible OM pending clearance with PPM. Pending MRI results and if no plans by ortho for surgical intervention, will treat for 6 weeks with IV antibiotics.   Follow abscess cx    Leukocytosis   resolved     DM II. A1c 5.7, controlled          Subjective:     Patient in bed, no complaints. RLE drain with sanguineous fluid    Objective:     Vitals: BP (!) 111/52   Pulse 80   Temp 97.9 °F (36.6 °C) (Oral)   Resp 18   Ht 1.956 m (6' 5\")   Wt (!) 141.9 kg (312 lb 12.8 oz)   SpO2 100%   BMI 37.09 kg/m²      Tmax:  Temp (24hrs), Av.1 °F (36.7 °C), Min:97.7 °F (36.5 °C), Max:98.2 °F (36.8 °C)      Exam:   Patient is intubated:  no    Exam:    General:  Alert, cooperative, well nourished, NAD   Eyes:  Sclera anicteric.   Mouth/Throat: Deferred   Neck: Supple   Lungs:   Clear to auscultation bilaterally, good effort   CV:  Regular rate and rhythm,no murmur, click, rub or gallop, Lifevest   Abdomen:   Soft, non-tender. bowel sounds normal. non-distended   Extremities: No cyanosis, RLE edema   Skin: Skin color, texture, turgor normal. RLE wound, RLE drain   Lymph nodes: Deferred   Musculoskeletal: SOLOMON   Lines/Devices:  RLE drain, PPM   Psych: Alert and oriented, normal mood affect given the setting         Labs:        Invalid input(s): \"ITNL\"   No results for input(s): \"CPK\", \"CKMB\" in the last 72 hours.    Invalid input(s): \"TROIQ\"  Recent Labs     07/10/24  0450 24  0447 24  0600    137 135*   K 3.9 4.0 3.9    107 106   CO2 24 23 22   BUN 19 19 18   WBC 9.7  --  11.4*   HGB 7.6*  --  8.2*   HCT 23.4*  --  25.4*     --  314     Recent Labs     07/10/24  0450 24  0447 24  0600   INR 2.6* 2.7* 2.7*         Medications:

## 2024-07-13 LAB
ALBUMIN SERPL-MCNC: 2.8 G/DL (ref 3.5–5)
ALBUMIN/GLOB SERPL: 0.7 (ref 1.1–2.2)
ALP SERPL-CCNC: 76 U/L (ref 45–117)
ALT SERPL-CCNC: 23 U/L (ref 12–78)
ANION GAP SERPL CALC-SCNC: 5 MMOL/L (ref 5–15)
AST SERPL-CCNC: 19 U/L (ref 15–37)
BILIRUB SERPL-MCNC: 0.8 MG/DL (ref 0.2–1)
BUN SERPL-MCNC: 21 MG/DL (ref 6–20)
BUN/CREAT SERPL: 12 (ref 12–20)
CALCIUM SERPL-MCNC: 9.8 MG/DL (ref 8.5–10.1)
CHLORIDE SERPL-SCNC: 108 MMOL/L (ref 97–108)
CO2 SERPL-SCNC: 24 MMOL/L (ref 21–32)
CREAT SERPL-MCNC: 1.7 MG/DL (ref 0.7–1.3)
GLOBULIN SER CALC-MCNC: 3.8 G/DL (ref 2–4)
GLUCOSE BLD STRIP.AUTO-MCNC: 108 MG/DL (ref 65–117)
GLUCOSE BLD STRIP.AUTO-MCNC: 113 MG/DL (ref 65–117)
GLUCOSE BLD STRIP.AUTO-MCNC: 116 MG/DL (ref 65–117)
GLUCOSE BLD STRIP.AUTO-MCNC: 95 MG/DL (ref 65–117)
GLUCOSE SERPL-MCNC: 108 MG/DL (ref 65–100)
INR PPP: 2.9 (ref 0.9–1.1)
POTASSIUM SERPL-SCNC: 4 MMOL/L (ref 3.5–5.1)
PROT SERPL-MCNC: 6.6 G/DL (ref 6.4–8.2)
PROTHROMBIN TIME: 28.7 SEC (ref 9–11.1)
SERVICE CMNT-IMP: NORMAL
SODIUM SERPL-SCNC: 137 MMOL/L (ref 136–145)

## 2024-07-13 PROCEDURE — 6360000002 HC RX W HCPCS: Performed by: INTERNAL MEDICINE

## 2024-07-13 PROCEDURE — 2580000003 HC RX 258: Performed by: PHYSICIAN ASSISTANT

## 2024-07-13 PROCEDURE — 6370000000 HC RX 637 (ALT 250 FOR IP): Performed by: INTERNAL MEDICINE

## 2024-07-13 PROCEDURE — 2580000003 HC RX 258: Performed by: INTERNAL MEDICINE

## 2024-07-13 PROCEDURE — 2060000000 HC ICU INTERMEDIATE R&B

## 2024-07-13 PROCEDURE — 6370000000 HC RX 637 (ALT 250 FOR IP): Performed by: FAMILY MEDICINE

## 2024-07-13 PROCEDURE — 36415 COLL VENOUS BLD VENIPUNCTURE: CPT

## 2024-07-13 PROCEDURE — 82962 GLUCOSE BLOOD TEST: CPT

## 2024-07-13 PROCEDURE — 80053 COMPREHEN METABOLIC PANEL: CPT

## 2024-07-13 PROCEDURE — 85610 PROTHROMBIN TIME: CPT

## 2024-07-13 RX ORDER — WARFARIN SODIUM 5 MG/1
5 TABLET ORAL
Status: COMPLETED | OUTPATIENT
Start: 2024-07-13 | End: 2024-07-13

## 2024-07-13 RX ADMIN — SODIUM CHLORIDE, PRESERVATIVE FREE 10 ML: 5 INJECTION INTRAVENOUS at 18:01

## 2024-07-13 RX ADMIN — ROSUVASTATIN 40 MG: 40 TABLET, FILM COATED ORAL at 09:45

## 2024-07-13 RX ADMIN — FERROUS SULFATE TAB 325 MG (65 MG ELEMENTAL FE) 325 MG: 325 (65 FE) TAB at 06:07

## 2024-07-13 RX ADMIN — Medication: at 09:48

## 2024-07-13 RX ADMIN — SODIUM CHLORIDE, PRESERVATIVE FREE 10 ML: 5 INJECTION INTRAVENOUS at 21:31

## 2024-07-13 RX ADMIN — SODIUM CHLORIDE, PRESERVATIVE FREE 10 ML: 5 INJECTION INTRAVENOUS at 09:45

## 2024-07-13 RX ADMIN — PANTOPRAZOLE SODIUM 40 MG: 40 TABLET, DELAYED RELEASE ORAL at 06:07

## 2024-07-13 RX ADMIN — CEFEPIME 2000 MG: 2 INJECTION, POWDER, FOR SOLUTION INTRAVENOUS at 01:27

## 2024-07-13 RX ADMIN — WARFARIN SODIUM 5 MG: 5 TABLET ORAL at 17:57

## 2024-07-13 RX ADMIN — CEFEPIME 2000 MG: 2 INJECTION, POWDER, FOR SOLUTION INTRAVENOUS at 09:44

## 2024-07-13 RX ADMIN — CEFEPIME 2000 MG: 2 INJECTION, POWDER, FOR SOLUTION INTRAVENOUS at 17:59

## 2024-07-13 RX ADMIN — Medication: at 21:32

## 2024-07-13 RX ADMIN — SODIUM CHLORIDE 1250 MG: 9 INJECTION, SOLUTION INTRAVENOUS at 15:16

## 2024-07-13 RX ADMIN — LACTULOSE 30 G: 20 SOLUTION ORAL at 09:47

## 2024-07-13 RX ADMIN — MIDODRINE HYDROCHLORIDE 5 MG: 5 TABLET ORAL at 13:07

## 2024-07-13 RX ADMIN — AMIODARONE HYDROCHLORIDE 400 MG: 200 TABLET ORAL at 09:44

## 2024-07-13 RX ADMIN — MIDODRINE HYDROCHLORIDE 5 MG: 5 TABLET ORAL at 17:58

## 2024-07-13 RX ADMIN — SODIUM CHLORIDE, PRESERVATIVE FREE 10 ML: 5 INJECTION INTRAVENOUS at 01:28

## 2024-07-13 RX ADMIN — MIDODRINE HYDROCHLORIDE 5 MG: 5 TABLET ORAL at 06:07

## 2024-07-13 RX ADMIN — Medication 1 MG: at 09:44

## 2024-07-13 NOTE — PROGRESS NOTES
JVD, no meningeal signs  Chest: Clear to auscultation bilaterally   CVS: RRR, audible click from prosthetic heart valve  Abd: soft/ non tender, non distended, BS physiological,   Ext: Right leg extremely swollen, bilateral pretibial and pedal edema significant on the right, drain placed in right lateral calf  Neuro/Psych: pleasant mood and affect, CN 2-12 grossly intact, sensory grossly within normal limit, no focal neurodeficit  Skin: Right leg hyperpigmented, wounds on right leg documented in wound care note    Data Review:   Review and/or order of clinical lab test  Review and/or order of tests in the radiology section of CPT  Review and/or order of tests in the medicine section of CPT    US SOFT TISSUE ABSCESS DRAINAGE PERC    Result Date: 7/10/2024  Successful placement of a right lateral calf 12 Turkmen drainage catheter with ultrasound guidance. Electronically signed by Terrence Morillo MD    XR KNEE RIGHT (1-2 VIEWS)    Result Date: 7/8/2024  Lucency is seen along the medial aspect of the femoral component which could be related to infection. There is soft tissue swelling along the lateral aspect of the knee. Electronically signed by VIDHI NUGENT    XR ARTHR/ASP/INJ MAJOR JNT/BURSA WO US    Result Date: 7/8/2024  Successful fluoroscopically guided aspiration of the right knee. 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 Pseudocapsule thickening and effusion, cannot exclude septic arthritis. Multiple rim-enhancing complex fluid collections about the knee, could reflect juxta articular abscesses. Electronically signed by DOROTHY MCDOWELL    XR CHEST PORTABLE    Result Date: 7/3/2024  Stable, mild cardiomegaly. No consolidation or pulmonary edema. Electronically signed by VIDHI NUGENT     Results       Procedure Component Value Units Date/Time    Culture, Body Fluid [[id number]] Collected: 07/10/24  Facility-Administered Medications   Medication Dose Route Frequency    warfarin (COUMADIN) tablet 5 mg  5 mg Oral Once    sodium chloride flush 0.9 % injection 10-20 mL  10-20 mL IntraCATHeter Q8H    lactulose (CHRONULAC) 10 GM/15ML solution 30 g  30 g Oral BID    midodrine (PROAMATINE) tablet 5 mg  5 mg Oral TID WC    ceFEPIme (MAXIPIME) 2,000 mg in sodium chloride 0.9 % 100 mL IVPB (mini-bag)  2,000 mg IntraVENous Q8H    vancomycin (VANCOCIN) 1,250 mg in sodium chloride 0.9 % 250 mL IVPB (Bzip1Sjg)  1,250 mg IntraVENous Q24H    ammonium lactate (LAC-HYDRIN) 12 % lotion   Topical Q12H    iohexol (OMNIPAQUE) injection 10 mL  10 mL Intra-artICUlar ONCE PRN    senna (SENOKOT) tablet 8.6 mg  1 tablet Oral Nightly PRN    amiodarone (CORDARONE) tablet 400 mg  400 mg Oral Daily    ferrous sulfate (IRON 325) tablet 325 mg  325 mg Oral QAM AC    folic acid (FOLVITE) tablet 1 mg  1 mg Oral Daily    HYDROcodone-acetaminophen (NORCO) 5-325 MG per tablet 1 tablet  1 tablet Oral Q6H PRN    pantoprazole (PROTONIX) tablet 40 mg  40 mg Oral QAM AC    rosuvastatin (CRESTOR) tablet 40 mg  40 mg Oral Daily    sodium chloride flush 0.9 % injection 5-40 mL  5-40 mL IntraVENous 2 times per day    sodium chloride flush 0.9 % injection 5-40 mL  5-40 mL IntraVENous PRN    0.9 % sodium chloride infusion   IntraVENous PRN    potassium chloride (KLOR-CON) extended release tablet 40 mEq  40 mEq Oral PRN    Or    potassium bicarb-citric acid (EFFER-K) effervescent tablet 40 mEq  40 mEq Oral PRN    Or    potassium chloride 10 mEq/100 mL IVPB (Peripheral Line)  10 mEq IntraVENous PRN    magnesium sulfate 2000 mg in 50 mL IVPB premix  2,000 mg IntraVENous PRN    ondansetron (ZOFRAN-ODT) disintegrating tablet 4 mg  4 mg Oral Q8H PRN    Or    ondansetron (ZOFRAN) injection 4 mg  4 mg IntraVENous Q6H PRN    polyethylene glycol (GLYCOLAX) packet 17 g  17 g Oral Daily PRN    acetaminophen (TYLENOL) tablet 650 mg  650 mg Oral Q6H PRN    Or

## 2024-07-13 NOTE — PROGRESS NOTES
Pharmacist Note - Warfarin Dosing  Consult provided for this 58 y.o.male to manage warfarin for aortic valve replacement.    INR Goal: 2 - 3    Home regimen: 7.5 mg x 5 days; 5 mg on Tues/Thurs    Drugs that may increase INR: Amiodarone  Drugs that may decrease INR: None  Other medications that may increase bleeding risk: None  Risk factors: None  Daily INR ordered through: 10/10/24     Recent Labs     07/11/24  0447 07/12/24  0600 07/13/24  0455   HGB  --  8.2*  --    INR 2.7* 2.7* 2.9*     Date               INR                  Dose  7/3  3.7  Held   7/4  3.6  Held   7/5  2.6  5 mg    7/6  2.1  7.5 mg    7/7  2.1  7.5 mg    7/8  2.3  7.5 mg    7/9  2.6  5 mg    7/10  2.6  7.5 mg    7/11  2.7  5 mg    7/12   2.7  7.5 mg    7/13  2.9  5 mg                                                                               Assessment/ Plan:  Will order warfarin 5 mg PO x 1 dose.     Pharmacy will continue to monitor daily and adjust therapy as indicated.  Please contact the pharmacist at [phone] or [phone] for outpatient recommendations if needed.

## 2024-07-14 LAB
ALBUMIN SERPL-MCNC: 2.8 G/DL (ref 3.5–5)
ALBUMIN/GLOB SERPL: 0.7 (ref 1.1–2.2)
ALP SERPL-CCNC: 71 U/L (ref 45–117)
ALT SERPL-CCNC: 21 U/L (ref 12–78)
ANION GAP SERPL CALC-SCNC: 6 MMOL/L (ref 5–15)
AST SERPL-CCNC: 23 U/L (ref 15–37)
BACTERIA SPEC CULT: NORMAL
BACTERIA SPEC CULT: NORMAL
BASOPHILS # BLD: 0.1 K/UL (ref 0–0.1)
BASOPHILS NFR BLD: 1 % (ref 0–1)
BILIRUB SERPL-MCNC: 0.7 MG/DL (ref 0.2–1)
BUN SERPL-MCNC: 20 MG/DL (ref 6–20)
BUN/CREAT SERPL: 13 (ref 12–20)
CALCIUM SERPL-MCNC: 9.7 MG/DL (ref 8.5–10.1)
CHLORIDE SERPL-SCNC: 109 MMOL/L (ref 97–108)
CO2 SERPL-SCNC: 22 MMOL/L (ref 21–32)
CREAT SERPL-MCNC: 1.55 MG/DL (ref 0.7–1.3)
DIFFERENTIAL METHOD BLD: ABNORMAL
EOSINOPHIL # BLD: 0.2 K/UL (ref 0–0.4)
EOSINOPHIL NFR BLD: 3 % (ref 0–7)
ERYTHROCYTE [DISTWIDTH] IN BLOOD BY AUTOMATED COUNT: 15.1 % (ref 11.5–14.5)
GLOBULIN SER CALC-MCNC: 3.8 G/DL (ref 2–4)
GLUCOSE BLD STRIP.AUTO-MCNC: 106 MG/DL (ref 65–117)
GLUCOSE BLD STRIP.AUTO-MCNC: 111 MG/DL (ref 65–117)
GLUCOSE BLD STRIP.AUTO-MCNC: 123 MG/DL (ref 65–117)
GLUCOSE BLD STRIP.AUTO-MCNC: 92 MG/DL (ref 65–117)
GLUCOSE SERPL-MCNC: 94 MG/DL (ref 65–100)
GRAM STN SPEC: NORMAL
GRAM STN SPEC: NORMAL
HCT VFR BLD AUTO: 24.3 % (ref 36.6–50.3)
HGB BLD-MCNC: 7.6 G/DL (ref 12.1–17)
IMM GRANULOCYTES # BLD AUTO: 0.1 K/UL (ref 0–0.04)
IMM GRANULOCYTES NFR BLD AUTO: 1 % (ref 0–0.5)
INR PPP: 2.9 (ref 0.9–1.1)
LYMPHOCYTES # BLD: 1.8 K/UL (ref 0.8–3.5)
LYMPHOCYTES NFR BLD: 22 % (ref 12–49)
MCH RBC QN AUTO: 27.2 PG (ref 26–34)
MCHC RBC AUTO-ENTMCNC: 31.3 G/DL (ref 30–36.5)
MCV RBC AUTO: 87.1 FL (ref 80–99)
MONOCYTES # BLD: 1 K/UL (ref 0–1)
MONOCYTES NFR BLD: 12 % (ref 5–13)
NEUTS SEG # BLD: 5 K/UL (ref 1.8–8)
NEUTS SEG NFR BLD: 61 % (ref 32–75)
NRBC # BLD: 0 K/UL (ref 0–0.01)
NRBC BLD-RTO: 0 PER 100 WBC
PLATELET # BLD AUTO: 291 K/UL (ref 150–400)
PMV BLD AUTO: 9.2 FL (ref 8.9–12.9)
POTASSIUM SERPL-SCNC: 3.8 MMOL/L (ref 3.5–5.1)
PROT SERPL-MCNC: 6.6 G/DL (ref 6.4–8.2)
PROTHROMBIN TIME: 28.7 SEC (ref 9–11.1)
RBC # BLD AUTO: 2.79 M/UL (ref 4.1–5.7)
SERVICE CMNT-IMP: ABNORMAL
SERVICE CMNT-IMP: NORMAL
SODIUM SERPL-SCNC: 137 MMOL/L (ref 136–145)
WBC # BLD AUTO: 8.3 K/UL (ref 4.1–11.1)

## 2024-07-14 PROCEDURE — 2580000003 HC RX 258: Performed by: PHYSICIAN ASSISTANT

## 2024-07-14 PROCEDURE — 2580000003 HC RX 258: Performed by: INTERNAL MEDICINE

## 2024-07-14 PROCEDURE — 85025 COMPLETE CBC W/AUTO DIFF WBC: CPT

## 2024-07-14 PROCEDURE — 36415 COLL VENOUS BLD VENIPUNCTURE: CPT

## 2024-07-14 PROCEDURE — 6360000002 HC RX W HCPCS: Performed by: INTERNAL MEDICINE

## 2024-07-14 PROCEDURE — 85610 PROTHROMBIN TIME: CPT

## 2024-07-14 PROCEDURE — 6370000000 HC RX 637 (ALT 250 FOR IP): Performed by: FAMILY MEDICINE

## 2024-07-14 PROCEDURE — 80053 COMPREHEN METABOLIC PANEL: CPT

## 2024-07-14 PROCEDURE — 6370000000 HC RX 637 (ALT 250 FOR IP): Performed by: INTERNAL MEDICINE

## 2024-07-14 PROCEDURE — 2060000000 HC ICU INTERMEDIATE R&B

## 2024-07-14 PROCEDURE — 82962 GLUCOSE BLOOD TEST: CPT

## 2024-07-14 RX ORDER — LACTULOSE 10 G/15ML
30 SOLUTION ORAL EVERY 6 HOURS PRN
Status: DISCONTINUED | OUTPATIENT
Start: 2024-07-14 | End: 2024-07-18 | Stop reason: HOSPADM

## 2024-07-14 RX ORDER — WARFARIN SODIUM 5 MG/1
5 TABLET ORAL
Status: COMPLETED | OUTPATIENT
Start: 2024-07-14 | End: 2024-07-14

## 2024-07-14 RX ADMIN — CEFEPIME 2000 MG: 2 INJECTION, POWDER, FOR SOLUTION INTRAVENOUS at 17:46

## 2024-07-14 RX ADMIN — MIDODRINE HYDROCHLORIDE 5 MG: 5 TABLET ORAL at 17:46

## 2024-07-14 RX ADMIN — WARFARIN SODIUM 5 MG: 5 TABLET ORAL at 17:46

## 2024-07-14 RX ADMIN — FERROUS SULFATE TAB 325 MG (65 MG ELEMENTAL FE) 325 MG: 325 (65 FE) TAB at 06:35

## 2024-07-14 RX ADMIN — Medication: at 09:09

## 2024-07-14 RX ADMIN — Medication 1 MG: at 09:06

## 2024-07-14 RX ADMIN — ROSUVASTATIN 40 MG: 40 TABLET, FILM COATED ORAL at 09:06

## 2024-07-14 RX ADMIN — AMIODARONE HYDROCHLORIDE 400 MG: 200 TABLET ORAL at 09:06

## 2024-07-14 RX ADMIN — MIDODRINE HYDROCHLORIDE 5 MG: 5 TABLET ORAL at 12:50

## 2024-07-14 RX ADMIN — Medication: at 20:59

## 2024-07-14 RX ADMIN — SODIUM CHLORIDE, PRESERVATIVE FREE 10 ML: 5 INJECTION INTRAVENOUS at 01:34

## 2024-07-14 RX ADMIN — SODIUM CHLORIDE, PRESERVATIVE FREE 10 ML: 5 INJECTION INTRAVENOUS at 09:10

## 2024-07-14 RX ADMIN — MIDODRINE HYDROCHLORIDE 5 MG: 5 TABLET ORAL at 06:35

## 2024-07-14 RX ADMIN — CEFEPIME 2000 MG: 2 INJECTION, POWDER, FOR SOLUTION INTRAVENOUS at 01:33

## 2024-07-14 RX ADMIN — PANTOPRAZOLE SODIUM 40 MG: 40 TABLET, DELAYED RELEASE ORAL at 06:35

## 2024-07-14 RX ADMIN — SODIUM CHLORIDE 1250 MG: 9 INJECTION, SOLUTION INTRAVENOUS at 15:49

## 2024-07-14 RX ADMIN — SODIUM CHLORIDE, PRESERVATIVE FREE 10 ML: 5 INJECTION INTRAVENOUS at 17:48

## 2024-07-14 RX ADMIN — CEFEPIME 2000 MG: 2 INJECTION, POWDER, FOR SOLUTION INTRAVENOUS at 09:08

## 2024-07-14 RX ADMIN — SODIUM CHLORIDE, PRESERVATIVE FREE 10 ML: 5 INJECTION INTRAVENOUS at 21:00

## 2024-07-14 ASSESSMENT — PAIN SCALES - GENERAL: PAINLEVEL_OUTOF10: 0

## 2024-07-14 ASSESSMENT — PAIN SCALES - WONG BAKER: WONGBAKER_NUMERICALRESPONSE: NO HURT

## 2024-07-14 NOTE — PROGRESS NOTES
Pharmacist Note - Warfarin Dosing  Consult provided for this 58 y.o.male to manage warfarin for aortic valve replacement.    INR Goal: 2 - 3    Home regimen: 7.5 mg x 5 days; 5 mg on Tues/Thurs    Drugs that may increase INR: Amiodarone  Drugs that may decrease INR: None  Other medications that may increase bleeding risk: None  Risk factors: None  Daily INR ordered through: 10/10/24     Recent Labs     07/12/24  0600 07/13/24  0455 07/14/24  0607   HGB 8.2*  --  7.6*   INR 2.7* 2.9* 2.9*     Date               INR                  Dose  7/3  3.7  Held   7/4  3.6  Held   7/5  2.6  5 mg    7/6  2.1  7.5 mg    7/7  2.1  7.5 mg    7/8  2.3  7.5 mg    7/9  2.6  5 mg    7/10  2.6  7.5 mg    7/11  2.7  5 mg    7/12   2.7  7.5 mg    7/13  2.9  5 mg  7/14  2.9  5 mg                                                                               Assessment/ Plan:  Will order warfarin 5 mg PO x 1 dose.     Pharmacy will continue to monitor daily and adjust therapy as indicated.  Please contact the pharmacist at [phone] or [phone] for outpatient recommendations if needed.

## 2024-07-14 NOTE — PROGRESS NOTES
[facility]  Hospitalist Group                                                                                          Hospitalist Progress Note  Nicolás Juares MD  Answering service: [phone] OR [phone] from in house phone        Date of Service:  2024  NAME:  Javier Ugalde  :  1965  MRN:  [id number]       Admission Summary:     \"Javier Ugalde is a 58 y.o. male with past medical history significant for type 2 diabetes, aortic valve replacement with mechanical valve, hypertension, status post pacemaker insertion, dyslipidemia, CKD, ventricular tachycardia s/p LifeVest, dyslipidemia presented at the emergency room with right leg swelling and redness.  Patient was admitted to this hospital last month, was admitted and treated for right lower extremity cellulitis, completed 5 days of cefepime and was then discharged home on doxycycline and the patient finished the course of this antibiotic.  He was seen at wound care clinic today, patient wound is progressively getting worse despite being on oral antibiotic, she was sent to the emergency room from wound care clinic for evaluation for admission.  Patient reported subjective fever without chills or rigors at room.  Patient was started on cefepime and vancomycin and referred to the hospitalist service for admission.\"       Interval history / Subjective:     Patient seen and examined at the bedside.  Reviewed his vitals labs test results and care plan.   Right leg swelling going down.Right leg pain improved.    No chest pain or shortness of breath.  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

## 2024-07-15 ENCOUNTER — APPOINTMENT (OUTPATIENT)
Facility: HOSPITAL | Age: 59
DRG: 560 | End: 2024-07-15
Payer: MEDICARE

## 2024-07-15 LAB
GLUCOSE BLD STRIP.AUTO-MCNC: 112 MG/DL (ref 65–117)
GLUCOSE BLD STRIP.AUTO-MCNC: 155 MG/DL (ref 65–117)
GLUCOSE BLD STRIP.AUTO-MCNC: 86 MG/DL (ref 65–117)
GLUCOSE BLD STRIP.AUTO-MCNC: 97 MG/DL (ref 65–117)
INR PPP: 2.5 (ref 0.9–1.1)
PROTHROMBIN TIME: 24.7 SEC (ref 9–11.1)
SERVICE CMNT-IMP: ABNORMAL
SERVICE CMNT-IMP: NORMAL

## 2024-07-15 PROCEDURE — 6370000000 HC RX 637 (ALT 250 FOR IP): Performed by: INTERNAL MEDICINE

## 2024-07-15 PROCEDURE — 6360000002 HC RX W HCPCS: Performed by: INTERNAL MEDICINE

## 2024-07-15 PROCEDURE — 36415 COLL VENOUS BLD VENIPUNCTURE: CPT

## 2024-07-15 PROCEDURE — 2580000003 HC RX 258: Performed by: INTERNAL MEDICINE

## 2024-07-15 PROCEDURE — 76937 US GUIDE VASCULAR ACCESS: CPT

## 2024-07-15 PROCEDURE — 85610 PROTHROMBIN TIME: CPT

## 2024-07-15 PROCEDURE — C1751 CATH, INF, PER/CENT/MIDLINE: HCPCS

## 2024-07-15 PROCEDURE — 2580000003 HC RX 258: Performed by: PHYSICIAN ASSISTANT

## 2024-07-15 PROCEDURE — 73721 MRI JNT OF LWR EXTRE W/O DYE: CPT

## 2024-07-15 PROCEDURE — 82962 GLUCOSE BLOOD TEST: CPT

## 2024-07-15 PROCEDURE — 2060000000 HC ICU INTERMEDIATE R&B

## 2024-07-15 PROCEDURE — 6370000000 HC RX 637 (ALT 250 FOR IP): Performed by: FAMILY MEDICINE

## 2024-07-15 RX ORDER — WARFARIN SODIUM 7.5 MG/1
7.5 TABLET ORAL
Status: COMPLETED | OUTPATIENT
Start: 2024-07-15 | End: 2024-07-15

## 2024-07-15 RX ADMIN — MIDODRINE HYDROCHLORIDE 5 MG: 5 TABLET ORAL at 16:42

## 2024-07-15 RX ADMIN — SODIUM CHLORIDE, PRESERVATIVE FREE 10 ML: 5 INJECTION INTRAVENOUS at 09:19

## 2024-07-15 RX ADMIN — CEFEPIME 2000 MG: 2 INJECTION, POWDER, FOR SOLUTION INTRAVENOUS at 21:02

## 2024-07-15 RX ADMIN — Medication: at 09:17

## 2024-07-15 RX ADMIN — MIDODRINE HYDROCHLORIDE 5 MG: 5 TABLET ORAL at 09:17

## 2024-07-15 RX ADMIN — MIDODRINE HYDROCHLORIDE 5 MG: 5 TABLET ORAL at 12:54

## 2024-07-15 RX ADMIN — HYDROCODONE BITARTRATE AND ACETAMINOPHEN 1 TABLET: 5; 325 TABLET ORAL at 20:13

## 2024-07-15 RX ADMIN — SODIUM CHLORIDE, PRESERVATIVE FREE 10 ML: 5 INJECTION INTRAVENOUS at 20:15

## 2024-07-15 RX ADMIN — CEFEPIME 2000 MG: 2 INJECTION, POWDER, FOR SOLUTION INTRAVENOUS at 02:02

## 2024-07-15 RX ADMIN — ROSUVASTATIN 40 MG: 40 TABLET, FILM COATED ORAL at 09:16

## 2024-07-15 RX ADMIN — SODIUM CHLORIDE, PRESERVATIVE FREE 10 ML: 5 INJECTION INTRAVENOUS at 09:16

## 2024-07-15 RX ADMIN — AMIODARONE HYDROCHLORIDE 400 MG: 200 TABLET ORAL at 09:16

## 2024-07-15 RX ADMIN — WARFARIN SODIUM 7.5 MG: 7.5 TABLET ORAL at 18:38

## 2024-07-15 RX ADMIN — Medication 1 MG: at 09:17

## 2024-07-15 RX ADMIN — CEFEPIME 2000 MG: 2 INJECTION, POWDER, FOR SOLUTION INTRAVENOUS at 09:22

## 2024-07-15 RX ADMIN — FERROUS SULFATE TAB 325 MG (65 MG ELEMENTAL FE) 325 MG: 325 (65 FE) TAB at 06:07

## 2024-07-15 RX ADMIN — SODIUM CHLORIDE 1250 MG: 9 INJECTION, SOLUTION INTRAVENOUS at 16:44

## 2024-07-15 RX ADMIN — PANTOPRAZOLE SODIUM 40 MG: 40 TABLET, DELAYED RELEASE ORAL at 06:07

## 2024-07-15 RX ADMIN — SODIUM CHLORIDE, PRESERVATIVE FREE 10 ML: 5 INJECTION INTRAVENOUS at 02:02

## 2024-07-15 RX ADMIN — SODIUM CHLORIDE, PRESERVATIVE FREE 10 ML: 5 INJECTION INTRAVENOUS at 17:00

## 2024-07-15 RX ADMIN — Medication: at 20:17

## 2024-07-15 ASSESSMENT — PAIN DESCRIPTION - LOCATION: LOCATION: KNEE

## 2024-07-15 ASSESSMENT — PAIN SCALES - GENERAL
PAINLEVEL_OUTOF10: 0
PAINLEVEL_OUTOF10: 4
PAINLEVEL_OUTOF10: 0
PAINLEVEL_OUTOF10: 0
PAINLEVEL_OUTOF10: 8
PAINLEVEL_OUTOF10: 0
PAINLEVEL_OUTOF10: 0

## 2024-07-15 ASSESSMENT — PAIN DESCRIPTION - DESCRIPTORS: DESCRIPTORS: SHARP;PINS AND NEEDLES

## 2024-07-15 ASSESSMENT — PAIN SCALES - WONG BAKER
WONGBAKER_NUMERICALRESPONSE: NO HURT

## 2024-07-15 ASSESSMENT — PAIN DESCRIPTION - ORIENTATION: ORIENTATION: RIGHT

## 2024-07-15 NOTE — PLAN OF CARE
Problem: Discharge Planning  Goal: Discharge to home or other facility with appropriate resources  7/14/2024 2258 by Octavia Paul RN  Outcome: Progressing  7/14/2024 1133 by Maya Gamino RN  Outcome: Progressing     Problem: Pain  Goal: Verbalizes/displays adequate comfort level or baseline comfort level  7/14/2024 2258 by Octavia Paul RN  Outcome: Progressing  7/14/2024 1133 by Maya Gamino RN  Outcome: Progressing     Problem: Skin/Tissue Integrity - Adult  Goal: Skin integrity remains intact  7/14/2024 1133 by Maya Gamino RN  Outcome: Progressing  Goal: Incisions, wounds, or drain sites healing without S/S of infection  7/14/2024 1133 by Maya Gamino RN  Outcome: Progressing     Problem: Musculoskeletal - Adult  Goal: Return mobility to safest level of function  7/14/2024 1133 by Maya Gamino RN  Outcome: Progressing  Goal: Maintain proper alignment of affected body part  7/14/2024 1133 by Maya Gamino RN  Outcome: Progressing  Goal: Return ADL status to a safe level of function  7/14/2024 1133 by Maya Gamino RN  Outcome: Progressing     Problem: Infection - Adult  Goal: Absence of infection at discharge  7/14/2024 2258 by Octavia Paul RN  Outcome: Progressing  7/14/2024 1133 by Maya Gamino RN  Outcome: Progressing  Goal: Absence of infection during hospitalization  7/14/2024 1133 by Maya Gamino RN  Outcome: Progressing  Goal: Absence of fever/infection during anticipated neutropenic period  7/14/2024 1133 by Maya Gamino RN  Outcome: Progressing     Problem: Metabolic/Fluid and Electrolytes - Adult  Goal: Electrolytes maintained within normal limits  7/14/2024 2258 by Octavia Paul RN  Outcome: Progressing  7/14/2024 1133 by Maya Gamino RN  Outcome: Progressing  Goal: Hemodynamic stability and optimal renal function maintained  7/14/2024 1133 by Maya Gamino RN  Outcome: Progressing  Goal: Glucose maintained within prescribed range  7/14/2024 1133

## 2024-07-15 NOTE — PLAN OF CARE
Problem: Discharge Planning  Goal: Discharge to home or other facility with appropriate resources  Outcome: Progressing     Problem: Pain  Goal: Verbalizes/displays adequate comfort level or baseline comfort level  Outcome: Progressing     Problem: Skin/Tissue Integrity - Adult  Goal: Skin integrity remains intact  Outcome: Progressing  Flowsheets (Taken 7/15/2024 [id number])  Skin Integrity Remains Intact: Monitor for areas of redness and/or skin breakdown

## 2024-07-15 NOTE — PROGRESS NOTES
MRI safety note:    Javier Ugalde has a [employer]tronic [place] XT DR JOY SureScan cardiac device, model # W1DR01 with Atrial lead model # 240564 and RV lead model # 790843 placed by Dr. Jann Hernandez on 3/29/2022. This system is MR conditional on 1.5T scanner for Right knee.    As mentioned in progress note on 7/12/2024, patient has a Life Vest on.  Order was obtained by patient's nurse from Dr.Gerald Juares that it is ok to remove the Life Vest for MRI.    MRI for patients with cardiac rhythm management devices requires a set process, based on  policy. It is not possible to complete immediately but will be performed as soon as is possible, when all necessary steps are completed.    Patient's with Medtronic cardiac devices will have a recent Chest X-ray viewed by a radiologist to rule out any abandoned or broken leads for patient safety before MRI can be done.  Dr. Rip Wallace, Radiologist viewed patient's most recent Chest X-ray and confirmed   That there were not any abandoned or broken leads.  MRI can be done today .  Informed patient's nurseRosie that MRI will be done later today.

## 2024-07-15 NOTE — CARE COORDINATION
JASON- D/c to home with [facility] and [facility].    CM noted that [facility] has accepted this pt for home health. Cm notified [facility] of new agency. CM also updated pt and his sister,Simin Lawler ([phone]). Tana Mcgill,SARITHA,ACM-SW

## 2024-07-15 NOTE — PROCEDURES
An extended dwell PIV (PowerGlide) was placed using ultrasound guidance. The IV flushed easily and had brisk blood return. The patient tolerated the procedure well.      Catheter Size: 20 g   Total length: 10 cm  External length: 0 cm  Location: left ventral forearm     May be used for blood draws--flush, waste 3ml, draw labs, flush post lab draw with at least 10ml NS and clamp if not infusing to maintain patency. (AccuCath is power injectable to 6 mL/sec at 300 psi.)     Seth Sepulveda RN  Vascular Access Nurse

## 2024-07-15 NOTE — CARE COORDINATION
Signed         Transition of Care Plan:     RUR: 21%  Prior Level of Functioning: Independent  Disposition: Home with Home Health Services and IV antibiotics  If SNF or IPR: Date FOC offered: N/A  Follow up appointments: Monday July 22, 2024 @ 11:30 with Dr. Brielle Shaffers  DME needed: Patient owns DME required for discharge  Transportation at discharge: [facility]  IM/IMM Medicare/ letter given: 7/4/24, will need second letter prior to discharge  Caregiver Contact: Simin Lawler, sister, [phone]  Barriers to discharge:  Surgical vs Non-Surgical Pathway, Pending Culture Results, Infectious Disease Final Antibiotics Plan            CM reviewed [facility] and noted that [facility] will not be able to accept this pt back  because he will need IV antibx at d/c and they are unable to staff this service.    CM sent referrals to several other home health agencies that sometimes accepts [employer] Medicare. They are, [facility], [facility], [facility], [facility] and [facility]. Will follow. Tana Hollingsworth,JANET-ROBBY

## 2024-07-15 NOTE — PROGRESS NOTES
Physical Therapy  Attempted PT session.  Patient currently undergoing testing at bedside.  Also noted patient may go for MRI later today.  Will defer and continue to follow.

## 2024-07-16 ENCOUNTER — TELEPHONE (OUTPATIENT)
Facility: HOSPITAL | Age: 59
End: 2024-07-16

## 2024-07-16 LAB
ANION GAP SERPL CALC-SCNC: 6 MMOL/L (ref 5–15)
BASOPHILS # BLD: 0.1 K/UL (ref 0–0.1)
BASOPHILS NFR BLD: 1 % (ref 0–1)
BUN SERPL-MCNC: 19 MG/DL (ref 6–20)
BUN/CREAT SERPL: 12 (ref 12–20)
CALCIUM SERPL-MCNC: 9.8 MG/DL (ref 8.5–10.1)
CHLORIDE SERPL-SCNC: 107 MMOL/L (ref 97–108)
CK SERPL-CCNC: 55 U/L (ref 39–308)
CO2 SERPL-SCNC: 22 MMOL/L (ref 21–32)
CREAT SERPL-MCNC: 1.62 MG/DL (ref 0.7–1.3)
DIFFERENTIAL METHOD BLD: ABNORMAL
EOSINOPHIL # BLD: 0.3 K/UL (ref 0–0.4)
EOSINOPHIL NFR BLD: 4 % (ref 0–7)
ERYTHROCYTE [DISTWIDTH] IN BLOOD BY AUTOMATED COUNT: 15.4 % (ref 11.5–14.5)
GLUCOSE BLD STRIP.AUTO-MCNC: 107 MG/DL (ref 65–117)
GLUCOSE BLD STRIP.AUTO-MCNC: 108 MG/DL (ref 65–117)
GLUCOSE BLD STRIP.AUTO-MCNC: 110 MG/DL (ref 65–117)
GLUCOSE BLD STRIP.AUTO-MCNC: 87 MG/DL (ref 65–117)
GLUCOSE SERPL-MCNC: 91 MG/DL (ref 65–100)
HCT VFR BLD AUTO: 22.7 % (ref 36.6–50.3)
HGB BLD-MCNC: 7.4 G/DL (ref 12.1–17)
IMM GRANULOCYTES # BLD AUTO: 0.1 K/UL (ref 0–0.04)
IMM GRANULOCYTES NFR BLD AUTO: 1 % (ref 0–0.5)
INR PPP: 2.3 (ref 0.9–1.1)
LYMPHOCYTES # BLD: 2.1 K/UL (ref 0.8–3.5)
LYMPHOCYTES NFR BLD: 25 % (ref 12–49)
MCH RBC QN AUTO: 28 PG (ref 26–34)
MCHC RBC AUTO-ENTMCNC: 32.6 G/DL (ref 30–36.5)
MCV RBC AUTO: 86 FL (ref 80–99)
MONOCYTES # BLD: 1.1 K/UL (ref 0–1)
MONOCYTES NFR BLD: 13 % (ref 5–13)
NEUTS SEG # BLD: 4.8 K/UL (ref 1.8–8)
NEUTS SEG NFR BLD: 56 % (ref 32–75)
NRBC # BLD: 0 K/UL (ref 0–0.01)
NRBC BLD-RTO: 0 PER 100 WBC
PLATELET # BLD AUTO: 321 K/UL (ref 150–400)
PMV BLD AUTO: 9.6 FL (ref 8.9–12.9)
POTASSIUM SERPL-SCNC: 3.7 MMOL/L (ref 3.5–5.1)
PROTHROMBIN TIME: 23 SEC (ref 9–11.1)
RBC # BLD AUTO: 2.64 M/UL (ref 4.1–5.7)
SERVICE CMNT-IMP: NORMAL
SODIUM SERPL-SCNC: 135 MMOL/L (ref 136–145)
VANCOMYCIN SERPL-MCNC: 20 UG/ML
WBC # BLD AUTO: 8.4 K/UL (ref 4.1–11.1)

## 2024-07-16 PROCEDURE — 2580000003 HC RX 258: Performed by: PHYSICIAN ASSISTANT

## 2024-07-16 PROCEDURE — 85610 PROTHROMBIN TIME: CPT

## 2024-07-16 PROCEDURE — 6360000002 HC RX W HCPCS: Performed by: INTERNAL MEDICINE

## 2024-07-16 PROCEDURE — 2580000003 HC RX 258: Performed by: INTERNAL MEDICINE

## 2024-07-16 PROCEDURE — 85025 COMPLETE CBC W/AUTO DIFF WBC: CPT

## 2024-07-16 PROCEDURE — 2580000003 HC RX 258

## 2024-07-16 PROCEDURE — 80202 ASSAY OF VANCOMYCIN: CPT

## 2024-07-16 PROCEDURE — 6370000000 HC RX 637 (ALT 250 FOR IP): Performed by: FAMILY MEDICINE

## 2024-07-16 PROCEDURE — 6370000000 HC RX 637 (ALT 250 FOR IP): Performed by: INTERNAL MEDICINE

## 2024-07-16 PROCEDURE — 2060000000 HC ICU INTERMEDIATE R&B

## 2024-07-16 PROCEDURE — 6360000002 HC RX W HCPCS

## 2024-07-16 PROCEDURE — 99233 SBSQ HOSP IP/OBS HIGH 50: CPT | Performed by: INTERNAL MEDICINE

## 2024-07-16 PROCEDURE — 82962 GLUCOSE BLOOD TEST: CPT

## 2024-07-16 PROCEDURE — 36415 COLL VENOUS BLD VENIPUNCTURE: CPT

## 2024-07-16 PROCEDURE — 97116 GAIT TRAINING THERAPY: CPT

## 2024-07-16 PROCEDURE — 80048 BASIC METABOLIC PNL TOTAL CA: CPT

## 2024-07-16 PROCEDURE — 82550 ASSAY OF CK (CPK): CPT

## 2024-07-16 RX ORDER — WARFARIN SODIUM 7.5 MG/1
7.5 TABLET ORAL
Status: COMPLETED | OUTPATIENT
Start: 2024-07-16 | End: 2024-07-16

## 2024-07-16 RX ADMIN — SODIUM CHLORIDE, PRESERVATIVE FREE 10 ML: 5 INJECTION INTRAVENOUS at 08:35

## 2024-07-16 RX ADMIN — CEFEPIME 2000 MG: 2 INJECTION, POWDER, FOR SOLUTION INTRAVENOUS at 04:30

## 2024-07-16 RX ADMIN — SODIUM CHLORIDE, PRESERVATIVE FREE 10 ML: 5 INJECTION INTRAVENOUS at 01:07

## 2024-07-16 RX ADMIN — SODIUM CHLORIDE, PRESERVATIVE FREE 10 ML: 5 INJECTION INTRAVENOUS at 20:53

## 2024-07-16 RX ADMIN — HYDROCODONE BITARTRATE AND ACETAMINOPHEN 1 TABLET: 5; 325 TABLET ORAL at 15:27

## 2024-07-16 RX ADMIN — DAPTOMYCIN 900 MG: 500 INJECTION, POWDER, LYOPHILIZED, FOR SOLUTION INTRAVENOUS at 18:08

## 2024-07-16 RX ADMIN — MIDODRINE HYDROCHLORIDE 5 MG: 5 TABLET ORAL at 18:07

## 2024-07-16 RX ADMIN — AMIODARONE HYDROCHLORIDE 400 MG: 200 TABLET ORAL at 08:35

## 2024-07-16 RX ADMIN — FERROUS SULFATE TAB 325 MG (65 MG ELEMENTAL FE) 325 MG: 325 (65 FE) TAB at 06:36

## 2024-07-16 RX ADMIN — MIDODRINE HYDROCHLORIDE 5 MG: 5 TABLET ORAL at 12:31

## 2024-07-16 RX ADMIN — WATER 2000 MG: 1 INJECTION INTRAMUSCULAR; INTRAVENOUS; SUBCUTANEOUS at 20:52

## 2024-07-16 RX ADMIN — PANTOPRAZOLE SODIUM 40 MG: 40 TABLET, DELAYED RELEASE ORAL at 06:36

## 2024-07-16 RX ADMIN — CEFEPIME 2000 MG: 2 INJECTION, POWDER, FOR SOLUTION INTRAVENOUS at 12:32

## 2024-07-16 RX ADMIN — Medication 1 MG: at 08:35

## 2024-07-16 RX ADMIN — WARFARIN SODIUM 7.5 MG: 7.5 TABLET ORAL at 18:07

## 2024-07-16 RX ADMIN — MIDODRINE HYDROCHLORIDE 5 MG: 5 TABLET ORAL at 08:35

## 2024-07-16 RX ADMIN — ROSUVASTATIN 40 MG: 40 TABLET, FILM COATED ORAL at 08:35

## 2024-07-16 RX ADMIN — Medication: at 20:52

## 2024-07-16 ASSESSMENT — PAIN SCALES - GENERAL: PAINLEVEL_OUTOF10: 7

## 2024-07-16 ASSESSMENT — PAIN DESCRIPTION - ORIENTATION: ORIENTATION: RIGHT

## 2024-07-16 ASSESSMENT — PAIN DESCRIPTION - LOCATION: LOCATION: LEG

## 2024-07-16 ASSESSMENT — PAIN DESCRIPTION - DESCRIPTORS: DESCRIPTORS: SHARP

## 2024-07-16 NOTE — PROGRESS NOTES
Pharmacist Note - Warfarin Dosing  Consult provided for this 58 y.o.male to manage warfarin for aortic valve replacement.    INR Goal: 2.5- 3.5 (followed by Dr. Rodriguez outpatient)    Home regimen: 7.5 mg x 5 days; 5 mg on Tues/Thurs    Drugs that may increase INR: Amiodarone  Drugs that may decrease INR: None  Other medications that may increase bleeding risk: None  Risk factors: None  Daily INR ordered through: 10/10/24     Recent Labs     07/14/24  0607 07/15/24  0441 07/16/24  0428   HGB 7.6*  --  7.4*   INR 2.9* 2.5* 2.3*     Date               INR                  Dose  7/3  3.7  Held   7/4  3.6  Held   7/5  2.6  5 mg    7/6  2.1  7.5 mg    7/7  2.1  7.5 mg    7/8  2.3  7.5 mg    7/9  2.6  5 mg    7/10  2.6  7.5 mg    7/11  2.7  5 mg    7/12   2.7  7.5 mg    7/13  2.9  5 mg  7/14  2.9  5 mg  7/15  2.5  7.5 mg  7/16  2.3  7.5 mg                                                                                 Assessment/ Plan:  Will order warfarin 7.5 mg x 1 dose.    Per previous hospital encounters, patient is followed by Dr. Rodriguez. [facility] confirmed that goal INR = 2.5-3.5, okaymirtha w/ Dr Juares.    Pharmacy will continue to monitor daily and adjust therapy as indicated.  Please contact the pharmacist at [phone] or [phone] for outpatient recommendations if needed.

## 2024-07-16 NOTE — PLAN OF CARE
Problem: Physical Therapy - Adult  Goal: By Discharge: Performs mobility at highest level of function for planned discharge setting.  See evaluation for individualized goals.  Description: FUNCTIONAL STATUS PRIOR TO ADMISSION: Patient was modified independent using a rolling walker for functional mobility.    HOME SUPPORT PRIOR TO ADMISSION: The patient lived alone with no local support.    Physical Therapy Goals  Updated 7/16/2024  1.  Patient will move from supine to sit and sit to supine and roll side to side in bed with modified independence within 7 day(s).    2.  Patient will perform sit to stand with modified independence within 7 day(s).  3.  Patient will transfer from bed to chair and chair to bed with modified independence using the least restrictive device within 7 day(s).  4.  Patient will ambulate with modified independence for 450 feet with the least restrictive device within 7 day(s).   5.  Patient will ascend/descend 5 stairs with 1 handrail(s) with modified independence within 7 day(s).     Initiated 7/9/2024  1.  Patient will move from supine to sit and sit to supine and roll side to side in bed with modified independence within 7 day(s).    2.  Patient will perform sit to stand with modified independence within 7 day(s).  3.  Patient will transfer from bed to chair and chair to bed with modified independence using the least restrictive device within 7 day(s).  4.  Patient will ambulate with modified independence for 300 feet with the least restrictive device within 7 day(s).   5.  Patient will ascend/descend 5 stairs with 1 handrail(s) with modified independence within 7 day(s).   Outcome: Progressing     PHYSICAL THERAPY TREATMENT: WEEKLY REASSESSMENT    Patient: Javier Ugalde (58 y.o. male)  Date: 7/16/2024  Primary Diagnosis: Cellulitis of right leg [L03.115]  Cellulitis of right lower extremity [L03.115]       Precautions: Fall Risk                      ASSESSMENT :  Patient continues to

## 2024-07-16 NOTE — PROGRESS NOTES
Pharmacy Note - Dose adjustment for daptomycin per P&T approved protocol.    Dosing Weight Used:  adjusted dosing wt = 110.2 kg    BMI:   BMI Readings from Last 1 Encounters:   07/04/24 37.09 kg/m²     Height:   Ht Readings from Last 1 Encounters:   07/03/24 1.956 m (6' 5\")     ABW:  Wt Readings from Last 1 Encounters:   07/04/24 (!) 141.9 kg (312 lb 12.8 oz)        IBW and AdjBW: Ideal body weight: 89.1 kg (196 lb 6.9 oz)  Adjusted ideal body weight: 110.2 kg (242 lb 15.6 oz)        Dosing:  Estimated Creatinine Clearance: 79 mL/min (A) (based on SCr of 1.62 mg/dL (H)).  Recent Labs     07/14/24  0607 07/16/24  0428   BUN 20 19   CREATININE 1.55* 1.62*           Max recommended dose is 1500mg daily. If dose is exceeded confirm dosing with infectious disease physician or fellow pharmacist. Increased incidence of side effects although benefit may outweigh risks in some cases. See dosing protocol for more information.    Statin Therapy    Statin therapy has been place on hold/discontinued while on daptomycin therapy. After pharm discussion fab Campoverde NP    CK Monitoring    Creatinine Kinase (CK) has been ordered at baseline and once weekly for duration of daptomycin therapy.    Ordered dose: Daptomycin 8 mg/kg x Adj wt (110 kg), rounded to nearest 50 mg increment     Daptomycin 900 mg IVPB every 24 hours has been ordered.    Thank you,  AB BYRD, [facility] MS  7/16/2024, 3:03 PM

## 2024-07-16 NOTE — PLAN OF CARE
INFECTIOUS DISEASE discharge plan:    Diagnosis: Presumptive R prosthetic septic knee    Antibiotic:   daptomycin  mg Q 24 H through 8/6/24, inclusive  Ceftriaxone IV 2 g Q 24 H through 8/6/24, inclusive    PICC line/Midline insertion for outpatient antibiotic.     Routine PICC/Basilio/ Portacath Care including PRN catheter flow management    Weekly labs with results fax to # [phone]. Call critical lab values to [phone].   [x] CBC w/diff  [x] BUN/Creatinine  [x] AST, ALT  [x] CPK  [x] CRP      Home Health to pull PICC line/Midline after last dose or send to IR for Basilio removal. Please call prior to removal of PICC line.     May send to IR for line evaluation or replacement PRN Phone # [phone]    Follow up with Infectious Disease

## 2024-07-16 NOTE — PROGRESS NOTES
[facility] Adult  Hospitalist Group                                                                                          Hospitalist Progress Note  Nicolás Juares MD  Answering service: [phone] OR [phone] from in house phone        NAME:  Javier Ugalde  :  1965  MRN:  [id number]       Admission Summary:     \"Javier Ugalde is a 58 y.o. male with past medical history significant for type 2 diabetes, aortic valve replacement with mechanical valve, hypertension, status post pacemaker insertion, dyslipidemia, CKD, ventricular tachycardia s/p LifeVest, dyslipidemia presented at the emergency room with right leg swelling and redness.  Patient was admitted to this hospital last month, was admitted and treated for right lower extremity cellulitis, completed 5 days of cefepime and was then discharged home on doxycycline and the patient finished the course of this antibiotic.  He was seen at wound care clinic today, patient wound is progressively getting worse despite being on oral antibiotic, she was sent to the emergency room from wound care clinic for evaluation for admission.  Patient reported subjective fever without chills or rigors at room.  Patient was started on cefepime and vancomycin and referred to the hospitalist service for admission.\"       Interval history / Subjective:     Patient seen and examined at the bedside.  Reviewed his vitals labs test results and care plan.   Right leg swelling going down.Right leg pain improved.    No chest pain or shortness of breath.  MRI of knee showing loculated abscesses- consulted ortho for I&D     Assessment & Plan:     Right leg swelling, right popliteal and knee area infected wounds with surrounding cellulitis and possible abscess,  Possible right knee septic arthritis, hx of right total knee arthroplasty  Continue IV antibiotics managed by ID follow cultures  -Right leg doppler negative for DVT   Reviewed radiology for right leg and

## 2024-07-16 NOTE — PROGRESS NOTES
Infectious Diseases Follow Up    7/16/2024    INFECTIOUS DISEASE Attending:     I agree with the above infectious disease daily progress note in its entirety as authored by and discussed in detail with the nurse practitioner.   I have reviewed pertinent laboratory studies, microbiology cultures, radiologic reports with review of the consultations and progress notes as appropriate.   I agree with today's subjective findings, physical examination, assessment and plan of care as described above and discussed extensively with the nurse practitioner.       4 weeks Daptomycin and Ceftriaxone via PICC line.    Follow up with ID and Dr. Zarate outpatient for plans for source control of right knee prosthetic joint infection.    Will sign off, please call with questions.    Assessment & Plan:     58 y.o. male with past medical Hx of type 2 diabetes, aortic valve replacement with mechanical valve, hypertension, status post pacemaker insertion, dyslipidemia, CKD, ventricular tachycardia s/p LifeVest, dyslipidemia who was admitted for RLE wound. Infectious disease services was consulted to assist with antibiotic management of infected right leg wound below knee and popliteal area.      Right lower extremity cellulitis/ulcer  Multiple right posterior knee fluid collection  Hx of right knee replacement 2021  Recent admission with improvement on cefepime. On outpatient Tx with ciprofloxacin  New right popliteal wound with serous drainage, indurated, fluctuance  S/p aspiration of right knee (7/8)  S/p drainage of abscess by IR (7/10)  All cultures negative    Concern for infected hardware with prosthetic knee/septic arthritis and multiple fluid collections as well as lucency seen on plain film. MRI confirming multiple loculates collections and cannot rule out OM. D/w with ortho, recommend IV abx and follow-up with them. Will be difficult to eradicate infection without source control.     Continue vancomycin, cefepime while

## 2024-07-16 NOTE — PROGRESS NOTES
[facility] Adult  Hospitalist Group                                                                                          Hospitalist Progress Note  Nicolás Juares MD  Answering service: [phone] OR [phone] from in house phone        NAME:  Javier Ugalde  :  1965  MRN:  [id number]       Admission Summary:     \"Javier Ugalde is a 58 y.o. male with past medical history significant for type 2 diabetes, aortic valve replacement with mechanical valve, hypertension, status post pacemaker insertion, dyslipidemia, CKD, ventricular tachycardia s/p LifeVest, dyslipidemia presented at the emergency room with right leg swelling and redness.  Patient was admitted to this hospital last month, was admitted and treated for right lower extremity cellulitis, completed 5 days of cefepime and was then discharged home on doxycycline and the patient finished the course of this antibiotic.  He was seen at wound care clinic today, patient wound is progressively getting worse despite being on oral antibiotic, she was sent to the emergency room from wound care clinic for evaluation for admission.  Patient reported subjective fever without chills or rigors at room.  Patient was started on cefepime and vancomycin and referred to the hospitalist service for admission.\"       Interval history / Subjective:     Patient seen and examined at the bedside.  Reviewed his vitals labs test results and care plan.   Right leg swelling going down.Right leg pain improved.    No chest pain or shortness of breath.  MRI of knee showing loculated abscesses- consulted ortho for I&D     Assessment & Plan:     Right leg swelling, right popliteal and knee area infected wounds with surrounding cellulitis and possible abscess,  Possible right knee septic arthritis, hx of right total knee arthroplasty  Continue IV antibiotics managed by ID follow cultures  -Right leg doppler negative for DVT   Reviewed radiology for right leg and  sodium chloride flush 0.9 % injection 5-40 mL  5-40 mL IntraVENous PRN    0.9 % sodium chloride infusion   IntraVENous PRN    potassium chloride (KLOR-CON) extended release tablet 40 mEq  40 mEq Oral PRN    Or    potassium bicarb-citric acid (EFFER-K) effervescent tablet 40 mEq  40 mEq Oral PRN    Or    potassium chloride 10 mEq/100 mL IVPB (Peripheral Line)  10 mEq IntraVENous PRN    magnesium sulfate 2000 mg in 50 mL IVPB premix  2,000 mg IntraVENous PRN    ondansetron (ZOFRAN-ODT) disintegrating tablet 4 mg  4 mg Oral Q8H PRN    Or    ondansetron (ZOFRAN) injection 4 mg  4 mg IntraVENous Q6H PRN    polyethylene glycol (GLYCOLAX) packet 17 g  17 g Oral Daily PRN    acetaminophen (TYLENOL) tablet 650 mg  650 mg Oral Q6H PRN    Or    acetaminophen (TYLENOL) suppository 650 mg  650 mg Rectal Q6H PRN    insulin lispro (HUMALOG,ADMELOG) injection vial 0-8 Units  0-8 Units SubCUTAneous TID WC    insulin lispro (HUMALOG,ADMELOG) injection vial 0-4 Units  0-4 Units SubCUTAneous Nightly    HYDROmorphone HCl PF (DILAUDID) injection 1 mg  1 mg IntraVENous Q4H PRN    Warfarin - Pharmacy to Dose   Other RX Placeholder    Vancomycin - Pharmacy to Dose  1 each Other RX Placeholder    albuterol (PROVENTIL) (2.5 MG/3ML) 0.083% nebulizer solution 2.5 mg  2.5 mg Nebulization Q6H PRN     ______________________________________________________________________  EXPECTED LENGTH OF STAY: Unable to retrieve estimated LOS  ACTUAL LENGTH OF STAY:          13                 Nicolás Juares MD

## 2024-07-17 ENCOUNTER — APPOINTMENT (OUTPATIENT)
Facility: HOSPITAL | Age: 59
DRG: 560 | End: 2024-07-17
Payer: MEDICARE

## 2024-07-17 LAB
GLUCOSE BLD STRIP.AUTO-MCNC: 127 MG/DL (ref 65–117)
GLUCOSE BLD STRIP.AUTO-MCNC: 131 MG/DL (ref 65–117)
GLUCOSE BLD STRIP.AUTO-MCNC: 137 MG/DL (ref 65–117)
GLUCOSE BLD STRIP.AUTO-MCNC: 99 MG/DL (ref 65–117)
INR PPP: 2.4 (ref 0.9–1.1)
PROTHROMBIN TIME: 23.9 SEC (ref 9–11.1)
SERVICE CMNT-IMP: ABNORMAL
SERVICE CMNT-IMP: NORMAL

## 2024-07-17 PROCEDURE — 2580000003 HC RX 258: Performed by: PHYSICIAN ASSISTANT

## 2024-07-17 PROCEDURE — 6370000000 HC RX 637 (ALT 250 FOR IP): Performed by: FAMILY MEDICINE

## 2024-07-17 PROCEDURE — 2580000003 HC RX 258: Performed by: INTERNAL MEDICINE

## 2024-07-17 PROCEDURE — 2060000000 HC ICU INTERMEDIATE R&B

## 2024-07-17 PROCEDURE — 6360000002 HC RX W HCPCS

## 2024-07-17 PROCEDURE — 85610 PROTHROMBIN TIME: CPT

## 2024-07-17 PROCEDURE — 2580000003 HC RX 258

## 2024-07-17 PROCEDURE — 82962 GLUCOSE BLOOD TEST: CPT

## 2024-07-17 PROCEDURE — 71045 X-RAY EXAM CHEST 1 VIEW: CPT

## 2024-07-17 PROCEDURE — 6370000000 HC RX 637 (ALT 250 FOR IP): Performed by: INTERNAL MEDICINE

## 2024-07-17 PROCEDURE — APPNB30 APP NON BILLABLE TIME 0-30 MINS: Performed by: PHYSICIAN ASSISTANT

## 2024-07-17 PROCEDURE — 97116 GAIT TRAINING THERAPY: CPT

## 2024-07-17 PROCEDURE — 36415 COLL VENOUS BLD VENIPUNCTURE: CPT

## 2024-07-17 RX ORDER — WARFARIN SODIUM 7.5 MG/1
7.5 TABLET ORAL
Status: COMPLETED | OUTPATIENT
Start: 2024-07-17 | End: 2024-07-17

## 2024-07-17 RX ADMIN — WATER 2000 MG: 1 INJECTION INTRAMUSCULAR; INTRAVENOUS; SUBCUTANEOUS at 20:26

## 2024-07-17 RX ADMIN — Medication 1 MG: at 08:55

## 2024-07-17 RX ADMIN — Medication: at 20:28

## 2024-07-17 RX ADMIN — WARFARIN SODIUM 7.5 MG: 7.5 TABLET ORAL at 17:00

## 2024-07-17 RX ADMIN — DAPTOMYCIN 900 MG: 500 INJECTION, POWDER, LYOPHILIZED, FOR SOLUTION INTRAVENOUS at 17:15

## 2024-07-17 RX ADMIN — LACTULOSE 30 G: 20 SOLUTION ORAL at 08:59

## 2024-07-17 RX ADMIN — AMIODARONE HYDROCHLORIDE 400 MG: 200 TABLET ORAL at 08:55

## 2024-07-17 RX ADMIN — FERROUS SULFATE TAB 325 MG (65 MG ELEMENTAL FE) 325 MG: 325 (65 FE) TAB at 06:47

## 2024-07-17 RX ADMIN — PANTOPRAZOLE SODIUM 40 MG: 40 TABLET, DELAYED RELEASE ORAL at 06:47

## 2024-07-17 RX ADMIN — SODIUM CHLORIDE, PRESERVATIVE FREE 10 ML: 5 INJECTION INTRAVENOUS at 20:27

## 2024-07-17 RX ADMIN — SODIUM CHLORIDE, PRESERVATIVE FREE 10 ML: 5 INJECTION INTRAVENOUS at 09:41

## 2024-07-17 RX ADMIN — MIDODRINE HYDROCHLORIDE 5 MG: 5 TABLET ORAL at 12:35

## 2024-07-17 RX ADMIN — Medication: at 12:15

## 2024-07-17 RX ADMIN — MIDODRINE HYDROCHLORIDE 5 MG: 5 TABLET ORAL at 08:55

## 2024-07-17 RX ADMIN — MIDODRINE HYDROCHLORIDE 5 MG: 5 TABLET ORAL at 17:00

## 2024-07-17 RX ADMIN — SODIUM CHLORIDE, PRESERVATIVE FREE 10 ML: 5 INJECTION INTRAVENOUS at 17:01

## 2024-07-17 RX ADMIN — SODIUM CHLORIDE, PRESERVATIVE FREE 10 ML: 5 INJECTION INTRAVENOUS at 02:34

## 2024-07-17 NOTE — PROGRESS NOTES
Infectious Diseases Follow Up    2024      Assessment & Plan:     58 y.o. male with past medical Hx of type 2 diabetes, aortic valve replacement with mechanical valve, hypertension, status post pacemaker insertion, dyslipidemia, CKD, ventricular tachycardia s/p LifeVest, dyslipidemia who was admitted for RLE wound. Infectious disease services was consulted to assist with antibiotic management of infected right leg wound below knee and popliteal area.      Right lower extremity cellulitis/ulcer  Multiple right posterior knee fluid collection/Right prosthetic knee infection  Hx of right knee replacement   Recent admission with improvement on cefepime. On outpatient Tx with ciprofloxacin  New right popliteal wound with serous drainage, indurated, fluctuance  S/p aspiration of right knee ()  S/p drainage of abscess by IR (7/10)  All cultures negative    Concern for infected hardware with prosthetic knee/septic arthritis and multiple fluid collections as well as lucency seen on plain film. MRI confirming multiple loculates collections and cannot rule out OM. D/w with ortho, recommend IV abx and follow-up with them. Will be difficult to eradicate infection without source control.     Continue daptomycin and ceftriaxone IV for 4 weeks through 24, inclusive. Abx discharge order in place. Will need PICC.     Patient would prefer [facility]. [facility] form on chart.     Leukocytosis   resolved     DM II. A1c 5.7, controlled    ID team signing off. Please reach out with any questions.            Subjective:     Report SOB during walk this morning. D/w pharmacy, daptomycin unlikely cause after one infusion.     Objective:     Vitals: /63   Pulse 66   Temp 98.1 °F (36.7 °C) (Oral)   Resp 18   Ht 1.956 m (6' 5\")   Wt (!) 141.9 kg (312 lb 12.8 oz)   SpO2 100%   BMI 37.09 kg/m²      Tmax:  Temp (24hrs), Av.9 °F (36.6 °C), Min:97.7 °F (36.5 °C), Max:98.1 °F (36.7 °C)      Exam:   Patient is intubated:

## 2024-07-17 NOTE — PLAN OF CARE
Problem: Discharge Planning  Goal: Discharge to home or other facility with appropriate resources  Outcome: Progressing  Flowsheets (Taken 7/17/2024 0030)  Discharge to home or other facility with appropriate resources:   Identify barriers to discharge with patient and caregiver   Arrange for needed discharge resources and transportation as appropriate   Identify discharge learning needs (meds, wound care, etc)   Refer to discharge planning if patient needs post-hospital services based on physician order or complex needs related to functional status, cognitive ability or social support system     Problem: Pain  Goal: Verbalizes/displays adequate comfort level or baseline comfort level  Outcome: Progressing     Problem: Skin/Tissue Integrity - Adult  Goal: Skin integrity remains intact  Outcome: Progressing  Flowsheets (Taken 7/17/2024 0030)  Skin Integrity Remains Intact:   Monitor for areas of redness and/or skin breakdown   Assess vascular access sites hourly  Goal: Incisions, wounds, or drain sites healing without S/S of infection  Outcome: Progressing  Flowsheets (Taken 7/17/2024 0030)  Incisions, Wounds, or Drain Sites Healing Without Sign and Symptoms of Infection: ADMISSION and DAILY: Assess and document risk factors for pressure ulcer development     Problem: Musculoskeletal - Adult  Goal: Return mobility to safest level of function  Outcome: Progressing  Flowsheets (Taken 7/17/2024 0030)  Return Mobility to Safest Level of Function:   Assess patient stability and activity tolerance for standing, transferring and ambulating with or without assistive devices   Assist with transfers and ambulation using safe patient handling equipment as needed   Ensure adequate protection for wounds/incisions during mobilization   Obtain physical therapy/occupational therapy consults as needed   Instruct patient/family in ordered activity level  Goal: Maintain proper alignment of affected body part  Outcome:  factors as ordered   Implement neutropenic guidelines     Problem: Metabolic/Fluid and Electrolytes - Adult  Goal: Electrolytes maintained within normal limits  Outcome: Progressing  Flowsheets (Taken 7/17/2024 0030)  Electrolytes maintained within normal limits:   Monitor labs and assess patient for signs and symptoms of electrolyte imbalances   Administer electrolyte replacement as ordered   Monitor response to electrolyte replacements, including repeat lab results as appropriate   Fluid restriction as ordered   Instruct patient on fluid and nutrition restrictions as appropriate  Goal: Hemodynamic stability and optimal renal function maintained  Outcome: Progressing  Flowsheets (Taken 7/17/2024 0030)  Hemodynamic stability and optimal renal function maintained:   Monitor labs and assess for signs and symptoms of volume excess or deficit   Monitor intake, output and patient weight   Monitor response to interventions for patient's volume status, including labs, urine output, blood pressure (other measures as available)   Monitor urine specific gravity, serum osmolarity and serum sodium as indicated or ordered   Encourage oral intake as appropriate   Instruct patient on fluid and nutrition restrictions as appropriate  Goal: Glucose maintained within prescribed range  Outcome: Progressing  Flowsheets (Taken 7/17/2024 0030)  Glucose maintained within prescribed range:   Monitor blood glucose as ordered   Assess for signs and symptoms of hyperglycemia and hypoglycemia   Administer ordered medications to maintain glucose within target range   Assess barriers to adequate nutritional intake and initiate nutrition consult as needed   Instruct patient on self management of diabetes and initiate consult as needed     Problem: Safety - Adult  Goal: Free from fall injury  Outcome: Progressing     Problem: Chronic Conditions and Co-morbidities  Goal: Patient's chronic conditions and co-morbidity symptoms are monitored and

## 2024-07-17 NOTE — PROGRESS NOTES
Spiritual Care Assessment/Progress Note  [facility]    Name: Javier Ugalde MRN: [id number]    Age: 58 y.o.     Sex: male   Language: English     Date: 7/17/2024            Total Time Calculated: 20 min              Spiritual Assessment begun in [facility] MED SURG  Service Provided For: Patient  Referral/Consult From: Rounding  Encounter Overview/Reason: Initial Encounter    Spiritual beliefs:      [x] Involved in a barbara tradition/spiritual practice: Yazidism      [] Supported by a barbara community:      [] Claims no spiritual orientation:      [] Seeking spiritual identity:           [] Adheres to an individual form of spirituality:      [] Not able to assess:                Identified resources for coping and support system:   Support System: Family members       [] Prayer                  [] Devotional reading               [] Music                  [] Guided Imagery     [] Pet visits                                        [] Other: (COMMENT)     Specific area/focus of visit   Encounter:    Crisis:    Spiritual/Emotional needs:    Ritual, Rites and Sacraments:    Grief, Loss, and Adjustments: Type: Adjustment to illness  Ethics/Mediation:    Behavioral Health:    Palliative Care:    Advance Care Planning:      I visited Javier Ugalde for an initial spiritual assessment.   He lives alone and is able to attend to his ADLs. His sister Carolyn assists him. She visits regularly and is his primary support.   Listened as he spoke of his illness and the impact on his life. He expressed concerns about caring for himself going forward.   Chaplain Taco, Guillermina, MS, BCC

## 2024-07-17 NOTE — PROGRESS NOTES
[facility] Adult  Hospitalist Group                                                                                          Hospitalist Progress Note  Nicolás Juares MD  Answering service: [phone] OR [phone] from in house phone        NAME:  Javier Ugalde  :  1965  MRN:  [id number]       Admission Summary:     \"Javier Ugalde is a 58 y.o. male with past medical history significant for type 2 diabetes, aortic valve replacement with mechanical valve, hypertension, status post pacemaker insertion, dyslipidemia, CKD, ventricular tachycardia s/p LifeVest, dyslipidemia presented at the emergency room with right leg swelling and redness.  Patient was admitted to this hospital last month, was admitted and treated for right lower extremity cellulitis, completed 5 days of cefepime and was then discharged home on doxycycline and the patient finished the course of this antibiotic.  He was seen at wound care clinic today, patient wound is progressively getting worse despite being on oral antibiotic, she was sent to the emergency room from wound care clinic for evaluation for admission.  Patient reported subjective fever without chills or rigors at room.  Patient was started on cefepime and vancomycin and referred to the hospitalist service for admission.\"       Interval history / Subjective:     Patient seen and examined at the bedside.  Reviewed his vitals labs test results and care plan.   Right leg swelling going down.Right leg pain improved.  Working on discharge planning  Patient does admit to shortness of breath today after starting daptomycin-chest x-ray ordered  Basilio catheter placement ordered for outpatient antibiotic administration today  Will need to contact IR tomorrow to remove drain and right lower leg prior to discharge     Assessment & Plan:     Right leg swelling, right popliteal and knee area infected wounds with surrounding cellulitis and possible abscess,  Possible right knee  supportive care     Constipation-resolved with senna and lactulose    Code status: Full Code   Prophylaxis: Coumadin   Care Plan discussed with: Luis, Nurse and CM  Anticipated Disposition: Likely home tomorrow with outpatient antibiotics administered through the infusion center  Inpatient  Cardiac monitoring: Telemetry     Principal Problem:    Cellulitis of right leg  Active Problems:    Type 2 diabetes mellitus without complication (HCC)  Resolved Problems:    * No resolved hospital problems. *         Social Determinants of Health     Tobacco Use: Low Risk  (7/10/2024)    Patient History     Smoking Tobacco Use: Never     Smokeless Tobacco Use: Never     Passive Exposure: Not on file   Alcohol Use: Not At Risk (5/20/2024)    AUDIT-C     Frequency of Alcohol Consumption: Never     Average Number of Drinks: Patient does not drink     Frequency of Binge Drinking: Never   Financial Resource Strain: Not on file   Food Insecurity: No Food Insecurity (7/15/2024)    Hunger Vital Sign     Worried About Running Out of Food in the Last Year: Never true     Ran Out of Food in the Last Year: Never true   Transportation Needs: No Transportation Needs (7/15/2024)    PRAPARE - Transportation     Lack of Transportation (Medical): No     Lack of Transportation (Non-Medical): No   Physical Activity: Not on file   Stress: Not on file   Social Connections: Not on file   Intimate Partner Violence: Not on file   Depression: Not at risk (6/21/2024)    PHQ-2     PHQ-2 Score: 0   Housing Stability: Low Risk  (7/15/2024)    Housing Stability Vital Sign     Unable to Pay for Housing in the Last Year: No     Number of Places Lived in the Last Year: 1     Unstable Housing in the Last Year: No   Interpersonal Safety: Not At Risk (7/15/2024)    Interpersonal Safety Domain Source: IP Abuse Screening     Physical abuse: Denies     Verbal abuse: Denies     Emotional abuse: Denies     Financial abuse: Denies     Sexual abuse: Denies

## 2024-07-17 NOTE — CONSULTS
Ortho Consult Update    Contacted by Hospitalist service regarding discharge plans for this patient. Discussed presentation with Dr Timi Zarate who states he spoke directly with ID service earlier this week and is aware of MRI findings. No growth on cultures so far.     Ortho recommendation is for discharge on antibiotics per ID team with outpatient follow-up in clinic next week with Dr Zarate. Call for appointment.    Santana Fregoso PA-C  Orthopedic Trauma Service  [facility]

## 2024-07-17 NOTE — PLAN OF CARE
Problem: Physical Therapy - Adult  Goal: By Discharge: Performs mobility at highest level of function for planned discharge setting.  See evaluation for individualized goals.  Description: FUNCTIONAL STATUS PRIOR TO ADMISSION: Patient was modified independent using a rolling walker for functional mobility.    HOME SUPPORT PRIOR TO ADMISSION: The patient lived alone with no local support.    Physical Therapy Goals  Updated 7/16/2024  1.  Patient will move from supine to sit and sit to supine and roll side to side in bed with modified independence within 7 day(s).    2.  Patient will perform sit to stand with modified independence within 7 day(s).  3.  Patient will transfer from bed to chair and chair to bed with modified independence using the least restrictive device within 7 day(s).  4.  Patient will ambulate with modified independence for 450 feet with the least restrictive device within 7 day(s).   5.  Patient will ascend/descend 5 stairs with 1 handrail(s) with modified independence within 7 day(s).     Initiated 7/9/2024  1.  Patient will move from supine to sit and sit to supine and roll side to side in bed with modified independence within 7 day(s).    2.  Patient will perform sit to stand with modified independence within 7 day(s).  3.  Patient will transfer from bed to chair and chair to bed with modified independence using the least restrictive device within 7 day(s).  4.  Patient will ambulate with modified independence for 300 feet with the least restrictive device within 7 day(s).   5.  Patient will ascend/descend 5 stairs with 1 handrail(s) with modified independence within 7 day(s).   Outcome: Progressing   PHYSICAL THERAPY TREATMENT    Patient: Javier Ugalde (58 y.o. male)  Date: 7/17/2024  Diagnosis: Cellulitis of right leg [L03.115]  Cellulitis of right lower extremity [L03.115] Cellulitis of right leg      Precautions: Fall Risk                      ASSESSMENT:  Patient continues to benefit from  skilled PT services and is progressing towards goals. Patient received up in chair, eager to work with therapy.  Dressing on RLE recently changed and clean.  Continues to need only SBA for transfers and gait with occasional cueing for keeping body closer within RW frame.  Completed stair training today with B rails, initially with LLE leading in ascent as the \"good leg\".  However, noted difficulty and patient reported this was more painful due to OA than his RLE at this time.  Repeated stairs with RLE leading with improved results and less heavy reliance on railings.  Walked back to room, noted some small area of blood on dressing, left up in bed.  Will cont to follow as appropriate to progress to safe d/c home.     PLAN:  Patient continues to benefit from skilled intervention to address the above impairments.  Continue treatment per established plan of care.    Recommendation for discharge: (in order for the patient to meet his/her long term goals): Therapy 2x a week in the home vs none    Other factors to consider for discharge: lives alone    IF patient discharges home will need the following DME: patient owns DME required for discharge       SUBJECTIVE:   Patient stated, \"My left leg is a whole lot worse than my right.\"    OBJECTIVE DATA SUMMARY:   Critical Behavior:          Functional Mobility Training:  Bed Mobility:  Bed Mobility Training  Sit to Supine: Modified independent  Scooting: Modified independent  Transfers:  Transfer Training  Sit to Stand: Stand-by assistance  Stand to Sit: Stand-by assistance  Bed to Chair: Stand-by assistance  Balance:  Balance  Sitting: Intact  Standing: With support  Standing - Static: Constant support;Good  Standing - Dynamic: Good;Constant support   Ambulation/Gait Training:     Gait  Right Side Weight Bearing: As tolerated  Overall Level of Assistance: Stand-by assistance  Distance (ft): 300 Feet  Assistive Device: Walker, rolling;Gait belt  Interventions: Safety awareness

## 2024-07-17 NOTE — PROGRESS NOTES
Pharmacist Note - Warfarin Dosing  Consult provided for this 58 y.o.male to manage warfarin for aortic valve replacement.    INR Goal: 2.5- 3.5 (followed by Dr. Rodriguez outpatient)    Home regimen: 7.5 mg x 5 days; 5 mg on Tues/Thurs    Drugs that may increase INR: Amiodarone  Drugs that may decrease INR: None  Other medications that may increase bleeding risk: None  Risk factors: None  Daily INR ordered through: 10/10/24     Recent Labs     07/15/24  0441 07/16/24  0428 07/17/24  0246   HGB  --  7.4*  --    INR 2.5* 2.3* 2.4*     Date               INR                  Dose  7/3  3.7  Held   7/4  3.6  Held   7/5  2.6  5 mg    7/6  2.1  7.5 mg    7/7  2.1  7.5 mg    7/8  2.3  7.5 mg    7/9  2.6  5 mg    7/10  2.6  7.5 mg    7/11  2.7  5 mg    7/12   2.7  7.5 mg    7/13  2.9  5 mg  7/14  2.9  5 mg  7/15  2.5  7.5 mg  7/16  2.3  7.5 mg  7/17  2.4  7.5 mg                                                                                 Assessment/ Plan:  Will order warfarin 7.5 mg x 1 dose.    Per previous hospital encounters, patient is followed by Dr. Rodriguez. [facility] confirmed that goal INR = 2.5-3.5, мария w/ Dr Juares.    Pharmacy will continue to monitor daily and adjust therapy as indicated.  Please contact the pharmacist at [phone] or [phone] for outpatient recommendations if needed.

## 2024-07-17 NOTE — CARE COORDINATION
JASON- D/c to home with [facility] and [facility].    For home IV abx at discharge     Disposition-Home with Home Health   Transportation- by family  F/U with PCP/Specialist    CM faxed Home IV abx order to [facility] at [phone]    JACLYN Garcia MSA, RN,CM

## 2024-07-18 ENCOUNTER — HOSPITAL ENCOUNTER (OUTPATIENT)
Facility: HOSPITAL | Age: 59
DRG: 560 | End: 2024-07-18
Payer: MEDICARE

## 2024-07-18 VITALS
OXYGEN SATURATION: 100 % | SYSTOLIC BLOOD PRESSURE: 135 MMHG | RESPIRATION RATE: 14 BRPM | DIASTOLIC BLOOD PRESSURE: 72 MMHG | HEART RATE: 64 BPM | TEMPERATURE: 98.5 F

## 2024-07-18 VITALS
WEIGHT: 312.8 LBS | SYSTOLIC BLOOD PRESSURE: 117 MMHG | DIASTOLIC BLOOD PRESSURE: 46 MMHG | HEIGHT: 77 IN | BODY MASS INDEX: 36.93 KG/M2 | TEMPERATURE: 98.2 F | OXYGEN SATURATION: 99 % | HEART RATE: 102 BPM | RESPIRATION RATE: 16 BRPM

## 2024-07-18 LAB
ANION GAP SERPL CALC-SCNC: 8 MMOL/L (ref 5–15)
BASOPHILS # BLD: 0.1 K/UL (ref 0–0.1)
BASOPHILS NFR BLD: 1 % (ref 0–1)
BUN SERPL-MCNC: 22 MG/DL (ref 6–20)
BUN/CREAT SERPL: 15 (ref 12–20)
CALCIUM SERPL-MCNC: 9.6 MG/DL (ref 8.5–10.1)
CHLORIDE SERPL-SCNC: 108 MMOL/L (ref 97–108)
CO2 SERPL-SCNC: 22 MMOL/L (ref 21–32)
CREAT SERPL-MCNC: 1.51 MG/DL (ref 0.7–1.3)
DIFFERENTIAL METHOD BLD: ABNORMAL
EOSINOPHIL # BLD: 0.2 K/UL (ref 0–0.4)
EOSINOPHIL NFR BLD: 3 % (ref 0–7)
ERYTHROCYTE [DISTWIDTH] IN BLOOD BY AUTOMATED COUNT: 15.2 % (ref 11.5–14.5)
GLUCOSE BLD STRIP.AUTO-MCNC: 112 MG/DL (ref 65–117)
GLUCOSE BLD STRIP.AUTO-MCNC: 113 MG/DL (ref 65–117)
GLUCOSE BLD STRIP.AUTO-MCNC: 89 MG/DL (ref 65–117)
GLUCOSE SERPL-MCNC: 93 MG/DL (ref 65–100)
HCT VFR BLD AUTO: 23.8 % (ref 36.6–50.3)
HGB BLD-MCNC: 7.8 G/DL (ref 12.1–17)
IMM GRANULOCYTES # BLD AUTO: 0 K/UL (ref 0–0.04)
IMM GRANULOCYTES NFR BLD AUTO: 1 % (ref 0–0.5)
INR PPP: 2.7 (ref 0.9–1.1)
LYMPHOCYTES # BLD: 2.1 K/UL (ref 0.8–3.5)
LYMPHOCYTES NFR BLD: 25 % (ref 12–49)
MCH RBC QN AUTO: 28.2 PG (ref 26–34)
MCHC RBC AUTO-ENTMCNC: 32.8 G/DL (ref 30–36.5)
MCV RBC AUTO: 85.9 FL (ref 80–99)
MONOCYTES # BLD: 1.1 K/UL (ref 0–1)
MONOCYTES NFR BLD: 13 % (ref 5–13)
NEUTS SEG # BLD: 5 K/UL (ref 1.8–8)
NEUTS SEG NFR BLD: 57 % (ref 32–75)
NRBC # BLD: 0 K/UL (ref 0–0.01)
NRBC BLD-RTO: 0 PER 100 WBC
NT PRO BNP: 477 PG/ML
PLATELET # BLD AUTO: 330 K/UL (ref 150–400)
PMV BLD AUTO: 9.7 FL (ref 8.9–12.9)
POTASSIUM SERPL-SCNC: 4 MMOL/L (ref 3.5–5.1)
PROTHROMBIN TIME: 26.1 SEC (ref 9–11.1)
RBC # BLD AUTO: 2.77 M/UL (ref 4.1–5.7)
SERVICE CMNT-IMP: NORMAL
SODIUM SERPL-SCNC: 138 MMOL/L (ref 136–145)
WBC # BLD AUTO: 8.5 K/UL (ref 4.1–11.1)

## 2024-07-18 PROCEDURE — 36415 COLL VENOUS BLD VENIPUNCTURE: CPT

## 2024-07-18 PROCEDURE — 83880 ASSAY OF NATRIURETIC PEPTIDE: CPT

## 2024-07-18 PROCEDURE — 82962 GLUCOSE BLOOD TEST: CPT

## 2024-07-18 PROCEDURE — 85025 COMPLETE CBC W/AUTO DIFF WBC: CPT

## 2024-07-18 PROCEDURE — 6370000000 HC RX 637 (ALT 250 FOR IP): Performed by: INTERNAL MEDICINE

## 2024-07-18 PROCEDURE — 6360000002 HC RX W HCPCS

## 2024-07-18 PROCEDURE — 2580000003 HC RX 258

## 2024-07-18 PROCEDURE — 0YP9X0Z REMOVAL OF DRAINAGE DEVICE FROM RIGHT LOWER EXTREMITY, EXTERNAL APPROACH: ICD-10-PCS | Performed by: RADIOLOGY

## 2024-07-18 PROCEDURE — 2580000003 HC RX 258: Performed by: PHYSICIAN ASSISTANT

## 2024-07-18 PROCEDURE — 2580000003 HC RX 258: Performed by: INTERNAL MEDICINE

## 2024-07-18 PROCEDURE — 36558 INSERT TUNNELED CV CATH: CPT

## 2024-07-18 PROCEDURE — 99212 OFFICE O/P EST SF 10 MIN: CPT

## 2024-07-18 PROCEDURE — 0JH63XZ INSERTION OF TUNNELED VASCULAR ACCESS DEVICE INTO CHEST SUBCUTANEOUS TISSUE AND FASCIA, PERCUTANEOUS APPROACH: ICD-10-PCS | Performed by: RADIOLOGY

## 2024-07-18 PROCEDURE — 80048 BASIC METABOLIC PNL TOTAL CA: CPT

## 2024-07-18 PROCEDURE — 85610 PROTHROMBIN TIME: CPT

## 2024-07-18 PROCEDURE — 6370000000 HC RX 637 (ALT 250 FOR IP): Performed by: HOSPITALIST

## 2024-07-18 PROCEDURE — 6370000000 HC RX 637 (ALT 250 FOR IP): Performed by: FAMILY MEDICINE

## 2024-07-18 RX ORDER — WARFARIN SODIUM 5 MG/1
TABLET ORAL
Qty: 30 TABLET | Refills: 3 | Status: SHIPPED
Start: 2024-07-18

## 2024-07-18 RX ORDER — WARFARIN SODIUM 5 MG/1
5 TABLET ORAL
Status: COMPLETED | OUTPATIENT
Start: 2024-07-18 | End: 2024-07-18

## 2024-07-18 RX ADMIN — WARFARIN SODIUM 5 MG: 5 TABLET ORAL at 16:28

## 2024-07-18 RX ADMIN — SODIUM CHLORIDE, PRESERVATIVE FREE 10 ML: 5 INJECTION INTRAVENOUS at 08:33

## 2024-07-18 RX ADMIN — Medication: at 08:34

## 2024-07-18 RX ADMIN — Medication 1 MG: at 08:33

## 2024-07-18 RX ADMIN — FERROUS SULFATE TAB 325 MG (65 MG ELEMENTAL FE) 325 MG: 325 (65 FE) TAB at 06:44

## 2024-07-18 RX ADMIN — HYDROCODONE BITARTRATE AND ACETAMINOPHEN 1 TABLET: 5; 325 TABLET ORAL at 16:31

## 2024-07-18 RX ADMIN — WATER 2000 MG: 1 INJECTION INTRAMUSCULAR; INTRAVENOUS; SUBCUTANEOUS at 18:30

## 2024-07-18 RX ADMIN — DAPTOMYCIN 900 MG: 500 INJECTION, POWDER, LYOPHILIZED, FOR SOLUTION INTRAVENOUS at 15:18

## 2024-07-18 RX ADMIN — MIDODRINE HYDROCHLORIDE 5 MG: 5 TABLET ORAL at 12:52

## 2024-07-18 RX ADMIN — MIDODRINE HYDROCHLORIDE 5 MG: 5 TABLET ORAL at 06:44

## 2024-07-18 RX ADMIN — SODIUM CHLORIDE, PRESERVATIVE FREE 10 ML: 5 INJECTION INTRAVENOUS at 01:16

## 2024-07-18 RX ADMIN — AMIODARONE HYDROCHLORIDE 400 MG: 200 TABLET ORAL at 08:33

## 2024-07-18 RX ADMIN — PANTOPRAZOLE SODIUM 40 MG: 40 TABLET, DELAYED RELEASE ORAL at 06:44

## 2024-07-18 RX ADMIN — SODIUM CHLORIDE, PRESERVATIVE FREE 10 ML: 5 INJECTION INTRAVENOUS at 08:31

## 2024-07-18 RX ADMIN — MIDODRINE HYDROCHLORIDE 5 MG: 5 TABLET ORAL at 18:31

## 2024-07-18 NOTE — PROGRESS NOTES
Physical therapy services attempted @9:45AM. Reporting DPT arrived to room to observe Pt currently off unit. Per cursory review of medical chart, currently at \"[facility]\". PT Team will try to provide skilled services later as time permits and as medically appropriate to patient tolerance.    Nicolasa Prasad, PT, DPT

## 2024-07-18 NOTE — PROGRESS NOTES
Attempted to schedule hospital follow up PCP appointment. Unable to reach anyone, left voicemail.  Pending patient discharge. Loyda Posadas, Care Management Assistant

## 2024-07-18 NOTE — CARE COORDINATION
JASON PLAN:    Patient is scheduled for a PICC Line placement today for home IV abx  [facility] orders faxed to [phone]. They will contact patient to set up appt. time for tomorrow.    No further discharge needs identified.    JACLYN Garcia MSA, RN, CM

## 2024-07-18 NOTE — DISCHARGE INSTRUCTIONS
IV antibiotics to be completed as arranged. Call Dr. Carnes's office to arrange follow-up to determine if the wenceslao catheter can be removed.

## 2024-07-18 NOTE — PROGRESS NOTES
NUTRITION  Reason for Assessment: LOS      Recommendations/Interventions/Plan:   Continue regular diet    Will rescreen per policy       Past Medical History:   Diagnosis Date    Arthritis     Atrial fib/flutter, transient (HCC)     Cellulitis     Chronic pain     COVID-19 vaccine series completed 4/2/21, 5/7/21    MODERNA    Diabetes (HCC)     Elevated hemoglobin A1c 06/07/2021    6.4    Hyperlipidemia     Hypertension     Hypertension complicating diabetes (HCC)        Pt re-screened for LOS.  Chart/labs/meds reviewed.  Admitted with Cellulitis of right leg [L03.115]  Cellulitis of right lower extremity [L03.115]  Per chart review, +multiple R posterior knee fluid collection. Pt s/p drainage of abscess by IR, abscess cx NGTD. Pending MRI of R leg to eval extent of infection.       7/18: follow-up.  Pt cleared for d/c, off unit for CVC placement for IV abx.  No overt PO intake concerns noted.  Labs OK.  No new weight.    7/11: Spoke with pt, when asked how he was doing with meals, he replied, \"it's hospital food\". Pt states that his favorite meal here is breakfast, 100% intake this morning per pt report. Pt stated that he eats smaller portions of his lunch and dinner trays d/t disliking some items. Pt w/ menu, encouraged to call kitchen with meal preferences. Normal intakes PTA, no recent wt changes per pt report. Varied wt hx per EMR. + constipation, pt started on lactulose, senna prn. No acute/overt malnutrition concerns at this time, will continue to follow per policy      Nutrition Related Findings:   Edema: Right lower extremity, Left lower extremity            RLE Edema: +2  LLE Edema: Non-pitting      Last BM: 07/17/24      Skin: RLE cluster, R thigh cluster       Current Nutrition Therapies:  Diet: regular  Supplements: none  Meal Intake:   No data found.    Supplement Intake:  Patient Vitals for the past 168 hrs:   PO Supplement (%)   07/12/24 2137 76 - 100%         Weight Hx:  Wt Readings from Last 10

## 2024-07-18 NOTE — DISCHARGE SUMMARY
Discharge Summary       PATIENT ID: Javier Ugalde  MRN: [id number]   YOB: 1965    DATE OF ADMISSION: 7/3/2024 12:55 PM    DATE OF DISCHARGE: 7/18/2024   PRIMARY CARE PROVIDER: Brielle Muller MD     ATTENDING PHYSICIAN: Arcadio  DISCHARGING PROVIDER: Jodie Ervin MD    To contact this individual call [phone] and ask the  to page.  If unavailable ask to be transferred the Adult Hospitalist Department.    CONSULTATIONS: IP CONSULT TO HOSPITALIST  IP CONSULT TO PHARMACY  IP WOUND CARE NURSE CONSULT TO EVAL  IP CONSULT TO PHARMACY  IP CONSULT TO INFECTIOUS DISEASES  IP CONSULT TO ORTHOPEDIC SURGERY  IP CONSULT TO ORTHOPEDIC SURGERY  IP CONSULT TO CASE MANAGEMENT  IP CONSULT TO ORTHOPEDIC SURGERY    PROCEDURES/SURGERIES: * No surgery found *     ADMITTING DIAGNOSES & HOSPITAL COURSE:   Right lower extremity cellulitis with ulcer  Multiple R posterior knee fluid collections  Possible right knee prosthesis infection  NIKKIE on CKD III  Type 2 DM  Hypotension  Anemia of chronic disease  Hyperlipidemia  Chronic pain  JASON  History of aortic valve mechanical valve  Complete AVB s/p PPM    58 y.o. male with past medical history significant for type 2 diabetes, aortic valve replacement with mechanical valve, hypertension, status post pacemaker insertion, dyslipidemia, CKD, ventricular tachycardia s/p LifeVest, dyslipidemia presented at the emergency room with right leg swelling and redness. Patient was admitted to this hospital last month, was admitted and treated for right lower extremity cellulitis, completed 5 days of cefepime and was then discharged home on doxycycline and the patient finished the course of this antibiotic. He was seen at wound care clinic today, patient wound is progressively getting worse despite being on oral antibiotic, she was sent to the emergency room from wound care clinic for evaluation for admission.     Ortho and ID consulted. Recommended drainage which was done

## 2024-07-19 ENCOUNTER — HOSPITAL ENCOUNTER (OUTPATIENT)
Facility: HOSPITAL | Age: 59
Setting detail: INFUSION SERIES
Discharge: HOME | End: 2024-07-19
Payer: MEDICARE

## 2024-07-19 VITALS
TEMPERATURE: 97.2 F | RESPIRATION RATE: 16 BRPM | BODY MASS INDEX: 36.84 KG/M2 | SYSTOLIC BLOOD PRESSURE: 127 MMHG | DIASTOLIC BLOOD PRESSURE: 66 MMHG | OXYGEN SATURATION: 100 % | HEIGHT: 77 IN | WEIGHT: 312 LBS | HEART RATE: 75 BPM

## 2024-07-19 DIAGNOSIS — M00.9 PYOGENIC ARTHRITIS OF RIGHT KNEE JOINT, DUE TO UNSPECIFIED ORGANISM (HCC): Primary | ICD-10-CM

## 2024-07-19 PROCEDURE — 96375 TX/PRO/DX INJ NEW DRUG ADDON: CPT

## 2024-07-19 PROCEDURE — 6360000002 HC RX W HCPCS

## 2024-07-19 PROCEDURE — 96365 THER/PROPH/DIAG IV INF INIT: CPT

## 2024-07-19 PROCEDURE — 2580000003 HC RX 258

## 2024-07-19 RX ORDER — SODIUM CHLORIDE 9 MG/ML
5-250 INJECTION, SOLUTION INTRAVENOUS PRN
Status: CANCELLED | OUTPATIENT
Start: 2024-07-19

## 2024-07-19 RX ORDER — SODIUM CHLORIDE 0.9 % (FLUSH) 0.9 %
5-40 SYRINGE (ML) INJECTION PRN
Status: CANCELLED | OUTPATIENT
Start: 2024-07-19

## 2024-07-19 RX ORDER — HEPARIN 100 UNIT/ML
500 SYRINGE INTRAVENOUS PRN
Status: CANCELLED | OUTPATIENT
Start: 2024-07-19

## 2024-07-19 RX ORDER — HEPARIN 100 UNIT/ML
500 SYRINGE INTRAVENOUS PRN
Status: CANCELLED | OUTPATIENT
Start: 2024-07-20

## 2024-07-19 RX ORDER — SODIUM CHLORIDE 9 MG/ML
5-250 INJECTION, SOLUTION INTRAVENOUS PRN
Status: CANCELLED | OUTPATIENT
Start: 2024-07-20

## 2024-07-19 RX ORDER — SODIUM CHLORIDE 0.9 % (FLUSH) 0.9 %
5-40 SYRINGE (ML) INJECTION PRN
Status: CANCELLED | OUTPATIENT
Start: 2024-07-20

## 2024-07-19 RX ADMIN — CEFTRIAXONE SODIUM 2000 MG: 2 INJECTION, POWDER, FOR SOLUTION INTRAMUSCULAR; INTRAVENOUS at 15:30

## 2024-07-19 RX ADMIN — SODIUM CHLORIDE 900 MG: 9 INJECTION INTRAMUSCULAR; INTRAVENOUS; SUBCUTANEOUS at 16:20

## 2024-07-19 ASSESSMENT — PAIN SCALES - GENERAL: PAINLEVEL_OUTOF10: 0

## 2024-07-19 ASSESSMENT — PAIN SCALES - WONG BAKER: WONGBAKER_NUMERICALRESPONSE: NO HURT

## 2024-07-19 NOTE — PROGRESS NOTES
[facility] Short Note                       Date: 2024    Name: Javier Ugalde    MRN: [id number]         : 1965       Pt admit to [facility] for Rocephin  infusion and Cubicin IV push ambulatory in stable condition. Assessment completed. No new concerns voiced.        Mr. Ugalde's vitals were reviewed prior to and after treatment.   Patient Vitals for the past 12 hrs:   Temp Resp BP SpO2   24 1500 97.2 °F (36.2 °C) 16 127/66 100 %             Medications given:   Medications Administered         cefTRIAXone (ROCEPHIN) 2,000 mg in sodium chloride 0.9 % 50 mL IVPB (mini-bag) Admin Date  2024 Action  New Bag Dose  2,000 mg Rate  100 mL/hr Route  IntraVENous Administered By  Eufemia Nobles RN        DAPTOmycin (CUBICIN) 900 mg in sodium chloride (PF) 0.9 % 18 mL IV syringe Admin Date  2024 Action  Given Dose  900 mg Rate   Route  IntraVENous Administered By  Eufemia Nobles RN                 Mr. Ugalde tolerated the infusion, and had no complaints.    [facility].     Future Appointments   Date Time Provider Department Center   2024  9:30 AM [facility] FASTTRACK 1 [facility]   2024  9:30 AM [facility] FASTTRACK 3 [facility]   2024  1:30 PM PEDS FASTTRACK 1 [facility]   2024  2:30 PM EDISON FASTTRACK 2 [facility]   2024  2:00 PM EDISON FASTTRACK 1 [facility]   2024  9:30 AM EDISON FASTTRACK 3 [facility]   2024  9:30 AM EDISON FASTTRACK 1 [facility]   8/3/2024  9:30 AM EDISON FASTTRACK 3 [facility]   2024  9:30 AM EDISON FASTTRACK 1 [facility]   2024  3:00 PM PEDS FASTTRACK 1 [facility]   2024  3:30 PM EDISON FASTTRACK 1 [facility]       Eufemia Nobles RN  2024  4:49 PM

## 2024-07-20 ENCOUNTER — HOSPITAL ENCOUNTER (OUTPATIENT)
Facility: HOSPITAL | Age: 59
Setting detail: INFUSION SERIES
Discharge: HOME | End: 2024-07-20
Payer: MEDICARE

## 2024-07-20 VITALS
TEMPERATURE: 97.9 F | HEART RATE: 76 BPM | RESPIRATION RATE: 16 BRPM | SYSTOLIC BLOOD PRESSURE: 117 MMHG | OXYGEN SATURATION: 100 % | DIASTOLIC BLOOD PRESSURE: 66 MMHG

## 2024-07-20 DIAGNOSIS — M00.9 PYOGENIC ARTHRITIS OF RIGHT KNEE JOINT, DUE TO UNSPECIFIED ORGANISM (HCC): Primary | ICD-10-CM

## 2024-07-20 PROCEDURE — 2580000003 HC RX 258

## 2024-07-20 PROCEDURE — 96365 THER/PROPH/DIAG IV INF INIT: CPT

## 2024-07-20 PROCEDURE — 6360000002 HC RX W HCPCS

## 2024-07-20 PROCEDURE — 96375 TX/PRO/DX INJ NEW DRUG ADDON: CPT

## 2024-07-20 RX ORDER — HEPARIN 100 UNIT/ML
500 SYRINGE INTRAVENOUS PRN
Status: CANCELLED | OUTPATIENT
Start: 2024-07-21

## 2024-07-20 RX ORDER — SODIUM CHLORIDE 0.9 % (FLUSH) 0.9 %
5-40 SYRINGE (ML) INJECTION PRN
Status: CANCELLED | OUTPATIENT
Start: 2024-07-21

## 2024-07-20 RX ORDER — SODIUM CHLORIDE 9 MG/ML
5-250 INJECTION, SOLUTION INTRAVENOUS PRN
Status: CANCELLED | OUTPATIENT
Start: 2024-07-21

## 2024-07-20 RX ADMIN — SODIUM CHLORIDE 900 MG: 9 INJECTION INTRAMUSCULAR; INTRAVENOUS; SUBCUTANEOUS at 10:37

## 2024-07-20 RX ADMIN — CEFTRIAXONE SODIUM 2000 MG: 2 INJECTION, POWDER, FOR SOLUTION INTRAMUSCULAR; INTRAVENOUS at 09:54

## 2024-07-20 NOTE — PROGRESS NOTES
[facility] Short Consult Note:    0945  Pt arrived to [facility] ambulatory and in no distress for daily Rocephin & Daptomycin.  Patient has a double lumen central line present to right chest. Dressing is clean, dry, and intact and both lumens flush easily with brisk blood return. No new complaints voiced today.       Patient denied having any symptoms of COVID-19, i.e. SOB, coughing, fever, or generally not feeling well.      /66   Pulse 76   Temp 97.9 °F (36.6 °C) (Temporal)   Resp 16   SpO2 100%     Medications Administered         cefTRIAXone (ROCEPHIN) 2,000 mg in sodium chloride 0.9 % 50 mL IVPB (mini-bag) Admin Date  07/20/2024 Action  New Bag Dose  2,000 mg Rate  100 mL/hr Route  IntraVENous Administered By  Lety Johnson RN        DAPTOmycin (CUBICIN) 900 mg in sodium chloride (PF) 0.9 % 18 mL IV syringe Admin Date  07/20/2024 Action  Given Dose  900 mg Rate   Route  IntraVENous Administered By  Lety Johnson RN             Pt tolerated treatment well, no adverse reaction noted.  Central line flushed per protocol and end cap applied.  Discharged from [facility] ambulatory and in no acute distress accompanied by sister.  Next appt 7/21/2024..

## 2024-07-21 ENCOUNTER — HOSPITAL ENCOUNTER (OUTPATIENT)
Facility: HOSPITAL | Age: 59
Setting detail: INFUSION SERIES
Discharge: HOME | End: 2024-07-21
Payer: MEDICARE

## 2024-07-21 VITALS — RESPIRATION RATE: 18 BRPM | OXYGEN SATURATION: 100 % | TEMPERATURE: 98.1 F

## 2024-07-21 DIAGNOSIS — M00.9 PYOGENIC ARTHRITIS OF RIGHT KNEE JOINT, DUE TO UNSPECIFIED ORGANISM (HCC): Primary | ICD-10-CM

## 2024-07-21 PROCEDURE — 6360000002 HC RX W HCPCS

## 2024-07-21 PROCEDURE — 96375 TX/PRO/DX INJ NEW DRUG ADDON: CPT

## 2024-07-21 PROCEDURE — 2580000003 HC RX 258

## 2024-07-21 PROCEDURE — 96365 THER/PROPH/DIAG IV INF INIT: CPT

## 2024-07-21 RX ORDER — SODIUM CHLORIDE 0.9 % (FLUSH) 0.9 %
5-40 SYRINGE (ML) INJECTION PRN
Status: CANCELLED | OUTPATIENT
Start: 2024-07-22

## 2024-07-21 RX ORDER — SODIUM CHLORIDE 0.9 % (FLUSH) 0.9 %
5-40 SYRINGE (ML) INJECTION PRN
Status: DISCONTINUED | OUTPATIENT
Start: 2024-07-21 | End: 2024-07-22 | Stop reason: HOSPADM

## 2024-07-21 RX ORDER — HEPARIN 100 UNIT/ML
500 SYRINGE INTRAVENOUS PRN
Status: CANCELLED | OUTPATIENT
Start: 2024-07-22

## 2024-07-21 RX ORDER — SODIUM CHLORIDE 9 MG/ML
5-250 INJECTION, SOLUTION INTRAVENOUS PRN
Status: CANCELLED | OUTPATIENT
Start: 2024-07-22

## 2024-07-21 RX ADMIN — SODIUM CHLORIDE, PRESERVATIVE FREE 10 ML: 5 INJECTION INTRAVENOUS at 09:38

## 2024-07-21 RX ADMIN — SODIUM CHLORIDE, PRESERVATIVE FREE 10 ML: 5 INJECTION INTRAVENOUS at 10:36

## 2024-07-21 RX ADMIN — CEFTRIAXONE SODIUM 2000 MG: 2 INJECTION, POWDER, FOR SOLUTION INTRAMUSCULAR; INTRAVENOUS at 09:45

## 2024-07-21 RX ADMIN — SODIUM CHLORIDE, PRESERVATIVE FREE 10 ML: 5 INJECTION INTRAVENOUS at 09:37

## 2024-07-21 RX ADMIN — SODIUM CHLORIDE 900 MG: 9 INJECTION INTRAMUSCULAR; INTRAVENOUS; SUBCUTANEOUS at 09:40

## 2024-07-21 NOTE — PROGRESS NOTES
[facility] Short Note                       Date: 2024    Name: Javier Ugalde    MRN: [id number]         : 1965    Treatment: Ceftriaxone and Daptomycin    [facility] COVID-19 SCREENING      The patient was asked the following questions and answered as documented below:    Do you have any symptoms of COVID-19?  SOB, coughing, fever, or generally not feeling well? NO  Have you been exposed to COVID-19 recently? NO  Have you had any recent contact with family/friend that has a pending COVID test? NO      Follow Up: Proceed with treatment    Mr. Ugalde was assessed and education was provided. No changes to report. No c/o pain, VSS.     Lines:   CVC  24 Right Subclavian (Active)   Central Line Being Utilized Yes 24 09   Criteria for Appropriate Use Long term IV/antibiotic administration 24 0955   Site Assessment Clean, dry & intact 24 0955   Phlebitis Assessment No symptoms 24 0955   Infiltration Assessment 0 24 0955   External Catheter Length (cm) 0 cm 24 0955   Proximal Lumen Color/Status Purple;Flushed;Brisk blood return 24 0955   Distal Lumen Color/Status White;Flushed;Brisk blood return 24 0955   Line Care Ports disinfected 24 0955   Alcohol Cap Used Yes 24 09   Date of Last Dressing Change 24 0955   Dressing Type Transparent w/CHG gel;Securing device 24 09   Dressing Status Clean, dry & intact 24 0955        Mr. Godfrey vitals were reviewed prior to and after treatment.   Patient Vitals for the past 12 hrs:   Temp Resp SpO2   24 0936 98.1 °F (36.7 °C) 18 100 %         Medications given:  Medications Administered         cefTRIAXone (ROCEPHIN) 2,000 mg in sodium chloride 0.9 % 50 mL IVPB (mini-bag) Admin Date  2024 Action  New Bag Dose  2,000 mg Rate  100 mL/hr Route  IntraVENous Administered By  Parish Gamino RN        DAPTOmycin (CUBICIN) 900 mg in sodium chloride (PF) 0.9 % 18 mL IV syringe

## 2024-07-22 ENCOUNTER — TELEPHONE (OUTPATIENT)
Age: 59
End: 2024-07-22

## 2024-07-22 ENCOUNTER — HOSPITAL ENCOUNTER (OUTPATIENT)
Facility: HOSPITAL | Age: 59
Setting detail: INFUSION SERIES
Discharge: HOME | End: 2024-07-22
Payer: MEDICARE

## 2024-07-22 VITALS
SYSTOLIC BLOOD PRESSURE: 126 MMHG | OXYGEN SATURATION: 98 % | HEART RATE: 84 BPM | RESPIRATION RATE: 18 BRPM | DIASTOLIC BLOOD PRESSURE: 69 MMHG | TEMPERATURE: 97.2 F

## 2024-07-22 DIAGNOSIS — M00.9 PYOGENIC ARTHRITIS OF RIGHT KNEE JOINT, DUE TO UNSPECIFIED ORGANISM (HCC): Primary | ICD-10-CM

## 2024-07-22 LAB
ALBUMIN SERPL-MCNC: 3.6 G/DL (ref 3.5–5)
ALBUMIN/GLOB SERPL: 0.8 (ref 1.1–2.2)
ALP SERPL-CCNC: 90 U/L (ref 45–117)
ALT SERPL-CCNC: 19 U/L (ref 12–78)
AST SERPL-CCNC: 23 U/L (ref 15–37)
BASOPHILS # BLD: 0.1 K/UL (ref 0–0.1)
BASOPHILS NFR BLD: 1 % (ref 0–1)
BILIRUB DIRECT SERPL-MCNC: 0.2 MG/DL (ref 0–0.2)
BILIRUB SERPL-MCNC: 0.9 MG/DL (ref 0.2–1)
BUN SERPL-MCNC: 25 MG/DL (ref 6–20)
CREAT SERPL-MCNC: 1.69 MG/DL (ref 0.7–1.3)
CRP SERPL-MCNC: 5.36 MG/DL (ref 0–0.3)
DIFFERENTIAL METHOD BLD: ABNORMAL
EOSINOPHIL # BLD: 0.3 K/UL (ref 0–0.4)
EOSINOPHIL NFR BLD: 3 % (ref 0–7)
ERYTHROCYTE [DISTWIDTH] IN BLOOD BY AUTOMATED COUNT: 15.8 % (ref 11.5–14.5)
GLOBULIN SER CALC-MCNC: 4.4 G/DL (ref 2–4)
HCT VFR BLD AUTO: 25.7 % (ref 36.6–50.3)
HGB BLD-MCNC: 8.4 G/DL (ref 12.1–17)
IMM GRANULOCYTES # BLD AUTO: 0.1 K/UL (ref 0–0.04)
IMM GRANULOCYTES NFR BLD AUTO: 1 % (ref 0–0.5)
LYMPHOCYTES # BLD: 2.1 K/UL (ref 0.8–3.5)
LYMPHOCYTES NFR BLD: 21 % (ref 12–49)
MCH RBC QN AUTO: 28.3 PG (ref 26–34)
MCHC RBC AUTO-ENTMCNC: 32.7 G/DL (ref 30–36.5)
MCV RBC AUTO: 86.5 FL (ref 80–99)
MONOCYTES # BLD: 1.1 K/UL (ref 0–1)
MONOCYTES NFR BLD: 11 % (ref 5–13)
NEUTS SEG # BLD: 6.4 K/UL (ref 1.8–8)
NEUTS SEG NFR BLD: 63 % (ref 32–75)
NRBC # BLD: 0 K/UL (ref 0–0.01)
NRBC BLD-RTO: 0 PER 100 WBC
PLATELET # BLD AUTO: 359 K/UL (ref 150–400)
PMV BLD AUTO: 9.8 FL (ref 8.9–12.9)
PROT SERPL-MCNC: 8 G/DL (ref 6.4–8.2)
RBC # BLD AUTO: 2.97 M/UL (ref 4.1–5.7)
RBC MORPH BLD: ABNORMAL
WBC # BLD AUTO: 10.1 K/UL (ref 4.1–11.1)

## 2024-07-22 PROCEDURE — 36415 COLL VENOUS BLD VENIPUNCTURE: CPT

## 2024-07-22 PROCEDURE — 96365 THER/PROPH/DIAG IV INF INIT: CPT

## 2024-07-22 PROCEDURE — 80076 HEPATIC FUNCTION PANEL: CPT

## 2024-07-22 PROCEDURE — 2580000003 HC RX 258

## 2024-07-22 PROCEDURE — 6360000002 HC RX W HCPCS

## 2024-07-22 PROCEDURE — 85025 COMPLETE CBC W/AUTO DIFF WBC: CPT

## 2024-07-22 PROCEDURE — 82565 ASSAY OF CREATININE: CPT

## 2024-07-22 PROCEDURE — 86140 C-REACTIVE PROTEIN: CPT

## 2024-07-22 PROCEDURE — 96375 TX/PRO/DX INJ NEW DRUG ADDON: CPT

## 2024-07-22 PROCEDURE — 84520 ASSAY OF UREA NITROGEN: CPT

## 2024-07-22 RX ORDER — HEPARIN 100 UNIT/ML
500 SYRINGE INTRAVENOUS PRN
Status: CANCELLED | OUTPATIENT
Start: 2024-07-23

## 2024-07-22 RX ORDER — SODIUM CHLORIDE 9 MG/ML
5-250 INJECTION, SOLUTION INTRAVENOUS PRN
Status: CANCELLED | OUTPATIENT
Start: 2024-07-23

## 2024-07-22 RX ORDER — SODIUM CHLORIDE 9 MG/ML
5-250 INJECTION, SOLUTION INTRAVENOUS PRN
Status: DISCONTINUED | OUTPATIENT
Start: 2024-07-22 | End: 2024-07-23 | Stop reason: HOSPADM

## 2024-07-22 RX ORDER — SODIUM CHLORIDE 0.9 % (FLUSH) 0.9 %
5-40 SYRINGE (ML) INJECTION PRN
Status: CANCELLED | OUTPATIENT
Start: 2024-07-23

## 2024-07-22 RX ORDER — SODIUM CHLORIDE 0.9 % (FLUSH) 0.9 %
5-40 SYRINGE (ML) INJECTION PRN
Status: DISCONTINUED | OUTPATIENT
Start: 2024-07-22 | End: 2024-07-23 | Stop reason: HOSPADM

## 2024-07-22 RX ADMIN — SODIUM CHLORIDE, PRESERVATIVE FREE 20 ML: 5 INJECTION INTRAVENOUS at 14:34

## 2024-07-22 RX ADMIN — CEFTRIAXONE SODIUM 2000 MG: 2 INJECTION, POWDER, FOR SOLUTION INTRAMUSCULAR; INTRAVENOUS at 13:44

## 2024-07-22 RX ADMIN — SODIUM CHLORIDE 900 MG: 9 INJECTION INTRAMUSCULAR; INTRAVENOUS; SUBCUTANEOUS at 14:30

## 2024-07-22 RX ADMIN — SODIUM CHLORIDE 50 ML/HR: 9 INJECTION, SOLUTION INTRAVENOUS at 13:41

## 2024-07-22 RX ADMIN — SODIUM CHLORIDE, PRESERVATIVE FREE 20 ML: 5 INJECTION INTRAVENOUS at 14:35

## 2024-07-22 RX ADMIN — SODIUM CHLORIDE, PRESERVATIVE FREE 10 ML: 5 INJECTION INTRAVENOUS at 13:40

## 2024-07-22 ASSESSMENT — PAIN SCALES - GENERAL: PAINLEVEL_OUTOF10: 0

## 2024-07-22 NOTE — PROGRESS NOTES
[facility] Peds/Adult Note                       Date: 2024    Name: Javier Ugalde    MRN: [id number]         : 1965    1330 Patient arrives for IV Ceftriaxone & Daptomycin without acute problems. Please see EPIC for complete assessment and education provided.    Vital signs stable throughout and prior to discharge. Patient tolerated procedure well and was discharged without incident.  Patient is aware of next [facility] appointment on 24.  Appointment card give to the Patient/Family member.      Mr. Ugalde's vitals were reviewed prior to and after treatment.   Patient Vitals for the past 12 hrs:   Temp Pulse Resp BP SpO2   24 1330 97.2 °F (36.2 °C) 84 18 126/69 98 %         Lab results were obtained and reviewed.  Recent Results (from the past 12 hour(s))   CBC With Auto Differential    Collection Time: 24  1:35 PM   Result Value Ref Range    WBC 10.1 4.1 - 11.1 K/uL    RBC 2.97 (L) 4.10 - 5.70 M/uL    Hemoglobin 8.4 (L) 12.1 - 17.0 g/dL    Hematocrit 25.7 (L) 36.6 - 50.3 %    MCV 86.5 80.0 - 99.0 FL    MCH 28.3 26.0 - 34.0 PG    MCHC 32.7 30.0 - 36.5 g/dL    RDW 15.8 (H) 11.5 - 14.5 %    Platelets 359 150 - 400 K/uL    MPV 9.8 8.9 - 12.9 FL    Nucleated RBCs 0.0 0  WBC    nRBC 0.00 0.00 - 0.01 K/uL    Neutrophils % PENDING %    Lymphocytes % PENDING %    Monocytes % PENDING %    Eosinophils % PENDING %    Basophils % PENDING %    Immature Granulocytes % PENDING %    Neutrophils Absolute PENDING K/UL    Lymphocytes Absolute PENDING K/UL    Monocytes Absolute PENDING K/UL    Eosinophils Absolute PENDING K/UL    Basophils Absolute PENDING K/UL    Immature Granulocytes Absolute PENDING K/UL    Differential Type PENDING    BUN    Collection Time: 24  1:35 PM   Result Value Ref Range    BUN 25 (H) 6 - 20 MG/DL   Creatinine    Collection Time: 24  1:35 PM   Result Value Ref Range    Creatinine 1.69 (H) 0.70 - 1.30 MG/DL    Est, Glom Filt Rate 47 (L) >60 ml/min/1.73m2   Hepatic

## 2024-07-23 ENCOUNTER — HOSPITAL ENCOUNTER (OUTPATIENT)
Facility: HOSPITAL | Age: 59
Setting detail: INFUSION SERIES
Discharge: HOME | End: 2024-07-23
Payer: MEDICARE

## 2024-07-23 ENCOUNTER — APPOINTMENT (OUTPATIENT)
Facility: HOSPITAL | Age: 59
End: 2024-07-23
Payer: MEDICARE

## 2024-07-23 VITALS
RESPIRATION RATE: 18 BRPM | TEMPERATURE: 97.8 F | HEART RATE: 72 BPM | DIASTOLIC BLOOD PRESSURE: 58 MMHG | SYSTOLIC BLOOD PRESSURE: 116 MMHG | OXYGEN SATURATION: 98 %

## 2024-07-23 DIAGNOSIS — M00.9 PYOGENIC ARTHRITIS OF RIGHT KNEE JOINT, DUE TO UNSPECIFIED ORGANISM (HCC): Primary | ICD-10-CM

## 2024-07-23 PROCEDURE — 2580000003 HC RX 258

## 2024-07-23 PROCEDURE — 6360000002 HC RX W HCPCS

## 2024-07-23 PROCEDURE — 96375 TX/PRO/DX INJ NEW DRUG ADDON: CPT

## 2024-07-23 PROCEDURE — 96365 THER/PROPH/DIAG IV INF INIT: CPT

## 2024-07-23 RX ORDER — SODIUM CHLORIDE 9 MG/ML
5-250 INJECTION, SOLUTION INTRAVENOUS PRN
Status: DISCONTINUED | OUTPATIENT
Start: 2024-07-23 | End: 2024-07-24 | Stop reason: HOSPADM

## 2024-07-23 RX ORDER — SODIUM CHLORIDE 0.9 % (FLUSH) 0.9 %
5-40 SYRINGE (ML) INJECTION PRN
Status: DISCONTINUED | OUTPATIENT
Start: 2024-07-23 | End: 2024-07-24 | Stop reason: HOSPADM

## 2024-07-23 RX ORDER — HEPARIN 100 UNIT/ML
500 SYRINGE INTRAVENOUS PRN
Status: CANCELLED | OUTPATIENT
Start: 2024-07-24

## 2024-07-23 RX ORDER — SODIUM CHLORIDE 0.9 % (FLUSH) 0.9 %
5-40 SYRINGE (ML) INJECTION PRN
Status: CANCELLED | OUTPATIENT
Start: 2024-07-24

## 2024-07-23 RX ORDER — SODIUM CHLORIDE 9 MG/ML
5-250 INJECTION, SOLUTION INTRAVENOUS PRN
Status: CANCELLED | OUTPATIENT
Start: 2024-07-24

## 2024-07-23 RX ADMIN — CEFTRIAXONE SODIUM 2000 MG: 2 INJECTION, POWDER, FOR SOLUTION INTRAMUSCULAR; INTRAVENOUS at 08:23

## 2024-07-23 RX ADMIN — SODIUM CHLORIDE 25 ML/HR: 9 INJECTION, SOLUTION INTRAVENOUS at 08:17

## 2024-07-23 RX ADMIN — SODIUM CHLORIDE, PRESERVATIVE FREE 10 ML: 5 INJECTION INTRAVENOUS at 08:21

## 2024-07-23 RX ADMIN — SODIUM CHLORIDE 900 MG: 9 INJECTION INTRAMUSCULAR; INTRAVENOUS; SUBCUTANEOUS at 09:14

## 2024-07-23 RX ADMIN — SODIUM CHLORIDE, PRESERVATIVE FREE 30 ML: 5 INJECTION INTRAVENOUS at 09:26

## 2024-07-23 ASSESSMENT — PAIN SCALES - GENERAL: PAINLEVEL_OUTOF10: 0

## 2024-07-23 NOTE — PROGRESS NOTES
[facility] Peds/Adult Note                   Date: 2024    Name: Javier Ugalde    MRN: [id number]       : 1965    0800 Patient arrives for Daily IV Ceftriaxone and Daptomycin without acute problems. Please see Epic for complete assessment and education provided.    Vital signs stable throughout and prior to discharge. Patient tolerated procedure well and was discharged without incident.  Patient is aware of next [facility] appointment on 2024. Appointment card give to the Patient.    Mr. Ugalde's vitals were reviewed prior to and after treatment.   Patient Vitals for the past 12 hrs:   Temp Pulse Resp BP SpO2   24 0921 97.8 °F (36.6 °C) 72 18 (!) 116/58 --   24 0800 98.1 °F (36.7 °C) 86 18 116/68 98 %     Medications given:   Medications Administered         0.9 % sodium chloride infusion Admin Date  2024 Action  New Bag Dose  25 mL/hr Rate  25 mL/hr Route  IntraVENous Administered By  Rukhsana Diaz RN        cefTRIAXone (ROCEPHIN) 2,000 mg in sodium chloride 0.9 % 50 mL IVPB (mini-bag) Admin Date  2024 Action  New Bag Dose  2,000 mg Rate  100 mL/hr Route  IntraVENous Administered By  Rukhsana Diaz RN        DAPTOmycin (CUBICIN) 900 mg in sodium chloride (PF) 0.9 % 18 mL IV syringe Admin Date  2024 Action  Given Dose  900 mg Rate   Route  IntraVENous Administered By  Rukhsana Diaz RN        sodium chloride flush 0.9 % injection 5-40 mL Admin Date  2024 Action  Given Dose  10 mL Rate   Route  IntraVENous Administered By  Rukhsana Diaz RN        sodium chloride flush 0.9 % injection 5-40 mL Admin Date  2024 Action  Given Dose  30 mL Rate   Route  IntraVENous Administered By  Rukhsana Diaz, BEENA              Mr. Ugalde tolerated the infusion, and had no complaints.    [facility].     Future Appointments   Date Time Provider Department Center   2024  2:15 PM Brielle Muller MD [facility]  [place] Sched   7/23/2024  3:40 PM Timi Zarate MD [facility]   7/24/2024  4:00 PM [facility] FASTTRACK 2 [facility]   7/25/2024  2:30 PM [facility]   7/26/2024  2:00 PM [facility]ISON FASTTRACK 1 [facility]   7/27/2024  9:30 AM EDISON FASTTRACK 3 [facility]   7/28/2024  9:30 AM [facility] FASTTRACK 1 [facility]   7/29/2024  1:00 PM PEDS FASTTRACK 1 [facility]   7/30/2024  1:00 PM PEDS FASTTRACK 1 [facility]   7/31/2024  3:00 PM PEDS FASTTRACK 1 [facility]   8/1/2024  9:00 AM PEDS FASTTRACK 2 [facility]   8/2/2024  3:30 PM PEDS FASTTRACK 2 [facility]   8/3/2024  9:30 AM EDISON FASTTRACK 3 [facility]   8/4/2024  9:30 AM [facility]ISON FASTTRACK 1 [facility]   8/5/2024  3:00 PM PEDS FASTTRACK 1 [facility]   8/6/2024  3:30 PM [facility]       BRIAN SCHERER RN  July 23, 2024  9:40 AM

## 2024-07-23 NOTE — TELEPHONE ENCOUNTER
Patient just needs an appt per previous encounter on 7/16/2024, patient has been scheduled by PSR.

## 2024-07-24 ENCOUNTER — HOSPITAL ENCOUNTER (OUTPATIENT)
Facility: HOSPITAL | Age: 59
Setting detail: INFUSION SERIES
Discharge: HOME | End: 2024-07-24
Payer: MEDICARE

## 2024-07-24 VITALS
RESPIRATION RATE: 18 BRPM | DIASTOLIC BLOOD PRESSURE: 78 MMHG | TEMPERATURE: 97.7 F | SYSTOLIC BLOOD PRESSURE: 135 MMHG | HEART RATE: 79 BPM

## 2024-07-24 DIAGNOSIS — M00.9 PYOGENIC ARTHRITIS OF RIGHT KNEE JOINT, DUE TO UNSPECIFIED ORGANISM (HCC): Primary | ICD-10-CM

## 2024-07-24 PROCEDURE — 2580000003 HC RX 258

## 2024-07-24 PROCEDURE — 96365 THER/PROPH/DIAG IV INF INIT: CPT

## 2024-07-24 PROCEDURE — 6360000002 HC RX W HCPCS

## 2024-07-24 PROCEDURE — 96375 TX/PRO/DX INJ NEW DRUG ADDON: CPT

## 2024-07-24 RX ORDER — SODIUM CHLORIDE 9 MG/ML
5-250 INJECTION, SOLUTION INTRAVENOUS PRN
Status: DISCONTINUED | OUTPATIENT
Start: 2024-07-24 | End: 2024-07-25 | Stop reason: HOSPADM

## 2024-07-24 RX ORDER — SODIUM CHLORIDE 0.9 % (FLUSH) 0.9 %
5-40 SYRINGE (ML) INJECTION PRN
Status: DISCONTINUED | OUTPATIENT
Start: 2024-07-24 | End: 2024-07-25 | Stop reason: HOSPADM

## 2024-07-24 RX ORDER — HEPARIN 100 UNIT/ML
500 SYRINGE INTRAVENOUS PRN
Status: DISCONTINUED | OUTPATIENT
Start: 2024-07-24 | End: 2024-07-25 | Stop reason: HOSPADM

## 2024-07-24 RX ORDER — HEPARIN 100 UNIT/ML
500 SYRINGE INTRAVENOUS PRN
Status: CANCELLED | OUTPATIENT
Start: 2024-07-25

## 2024-07-24 RX ORDER — SODIUM CHLORIDE 0.9 % (FLUSH) 0.9 %
5-40 SYRINGE (ML) INJECTION PRN
Status: CANCELLED | OUTPATIENT
Start: 2024-07-25

## 2024-07-24 RX ORDER — SODIUM CHLORIDE 9 MG/ML
5-250 INJECTION, SOLUTION INTRAVENOUS PRN
Status: CANCELLED | OUTPATIENT
Start: 2024-07-25

## 2024-07-24 RX ADMIN — SODIUM CHLORIDE 900 MG: 9 INJECTION INTRAMUSCULAR; INTRAVENOUS; SUBCUTANEOUS at 16:35

## 2024-07-24 RX ADMIN — CEFTRIAXONE SODIUM 2000 MG: 2 INJECTION, POWDER, FOR SOLUTION INTRAMUSCULAR; INTRAVENOUS at 16:00

## 2024-07-24 ASSESSMENT — PAIN SCALES - WONG BAKER: WONGBAKER_NUMERICALRESPONSE: NO HURT

## 2024-07-24 ASSESSMENT — PAIN SCALES - GENERAL: PAINLEVEL_OUTOF10: 0

## 2024-07-24 NOTE — PROGRESS NOTES
[facility] Short Note                       Date: 2024    Name: Javier Ugalde    MRN: [id number]         : 1965      4:00 Pt admit to [facility] for ROCEPHIN/DAPTOMYCIN ambulatory in stable condition. Assessment completed. No new concerns voiced.      Mr. Ugalde's vitals were reviewed prior to treatment.   Patient Vitals for the past 12 hrs:   Temp Pulse Resp BP   24 1600 97.7 °F (36.5 °C) 79 18 135/78       Medications Administered         cefTRIAXone (ROCEPHIN) 2,000 mg in sodium chloride 0.9 % 50 mL IVPB (mini-bag) Admin Date  2024 Action  New Bag Dose  2,000 mg Rate  100 mL/hr Route  IntraVENous Administered By  Raya Juárez RN        cefTRIAXone (ROCEPHIN) 2,000 mg in sodium chloride 0.9 % 50 mL IVPB (mini-bag) Admin Date  2024 Action  Rate/Dose Verify Dose   Rate  100 mL/hr Route  IntraVENous Administered By  Raya Juárez RN        DAPTOmycin (CUBICIN) 900 mg in sodium chloride (PF) 0.9 % 18 mL IV syringe Admin Date  2024 Action  Given Dose  900 mg Rate   Route  IntraVENous Administered By  Raya Juárez RN            PICC accessed with positive blood return. PICC flushed, and de-accessed per protocol.     Mr. Ugalde tolerated the infusion, and had no complaints.    [facility] Ugalde [facility] in [facility] condition.     Future Appointments   Date Time Provider Department Center   2024  2:30 PM [facility] FASTTRACK 2 [facility]   2024  2:00 PM EDISON FASTTRACK 1 [facility]   2024  9:30 AM EDISON FASTTRACK 3 [facility]   2024  9:30 AM [facility]ISON FASTTRACK 1 [facility]   2024  1:00 PM PEDS FASTTRACK 1 [facility]   2024  1:00 PM PEDS FASTTRACK 1 [facility]   2024  1:30 PM [facility]   2024  3:00 PM PEDS FASTTRACK 1 [facility]   2024  9:00 AM PEDS FASTTRACK 2 [facility]   2024  1:00 PM Orlando Carnes MD [facility]   2024  3:30 PM PEDS FASTTRACK 2 [facility]   8/3/2024  9:30 AM EDISON  FASTTRACK 3 [facility]   8/4/2024  9:30 AM EDISON FASTTRACK 1 [facility]   8/5/2024  3:00 PM PEDS FASTTRACK 1 [facility]   8/6/2024  3:30 PM [facility]   8/20/2024  1:50 PM Timi Zarate MD [facility] AMB       Raya Juárez RN  July 24, 2024  4:42 PM

## 2024-07-25 ENCOUNTER — HOSPITAL ENCOUNTER (OUTPATIENT)
Facility: HOSPITAL | Age: 59
Setting detail: INFUSION SERIES
Discharge: HOME | End: 2024-07-25
Payer: MEDICARE

## 2024-07-25 VITALS
DIASTOLIC BLOOD PRESSURE: 67 MMHG | SYSTOLIC BLOOD PRESSURE: 159 MMHG | OXYGEN SATURATION: 100 % | TEMPERATURE: 98.3 F | HEART RATE: 84 BPM | RESPIRATION RATE: 18 BRPM

## 2024-07-25 DIAGNOSIS — M00.9 PYOGENIC ARTHRITIS OF RIGHT KNEE JOINT, DUE TO UNSPECIFIED ORGANISM (HCC): Primary | ICD-10-CM

## 2024-07-25 PROCEDURE — 96375 TX/PRO/DX INJ NEW DRUG ADDON: CPT

## 2024-07-25 PROCEDURE — 6360000002 HC RX W HCPCS

## 2024-07-25 PROCEDURE — 96365 THER/PROPH/DIAG IV INF INIT: CPT

## 2024-07-25 PROCEDURE — 2580000003 HC RX 258

## 2024-07-25 RX ORDER — SODIUM CHLORIDE 0.9 % (FLUSH) 0.9 %
5-40 SYRINGE (ML) INJECTION PRN
Status: DISCONTINUED | OUTPATIENT
Start: 2024-07-25 | End: 2024-07-26 | Stop reason: HOSPADM

## 2024-07-25 RX ORDER — HEPARIN 100 UNIT/ML
500 SYRINGE INTRAVENOUS PRN
Status: CANCELLED | OUTPATIENT
Start: 2024-07-26

## 2024-07-25 RX ORDER — SODIUM CHLORIDE 9 MG/ML
5-250 INJECTION, SOLUTION INTRAVENOUS PRN
Status: DISCONTINUED | OUTPATIENT
Start: 2024-07-25 | End: 2024-07-26 | Stop reason: HOSPADM

## 2024-07-25 RX ORDER — SODIUM CHLORIDE 9 MG/ML
5-250 INJECTION, SOLUTION INTRAVENOUS PRN
Status: CANCELLED | OUTPATIENT
Start: 2024-07-26

## 2024-07-25 RX ORDER — SODIUM CHLORIDE 0.9 % (FLUSH) 0.9 %
5-40 SYRINGE (ML) INJECTION PRN
Status: CANCELLED | OUTPATIENT
Start: 2024-07-26

## 2024-07-25 RX ADMIN — SODIUM CHLORIDE 900 MG: 9 INJECTION INTRAMUSCULAR; INTRAVENOUS; SUBCUTANEOUS at 15:27

## 2024-07-25 RX ADMIN — SODIUM CHLORIDE 50 ML/HR: 9 INJECTION, SOLUTION INTRAVENOUS at 14:39

## 2024-07-25 RX ADMIN — CEFTRIAXONE SODIUM 2000 MG: 2 INJECTION, POWDER, FOR SOLUTION INTRAMUSCULAR; INTRAVENOUS at 14:41

## 2024-07-25 ASSESSMENT — PAIN SCALES - GENERAL: PAINLEVEL_OUTOF10: 0

## 2024-07-25 NOTE — PROGRESS NOTES
[facility] Short Note                       Date: 2024    Name: Javier Ugalde    MRN: [id number]         : 1965      Pt admit to [facility] for Rocephin + Daptomycin ambulatory with walker in stable condition. Assessment completed and documented in flowsheets. Patient has a double lumen central line present to right chest. Dressing is clean, dry and intact (due to be changed today). Both lumens flush easily with brisk blood return.    Mr. Ugalde's vitals were reviewed prior to and after treatment.   Patient Vitals for the past 12 hrs:   Temp Pulse Resp BP SpO2   24 1436 98.3 °F (36.8 °C) 84 18 (!) 159/67 100 %                Medications given:   Medications Administered         0.9 % sodium chloride infusion Admin Date  2024 Action  New Bag Dose  50 mL/hr Rate  50 mL/hr Route  IntraVENous Administered By  Britney Blanchard RN        cefTRIAXone (ROCEPHIN) 2,000 mg in sodium chloride 0.9 % 50 mL IVPB (mini-bag) Admin Date  2024 Action  New Bag Dose  2,000 mg Rate  100 mL/hr Route  IntraVENous Administered By  Britney Blanchard RN        DAPTOmycin (CUBICIN) 900 mg in sodium chloride (PF) 0.9 % 18 mL IV syringe Admin Date  2024 Action  Given Dose  900 mg Rate   Route  IntraVENous Administered By  Britney Blanchard RN            CVC positive blood return noted at end of infusion, flushed per protocol. Sterile dressing changed performed per protocol. Both dressing and stat lock changed, new microclaves attached to each lumen, new curos caps applied.    Mr. Ugalde tolerated the infusion, and had no complaints.    [facility]. Ugalde [facility] in [facility] condition and is aware of future appointments.     Future Appointments   Date Time Provider Department Center   2024  2:00 PM EDISON FASTTRACK 1 [facility]   2024  9:30 AM EDISON FASTTRACK 3 [facility]   2024  9:30 AM EDISON FASTTRACK 1 [facility]   2024  1:00 PM PEDS FASTTRACK 1 [facility]   2024  1:00

## 2024-07-26 ENCOUNTER — HOSPITAL ENCOUNTER (OUTPATIENT)
Facility: HOSPITAL | Age: 59
Setting detail: INFUSION SERIES
Discharge: HOME | End: 2024-07-26
Payer: MEDICARE

## 2024-07-26 VITALS
TEMPERATURE: 97.8 F | HEART RATE: 75 BPM | RESPIRATION RATE: 18 BRPM | DIASTOLIC BLOOD PRESSURE: 62 MMHG | SYSTOLIC BLOOD PRESSURE: 122 MMHG

## 2024-07-26 DIAGNOSIS — M00.9 PYOGENIC ARTHRITIS OF RIGHT KNEE JOINT, DUE TO UNSPECIFIED ORGANISM (HCC): Primary | ICD-10-CM

## 2024-07-26 PROCEDURE — 96375 TX/PRO/DX INJ NEW DRUG ADDON: CPT

## 2024-07-26 PROCEDURE — 2580000003 HC RX 258

## 2024-07-26 PROCEDURE — 6360000002 HC RX W HCPCS

## 2024-07-26 PROCEDURE — 96365 THER/PROPH/DIAG IV INF INIT: CPT

## 2024-07-26 RX ORDER — SODIUM CHLORIDE 9 MG/ML
5-250 INJECTION, SOLUTION INTRAVENOUS PRN
Status: DISCONTINUED | OUTPATIENT
Start: 2024-07-26 | End: 2024-07-27 | Stop reason: HOSPADM

## 2024-07-26 RX ORDER — HEPARIN 100 UNIT/ML
500 SYRINGE INTRAVENOUS PRN
Status: CANCELLED | OUTPATIENT
Start: 2024-07-27

## 2024-07-26 RX ORDER — SODIUM CHLORIDE 0.9 % (FLUSH) 0.9 %
5-40 SYRINGE (ML) INJECTION PRN
Status: DISCONTINUED | OUTPATIENT
Start: 2024-07-26 | End: 2024-07-27 | Stop reason: HOSPADM

## 2024-07-26 RX ORDER — SODIUM CHLORIDE 0.9 % (FLUSH) 0.9 %
5-40 SYRINGE (ML) INJECTION PRN
Status: CANCELLED | OUTPATIENT
Start: 2024-07-27

## 2024-07-26 RX ORDER — HEPARIN 100 UNIT/ML
500 SYRINGE INTRAVENOUS PRN
Status: DISCONTINUED | OUTPATIENT
Start: 2024-07-26 | End: 2024-07-27 | Stop reason: HOSPADM

## 2024-07-26 RX ORDER — SODIUM CHLORIDE 9 MG/ML
5-250 INJECTION, SOLUTION INTRAVENOUS PRN
Status: CANCELLED | OUTPATIENT
Start: 2024-07-27

## 2024-07-26 RX ADMIN — SODIUM CHLORIDE 900 MG: 9 INJECTION INTRAMUSCULAR; INTRAVENOUS; SUBCUTANEOUS at 14:58

## 2024-07-26 RX ADMIN — CEFTRIAXONE SODIUM 2000 MG: 2 INJECTION, POWDER, FOR SOLUTION INTRAMUSCULAR; INTRAVENOUS at 14:17

## 2024-07-26 ASSESSMENT — PAIN SCALES - GENERAL: PAINLEVEL_OUTOF10: 0

## 2024-07-26 NOTE — PROGRESS NOTES
[facility] Short Note                       Date: 2024    Name: Javier Ugalde    MRN: [id number]         : 1965      2:00 Pt admit to [facility] for ROCEPHIN/DAPTO ambulatory in stable condition. Assessment completed. No new concerns voiced.      Mr. Ugalde's vitals were reviewed prior to treatment.   Patient Vitals for the past 12 hrs:   Temp Pulse Resp BP   24 1418 97.8 °F (36.6 °C) 75 18 122/62       Medications Administered         cefTRIAXone (ROCEPHIN) 2,000 mg in sodium chloride 0.9 % 50 mL IVPB (mini-bag) Admin Date  2024 Action  New Bag Dose  2,000 mg Rate  100 mL/hr Route  IntraVENous Administered By  Raya Juárez RN        DAPTOmycin (CUBICIN) 900 mg in sodium chloride (PF) 0.9 % 18 mL IV syringe Admin Date  2024 Action  Given Dose  900 mg Rate   Route  IntraVENous Administered By  Raya Juárez RN            PICC accessed with positive blood return. PICC flushed,  and de-accessed per protocol.     Mr. Ugalde tolerated the infusion, and had no complaints.    [facility]. Ugalde [facility] condition.     Future Appointments   Date Time Provider Department Center   2024  9:30 AM [facility]ISON FASTTRACK 3 [facility]   2024  9:30 AM [facility] FASTTRACK 1 [facility]   2024  1:00 PM PEDS FASTTRACK 1 [facility]   2024  1:00 PM PEDS FASTTRACK 1 [facility]   2024  1:30 PM [facility]   2024  3:00 PM PEDS FASTTRACK 1 [facility]   2024  9:00 AM PEDS FASTTRACK 2 [facility]   2024  1:00 PM [facility]   2024  3:30 PM PEDS FASTTRACK 2 [facility]   8/3/2024  9:30 AM EDISON FASTTRACK 3 [facility]   2024  9:30 AM EDISON FASTTRACK 1 [facility]   2024  3:00 PM PEDS FASTTRACK 1 [facility]   2024  3:30 PM [facility]   2024  1:50 PM Timi Zarate MD [facility] AMB       Raya Juárez RN  2024  3:14 PM

## 2024-07-27 ENCOUNTER — HOSPITAL ENCOUNTER (OUTPATIENT)
Facility: HOSPITAL | Age: 59
Setting detail: INFUSION SERIES
Discharge: HOME | End: 2024-07-27
Payer: MEDICARE

## 2024-07-27 VITALS
TEMPERATURE: 98.4 F | HEART RATE: 78 BPM | OXYGEN SATURATION: 100 % | SYSTOLIC BLOOD PRESSURE: 115 MMHG | RESPIRATION RATE: 20 BRPM | DIASTOLIC BLOOD PRESSURE: 64 MMHG

## 2024-07-27 DIAGNOSIS — M00.9 PYOGENIC ARTHRITIS OF RIGHT KNEE JOINT, DUE TO UNSPECIFIED ORGANISM (HCC): Primary | ICD-10-CM

## 2024-07-27 LAB — CK SERPL-CCNC: 159 U/L (ref 39–308)

## 2024-07-27 PROCEDURE — 96375 TX/PRO/DX INJ NEW DRUG ADDON: CPT

## 2024-07-27 PROCEDURE — 6360000002 HC RX W HCPCS

## 2024-07-27 PROCEDURE — 2580000003 HC RX 258

## 2024-07-27 PROCEDURE — 36415 COLL VENOUS BLD VENIPUNCTURE: CPT

## 2024-07-27 PROCEDURE — 82550 ASSAY OF CK (CPK): CPT

## 2024-07-27 PROCEDURE — 96365 THER/PROPH/DIAG IV INF INIT: CPT

## 2024-07-27 RX ORDER — SODIUM CHLORIDE 9 MG/ML
5-250 INJECTION, SOLUTION INTRAVENOUS PRN
Status: DISCONTINUED | OUTPATIENT
Start: 2024-07-27 | End: 2024-07-28 | Stop reason: HOSPADM

## 2024-07-27 RX ORDER — SODIUM CHLORIDE 0.9 % (FLUSH) 0.9 %
5-40 SYRINGE (ML) INJECTION PRN
Status: DISCONTINUED | OUTPATIENT
Start: 2024-07-27 | End: 2024-07-28 | Stop reason: HOSPADM

## 2024-07-27 RX ORDER — HEPARIN 100 UNIT/ML
500 SYRINGE INTRAVENOUS PRN
Status: CANCELLED | OUTPATIENT
Start: 2024-07-28

## 2024-07-27 RX ORDER — SODIUM CHLORIDE 9 MG/ML
5-250 INJECTION, SOLUTION INTRAVENOUS PRN
Status: CANCELLED | OUTPATIENT
Start: 2024-07-28

## 2024-07-27 RX ORDER — SODIUM CHLORIDE 0.9 % (FLUSH) 0.9 %
5-40 SYRINGE (ML) INJECTION PRN
Status: CANCELLED | OUTPATIENT
Start: 2024-07-28

## 2024-07-27 RX ADMIN — SODIUM CHLORIDE, PRESERVATIVE FREE 10 ML: 5 INJECTION INTRAVENOUS at 11:08

## 2024-07-27 RX ADMIN — SODIUM CHLORIDE 900 MG: 9 INJECTION INTRAMUSCULAR; INTRAVENOUS; SUBCUTANEOUS at 11:07

## 2024-07-27 RX ADMIN — CEFTRIAXONE SODIUM 2000 MG: 2 INJECTION, POWDER, FOR SOLUTION INTRAMUSCULAR; INTRAVENOUS at 10:09

## 2024-07-27 RX ADMIN — SODIUM CHLORIDE, PRESERVATIVE FREE 10 ML: 5 INJECTION INTRAVENOUS at 10:30

## 2024-07-27 NOTE — PROGRESS NOTES
[facility] Progress Note  Date: July 27, 2024     Treatment: Rocephin/Dapto    Mr. Ugalde arrived in no acute distress at 1000.    Assessment was unremarkable with no new concerns voiced. CVC flushed with positive blood return noted.     Problem: Chronic Conditions and Co-morbidities  Goal: Patient's chronic conditions and co-morbidity symptoms are monitored and maintained or improved  Outcome: Progressing     Problem: Safety - Adult  Goal: Free from fall injury  Outcome: Progressing    Mr. Ugalde's vitals for today's visit.   Patient Vitals for the past 12 hrs:   Temp Pulse Resp BP SpO2   07/27/24 1004 98.4 °F (36.9 °C) 78 20 115/64 100 %     Medications given:  Medications Administered         cefTRIAXone (ROCEPHIN) 2,000 mg in sodium chloride 0.9 % 50 mL IVPB (mini-bag) Admin Date  07/27/2024 Action  New Bag Dose  2,000 mg Rate  100 mL/hr Route  IntraVENous Administered By  Pily Reid RN        DAPTOmycin (CUBICIN) 900 mg in sodium chloride (PF) 0.9 % 18 mL IV syringe Admin Date  07/27/2024 Action  Given Dose  900 mg Rate   Route  IntraVENous Administered By  Pily Reid RN        sodium chloride flush 0.9 % injection 5-40 mL Admin Date  07/27/2024 Action  Given Dose  10 mL Rate   Route  IntraVENous Administered By  Pily Reid RN        sodium chloride flush 0.9 % injection 5-40 mL Admin Date  07/27/2024 Action  Given Dose  10 mL Rate   Route  IntraVENous Administered By  Pily Reid RN          Pharmacist called stating pt needed CK drawn. Lab drawn and processed. No results at time of note.    Mr. Ugalde tolerated the treatment without complaints. Line flushed, new caps placed.    [facility].    Future Appointments   Date Time Provider Department Center   7/28/2024  9:30 AM EDISON FASTTRACK 1 [facility]   7/29/2024  1:00 PM PEDS FASTTRACK 1 [facility]   7/30/2024  1:00 PM PEDS FASTTRACK 1 [facility]   7/31/2024   1:30 PM Feroz Jimenez MD [facility]   7/31/2024  3:00 PM PEDS FASTTRACK 1 [facility]   8/1/2024  9:00 AM PEDS FASTTRACK 2 [facility]   8/1/2024  1:00 PM Orlando Carnes MD [facility]   8/2/2024  3:30 PM PEDS FASTTRACK 2 [facility]   8/3/2024  9:30 AM EDISON FASTTRACK 3 [facility]   8/4/2024  9:30 AM EDISON FASTTRACK 1 [facility]   8/5/2024  3:00 PM PEDS FASTTRACK 1 [facility]   8/6/2024  3:30 PM EDISON FASTTRACK 1 [facility]   8/20/2024  1:50 PM Timi Zarate MD [facility]     Pily Reid RN  July 27, 2024  11:40 AM

## 2024-07-28 ENCOUNTER — HOSPITAL ENCOUNTER (OUTPATIENT)
Facility: HOSPITAL | Age: 59
Setting detail: INFUSION SERIES
Discharge: HOME | End: 2024-07-28
Payer: MEDICARE

## 2024-07-28 VITALS
TEMPERATURE: 97.1 F | RESPIRATION RATE: 18 BRPM | SYSTOLIC BLOOD PRESSURE: 132 MMHG | HEART RATE: 81 BPM | OXYGEN SATURATION: 100 % | DIASTOLIC BLOOD PRESSURE: 68 MMHG

## 2024-07-28 DIAGNOSIS — M00.9 PYOGENIC ARTHRITIS OF RIGHT KNEE JOINT, DUE TO UNSPECIFIED ORGANISM (HCC): Primary | ICD-10-CM

## 2024-07-28 PROCEDURE — 96365 THER/PROPH/DIAG IV INF INIT: CPT

## 2024-07-28 PROCEDURE — 96375 TX/PRO/DX INJ NEW DRUG ADDON: CPT

## 2024-07-28 PROCEDURE — 2580000003 HC RX 258

## 2024-07-28 PROCEDURE — 6360000002 HC RX W HCPCS

## 2024-07-28 RX ORDER — SODIUM CHLORIDE 0.9 % (FLUSH) 0.9 %
5-40 SYRINGE (ML) INJECTION PRN
Status: CANCELLED | OUTPATIENT
Start: 2024-07-29

## 2024-07-28 RX ORDER — HEPARIN 100 UNIT/ML
500 SYRINGE INTRAVENOUS PRN
Status: CANCELLED | OUTPATIENT
Start: 2024-07-29

## 2024-07-28 RX ORDER — SODIUM CHLORIDE 9 MG/ML
5-250 INJECTION, SOLUTION INTRAVENOUS PRN
Status: CANCELLED | OUTPATIENT
Start: 2024-07-29

## 2024-07-28 RX ADMIN — SODIUM CHLORIDE 900 MG: 9 INJECTION INTRAMUSCULAR; INTRAVENOUS; SUBCUTANEOUS at 09:36

## 2024-07-28 RX ADMIN — CEFTRIAXONE SODIUM 2000 MG: 2 INJECTION, POWDER, FOR SOLUTION INTRAMUSCULAR; INTRAVENOUS at 09:41

## 2024-07-28 ASSESSMENT — PAIN SCALES - GENERAL: PAINLEVEL_OUTOF10: 0

## 2024-07-28 NOTE — PROGRESS NOTES
7/29/2024  1:00 PM PEDS FASTTRACK 1 [facility]   7/30/2024  1:00 PM PEDS FASTTRACK 1 [facility]   7/31/2024  1:30 PM Feroz Jimenez MD [facility]   7/31/2024  3:00 PM PEDS FASTTRACK 1 [facility]   8/1/2024  9:00 AM PEDS FASTTRACK 2 [facility]   8/1/2024  1:00 PM Orlando Carnes MD [facility]B   8/2/2024  3:30 PM PEDS FASTTRACK 2 [facility]   8/3/2024  9:30 AM EDISON FASTTRACK 3 [facility]   8/4/2024  9:30 AM EDISON FASTTRACK 1 [facility]   8/5/2024  3:00 PM PEDS FASTTRACK 1 [facility]   8/6/2024  3:30 PM EDISON FASTTRACK 1 [facility]   8/20/2024  1:50 PM Timi Zarate MD [facility] BS AMB

## 2024-07-29 ENCOUNTER — HOSPITAL ENCOUNTER (OUTPATIENT)
Facility: HOSPITAL | Age: 59
Setting detail: INFUSION SERIES
Discharge: HOME | End: 2024-07-29
Payer: MEDICARE

## 2024-07-29 VITALS
DIASTOLIC BLOOD PRESSURE: 68 MMHG | SYSTOLIC BLOOD PRESSURE: 108 MMHG | HEART RATE: 66 BPM | RESPIRATION RATE: 18 BRPM | TEMPERATURE: 97.4 F

## 2024-07-29 DIAGNOSIS — M00.9 PYOGENIC ARTHRITIS OF RIGHT KNEE JOINT, DUE TO UNSPECIFIED ORGANISM (HCC): Primary | ICD-10-CM

## 2024-07-29 LAB
ALBUMIN SERPL-MCNC: 3.2 G/DL (ref 3.5–5)
ALBUMIN/GLOB SERPL: 0.7 (ref 1.1–2.2)
ALP SERPL-CCNC: 76 U/L (ref 45–117)
ALT SERPL-CCNC: 19 U/L (ref 12–78)
AST SERPL-CCNC: 21 U/L (ref 15–37)
BASOPHILS # BLD: 0.1 K/UL (ref 0–0.1)
BASOPHILS NFR BLD: 1 % (ref 0–1)
BILIRUB DIRECT SERPL-MCNC: 0.2 MG/DL (ref 0–0.2)
BILIRUB SERPL-MCNC: 0.5 MG/DL (ref 0.2–1)
BUN SERPL-MCNC: 15 MG/DL (ref 6–20)
CREAT SERPL-MCNC: 1.39 MG/DL (ref 0.7–1.3)
CRP SERPL-MCNC: 3.13 MG/DL (ref 0–0.3)
DIFFERENTIAL METHOD BLD: ABNORMAL
EOSINOPHIL # BLD: 0.2 K/UL (ref 0–0.4)
EOSINOPHIL NFR BLD: 3 % (ref 0–7)
ERYTHROCYTE [DISTWIDTH] IN BLOOD BY AUTOMATED COUNT: 15.6 % (ref 11.5–14.5)
GLOBULIN SER CALC-MCNC: 4.6 G/DL (ref 2–4)
HCT VFR BLD AUTO: 25.8 % (ref 36.6–50.3)
HGB BLD-MCNC: 8.1 G/DL (ref 12.1–17)
IMM GRANULOCYTES # BLD AUTO: 0 K/UL (ref 0–0.04)
IMM GRANULOCYTES NFR BLD AUTO: 0 % (ref 0–0.5)
LYMPHOCYTES # BLD: 1.7 K/UL (ref 0.8–3.5)
LYMPHOCYTES NFR BLD: 26 % (ref 12–49)
MCH RBC QN AUTO: 27.6 PG (ref 26–34)
MCHC RBC AUTO-ENTMCNC: 31.4 G/DL (ref 30–36.5)
MCV RBC AUTO: 88.1 FL (ref 80–99)
MONOCYTES # BLD: 0.7 K/UL (ref 0–1)
MONOCYTES NFR BLD: 11 % (ref 5–13)
NEUTS SEG # BLD: 4 K/UL (ref 1.8–8)
NEUTS SEG NFR BLD: 59 % (ref 32–75)
NRBC # BLD: 0 K/UL (ref 0–0.01)
NRBC BLD-RTO: 0 PER 100 WBC
PLATELET # BLD AUTO: 317 K/UL (ref 150–400)
PMV BLD AUTO: 9.2 FL (ref 8.9–12.9)
PROT SERPL-MCNC: 7.8 G/DL (ref 6.4–8.2)
RBC # BLD AUTO: 2.93 M/UL (ref 4.1–5.7)
RBC MORPH BLD: ABNORMAL
WBC # BLD AUTO: 6.7 K/UL (ref 4.1–11.1)

## 2024-07-29 PROCEDURE — 96375 TX/PRO/DX INJ NEW DRUG ADDON: CPT

## 2024-07-29 PROCEDURE — 85025 COMPLETE CBC W/AUTO DIFF WBC: CPT

## 2024-07-29 PROCEDURE — 84520 ASSAY OF UREA NITROGEN: CPT

## 2024-07-29 PROCEDURE — 96365 THER/PROPH/DIAG IV INF INIT: CPT

## 2024-07-29 PROCEDURE — 80076 HEPATIC FUNCTION PANEL: CPT

## 2024-07-29 PROCEDURE — 2580000003 HC RX 258

## 2024-07-29 PROCEDURE — 6360000002 HC RX W HCPCS

## 2024-07-29 PROCEDURE — 86140 C-REACTIVE PROTEIN: CPT

## 2024-07-29 PROCEDURE — 82565 ASSAY OF CREATININE: CPT

## 2024-07-29 PROCEDURE — 36415 COLL VENOUS BLD VENIPUNCTURE: CPT

## 2024-07-29 RX ORDER — SODIUM CHLORIDE 9 MG/ML
5-250 INJECTION, SOLUTION INTRAVENOUS PRN
Status: DISCONTINUED | OUTPATIENT
Start: 2024-07-29 | End: 2024-07-30 | Stop reason: HOSPADM

## 2024-07-29 RX ORDER — SODIUM CHLORIDE 0.9 % (FLUSH) 0.9 %
5-40 SYRINGE (ML) INJECTION PRN
Status: DISCONTINUED | OUTPATIENT
Start: 2024-07-29 | End: 2024-07-30 | Stop reason: HOSPADM

## 2024-07-29 RX ORDER — SODIUM CHLORIDE 9 MG/ML
5-250 INJECTION, SOLUTION INTRAVENOUS PRN
Status: CANCELLED | OUTPATIENT
Start: 2024-07-30

## 2024-07-29 RX ORDER — HEPARIN 100 UNIT/ML
500 SYRINGE INTRAVENOUS PRN
Status: CANCELLED | OUTPATIENT
Start: 2024-07-30

## 2024-07-29 RX ORDER — SODIUM CHLORIDE 0.9 % (FLUSH) 0.9 %
5-40 SYRINGE (ML) INJECTION PRN
Status: CANCELLED | OUTPATIENT
Start: 2024-07-30

## 2024-07-29 RX ADMIN — SODIUM CHLORIDE 25 ML/HR: 9 INJECTION, SOLUTION INTRAVENOUS at 13:22

## 2024-07-29 RX ADMIN — SODIUM CHLORIDE 900 MG: 9 INJECTION INTRAMUSCULAR; INTRAVENOUS; SUBCUTANEOUS at 14:13

## 2024-07-29 RX ADMIN — CEFTRIAXONE SODIUM 2000 MG: 2 INJECTION, POWDER, FOR SOLUTION INTRAMUSCULAR; INTRAVENOUS at 13:25

## 2024-07-29 ASSESSMENT — PAIN SCALES - GENERAL: PAINLEVEL_OUTOF10: 0

## 2024-07-29 ASSESSMENT — PAIN SCALES - WONG BAKER: WONGBAKER_NUMERICALRESPONSE: NO HURT

## 2024-07-29 NOTE — PROGRESS NOTES
[facility] Peds/Adult Note                       Date: 2024    Name: Javier Ugalde    MRN: [id number]         : 1965    1300 Patient arrives for Rocephin & Daptomycin without acute problems. Please see Epic for complete assessment and education provided.    Vital signs stable throughout and prior to discharge. Patient tolerated procedure well and was discharged without incident. Patient is aware of next [facility] appointment on 2024. Appointment card give to the Patient.      Mr. Ugalde's vitals were reviewed prior to and after treatment.   Patient Vitals for the past 12 hrs:   Temp Pulse Resp BP   24 1419 -- 66 -- 108/68   24 1300 97.4 °F (36.3 °C) 75 18 120/73       Lab results were obtained and reviewed.  See Epic for pending lab results  No results found for this or any previous visit (from the past 12 hour(s)).    Medications given:   Medications Administered         0.9 % sodium chloride infusion Admin Date  2024 Action  New Bag Dose  25 mL/hr Rate  25 mL/hr Route  IntraVENous Administered By  Erika Langston RN        cefTRIAXone (ROCEPHIN) 2,000 mg in sodium chloride 0.9 % 50 mL IVPB (mini-bag) Admin Date  2024 Action  New Bag Dose  2,000 mg Rate  100 mL/hr Route  IntraVENous Administered By  Erika Langston RN        DAPTOmycin (CUBICIN) 900 mg in sodium chloride (PF) 0.9 % 18 mL IV syringe Admin Date  2024 Action  Given Dose  900 mg Rate   Route  IntraVENous Administered By  Erika Langston RN              Mr. Ugalde tolerated the infusion, and had no complaints.    Mr. Ugalde was [facility] in stable condition. Discharge Instructions provided to patient, patient verbalized understanding but denied the request for a copy of after visit summary.     Future Appointments   Date Time Provider Department Center   2024  1:00 PM PEDS FASTTRACK 1 [facility]   2024  1:30 PM Feroz Jimenez MD [facility]   2024   3:00 PM PEDS FASTTRACK 1 [facility]   8/1/2024  9:00 AM PEDS FASTTRACK 2 [facility]   8/1/2024  1:00 PM Orlando Carnes MD [facility]   8/2/2024  3:30 PM PEDS FASTTRACK 2 [facility]   8/3/2024  9:30 AM EDISON FASTTRACK 3 [facility]   8/4/2024  9:30 AM EDISON FASTTRACK 1 [facility]   8/5/2024  3:00 PM PEDS FASTTRACK 1 [facility]   8/6/2024  3:30 PM EDISON FASTTRACK 1 [facility]   8/20/2024  1:50 PM Timi Zarate MD [facility] AMB       JENNIFER CONNELLY RN  July 29, 2024  2:32 PM

## 2024-07-30 ENCOUNTER — HOSPITAL ENCOUNTER (OUTPATIENT)
Facility: HOSPITAL | Age: 59
Setting detail: INFUSION SERIES
Discharge: HOME | End: 2024-07-30
Payer: MEDICARE

## 2024-07-30 VITALS
DIASTOLIC BLOOD PRESSURE: 66 MMHG | RESPIRATION RATE: 18 BRPM | SYSTOLIC BLOOD PRESSURE: 100 MMHG | TEMPERATURE: 98.5 F | OXYGEN SATURATION: 99 % | HEART RATE: 67 BPM

## 2024-07-30 DIAGNOSIS — M00.9 PYOGENIC ARTHRITIS OF RIGHT KNEE JOINT, DUE TO UNSPECIFIED ORGANISM (HCC): Primary | ICD-10-CM

## 2024-07-30 PROCEDURE — 2580000003 HC RX 258

## 2024-07-30 PROCEDURE — 96375 TX/PRO/DX INJ NEW DRUG ADDON: CPT

## 2024-07-30 PROCEDURE — 6360000002 HC RX W HCPCS

## 2024-07-30 PROCEDURE — 96365 THER/PROPH/DIAG IV INF INIT: CPT

## 2024-07-30 RX ORDER — SODIUM CHLORIDE 9 MG/ML
5-250 INJECTION, SOLUTION INTRAVENOUS PRN
Status: CANCELLED | OUTPATIENT
Start: 2024-07-31

## 2024-07-30 RX ORDER — HEPARIN 100 UNIT/ML
500 SYRINGE INTRAVENOUS PRN
Status: CANCELLED | OUTPATIENT
Start: 2024-07-31

## 2024-07-30 RX ORDER — SODIUM CHLORIDE 9 MG/ML
5-250 INJECTION, SOLUTION INTRAVENOUS PRN
Status: DISCONTINUED | OUTPATIENT
Start: 2024-07-30 | End: 2024-07-31 | Stop reason: HOSPADM

## 2024-07-30 RX ORDER — SODIUM CHLORIDE 0.9 % (FLUSH) 0.9 %
5-40 SYRINGE (ML) INJECTION PRN
Status: DISCONTINUED | OUTPATIENT
Start: 2024-07-30 | End: 2024-07-31 | Stop reason: HOSPADM

## 2024-07-30 RX ORDER — SODIUM CHLORIDE 0.9 % (FLUSH) 0.9 %
5-40 SYRINGE (ML) INJECTION PRN
Status: CANCELLED | OUTPATIENT
Start: 2024-07-31

## 2024-07-30 RX ADMIN — SODIUM CHLORIDE, PRESERVATIVE FREE 10 ML: 5 INJECTION INTRAVENOUS at 14:15

## 2024-07-30 RX ADMIN — SODIUM CHLORIDE 25 ML/HR: 9 INJECTION, SOLUTION INTRAVENOUS at 13:19

## 2024-07-30 RX ADMIN — SODIUM CHLORIDE, PRESERVATIVE FREE 10 ML: 5 INJECTION INTRAVENOUS at 13:17

## 2024-07-30 RX ADMIN — SODIUM CHLORIDE 900 MG: 9 INJECTION INTRAMUSCULAR; INTRAVENOUS; SUBCUTANEOUS at 14:09

## 2024-07-30 RX ADMIN — CEFTRIAXONE SODIUM 2000 MG: 2 INJECTION, POWDER, FOR SOLUTION INTRAMUSCULAR; INTRAVENOUS at 13:24

## 2024-07-30 RX ADMIN — SODIUM CHLORIDE, PRESERVATIVE FREE 10 ML: 5 INJECTION INTRAVENOUS at 14:14

## 2024-07-30 ASSESSMENT — PAIN SCALES - GENERAL: PAINLEVEL_OUTOF10: 4

## 2024-07-30 ASSESSMENT — PAIN DESCRIPTION - FREQUENCY: FREQUENCY: INTERMITTENT

## 2024-07-30 ASSESSMENT — PAIN DESCRIPTION - LOCATION: LOCATION: LEG

## 2024-07-30 ASSESSMENT — PAIN DESCRIPTION - ORIENTATION: ORIENTATION: RIGHT

## 2024-07-30 ASSESSMENT — PAIN SCALES - WONG BAKER: WONGBAKER_NUMERICALRESPONSE: NO HURT

## 2024-07-30 ASSESSMENT — PAIN - FUNCTIONAL ASSESSMENT: PAIN_FUNCTIONAL_ASSESSMENT: PREVENTS OR INTERFERES SOME ACTIVE ACTIVITIES AND ADLS

## 2024-07-30 ASSESSMENT — PAIN DESCRIPTION - DESCRIPTORS: DESCRIPTORS: SHARP;SHOOTING

## 2024-07-30 ASSESSMENT — PAIN DESCRIPTION - PAIN TYPE: TYPE: SURGICAL PAIN;CHRONIC PAIN

## 2024-07-30 NOTE — PROGRESS NOTES
[facility] Peds/Adult Note                       Date: 2024    Name: Javier Ugalde    MRN: [id number]         : 1965    1305 Patient arrives for Daily IV Rocephin & Daptomycin without acute problems. Please see Epic for complete assessment and education provided.    Vital signs stable throughout and prior to discharge. Patient tolerated procedure well and was discharged without incident.  Patient is aware of next [facility] appointment on 2024.  Appointment card give to the Patient.       Mr. Ugalde's vitals were reviewed prior to and after treatment.   Patient Vitals for the past 12 hrs:   Temp Pulse Resp BP SpO2   24 1415 -- 67 18 100/66 --   24 1305 98.5 °F (36.9 °C) 86 18 125/73 99 %         Medications given:   Medications Administered         0.9 % sodium chloride infusion Admin Date  2024 Action  New Bag Dose  25 mL/hr Rate  25 mL/hr Route  IntraVENous Administered By  Kathryn Chao RN        cefTRIAXone (ROCEPHIN) 2,000 mg in sodium chloride 0.9 % 50 mL IVPB (mini-bag) Admin Date  2024 Action  New Bag Dose  2,000 mg Rate  100 mL/hr Route  IntraVENous Administered By  Kathryn Chao RN        DAPTOmycin (CUBICIN) 900 mg in sodium chloride (PF) 0.9 % 18 mL IV syringe Admin Date  2024 Action  Given Dose  900 mg Rate   Route  IntraVENous Administered By  Kathryn Chao RN        sodium chloride flush 0.9 % injection 5-40 mL Admin Date  2024 Action  Given Dose  10 mL Rate   Route  IntraVENous Administered By  Kathryn Chao RN        sodium chloride flush 0.9 % injection 5-40 mL Admin Date  2024 Action  Given Dose  10 mL Rate   Route  IntraVENous Administered By  Kathryn Chao RN        sodium chloride flush 0.9 % injection 5-40 mL Admin Date  2024 Action  Given Dose  10 mL Rate   Route  IntraVENous Administered By  Kathryn Chao RN          Mr. Ugalde tolerated the infusion, and had no complaints.    Mr. Ugalde was

## 2024-07-31 ENCOUNTER — HOSPITAL ENCOUNTER (OUTPATIENT)
Facility: HOSPITAL | Age: 59
Discharge: HOME | End: 2024-07-31
Attending: FAMILY MEDICINE
Payer: MEDICARE

## 2024-07-31 ENCOUNTER — HOSPITAL ENCOUNTER (OUTPATIENT)
Facility: HOSPITAL | Age: 59
Setting detail: INFUSION SERIES
Discharge: HOME | End: 2024-07-31
Payer: MEDICARE

## 2024-07-31 VITALS
RESPIRATION RATE: 18 BRPM | DIASTOLIC BLOOD PRESSURE: 70 MMHG | SYSTOLIC BLOOD PRESSURE: 112 MMHG | OXYGEN SATURATION: 98 % | HEART RATE: 71 BPM | TEMPERATURE: 98.2 F

## 2024-07-31 VITALS
RESPIRATION RATE: 16 BRPM | HEART RATE: 70 BPM | DIASTOLIC BLOOD PRESSURE: 70 MMHG | SYSTOLIC BLOOD PRESSURE: 122 MMHG | TEMPERATURE: 98.3 F

## 2024-07-31 DIAGNOSIS — L97.212 NON-PRESSURE CHRONIC ULCER OF RIGHT CALF WITH FAT LAYER EXPOSED (HCC): Primary | ICD-10-CM

## 2024-07-31 DIAGNOSIS — M00.9 PYOGENIC ARTHRITIS OF RIGHT KNEE JOINT, DUE TO UNSPECIFIED ORGANISM (HCC): Primary | ICD-10-CM

## 2024-07-31 PROCEDURE — 96375 TX/PRO/DX INJ NEW DRUG ADDON: CPT

## 2024-07-31 PROCEDURE — 99214 OFFICE O/P EST MOD 30 MIN: CPT | Performed by: SURGERY

## 2024-07-31 PROCEDURE — 6360000002 HC RX W HCPCS

## 2024-07-31 PROCEDURE — 99213 OFFICE O/P EST LOW 20 MIN: CPT

## 2024-07-31 PROCEDURE — 2580000003 HC RX 258

## 2024-07-31 PROCEDURE — 96365 THER/PROPH/DIAG IV INF INIT: CPT

## 2024-07-31 RX ORDER — LIDOCAINE HYDROCHLORIDE 40 MG/ML
SOLUTION TOPICAL ONCE
OUTPATIENT
Start: 2024-07-31 | End: 2024-07-31

## 2024-07-31 RX ORDER — LIDOCAINE HYDROCHLORIDE 20 MG/ML
JELLY TOPICAL ONCE
OUTPATIENT
Start: 2024-07-31 | End: 2024-07-31

## 2024-07-31 RX ORDER — CLOBETASOL PROPIONATE 0.5 MG/G
OINTMENT TOPICAL ONCE
OUTPATIENT
Start: 2024-07-31 | End: 2024-07-31

## 2024-07-31 RX ORDER — SODIUM CHLORIDE 9 MG/ML
5-250 INJECTION, SOLUTION INTRAVENOUS PRN
Status: CANCELLED | OUTPATIENT
Start: 2024-08-01

## 2024-07-31 RX ORDER — SODIUM CHLORIDE 0.9 % (FLUSH) 0.9 %
5-40 SYRINGE (ML) INJECTION PRN
Status: CANCELLED | OUTPATIENT
Start: 2024-08-01

## 2024-07-31 RX ORDER — GENTAMICIN SULFATE 1 MG/G
OINTMENT TOPICAL ONCE
OUTPATIENT
Start: 2024-07-31 | End: 2024-07-31

## 2024-07-31 RX ORDER — SODIUM CHLORIDE 0.9 % (FLUSH) 0.9 %
5-40 SYRINGE (ML) INJECTION PRN
Status: DISCONTINUED | OUTPATIENT
Start: 2024-07-31 | End: 2024-08-01 | Stop reason: HOSPADM

## 2024-07-31 RX ORDER — LIDOCAINE 50 MG/G
OINTMENT TOPICAL ONCE
OUTPATIENT
Start: 2024-07-31 | End: 2024-07-31

## 2024-07-31 RX ORDER — SODIUM CHLOR/HYPOCHLOROUS ACID 0.033 %
SOLUTION, IRRIGATION IRRIGATION ONCE
OUTPATIENT
Start: 2024-07-31 | End: 2024-07-31

## 2024-07-31 RX ORDER — IBUPROFEN 200 MG
TABLET ORAL ONCE
OUTPATIENT
Start: 2024-07-31 | End: 2024-07-31

## 2024-07-31 RX ORDER — BETAMETHASONE DIPROPIONATE 0.5 MG/G
CREAM TOPICAL ONCE
OUTPATIENT
Start: 2024-07-31 | End: 2024-07-31

## 2024-07-31 RX ORDER — TRIAMCINOLONE ACETONIDE 1 MG/G
OINTMENT TOPICAL ONCE
OUTPATIENT
Start: 2024-07-31 | End: 2024-07-31

## 2024-07-31 RX ORDER — GINSENG 100 MG
CAPSULE ORAL ONCE
OUTPATIENT
Start: 2024-07-31 | End: 2024-07-31

## 2024-07-31 RX ORDER — HEPARIN 100 UNIT/ML
500 SYRINGE INTRAVENOUS PRN
Status: CANCELLED | OUTPATIENT
Start: 2024-08-01

## 2024-07-31 RX ORDER — LIDOCAINE 40 MG/G
CREAM TOPICAL ONCE
OUTPATIENT
Start: 2024-07-31 | End: 2024-07-31

## 2024-07-31 RX ORDER — SODIUM CHLORIDE 9 MG/ML
5-250 INJECTION, SOLUTION INTRAVENOUS PRN
Status: DISCONTINUED | OUTPATIENT
Start: 2024-07-31 | End: 2024-08-01 | Stop reason: HOSPADM

## 2024-07-31 RX ORDER — BACITRACIN ZINC AND POLYMYXIN B SULFATE 500; 1000 [USP'U]/G; [USP'U]/G
OINTMENT TOPICAL ONCE
OUTPATIENT
Start: 2024-07-31 | End: 2024-07-31

## 2024-07-31 RX ADMIN — SODIUM CHLORIDE, PRESERVATIVE FREE 10 ML: 5 INJECTION INTRAVENOUS at 15:55

## 2024-07-31 RX ADMIN — SODIUM CHLORIDE 30 ML/HR: 9 INJECTION, SOLUTION INTRAVENOUS at 15:09

## 2024-07-31 RX ADMIN — SODIUM CHLORIDE, PRESERVATIVE FREE 10 ML: 5 INJECTION INTRAVENOUS at 16:03

## 2024-07-31 RX ADMIN — CEFTRIAXONE SODIUM 2000 MG: 2 INJECTION, POWDER, FOR SOLUTION INTRAMUSCULAR; INTRAVENOUS at 15:10

## 2024-07-31 RX ADMIN — SODIUM CHLORIDE, PRESERVATIVE FREE 10 ML: 5 INJECTION INTRAVENOUS at 15:07

## 2024-07-31 RX ADMIN — SODIUM CHLORIDE 900 MG: 9 INJECTION INTRAMUSCULAR; INTRAVENOUS; SUBCUTANEOUS at 15:57

## 2024-07-31 RX ADMIN — SODIUM CHLORIDE, PRESERVATIVE FREE 10 ML: 5 INJECTION INTRAVENOUS at 16:04

## 2024-07-31 ASSESSMENT — PAIN DESCRIPTION - LOCATION: LOCATION: LEG

## 2024-07-31 ASSESSMENT — PAIN DESCRIPTION - ORIENTATION: ORIENTATION: RIGHT

## 2024-07-31 ASSESSMENT — PAIN DESCRIPTION - FREQUENCY: FREQUENCY: INTERMITTENT

## 2024-07-31 ASSESSMENT — PAIN - FUNCTIONAL ASSESSMENT: PAIN_FUNCTIONAL_ASSESSMENT: PREVENTS OR INTERFERES SOME ACTIVE ACTIVITIES AND ADLS

## 2024-07-31 ASSESSMENT — PAIN SCALES - GENERAL: PAINLEVEL_OUTOF10: 3

## 2024-07-31 ASSESSMENT — PAIN DESCRIPTION - DESCRIPTORS: DESCRIPTORS: SHARP;SHOOTING

## 2024-07-31 ASSESSMENT — PAIN SCALES - WONG BAKER: WONGBAKER_NUMERICALRESPONSE: NO HURT

## 2024-07-31 ASSESSMENT — PAIN DESCRIPTION - PAIN TYPE: TYPE: SURGICAL PAIN;CHRONIC PAIN

## 2024-07-31 NOTE — PATIENT INSTRUCTIONS
Discharge Instructions for  [facility]  [address] [address]  Phone: [phone] Fax: [phone]    NAME:  Javier Ugalde  YOB: 1965  MEDICAL RECORD NUMBER:  [id number]  DATE:  July 31, 2024    WOUND CARE ORDERS:  Right lower leg wound-Cleanse with baby shampoo. Rinse and pat dry. Apply xeroform to wound bed (give extra). Cover with gauze and ABD. Secure with coban wrap. Apply extra long tubigrip size G to cover knee. Change every other day.    Keep leg elevated.            Activity:  [x] Elevate leg(s) above the level of the heart when sitting.  [x] Avoid prolonged standing in one place.   [x] Do no get dressing/wrap wet.       Dietary:  [] Diet as tolerated      [x] Diabetic Diet            [] Increase Protein: examples (Meat, cheese, eggs, greek yogurt, fish, nuts)          [] Teo Therapeutic Nutrition Powder  [] Other:  [] Dial a Dietician : Call [facility] at [phone] enter code (249) when prompted. M-F 9am-5pm EST.      Return Appointment:  [x] Return Appointment: With Dr. Romi Rosales in 1 Week  [] Nurse Visit  [] Ordered tests:      Electronically signed Oneida Polanco RN on 7/31/2024 at 1:43 PM     Wound Care Center Information: Should you experience any significant changes in your wound(s) or have questions about your wound care, please contact the [facility] at MONDAY - FRIDAY 8:00 am - 4:30.  If you need help with your wound outside these hours and cannot wait until we are again available, contact your PCP or go to the hospital emergency room.     PLEASE NOTE: IF YOU ARE UNABLE TO OBTAIN WOUND SUPPLIES, CONTINUE TO USE THE SUPPLIES YOU HAVE AVAILABLE UNTIL YOU ARE ABLE TO REACH US. IT IS MOST IMPORTANT TO KEEP THE WOUND COVERED AT ALL TIMES.     Physician Signature:_______________________    Date: ___________ Time:  ____________

## 2024-07-31 NOTE — PROGRESS NOTES
[facility] Peds/Adult Note                       Date: 2024    Name: Javier Ugalde    MRN: [id number]         : 1965    1500 Patient arrives for Daily IV Rocephin & Daptomycin without acute problems. Please see Epic for complete assessment and education provided.    Vital signs stable throughout and prior to discharge. Patient tolerated procedure well and was discharged without incident.  Patient is aware of next [facility] appointment on 2024.  Appointment card give to the Patient.       Mr. Ugalde's vitals were reviewed prior to and after treatment.   Patient Vitals for the past 12 hrs:   Temp Pulse Resp BP SpO2   24 1605 -- 71 18 112/70 --   24 1500 98.2 °F (36.8 °C) 70 18 122/73 98 %       Medications given:   Medications Administered         0.9 % sodium chloride infusion Admin Date  2024 Action  New Bag Dose  30 mL/hr Rate  30 mL/hr Route  IntraVENous Administered By  Kathryn Chao RN        cefTRIAXone (ROCEPHIN) 2,000 mg in sodium chloride 0.9 % 50 mL IVPB (mini-bag) Admin Date  2024 Action  New Bag Dose  2,000 mg Rate  100 mL/hr Route  IntraVENous Administered By  Kathryn Chao RN        DAPTOmycin (CUBICIN) 900 mg in sodium chloride (PF) 0.9 % 18 mL IV syringe Admin Date  2024 Action  Given Dose  900 mg Rate   Route  IntraVENous Administered By  Kathryn Chao RN        sodium chloride flush 0.9 % injection 5-40 mL Admin Date  2024 Action  Given Dose  10 mL Rate   Route  IntraVENous Administered By  Kathryn Chao RN        sodium chloride flush 0.9 % injection 5-40 mL Admin Date  2024 Action  Given Dose  10 mL Rate   Route  IntraVENous Administered By  Kathryn Chao RN        sodium chloride flush 0.9 % injection 5-40 mL Admin Date  2024 Action  Given Dose  10 mL Rate   Route  IntraVENous Administered By  Kathryn Chao RN        sodium chloride flush 0.9 % injection 5-40 mL Admin Date  2024 Action  Given

## 2024-07-31 NOTE — FLOWSHEET NOTE
07/31/24 1331   Anesthetic   Anesthetic 4% Lidocaine Cream   RLE Neurovascular Assessment   Capillary Refill Less than/Equal to 3 seconds   Color Red;Nathan   Temperature Hot   Wound 06/19/24 Leg Right;Lateral Cluster   Date First Assessed/Time First Assessed: 06/19/24 1428   Present on Original Admission: Yes  Wound Approximate Age at First Assessment (Weeks): 4 weeks  Primary Wound Type: Traumatic  Location: Leg  Wound Location Orientation: Right;Lateral  Wound Tear...   Wound Image    Dressing Status Intact   Wound Cleansed Cleansed with saline   Offloading for Diabetic Foot Ulcers Offloading not ordered   Wound Length (cm) 5.2 cm   Wound Width (cm) 10 cm   Wound Depth (cm) 0.1 cm   Wound Surface Area (cm^2) 52 cm^2   Change in Wound Size % (l*w) 45.7   Wound Volume (cm^3) 5.2 cm^3   Wound Healing % 86   Wound Assessment [place]/red;Slough   Drainage Amount Moderate (25-50%)   Drainage Description Serosanguinous;Green   Odor None   Emely-wound Assessment Hyperpigmented   Margins Defined edges   Wound Thickness Description not for Pressure Injury Full thickness   Wound 07/03/24 Thigh Distal;Right;Lateral cluster   Date First Assessed/Time First Assessed: 07/03/24 1028   Present on Original Admission: No  Wound Approximate Age at First Assessment (Weeks): 1 weeks  Location: Thigh  Wound Location Orientation: Distal;Right;Lateral  Wound Description (Comments): cl...   Wound Image    Dressing Status Intact   Wound Cleansed Cleansed with saline   Offloading for Diabetic Foot Ulcers Offloading not ordered   Wound Length (cm) 0.8 cm   Wound Width (cm) 1.4 cm   Wound Depth (cm) 0.1 cm   Wound Surface Area (cm^2) 1.12 cm^2   Change in Wound Size % (l*w) 34.5   Wound Volume (cm^3) 0.112 cm^3   Wound Healing % 35   Wound Assessment [place]/red;Slough   Drainage Amount Moderate (25-50%)   Drainage Description Serosanguinous;Green   Odor None   Emely-wound Assessment Hyperpigmented;Induration   Margins Defined edges   Wound

## 2024-07-31 NOTE — PROGRESS NOTES
WOUND CARE PROGRESS       Subjective:     Patient is a 58-year-old male with multiple medical problems as outlined below, obesity, diabetes, coronary disease, cardiomyopathy, AICD, chronic renal insufficiency, with a chronic right calf open wound, spent a couple of weeks in the hospital recently on IV antibiotics.  Multiple right leg wound cultures while hospitalized showed no growth    Follow-up July 31, 2024: Lengthy review of hospital notes and records.  During hospitalization patient was seen by orthopedics and high concern for pyogenic arthritis of right knee replacement hardware, physician below, wanted to wait until he was off antibiotics IV and then reevaluate in 2 weeks to see if knee replacement needs removal.--Orthopedist Timi Zarate MD     Patient has been home for 2 weeks and has been using a variety of agents he had at home previously silver alginate and other pads for dressing changes.  Patient reports there is not much drainage through the dressings right leg.  Patient on IV antibiotics from infusion center and infectious disease.    Review of Systems   Constitutional:         See HPI   All other systems reviewed and are negative.      Objective:     There were no vitals taken for this visit.    No data recorded.      Physical Exam  Vitals and nursing note reviewed. Exam conducted with a chaperone present.   Constitutional:       General: He is not in acute distress.     Appearance: Normal appearance. He is obese. He is not ill-appearing.   HENT:      Head: Normocephalic and atraumatic.      Nose: Nose normal.   Cardiovascular:      Rate and Rhythm: Normal rate.   Pulmonary:      Effort: Pulmonary effort is normal.   Neurological:      General: No focal deficit present.      Mental Status: He is alert and oriented to person, place, and time.   Psychiatric:         Mood and Affect: Mood normal.         Behavior: Behavior normal.         Thought Content: Thought content normal.

## 2024-08-01 ENCOUNTER — TELEPHONE (OUTPATIENT)
Age: 59
End: 2024-08-01

## 2024-08-01 ENCOUNTER — HOSPITAL ENCOUNTER (OUTPATIENT)
Facility: HOSPITAL | Age: 59
Setting detail: INFUSION SERIES
Discharge: HOME | End: 2024-08-01
Payer: MEDICARE

## 2024-08-01 ENCOUNTER — OFFICE VISIT (OUTPATIENT)
Age: 59
End: 2024-08-01
Payer: MEDICARE

## 2024-08-01 VITALS
OXYGEN SATURATION: 99 % | HEART RATE: 83 BPM | RESPIRATION RATE: 16 BRPM | WEIGHT: 315 LBS | TEMPERATURE: 96 F | BODY MASS INDEX: 38.3 KG/M2 | DIASTOLIC BLOOD PRESSURE: 70 MMHG | SYSTOLIC BLOOD PRESSURE: 130 MMHG

## 2024-08-01 VITALS
OXYGEN SATURATION: 95 % | SYSTOLIC BLOOD PRESSURE: 105 MMHG | TEMPERATURE: 97.8 F | RESPIRATION RATE: 18 BRPM | HEART RATE: 66 BPM | DIASTOLIC BLOOD PRESSURE: 58 MMHG

## 2024-08-01 DIAGNOSIS — M00.9 PYOGENIC ARTHRITIS OF RIGHT KNEE JOINT, DUE TO UNSPECIFIED ORGANISM (HCC): Primary | ICD-10-CM

## 2024-08-01 PROCEDURE — 1111F DSCHRG MED/CURRENT MED MERGE: CPT | Performed by: INTERNAL MEDICINE

## 2024-08-01 PROCEDURE — 96365 THER/PROPH/DIAG IV INF INIT: CPT

## 2024-08-01 PROCEDURE — G8417 CALC BMI ABV UP PARAM F/U: HCPCS | Performed by: INTERNAL MEDICINE

## 2024-08-01 PROCEDURE — 2580000003 HC RX 258

## 2024-08-01 PROCEDURE — G8427 DOCREV CUR MEDS BY ELIG CLIN: HCPCS | Performed by: INTERNAL MEDICINE

## 2024-08-01 PROCEDURE — 1036F TOBACCO NON-USER: CPT | Performed by: INTERNAL MEDICINE

## 2024-08-01 PROCEDURE — 3075F SYST BP GE 130 - 139MM HG: CPT | Performed by: INTERNAL MEDICINE

## 2024-08-01 PROCEDURE — 96375 TX/PRO/DX INJ NEW DRUG ADDON: CPT

## 2024-08-01 PROCEDURE — 6360000002 HC RX W HCPCS

## 2024-08-01 PROCEDURE — 3078F DIAST BP <80 MM HG: CPT | Performed by: INTERNAL MEDICINE

## 2024-08-01 PROCEDURE — 99214 OFFICE O/P EST MOD 30 MIN: CPT | Performed by: INTERNAL MEDICINE

## 2024-08-01 PROCEDURE — 3017F COLORECTAL CA SCREEN DOC REV: CPT | Performed by: INTERNAL MEDICINE

## 2024-08-01 RX ORDER — SODIUM CHLORIDE 9 MG/ML
5-250 INJECTION, SOLUTION INTRAVENOUS PRN
Status: CANCELLED | OUTPATIENT
Start: 2024-08-02

## 2024-08-01 RX ORDER — HEPARIN 100 UNIT/ML
500 SYRINGE INTRAVENOUS PRN
Status: DISCONTINUED | OUTPATIENT
Start: 2024-08-01 | End: 2024-08-02 | Stop reason: HOSPADM

## 2024-08-01 RX ORDER — SODIUM CHLORIDE 0.9 % (FLUSH) 0.9 %
5-40 SYRINGE (ML) INJECTION PRN
Status: CANCELLED | OUTPATIENT
Start: 2024-08-02

## 2024-08-01 RX ORDER — SODIUM CHLORIDE 9 MG/ML
5-250 INJECTION, SOLUTION INTRAVENOUS PRN
Status: DISCONTINUED | OUTPATIENT
Start: 2024-08-01 | End: 2024-08-02 | Stop reason: HOSPADM

## 2024-08-01 RX ORDER — SODIUM CHLORIDE 0.9 % (FLUSH) 0.9 %
5-40 SYRINGE (ML) INJECTION PRN
Status: DISCONTINUED | OUTPATIENT
Start: 2024-08-01 | End: 2024-08-02 | Stop reason: HOSPADM

## 2024-08-01 RX ORDER — HEPARIN 100 UNIT/ML
500 SYRINGE INTRAVENOUS PRN
Status: CANCELLED | OUTPATIENT
Start: 2024-08-02

## 2024-08-01 RX ADMIN — CEFTRIAXONE SODIUM 2000 MG: 2 INJECTION, POWDER, FOR SOLUTION INTRAMUSCULAR; INTRAVENOUS at 09:09

## 2024-08-01 RX ADMIN — SODIUM CHLORIDE, PRESERVATIVE FREE 20 ML: 5 INJECTION INTRAVENOUS at 10:29

## 2024-08-01 RX ADMIN — SODIUM CHLORIDE, PRESERVATIVE FREE 10 ML: 5 INJECTION INTRAVENOUS at 09:05

## 2024-08-01 RX ADMIN — SODIUM CHLORIDE 900 MG: 9 INJECTION INTRAMUSCULAR; INTRAVENOUS; SUBCUTANEOUS at 10:12

## 2024-08-01 RX ADMIN — SODIUM CHLORIDE 25 ML/HR: 9 INJECTION, SOLUTION INTRAVENOUS at 09:08

## 2024-08-01 ASSESSMENT — PATIENT HEALTH QUESTIONNAIRE - PHQ9
SUM OF ALL RESPONSES TO PHQ QUESTIONS 1-9: 0
2. FEELING DOWN, DEPRESSED OR HOPELESS: NOT AT ALL
SUM OF ALL RESPONSES TO PHQ QUESTIONS 1-9: 0
SUM OF ALL RESPONSES TO PHQ QUESTIONS 1-9: 0
SUM OF ALL RESPONSES TO PHQ9 QUESTIONS 1 & 2: 0
1. LITTLE INTEREST OR PLEASURE IN DOING THINGS: NOT AT ALL
SUM OF ALL RESPONSES TO PHQ QUESTIONS 1-9: 0

## 2024-08-01 ASSESSMENT — PAIN DESCRIPTION - DESCRIPTORS: DESCRIPTORS: PRESSURE;THROBBING

## 2024-08-01 ASSESSMENT — PAIN DESCRIPTION - LOCATION: LOCATION: LEG

## 2024-08-01 ASSESSMENT — PAIN DESCRIPTION - FREQUENCY: FREQUENCY: INTERMITTENT

## 2024-08-01 ASSESSMENT — PAIN DESCRIPTION - ORIENTATION: ORIENTATION: RIGHT

## 2024-08-01 ASSESSMENT — PAIN SCALES - GENERAL: PAINLEVEL_OUTOF10: 4

## 2024-08-01 NOTE — TELEPHONE ENCOUNTER
Spoke with Belinda from [facility] that they received orders for patient to receive home health. States that called patient to start services but stated that he already had home health with another company and patient did not know the name of the company . Belinda stated that they would have no problems providing patient with home health services but, a new order would need to be placed. Attempted to call patient to advise no answer.

## 2024-08-01 NOTE — PROGRESS NOTES
[facility] Peds/Adult [facility]                       Date: 2024    Name: Javier Ugalde    MRN: [id number]         : 1965    0900 Patient arrives for ceftriaxone and dapto without acute problems. Please see Epic for complete assessment and education provided.        Vital signs stable throughout and prior to discharge. Patient tolerated procedure well and was discharged without incident.  Javier is aware of next \[facility]\"" appointment on 24. Appointment card given.      Mr. Ugalde's vitals were reviewed prior to and after treatment.   Patient Vitals for the past 12 hrs:   Temp Pulse Resp BP SpO2   24 1019 -- 66 18 (!) 105/58 --   24 0900 97.8 °F (36.6 °C) 76 20 127/66 95 %         Lab results were obtained and reviewed.  No results found for this or any previous visit (from the past 12 hour(s)).    Medications given:   Medications Administered         0.9 % sodium chloride infusion Admin Date  2024 Action  New Bag Dose  25 mL/hr Rate  25 mL/hr Route  IntraVENous Administered By  Marybel Neil RN        cefTRIAXone (ROCEPHIN) 2,000 mg in sodium chloride 0.9 % 50 mL IVPB (mini-bag) Admin Date  2024 Action  New Bag Dose  2,000 mg Rate  100 mL/hr Route  IntraVENous Administered By  Marybel Neil RN        DAPTOmycin (CUBICIN) 900 mg in sodium chloride (PF) 0.9 % 18 mL IV syringe Admin Date  2024 Action  Given Dose  900 mg Rate   Route  IntraVENous Administered By  Rukhsana Diaz RN        sodium chloride flush 0.9 % injection 5-40 mL Admin Date  2024 Action  Given Dose  10 mL Rate   Route  IntraVENous Administered By  Marybel Neil RN        sodium chloride flush 0.9 % injection 5-40 mL Admin Date  2024 Action  Given Dose  20 mL Rate   Route  IntraVENous Administered By  Rukhsana Diaz RN          PICC dressing changed.    Mr. Ugalde tolerated the infusion, and had no complaints.    Mr. Ugalde was [facility] in stable

## 2024-08-01 NOTE — PROGRESS NOTES
Chief Complaint   Patient presents with    Follow-Up from Hospital     1. Have you been to the ER, urgent care clinic since your last visit?  Hospitalized since your last visit?No    2. Have you seen or consulted any other health care providers outside of the [facility] since your last visit?  Include any pap smears or colon screening. Yes PCP

## 2024-08-01 NOTE — PROGRESS NOTES
[facility] Progress Note  Orlando Carnes MD   Phone [phone]  Fax [phone]      8/1/2024      Assessment & Plan:     58 y.o. 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    Finish course of Daptomycin and Ceftriaxone on 8/6/24, inclusive.    Afterwards, maintain Basilio catheter for 3 weeks and observe off antibiotics.  Acknowledged risk of infection and clot from maintaining Basilio catheter.    Follow up with Dr. Zarate as it appears that infection eradication will be quite difficult to achieve with retention of prosthesis.            Subjective:     59 y/o male seen for follow up visit.  Admitted to [facility] from 7/3-7/18/24 for right knee pain, swelling, despite PO antibiotic therapy and referred for admission.  Noted to have MRI evidence of [id number] amorphous process extending from distal femur to prosthesis with loculated [id number] collection in soft tissues and underwent 7/8/24 aspiration of knee collection.  Culture negative.  Treated with Vanco/Cefepime for prolonged duration inpatient and d/w orthopedics.  Notably right TKA in 6/2021.  Plan made to treat with antibiotic course and with follow up with Dr. Zarate outpatient.    Tolerating Daptomycin and Ceftriaxone course well no pruritus no rash no diarrhea nor issues with Basilio catheter.  C/o lack of home health for wound care.  Has been seeing wound care for follow up.    Note: home health company contacted - per their records, patient contacted and attested to already having a home care company set up and likely unintentionally cancelled home health.    Objective:     Vitals: /70 (Site: Left Upper Arm, Position: Sitting, Cuff Size: Large Adult)   Pulse 83   Temp (!) 96 °F (35.6 °C) (Oral)   Resp 16   Wt (!) 146.5 kg (323 lb)   SpO2 99%   BMI 38.30 kg/m²       Physical Examination:   General:  Alert, cooperative, no distress   Head:

## 2024-08-02 ENCOUNTER — HOSPITAL ENCOUNTER (OUTPATIENT)
Facility: HOSPITAL | Age: 59
Setting detail: INFUSION SERIES
Discharge: HOME | End: 2024-08-02
Payer: MEDICARE

## 2024-08-02 VITALS
SYSTOLIC BLOOD PRESSURE: 108 MMHG | OXYGEN SATURATION: 98 % | DIASTOLIC BLOOD PRESSURE: 63 MMHG | TEMPERATURE: 98 F | HEART RATE: 63 BPM | RESPIRATION RATE: 16 BRPM

## 2024-08-02 DIAGNOSIS — M00.9 PYOGENIC ARTHRITIS OF RIGHT KNEE JOINT, DUE TO UNSPECIFIED ORGANISM (HCC): Primary | ICD-10-CM

## 2024-08-02 PROCEDURE — 6360000002 HC RX W HCPCS

## 2024-08-02 PROCEDURE — 96375 TX/PRO/DX INJ NEW DRUG ADDON: CPT

## 2024-08-02 PROCEDURE — 2580000003 HC RX 258

## 2024-08-02 PROCEDURE — 96365 THER/PROPH/DIAG IV INF INIT: CPT

## 2024-08-02 RX ORDER — SODIUM CHLORIDE 9 MG/ML
5-250 INJECTION, SOLUTION INTRAVENOUS PRN
Status: DISCONTINUED | OUTPATIENT
Start: 2024-08-02 | End: 2024-08-03 | Stop reason: HOSPADM

## 2024-08-02 RX ORDER — SODIUM CHLORIDE 9 MG/ML
5-250 INJECTION, SOLUTION INTRAVENOUS PRN
Status: CANCELLED | OUTPATIENT
Start: 2024-08-03

## 2024-08-02 RX ORDER — HEPARIN 100 UNIT/ML
500 SYRINGE INTRAVENOUS PRN
Status: CANCELLED | OUTPATIENT
Start: 2024-08-03

## 2024-08-02 RX ORDER — SODIUM CHLORIDE 0.9 % (FLUSH) 0.9 %
5-40 SYRINGE (ML) INJECTION PRN
Status: CANCELLED | OUTPATIENT
Start: 2024-08-03

## 2024-08-02 RX ADMIN — SODIUM CHLORIDE 25 ML/HR: 900 INJECTION INTRAVENOUS at 15:06

## 2024-08-02 RX ADMIN — CEFTRIAXONE SODIUM 2000 MG: 2 INJECTION, POWDER, FOR SOLUTION INTRAMUSCULAR; INTRAVENOUS at 15:07

## 2024-08-02 RX ADMIN — SODIUM CHLORIDE 900 MG: 9 INJECTION INTRAMUSCULAR; INTRAVENOUS; SUBCUTANEOUS at 16:38

## 2024-08-02 ASSESSMENT — PAIN SCALES - GENERAL: PAINLEVEL_OUTOF10: 5

## 2024-08-02 ASSESSMENT — PAIN DESCRIPTION - LOCATION: LOCATION: LEG

## 2024-08-02 NOTE — PROGRESS NOTES
[facility] Peds/Adult Note                       Date: 2024    Name: Javier Ugalde    MRN: [id number]         : 1965    1530 Patient arrives for Ceftriaxone & Daptomycin without acute problems. Please see Epic for complete assessment and education provided.    Vital signs stable throughout and prior to discharge. Patient tolerated procedure well and was discharged without incident. Patient is aware of next [facility] appointment on 8/3/2024. Appointment card give to the Patient.      Mr. Ugalde's vitals were reviewed prior to and after treatment.   Patient Vitals for the past 12 hrs:   Temp Pulse Resp BP SpO2   24 1643 -- 63 16 108/63 --   24 1502 98 °F (36.7 °C) 74 16 (!) 141/72 98 %         Medications given:   Medications Administered         0.9 % sodium chloride infusion Admin Date  2024 Action  New Bag Dose  25 mL/hr Rate  25 mL/hr Route  IntraVENous Administered By  Erika Langston RN        cefTRIAXone (ROCEPHIN) 2,000 mg in sodium chloride 0.9 % 50 mL IVPB (mini-bag) Admin Date  2024 Action  New Bag Dose  2,000 mg Rate  100 mL/hr Route  IntraVENous Administered By  Erika Langston RN        DAPTOmycin (CUBICIN) 900 mg in sodium chloride (PF) 0.9 % 18 mL IV syringe Admin Date  2024 Action  Given Dose  900 mg Rate   Route  IntraVENous Administered By  Erika Langston RN              Mr. Ugalde tolerated the infusion, and had no complaints.    Mr. Ugalde was discharged from [facility] in stable condition. Discharge Instructions provided to patient, patient verbalized understanding but denied the request for a copy of after visit summary.     Future Appointments   Date Time Provider Department Center   8/3/2024  9:30 AM EDISON FASTTRACK 3 [facility]   2024  9:30 AM EDISON FASTTRACK 1 [facility]   2024  1:00 PM PEDS FASTTRACK 1 [facility]   2024  3:30 PM EDISON FASTTRACK 1 [facility]   2024 10:00 AM Romi Rosales MD [facility]   2024   1:50 PM Timi Zarate MD [facility]       JENNIFER CONNELLY RN  August 2, 2024  4:53 PM

## 2024-08-03 ENCOUNTER — HOSPITAL ENCOUNTER (OUTPATIENT)
Facility: HOSPITAL | Age: 59
Setting detail: INFUSION SERIES
Discharge: HOME | End: 2024-08-03
Payer: MEDICARE

## 2024-08-03 VITALS
TEMPERATURE: 97.7 F | OXYGEN SATURATION: 97 % | SYSTOLIC BLOOD PRESSURE: 109 MMHG | DIASTOLIC BLOOD PRESSURE: 52 MMHG | HEART RATE: 74 BPM | RESPIRATION RATE: 16 BRPM

## 2024-08-03 DIAGNOSIS — M00.9 PYOGENIC ARTHRITIS OF RIGHT KNEE JOINT, DUE TO UNSPECIFIED ORGANISM (HCC): Primary | ICD-10-CM

## 2024-08-03 PROCEDURE — 96375 TX/PRO/DX INJ NEW DRUG ADDON: CPT

## 2024-08-03 PROCEDURE — 6360000002 HC RX W HCPCS

## 2024-08-03 PROCEDURE — 2580000003 HC RX 258

## 2024-08-03 PROCEDURE — 96365 THER/PROPH/DIAG IV INF INIT: CPT

## 2024-08-03 RX ORDER — SODIUM CHLORIDE 0.9 % (FLUSH) 0.9 %
5-40 SYRINGE (ML) INJECTION PRN
Status: CANCELLED | OUTPATIENT
Start: 2024-08-04

## 2024-08-03 RX ORDER — SODIUM CHLORIDE 9 MG/ML
5-250 INJECTION, SOLUTION INTRAVENOUS PRN
Status: CANCELLED | OUTPATIENT
Start: 2024-08-04

## 2024-08-03 RX ORDER — HEPARIN 100 UNIT/ML
500 SYRINGE INTRAVENOUS PRN
Status: CANCELLED | OUTPATIENT
Start: 2024-08-04

## 2024-08-03 RX ADMIN — SODIUM CHLORIDE 900 MG: 9 INJECTION INTRAMUSCULAR; INTRAVENOUS; SUBCUTANEOUS at 09:41

## 2024-08-03 RX ADMIN — CEFTRIAXONE SODIUM 2000 MG: 2 INJECTION, POWDER, FOR SOLUTION INTRAMUSCULAR; INTRAVENOUS at 09:45

## 2024-08-03 ASSESSMENT — PAIN DESCRIPTION - ORIENTATION: ORIENTATION: RIGHT

## 2024-08-03 ASSESSMENT — PAIN SCALES - GENERAL: PAINLEVEL_OUTOF10: 7

## 2024-08-03 ASSESSMENT — PAIN DESCRIPTION - LOCATION: LOCATION: LEG

## 2024-08-03 NOTE — PROGRESS NOTES
Outpatient Infusion Center Progress Note        Date: 24    Name: Javier Ugalde    MRN: [id number]         : 1965    MD: Victoria Campoverde, APRN - NP       Mr. Ugalde admitted to [facility] for Daily Rocephin and Daptomycin ambulatory in stable condition. Assessment completed. No new concerns voiced. CVC flushed and blood return obtained without difficulty.  No labs ordered for this visit.      ** Patient has received this medication in the past. Patient reports no previous reactions to the medication(s).       Vitals:    24 0930 24 1015   BP: 122/63 (!) 109/52   Pulse: 77 74   Resp: 16    Temp: 97.7 °F (36.5 °C)    TempSrc: Temporal    SpO2: 97%            Medications:  MEDICATIONS GIVEN:  Medications Administered         cefTRIAXone (ROCEPHIN) 2,000 mg in sodium chloride 0.9 % 50 mL IVPB (mini-bag) Admin Date  2024 Action  New Bag Dose  2,000 mg Rate  100 mL/hr Route  IntraVENous Administered By  Ro Schaefer RN        DAPTOmycin (CUBICIN) 900 mg in sodium chloride (PF) 0.9 % 18 mL IV syringe Admin Date  2024 Action  Given Dose  900 mg Rate   Route  IntraVENous Administered By  Ro Schaefer RN                Post-Infusion Vitals:  Vitals:    24 1015   BP: (!) 109/52   Pulse: 74   Resp:    Temp:    SpO2:              Pt tolerated treatment well. CVC maintained positive blood return throughout treatment, flushed with positive blood return at conclusion and capped per protocol. D/c home ambulatory in no distress. Patient is aware of next appointment on 24 @ 0930 at the [facility] location.        Future Appointments:  Future Appointments   Date Time Provider Department Center   2024  9:30 AM [facility]ISON FASTTRACK 1 [facility]   2024  1:00 PM PEDS FASTTRACK 1 [facility]   2024  3:30 PM [facility] FASTTRACK 1 [facility]   2024 10:00 AM Romi Rosales MD [facility]   2024  1:50 PM Timi Zarate MD [facility]

## 2024-08-04 ENCOUNTER — HOSPITAL ENCOUNTER (OUTPATIENT)
Facility: HOSPITAL | Age: 59
Setting detail: INFUSION SERIES
Discharge: HOME | End: 2024-08-04
Payer: MEDICARE

## 2024-08-04 VITALS
SYSTOLIC BLOOD PRESSURE: 117 MMHG | HEART RATE: 88 BPM | DIASTOLIC BLOOD PRESSURE: 62 MMHG | OXYGEN SATURATION: 98 % | RESPIRATION RATE: 18 BRPM | TEMPERATURE: 96.8 F

## 2024-08-04 DIAGNOSIS — M00.9 PYOGENIC ARTHRITIS OF RIGHT KNEE JOINT, DUE TO UNSPECIFIED ORGANISM (HCC): Primary | ICD-10-CM

## 2024-08-04 PROCEDURE — 96375 TX/PRO/DX INJ NEW DRUG ADDON: CPT

## 2024-08-04 PROCEDURE — 96365 THER/PROPH/DIAG IV INF INIT: CPT

## 2024-08-04 PROCEDURE — 6360000002 HC RX W HCPCS

## 2024-08-04 PROCEDURE — 2580000003 HC RX 258

## 2024-08-04 RX ORDER — SODIUM CHLORIDE 0.9 % (FLUSH) 0.9 %
5-40 SYRINGE (ML) INJECTION PRN
Status: CANCELLED | OUTPATIENT
Start: 2024-08-05

## 2024-08-04 RX ORDER — SODIUM CHLORIDE 9 MG/ML
5-250 INJECTION, SOLUTION INTRAVENOUS PRN
Status: CANCELLED | OUTPATIENT
Start: 2024-08-05

## 2024-08-04 RX ORDER — HEPARIN 100 UNIT/ML
500 SYRINGE INTRAVENOUS PRN
Status: CANCELLED | OUTPATIENT
Start: 2024-08-05

## 2024-08-04 RX ADMIN — CEFTRIAXONE SODIUM 2000 MG: 2 INJECTION, POWDER, FOR SOLUTION INTRAMUSCULAR; INTRAVENOUS at 09:43

## 2024-08-04 RX ADMIN — SODIUM CHLORIDE 900 MG: 9 INJECTION INTRAMUSCULAR; INTRAVENOUS; SUBCUTANEOUS at 09:35

## 2024-08-04 ASSESSMENT — PAIN DESCRIPTION - LOCATION: LOCATION: LEG

## 2024-08-04 ASSESSMENT — PAIN SCALES - GENERAL: PAINLEVEL_OUTOF10: 4

## 2024-08-04 ASSESSMENT — PAIN DESCRIPTION - ORIENTATION: ORIENTATION: RIGHT

## 2024-08-04 NOTE — PROGRESS NOTES
[facility] Progress Note    Date: 2024    Name: Javier Ugalde    MRN: [id number]         : 1965    Mr. Ugalde Arrived ambulatory and in no distress for Daily Rocephin+Daptomycin Infusion.  Assessment was completed, no acute issues at this time, no new complaints voiced.  Right chest GILLES lumens flushed and secured, + blood return noted.    Mr. Ugalde's vitals were reviewed.  Vitals:    24 0930   BP: 117/62   Pulse: 88   Resp: 18   Temp: 96.8 °F (36 °C)   SpO2: 98%       Medications:  Medications Administered         cefTRIAXone (ROCEPHIN) 2,000 mg in sodium chloride 0.9 % 50 mL IVPB (mini-bag) Admin Date  2024 Action  New Bag Dose  2,000 mg Rate  100 mL/hr Route  IntraVENous Administered By  Denise Monsalve RN        DAPTOmycin (CUBICIN) 900 mg in sodium chloride (PF) 0.9 % 18 mL IV syringe Admin Date  2024 Action  Given Dose  900 mg Rate   Route  IntraVENous Administered By  Denise Monsalve RN            Mr. Ugalde tolerated treatment well and was discharged from Outpatient Infusion Center in stable condition. Patient is aware of future appointments.    Future Appointments   Date Time Provider Department Center   2024  1:00 PM PEDS FASTTRACK 1 [facility]   2024  3:30 PM EDISON FASTTRACK 1 [facility]   2024 10:00 AM [facility]   2024  1:50 PM Timi Zarate MD [facility]       Denise Monsalve RN  2024

## 2024-08-05 ENCOUNTER — HOSPITAL ENCOUNTER (OUTPATIENT)
Facility: HOSPITAL | Age: 59
Setting detail: INFUSION SERIES
Discharge: HOME | End: 2024-08-05
Payer: MEDICARE

## 2024-08-05 VITALS
RESPIRATION RATE: 22 BRPM | SYSTOLIC BLOOD PRESSURE: 160 MMHG | OXYGEN SATURATION: 97 % | DIASTOLIC BLOOD PRESSURE: 88 MMHG | TEMPERATURE: 97.5 F | HEART RATE: 90 BPM

## 2024-08-05 DIAGNOSIS — M00.9 PYOGENIC ARTHRITIS OF RIGHT KNEE JOINT, DUE TO UNSPECIFIED ORGANISM (HCC): Primary | ICD-10-CM

## 2024-08-05 LAB
ALBUMIN SERPL-MCNC: 3.5 G/DL (ref 3.5–5)
ALBUMIN/GLOB SERPL: 0.7 (ref 1.1–2.2)
ALP SERPL-CCNC: 82 U/L (ref 45–117)
ALT SERPL-CCNC: 18 U/L (ref 12–78)
AST SERPL-CCNC: 17 U/L (ref 15–37)
BASOPHILS # BLD: 0.1 K/UL (ref 0–0.1)
BASOPHILS NFR BLD: 1 % (ref 0–1)
BILIRUB DIRECT SERPL-MCNC: <0.1 MG/DL (ref 0–0.2)
BILIRUB SERPL-MCNC: 0.6 MG/DL (ref 0.2–1)
BUN SERPL-MCNC: 25 MG/DL (ref 6–20)
CK SERPL-CCNC: 122 U/L (ref 39–308)
CREAT SERPL-MCNC: 1.87 MG/DL (ref 0.7–1.3)
CRP SERPL-MCNC: 5.53 MG/DL (ref 0–0.3)
DIFFERENTIAL METHOD BLD: ABNORMAL
EOSINOPHIL # BLD: 0.5 K/UL (ref 0–0.4)
EOSINOPHIL NFR BLD: 6 % (ref 0–7)
ERYTHROCYTE [DISTWIDTH] IN BLOOD BY AUTOMATED COUNT: 14.8 % (ref 11.5–14.5)
GLOBULIN SER CALC-MCNC: 4.8 G/DL (ref 2–4)
HCT VFR BLD AUTO: 28 % (ref 36.6–50.3)
HGB BLD-MCNC: 9 G/DL (ref 12.1–17)
IMM GRANULOCYTES # BLD AUTO: 0 K/UL (ref 0–0.04)
IMM GRANULOCYTES NFR BLD AUTO: 0 % (ref 0–0.5)
LYMPHOCYTES # BLD: 1.6 K/UL (ref 0.8–3.5)
LYMPHOCYTES NFR BLD: 21 % (ref 12–49)
MCH RBC QN AUTO: 28.2 PG (ref 26–34)
MCHC RBC AUTO-ENTMCNC: 32.1 G/DL (ref 30–36.5)
MCV RBC AUTO: 87.8 FL (ref 80–99)
MONOCYTES # BLD: 0.7 K/UL (ref 0–1)
MONOCYTES NFR BLD: 9 % (ref 5–13)
NEUTS SEG # BLD: 5 K/UL (ref 1.8–8)
NEUTS SEG NFR BLD: 63 % (ref 32–75)
NRBC # BLD: 0 K/UL (ref 0–0.01)
NRBC BLD-RTO: 0 PER 100 WBC
PLATELET # BLD AUTO: 363 K/UL (ref 150–400)
PMV BLD AUTO: 9.8 FL (ref 8.9–12.9)
PROT SERPL-MCNC: 8.3 G/DL (ref 6.4–8.2)
RBC # BLD AUTO: 3.19 M/UL (ref 4.1–5.7)
WBC # BLD AUTO: 7.9 K/UL (ref 4.1–11.1)

## 2024-08-05 PROCEDURE — 96375 TX/PRO/DX INJ NEW DRUG ADDON: CPT

## 2024-08-05 PROCEDURE — 2580000003 HC RX 258

## 2024-08-05 PROCEDURE — 82565 ASSAY OF CREATININE: CPT

## 2024-08-05 PROCEDURE — 82550 ASSAY OF CK (CPK): CPT

## 2024-08-05 PROCEDURE — 86140 C-REACTIVE PROTEIN: CPT

## 2024-08-05 PROCEDURE — 84520 ASSAY OF UREA NITROGEN: CPT

## 2024-08-05 PROCEDURE — 6360000002 HC RX W HCPCS

## 2024-08-05 PROCEDURE — 96365 THER/PROPH/DIAG IV INF INIT: CPT

## 2024-08-05 PROCEDURE — 36415 COLL VENOUS BLD VENIPUNCTURE: CPT

## 2024-08-05 PROCEDURE — 85025 COMPLETE CBC W/AUTO DIFF WBC: CPT

## 2024-08-05 PROCEDURE — 80076 HEPATIC FUNCTION PANEL: CPT

## 2024-08-05 RX ORDER — SODIUM CHLORIDE 0.9 % (FLUSH) 0.9 %
5-40 SYRINGE (ML) INJECTION PRN
Status: DISCONTINUED | OUTPATIENT
Start: 2024-08-05 | End: 2024-08-06 | Stop reason: HOSPADM

## 2024-08-05 RX ORDER — SODIUM CHLORIDE 9 MG/ML
5-250 INJECTION, SOLUTION INTRAVENOUS PRN
Status: DISCONTINUED | OUTPATIENT
Start: 2024-08-05 | End: 2024-08-06 | Stop reason: HOSPADM

## 2024-08-05 RX ORDER — HEPARIN 100 UNIT/ML
500 SYRINGE INTRAVENOUS PRN
Status: CANCELLED | OUTPATIENT
Start: 2024-08-06

## 2024-08-05 RX ORDER — SODIUM CHLORIDE 0.9 % (FLUSH) 0.9 %
5-40 SYRINGE (ML) INJECTION PRN
Status: CANCELLED | OUTPATIENT
Start: 2024-08-06

## 2024-08-05 RX ORDER — SODIUM CHLORIDE 9 MG/ML
5-250 INJECTION, SOLUTION INTRAVENOUS PRN
Status: CANCELLED | OUTPATIENT
Start: 2024-08-06

## 2024-08-05 RX ADMIN — SODIUM CHLORIDE 900 MG: 9 INJECTION INTRAMUSCULAR; INTRAVENOUS; SUBCUTANEOUS at 14:01

## 2024-08-05 RX ADMIN — SODIUM CHLORIDE 100 ML/HR: 9 INJECTION, SOLUTION INTRAVENOUS at 13:11

## 2024-08-05 RX ADMIN — SODIUM CHLORIDE, PRESERVATIVE FREE 20 ML: 5 INJECTION INTRAVENOUS at 14:09

## 2024-08-05 RX ADMIN — SODIUM CHLORIDE, PRESERVATIVE FREE 20 ML: 5 INJECTION INTRAVENOUS at 13:10

## 2024-08-05 RX ADMIN — CEFTRIAXONE SODIUM 2000 MG: 2 INJECTION, POWDER, FOR SOLUTION INTRAMUSCULAR; INTRAVENOUS at 13:13

## 2024-08-05 RX ADMIN — SODIUM CHLORIDE, PRESERVATIVE FREE 10 ML: 5 INJECTION INTRAVENOUS at 13:11

## 2024-08-05 RX ADMIN — CEFTRIAXONE SODIUM 2000 MG: 2 INJECTION, POWDER, FOR SOLUTION INTRAMUSCULAR; INTRAVENOUS at 13:33

## 2024-08-05 ASSESSMENT — PAIN DESCRIPTION - LOCATION: LOCATION: LEG

## 2024-08-05 ASSESSMENT — PAIN DESCRIPTION - ORIENTATION: ORIENTATION: RIGHT

## 2024-08-05 ASSESSMENT — PAIN SCALES - GENERAL: PAINLEVEL_OUTOF10: 1

## 2024-08-05 NOTE — PROGRESS NOTES
[facility] Peds/Adult Note                       Date: 2024    Name: Javier Ugalde    MRN: [id number]         : 1965    1300 Patient arrives for daily antibiotics and labs without acute problems. Please see Epic for complete assessment and education provided.        Vital signs stable throughout and prior to discharge. Patient tolerated procedure well and was discharged without incident.  Javier is aware of next [facility] appointment on 24. Appointment card given.      Mr. Godfrey vitals were reviewed prior to and after treatment.   Patient Vitals for the past 12 hrs:   Temp Pulse Resp BP SpO2   24 1415 -- 90 22 (!) 160/88 --   24 1245 97.5 °F (36.4 °C) 88 20 120/68 97 %         Lab results were obtained and reviewed.  Recent Results (from the past 12 hour(s))   CBC With Auto Differential    Collection Time: 24  1:05 PM   Result Value Ref Range    WBC 7.9 4.1 - 11.1 K/uL    RBC 3.19 (L) 4.10 - 5.70 M/uL    Hemoglobin 9.0 (L) 12.1 - 17.0 g/dL    Hematocrit 28.0 (L) 36.6 - 50.3 %    MCV 87.8 80.0 - 99.0 FL    MCH 28.2 26.0 - 34.0 PG    MCHC 32.1 30.0 - 36.5 g/dL    RDW 14.8 (H) 11.5 - 14.5 %    Platelets 363 150 - 400 K/uL    MPV 9.8 8.9 - 12.9 FL    Nucleated RBCs 0.0 0  WBC    nRBC 0.00 0.00 - 0.01 K/uL    Neutrophils % 63 32 - 75 %    Lymphocytes % 21 12 - 49 %    Monocytes % 9 5 - 13 %    Eosinophils % 6 0 - 7 %    Basophils % 1 0 - 1 %    Immature Granulocytes % 0 0.0 - 0.5 %    Neutrophils Absolute 5.0 1.8 - 8.0 K/UL    Lymphocytes Absolute 1.6 0.8 - 3.5 K/UL    Monocytes Absolute 0.7 0.0 - 1.0 K/UL    Eosinophils Absolute 0.5 (H) 0.0 - 0.4 K/UL    Basophils Absolute 0.1 0.0 - 0.1 K/UL    Immature Granulocytes Absolute 0.0 0.00 - 0.04 K/UL    Differential Type AUTOMATED         Medications given:   Medications Administered         0.9 % sodium chloride infusion Admin Date  2024 Action  New Bag Dose  100 mL/hr Rate  100 mL/hr Route  IntraVENous Administered  By  Marybel Dalton RN        cefTRIAXone (ROCEPHIN) 2,000 mg in sodium chloride 0.9 % 100 mL IVPB (mini-bag) Admin Date  08/05/2024 Action  New Bag Dose  2,000 mg Rate  200 mL/hr Route  IntraVENous Administered By  Erika Langston RN        cefTRIAXone (ROCEPHIN) 2,000 mg in sodium chloride 0.9 % 50 mL IVPB (mini-bag) Admin Date  08/05/2024 Action  New Bag Dose  2,000 mg Rate  100 mL/hr Route  IntraVENous Administered By  Marybel Dalton RN        DAPTOmycin (CUBICIN) 900 mg in sodium chloride (PF) 0.9 % 18 mL IV syringe Admin Date  08/05/2024 Action  Given Dose  900 mg Rate   Route  IntraVENous Administered By  Marybel Dalton RN        sodium chloride flush 0.9 % injection 5-40 mL Admin Date  08/05/2024 Action  Given Dose  20 mL Rate   Route  IntraVENous Administered By  Marybel Dalton RN        sodium chloride flush 0.9 % injection 5-40 mL Admin Date  08/05/2024 Action  Given Dose  10 mL Rate   Route  IntraVENous Administered By  Marybel Dalton RN        sodium chloride flush 0.9 % injection 5-40 mL Admin Date  08/05/2024 Action  Given Dose  20 mL Rate   Route  IntraVENous Administered By  Marybel Dalton RN              Mr. Ugalde tolerated the infusion, and had no complaints.    Mr. Ugalde was discharged from Outpatient Infusion Center in stable condition.     Future Appointments   Date Time Provider Department Center   8/6/2024  3:30 PM [facility]   8/7/2024 10:00 AM [facility]   8/20/2024  1:50 PM [facility]       Marybel Dalton RN  August 5, 2024  2:33 PM

## 2024-08-06 ENCOUNTER — HOSPITAL ENCOUNTER (OUTPATIENT)
Facility: HOSPITAL | Age: 59
Setting detail: INFUSION SERIES
Discharge: HOME | End: 2024-08-06
Payer: MEDICARE

## 2024-08-06 ENCOUNTER — TELEPHONE (OUTPATIENT)
Age: 59
End: 2024-08-06

## 2024-08-06 VITALS
OXYGEN SATURATION: 97 % | DIASTOLIC BLOOD PRESSURE: 68 MMHG | HEART RATE: 82 BPM | SYSTOLIC BLOOD PRESSURE: 108 MMHG | RESPIRATION RATE: 18 BRPM | TEMPERATURE: 98.3 F

## 2024-08-06 DIAGNOSIS — M00.9 PYOGENIC ARTHRITIS OF RIGHT KNEE JOINT, DUE TO UNSPECIFIED ORGANISM (HCC): Primary | ICD-10-CM

## 2024-08-06 PROCEDURE — 6360000002 HC RX W HCPCS

## 2024-08-06 PROCEDURE — 96375 TX/PRO/DX INJ NEW DRUG ADDON: CPT

## 2024-08-06 PROCEDURE — 96365 THER/PROPH/DIAG IV INF INIT: CPT

## 2024-08-06 PROCEDURE — 2580000003 HC RX 258

## 2024-08-06 RX ORDER — SODIUM CHLORIDE 0.9 % (FLUSH) 0.9 %
5-40 SYRINGE (ML) INJECTION PRN
Status: DISCONTINUED | OUTPATIENT
Start: 2024-08-06 | End: 2024-08-07 | Stop reason: HOSPADM

## 2024-08-06 RX ORDER — SODIUM CHLORIDE 9 MG/ML
5-250 INJECTION, SOLUTION INTRAVENOUS PRN
Status: DISCONTINUED | OUTPATIENT
Start: 2024-08-06 | End: 2024-08-07 | Stop reason: HOSPADM

## 2024-08-06 RX ORDER — SODIUM CHLORIDE 0.9 % (FLUSH) 0.9 %
5-40 SYRINGE (ML) INJECTION PRN
OUTPATIENT
Start: 2024-08-06

## 2024-08-06 RX ORDER — SODIUM CHLORIDE 9 MG/ML
5-250 INJECTION, SOLUTION INTRAVENOUS PRN
OUTPATIENT
Start: 2024-08-06

## 2024-08-06 RX ORDER — HEPARIN 100 UNIT/ML
500 SYRINGE INTRAVENOUS PRN
OUTPATIENT
Start: 2024-08-06

## 2024-08-06 RX ADMIN — CEFTRIAXONE SODIUM 2000 MG: 2 INJECTION, POWDER, FOR SOLUTION INTRAMUSCULAR; INTRAVENOUS at 15:33

## 2024-08-06 RX ADMIN — SODIUM CHLORIDE, PRESERVATIVE FREE 10 ML: 5 INJECTION INTRAVENOUS at 16:15

## 2024-08-06 RX ADMIN — SODIUM CHLORIDE 25 ML/HR: 9 INJECTION, SOLUTION INTRAVENOUS at 15:31

## 2024-08-06 RX ADMIN — SODIUM CHLORIDE, PRESERVATIVE FREE 10 ML: 5 INJECTION INTRAVENOUS at 16:30

## 2024-08-06 RX ADMIN — SODIUM CHLORIDE 900 MG: 9 INJECTION INTRAMUSCULAR; INTRAVENOUS; SUBCUTANEOUS at 16:25

## 2024-08-06 ASSESSMENT — PAIN SCALES - GENERAL: PAINLEVEL_OUTOF10: 0

## 2024-08-06 NOTE — PROGRESS NOTES
Pt arrived to [facility] for daily ABX in stable condition.  R subclavian Basilio catheter flushed positive blood return. Dressing changed per protocol. MD office notified of pt's last day of ABX and that [facility] will need a new order to maintain Basilio catheter. Pt updated and verbalized understanding.       Vitals:    08/06/24 1527   BP: 108/68   Pulse: 82   Resp: 18   Temp: 98.3 °F (36.8 °C)   SpO2: 97%     Medications Administered         0.9 % sodium chloride infusion Admin Date  08/06/2024 Action  New Bag Dose  25 mL/hr Rate  25 mL/hr Route  IntraVENous Administered By  Britney Baltazar RN        cefTRIAXone (ROCEPHIN) 2,000 mg in sodium chloride 0.9 % 50 mL IVPB (mini-bag) Admin Date  08/06/2024 Action  New Bag Dose  2,000 mg Rate  100 mL/hr Route  IntraVENous Administered By  Britney Baltazar RN        DAPTOmycin (CUBICIN) 900 mg in sodium chloride (PF) 0.9 % 18 mL IV syringe Admin Date  08/06/2024 Action  Given Dose  900 mg Rate   Route  IntraVENous Administered By  Britney Baltazar RN        sodium chloride flush 0.9 % injection 5-40 mL Admin Date  08/06/2024 Action  Given Dose  10 mL Rate   Route  IntraVENous Administered By  Britney Baltazar RN        sodium chloride flush 0.9 % injection 5-40 mL Admin Date  08/06/2024 Action  Given Dose  10 mL Rate   Route  IntraVENous Administered By  Britney Baltazar RN              Basilio flushed and capped. Pt tolerated treatment well. D/Cd from [facility] in no distress.  Future Appointments   Date Time Provider Department Center   8/7/2024 10:00 AM Romi Rosales MD [facility]   8/20/2024  1:50 PM Timi Zarate MD [facility]

## 2024-08-07 ENCOUNTER — HOSPITAL ENCOUNTER (OUTPATIENT)
Facility: HOSPITAL | Age: 59
Discharge: HOME | End: 2024-08-07
Attending: FAMILY MEDICINE
Payer: MEDICARE

## 2024-08-07 VITALS — HEART RATE: 80 BPM | DIASTOLIC BLOOD PRESSURE: 70 MMHG | TEMPERATURE: 98.2 F | SYSTOLIC BLOOD PRESSURE: 106 MMHG

## 2024-08-07 DIAGNOSIS — L97.212 NON-PRESSURE CHRONIC ULCER OF RIGHT CALF WITH FAT LAYER EXPOSED (HCC): Primary | ICD-10-CM

## 2024-08-07 PROCEDURE — 11045 DBRDMT SUBQ TISS EACH ADDL: CPT

## 2024-08-07 PROCEDURE — 11042 DBRDMT SUBQ TIS 1ST 20SQCM/<: CPT

## 2024-08-07 NOTE — FLOWSHEET NOTE
08/07/24 1020   Anesthetic   Anesthetic 4% Lidocaine Cream   Wound 07/03/24 Thigh Distal;Right;Lateral cluster   Date First Assessed/Time First Assessed: 07/03/24 1028   Present on Original Admission: No  Wound Approximate Age at First Assessment (Weeks): 1 weeks  Location: Thigh  Wound Location Orientation: Distal;Right;Lateral  Wound Description (Comments): cl...   Wound Image    Dressing Status Intact   Offloading for Diabetic Foot Ulcers Offloading not ordered   Wound Length (cm) 1.5 cm   Wound Width (cm) 0.9 cm   Wound Depth (cm) 0.2 cm   Wound Surface Area (cm^2) 1.35 cm^2   Change in Wound Size % (l*w) 21.05   Wound Volume (cm^3) 0.27 cm^3   Wound Healing % -58   Wound Assessment [place]/red;Slough   Drainage Amount Copious (>75 % saturated)   Drainage Description Serosanguinous   Odor None   Emely-wound Assessment Hyperpigmented   Margins Defined edges   Wound Thickness Description not for Pressure Injury Full thickness   Wound 06/19/24 Leg Right;Lateral Cluster   Date First Assessed/Time First Assessed: 06/19/24 1428   Present on Original Admission: Yes  Wound Approximate Age at First Assessment (Weeks): 4 weeks  Primary Wound Type: Traumatic  Location: Leg  Wound Location Orientation: Right;Lateral  Wound Tera...   Wound Image    Dressing Status Intact   Wound Cleansed Cleansed with saline   Offloading for Diabetic Foot Ulcers Offloading not ordered   Wound Length (cm) 4 cm   Wound Width (cm) 7.5 cm   Wound Depth (cm) 0.2 cm   Wound Surface Area (cm^2) 30 cm^2   Change in Wound Size % (l*w) 68.67   Wound Volume (cm^3) 6 cm^3   Wound Healing % 84   Wound Assessment [place]/red;Slough   Drainage Amount Moderate (25-50%)   Drainage Description Serosanguinous   Odor None   Emely-wound Assessment Hyperpigmented   Margins Defined edges   Wound Thickness Description not for Pressure Injury Full thickness     /70   Pulse 80   Temp 98.2 °F (36.8 °C) (Temporal)

## 2024-08-07 NOTE — PATIENT INSTRUCTIONS
Discharge Instructions for  [facility]  [address] [address]  Phone: [phone] Fax: [phone]    NAME:  Javier Ugalde  YOB: 1965  MEDICAL RECORD NUMBER:  [id number]  DATE:  August 7, 2024    WOUND CARE ORDERS:  Right lower leg wound-Cleanse with baby shampoo. Rinse and pat dry. Apply Alginate to the wound bed . Cover with gauze and ABD. Secure with coban wrap. Double tubi , size G from ankle to knee, and Ace wrap bandage to cover knee and upper thigh. Change every other day.    Keep leg elevated.            Activity:  [x] Elevate leg(s) above the level of the heart when sitting.  [x] Avoid prolonged standing in one place.   [x] Do no get dressing/wrap wet.       Dietary:  [] Diet as tolerated      [x] Diabetic Diet            [] Increase Protein: examples (Meat, cheese, eggs, greek yogurt, fish, nuts)          [] Teo Therapeutic Nutrition Powder  [] Other:  [] Dial a Dietician : Call [employer] at [phone] enter code ([email or web address]) when prompted. M-F 9am-5pm EST.      Return Appointment:  [x] Return Appointment: With Dr. Romi Rosales in 1 Week  [] Nurse Visit  [] Ordered tests:      Electronically signed Marilou Osei RN on 8/7/2024 at 10:55 AM     Wound Care Center Information: Should you experience any significant changes in your wound(s) or have questions about your wound care, please contact the [facility] at MONDAY - FRIDAY 8:00 am - 4:30.  If you need help with your wound outside these hours and cannot wait until we are again available, contact your PCP or go to the hospital emergency room.     PLEASE NOTE: IF YOU ARE UNABLE TO OBTAIN WOUND SUPPLIES, CONTINUE TO USE THE SUPPLIES YOU HAVE AVAILABLE UNTIL YOU ARE ABLE TO REACH US. IT IS MOST IMPORTANT TO KEEP THE WOUND COVERED AT ALL TIMES.     Physician Signature:_______________________    Date: ___________ Time:  ____________

## 2024-08-09 ENCOUNTER — TELEPHONE (OUTPATIENT)
Age: 59
End: 2024-08-09

## 2024-08-09 ENCOUNTER — TELEPHONE (OUTPATIENT)
Facility: HOSPITAL | Age: 59
End: 2024-08-09

## 2024-08-09 NOTE — TELEPHONE ENCOUNTER
HANNA from [facility] with [facility]. Patient Name/  verified. Reports that patient has been accepted to service and they will start Monday next week. [facility] to reach out to the patient today to set up start of care.

## 2024-08-09 NOTE — TELEPHONE ENCOUNTER
Pt wants to follow up on getting home health. He is willing to go to the infusion center for cleaning/ bandage change through the infusion center. Please advise

## 2024-08-12 NOTE — TELEPHONE ENCOUNTER
I called patient he said that the infusion center needs orders from Dr. Carnes to maintain the picc line, I tried to call them they didn't answer I left a vm

## 2024-08-12 NOTE — TELEPHONE ENCOUNTER
Patient called again to check on previous message about his PICC line needing the bandage changed and the PICC flushed.Please call him back at [phone]. It is ok to leave a voicemail

## 2024-08-14 ENCOUNTER — HOSPITAL ENCOUNTER (OUTPATIENT)
Facility: HOSPITAL | Age: 59
Discharge: HOME | End: 2024-08-14
Attending: FAMILY MEDICINE
Payer: MEDICARE

## 2024-08-14 VITALS
DIASTOLIC BLOOD PRESSURE: 79 MMHG | HEART RATE: 86 BPM | TEMPERATURE: 98 F | RESPIRATION RATE: 18 BRPM | SYSTOLIC BLOOD PRESSURE: 148 MMHG

## 2024-08-14 DIAGNOSIS — L97.212 NON-PRESSURE CHRONIC ULCER OF RIGHT CALF WITH FAT LAYER EXPOSED (HCC): Primary | ICD-10-CM

## 2024-08-14 PROCEDURE — 11042 DBRDMT SUBQ TIS 1ST 20SQCM/<: CPT

## 2024-08-14 RX ORDER — BETAMETHASONE DIPROPIONATE 0.5 MG/G
CREAM TOPICAL ONCE
OUTPATIENT
Start: 2024-08-14 | End: 2024-08-14

## 2024-08-14 RX ORDER — GINSENG 100 MG
CAPSULE ORAL ONCE
OUTPATIENT
Start: 2024-08-14 | End: 2024-08-14

## 2024-08-14 RX ORDER — TRIAMCINOLONE ACETONIDE 1 MG/G
OINTMENT TOPICAL ONCE
OUTPATIENT
Start: 2024-08-14 | End: 2024-08-14

## 2024-08-14 RX ORDER — GENTAMICIN SULFATE 1 MG/G
OINTMENT TOPICAL ONCE
OUTPATIENT
Start: 2024-08-14 | End: 2024-08-14

## 2024-08-14 RX ORDER — CLOBETASOL PROPIONATE 0.5 MG/G
OINTMENT TOPICAL ONCE
OUTPATIENT
Start: 2024-08-14 | End: 2024-08-14

## 2024-08-14 RX ORDER — LIDOCAINE HYDROCHLORIDE 40 MG/ML
SOLUTION TOPICAL ONCE
OUTPATIENT
Start: 2024-08-14 | End: 2024-08-14

## 2024-08-14 RX ORDER — IBUPROFEN 200 MG
TABLET ORAL ONCE
OUTPATIENT
Start: 2024-08-14 | End: 2024-08-14

## 2024-08-14 RX ORDER — LIDOCAINE 50 MG/G
OINTMENT TOPICAL ONCE
OUTPATIENT
Start: 2024-08-14 | End: 2024-08-14

## 2024-08-14 RX ORDER — SODIUM CHLOR/HYPOCHLOROUS ACID 0.033 %
SOLUTION, IRRIGATION IRRIGATION ONCE
OUTPATIENT
Start: 2024-08-14 | End: 2024-08-14

## 2024-08-14 RX ORDER — BACITRACIN ZINC AND POLYMYXIN B SULFATE 500; 1000 [USP'U]/G; [USP'U]/G
OINTMENT TOPICAL ONCE
OUTPATIENT
Start: 2024-08-14 | End: 2024-08-14

## 2024-08-14 RX ORDER — LIDOCAINE 40 MG/G
CREAM TOPICAL ONCE
OUTPATIENT
Start: 2024-08-14 | End: 2024-08-14

## 2024-08-14 RX ORDER — LIDOCAINE HYDROCHLORIDE 20 MG/ML
JELLY TOPICAL ONCE
OUTPATIENT
Start: 2024-08-14 | End: 2024-08-14

## 2024-08-14 ASSESSMENT — PAIN SCALES - GENERAL: PAINLEVEL_OUTOF10: 0

## 2024-08-14 NOTE — FLOWSHEET NOTE
08/14/24 1425   Anesthetic   Anesthetic 4% Lidocaine Liquid Topical   Right Leg Edema Point of Measurement   Leg circumference 51 cm   Ankle circumference 29.5 cm   Compression Therapy Tubular elastic support bandage   Tubular Elastic Support Bandage Compression Pressure Low   Wound 07/03/24 Thigh Right;Lateral;Proximal cluster   Date First Assessed/Time First Assessed: 07/03/24 1028   Present on Original Admission: No  Wound Approximate Age at First Assessment (Weeks): 1 weeks  Location: Thigh  Wound Location Orientation: Right;Lateral;Proximal  Wound Description (Comments): ...   Wound Etiology Non-Healing Surgical   Dressing Status Old drainage noted   Wound Cleansed Cleansed with saline   Offloading for Diabetic Foot Ulcers Offloading not required   Wound Length (cm) 1 cm   Wound Width (cm) 1.4 cm   Wound Depth (cm) 0.3 cm   Wound Surface Area (cm^2) 1.4 cm^2   Change in Wound Size % (l*w) 18.13   Wound Volume (cm^3) 0.42 cm^3   Wound Healing % -146   Wound Assessment [place]/red;Slough   Drainage Amount Moderate (25-50%)   Drainage Description Sanguinous   Odor None   Emely-wound Assessment Hyperpigmented;Blanchable erythema   Margins Defined edges   Wound Thickness Description not for Pressure Injury Full thickness   Wound 06/19/24 Leg Right;Lateral;Distal Cluster   Date First Assessed/Time First Assessed: 06/19/24 1428   Present on Original Admission: Yes  Wound Approximate Age at First Assessment (Weeks): 4 weeks  Primary Wound Type: Traumatic  Location: Leg  Wound Location Orientation: Right;Lateral;Distal  Wo...   Wound Etiology Non-Healing Surgical   Dressing Status Old drainage noted   Wound Cleansed Cleansed with saline   Offloading for Diabetic Foot Ulcers Offloading not required   Wound Length (cm) 4.1 cm   Wound Width (cm) 7.2 cm   Wound Depth (cm) 0.2 cm   Wound Surface Area (cm^2) 29.52 cm^2   Change in Wound Size % (l*w) 69.17   Wound Volume (cm^3) 5.904 cm^3   Wound Healing % 85   Wound Assessment

## 2024-08-14 NOTE — PATIENT INSTRUCTIONS
Discharge Instructions for  [facility]  [address] [address]  Phone: [phone] Fax: [phone]    NAME:  Javier Ugalde  YOB: 1965  MEDICAL RECORD NUMBER:  [id number]  DATE:  August 14, 2024    WOUND CARE ORDERS:  Right leg wounds-Cleanse with baby shampoo. Rinse and pat dry. Apply Diandra to the wound bed; Pack Diandra into thigh wound. Cover with calcium alginate. Cover with gauze and ABD. Secure with coban wrap. Double tubi , size G from ankle to knee, and Ace wrap bandage to cover knee and upper thigh. Change every other day.    Keep leg elevated.         Home Health-[facility]    Activity:  [x] Elevate leg(s) above the level of the heart when sitting.  [x] Avoid prolonged standing in one place.   [x] Do no get dressing/wrap wet.       Dietary:  [] Diet as tolerated      [x] Diabetic Diet            [] Increase Protein: examples (Meat, cheese, eggs, greek yogurt, fish, nuts)          [] Teo Therapeutic Nutrition Powder  [] Other:  [] Dial a Dietician : Call [employer] at [phone] enter code (249) when prompted. M-F 9am-5pm EST.      Return Appointment:  [x] Return Appointment: With Dr. Romi Rosales in 1 Week  [] Nurse Visit  [] Ordered tests:      Electronically signed Oneida Polanco RN on 8/14/2024 at 3:15 PM     Wound Care Center Information: Should you experience any significant changes in your wound(s) or have questions about your wound care, please contact the [facility] at MONDAY - FRIDAY 8:00 am - 4:30.  If you need help with your wound outside these hours and cannot wait until we are again available, contact your PCP or go to the hospital emergency room.     PLEASE NOTE: IF YOU ARE UNABLE TO OBTAIN WOUND SUPPLIES, CONTINUE TO USE THE SUPPLIES YOU HAVE AVAILABLE UNTIL YOU ARE ABLE TO REACH US. IT IS MOST IMPORTANT TO KEEP THE WOUND COVERED AT ALL TIMES.     Physician

## 2024-08-14 NOTE — FLOWSHEET NOTE
08/14/24 1534   Right Leg Edema Point of Measurement   Compression Therapy Tubular elastic support bandage   Tubular Elastic Support Bandage Compression Pressure Medium   Wound 07/03/24 Thigh Right;Lateral;Proximal cluster   Date First Assessed/Time First Assessed: 07/03/24 1028   Present on Original Admission: No  Wound Approximate Age at First Assessment (Weeks): 1 weeks  Location: Thigh  Wound Location Orientation: Right;Lateral;Proximal  Wound Description (Comments): ...   Dressing Status New dressing applied   Dressing/Treatment   (peace, calcium alginate, gauze, roll gauze, coban, ACE)   Offloading for Diabetic Foot Ulcers Offloading not ordered   Wound 06/19/24 Leg Right;Lateral;Distal Cluster   Date First Assessed/Time First Assessed: 06/19/24 1428   Present on Original Admission: Yes  Wound Approximate Age at First Assessment (Weeks): 4 weeks  Primary Wound Type: Traumatic  Location: Leg  Wound Location Orientation: Right;Lateral;Distal  Wo...   Dressing Status New dressing applied   Dressing/Treatment   (peace, calcium alginate, gauze, roll gauze, coban, double tubi size f)   Offloading for Diabetic Foot Ulcers Offloading not ordered

## 2024-08-15 ENCOUNTER — TELEPHONE (OUTPATIENT)
Age: 59
End: 2024-08-15

## 2024-08-15 NOTE — TELEPHONE ENCOUNTER
Pt needs help getting an appointment or order for the infusion center at [facility] to flush his pic line and change his bandages.pt has not had this changed since last week. Please advise

## 2024-08-19 ENCOUNTER — TELEPHONE (OUTPATIENT)
Age: 59
End: 2024-08-19

## 2024-08-19 ENCOUNTER — TELEPHONE (OUTPATIENT)
Facility: HOSPITAL | Age: 59
End: 2024-08-19

## 2024-08-19 NOTE — TELEPHONE ENCOUNTER
Pt is requesting for Picc Line to be cleaned/Bandages changed; Orders needs to be sent to Infusion Center    Fax Number: [phone]

## 2024-08-21 ENCOUNTER — HOSPITAL ENCOUNTER (OUTPATIENT)
Facility: HOSPITAL | Age: 59
Discharge: HOME | End: 2024-08-21
Attending: FAMILY MEDICINE
Payer: MEDICARE

## 2024-08-21 VITALS
HEART RATE: 81 BPM | SYSTOLIC BLOOD PRESSURE: 138 MMHG | DIASTOLIC BLOOD PRESSURE: 74 MMHG | RESPIRATION RATE: 18 BRPM | TEMPERATURE: 98 F

## 2024-08-21 DIAGNOSIS — L03.115 CELLULITIS OF RIGHT LOWER EXTREMITY: ICD-10-CM

## 2024-08-21 DIAGNOSIS — L97.212 NON-PRESSURE CHRONIC ULCER OF RIGHT CALF WITH FAT LAYER EXPOSED (HCC): Primary | ICD-10-CM

## 2024-08-21 PROCEDURE — 87070 CULTURE OTHR SPECIMN AEROBIC: CPT

## 2024-08-21 PROCEDURE — 87205 SMEAR GRAM STAIN: CPT

## 2024-08-21 PROCEDURE — 87077 CULTURE AEROBIC IDENTIFY: CPT

## 2024-08-21 PROCEDURE — 87186 SC STD MICRODIL/AGAR DIL: CPT

## 2024-08-21 RX ORDER — CLOBETASOL PROPIONATE 0.5 MG/G
OINTMENT TOPICAL ONCE
OUTPATIENT
Start: 2024-08-21 | End: 2024-08-21

## 2024-08-21 RX ORDER — LIDOCAINE HYDROCHLORIDE 20 MG/ML
JELLY TOPICAL ONCE
OUTPATIENT
Start: 2024-08-21 | End: 2024-08-21

## 2024-08-21 RX ORDER — GENTAMICIN SULFATE 1 MG/G
OINTMENT TOPICAL ONCE
OUTPATIENT
Start: 2024-08-21 | End: 2024-08-21

## 2024-08-21 RX ORDER — IBUPROFEN 200 MG
TABLET ORAL ONCE
OUTPATIENT
Start: 2024-08-21 | End: 2024-08-21

## 2024-08-21 RX ORDER — TRIAMCINOLONE ACETONIDE 1 MG/G
OINTMENT TOPICAL ONCE
OUTPATIENT
Start: 2024-08-21 | End: 2024-08-21

## 2024-08-21 RX ORDER — BACITRACIN ZINC AND POLYMYXIN B SULFATE 500; 1000 [USP'U]/G; [USP'U]/G
OINTMENT TOPICAL ONCE
OUTPATIENT
Start: 2024-08-21 | End: 2024-08-21

## 2024-08-21 RX ORDER — LIDOCAINE HYDROCHLORIDE 40 MG/ML
SOLUTION TOPICAL ONCE
OUTPATIENT
Start: 2024-08-21 | End: 2024-08-21

## 2024-08-21 RX ORDER — LIDOCAINE 40 MG/G
CREAM TOPICAL ONCE
OUTPATIENT
Start: 2024-08-21 | End: 2024-08-21

## 2024-08-21 RX ORDER — GINSENG 100 MG
CAPSULE ORAL ONCE
OUTPATIENT
Start: 2024-08-21 | End: 2024-08-21

## 2024-08-21 RX ORDER — LIDOCAINE 50 MG/G
OINTMENT TOPICAL ONCE
OUTPATIENT
Start: 2024-08-21 | End: 2024-08-21

## 2024-08-21 RX ORDER — SODIUM CHLOR/HYPOCHLOROUS ACID 0.033 %
SOLUTION, IRRIGATION IRRIGATION ONCE
OUTPATIENT
Start: 2024-08-21 | End: 2024-08-21

## 2024-08-21 RX ORDER — BETAMETHASONE DIPROPIONATE 0.5 MG/G
CREAM TOPICAL ONCE
OUTPATIENT
Start: 2024-08-21 | End: 2024-08-21

## 2024-08-21 NOTE — FLOWSHEET NOTE
08/21/24 1422   Anesthetic   Anesthetic 4% Lidocaine Liquid Topical   Right Leg Edema Point of Measurement   Leg circumference 49 cm   Ankle circumference 28 cm   Compression Therapy Tubular elastic support bandage   Tubular Elastic Support Bandage Compression Pressure Medium   Wound 07/03/24 Thigh Right;Lateral;Proximal cluster   Date First Assessed/Time First Assessed: 07/03/24 1028   Present on Original Admission: No  Wound Approximate Age at First Assessment (Weeks): 1 weeks  Location: Thigh  Wound Location Orientation: Right;Lateral;Proximal  Wound Description (Comments): ...   Wound Image    Wound Etiology Non-Healing Surgical   Dressing Status Old drainage noted   Wound Cleansed Cleansed with saline   Offloading for Diabetic Foot Ulcers Offloading not required   Wound Length (cm) 1.4 cm   Wound Width (cm) 1.2 cm   Wound Depth (cm) 0.2 cm   Wound Surface Area (cm^2) 1.68 cm^2   Change in Wound Size % (l*w) 1.75   Wound Volume (cm^3) 0.336 cm^3   Wound Healing % -96   Wound Assessment [place]/red;Slough   Drainage Amount Moderate (25-50%)   Drainage Description Serosanguinous   Odor None   Emely-wound Assessment Hyperpigmented   Margins Defined edges   Wound Thickness Description not for Pressure Injury Full thickness   Wound 06/19/24 Leg Right;Lateral;Distal Cluster   Date First Assessed/Time First Assessed: 06/19/24 1428   Present on Original Admission: Yes  Wound Approximate Age at First Assessment (Weeks): 4 weeks  Primary Wound Type: Traumatic  Location: Leg  Wound Location Orientation: Right;Lateral;Distal  Wo...   Wound Image    Wound Etiology Non-Healing Surgical   Dressing Status Old drainage noted   Wound Cleansed Cleansed with saline   Offloading for Diabetic Foot Ulcers Offloading not required   Wound Length (cm) 3.9 cm   Wound Width (cm) 2.5 cm   Wound Depth (cm) 0.1 cm   Wound Surface Area (cm^2) 9.75 cm^2   Change in Wound Size % (l*w) 89.82   Wound Volume (cm^3) 0.975 cm^3   Wound Healing % 97

## 2024-08-21 NOTE — FLOWSHEET NOTE
08/21/24 1632   Right Leg Edema Point of Measurement   Compression Therapy Tubular elastic support bandage   Tubular Elastic Support Bandage Compression Pressure Medium   Wound 07/03/24 Thigh Right;Lateral;Proximal cluster   Date First Assessed/Time First Assessed: 07/03/24 1028   Present on Original Admission: No  Wound Approximate Age at First Assessment (Weeks): 1 weeks  Location: Thigh  Wound Location Orientation: Right;Lateral;Proximal  Wound Description (Comments): ...   Dressing Status New dressing applied   Dressing/Treatment   (peace, calcium alginate, gauze, roll gauze, coban, tubi  F)   Offloading for Diabetic Foot Ulcers Offloading not required   Wound 06/19/24 Leg Right;Lateral;Distal Cluster   Date First Assessed/Time First Assessed: 06/19/24 1428   Present on Original Admission: Yes  Wound Approximate Age at First Assessment (Weeks): 4 weeks  Primary Wound Type: Traumatic  Location: Leg  Wound Location Orientation: Right;Lateral;Distal  Wo...   Dressing Status New dressing applied   Dressing/Treatment   (peace, calcium alginate, gauze, roll gauze, coban, tubi  F)   Offloading for Diabetic Foot Ulcers Offloading not required

## 2024-08-21 NOTE — PATIENT INSTRUCTIONS
Discharge Instructions for  [facility]  [address] [address]  Phone: [phone] Fax: [phone]    NAME:  Javier Ugalde  YOB: 1965  MEDICAL RECORD NUMBER:  [id number]  DATE:  August 21, 2024    WOUND CARE ORDERS:  Right leg wounds-Cleanse with baby shampoo. Rinse and pat dry. Apply Diandra to the wound bed; Pack Diandra into thigh wound. Cover with calcium alginate. Cover with gauze and ABD. Secure with coban wrap. Double tubi , size G from ankle to knee, and Ace wrap bandage to cover knee and upper thigh. Change every other day.    Keep leg elevated.         Home Health ([facility])     Activity:  [x] Elevate leg(s) above the level of the heart when sitting.  [x] Avoid prolonged standing in one place.   [x] Do no get dressing/wrap wet.       Dietary:  [] Diet as tolerated      [x] Diabetic Diet            [] Increase Protein: examples (Meat, cheese, eggs, greek yogurt, fish, nuts)          [] Teo Therapeutic Nutrition Powder  [] Other:  [] Dial a Dietician : Call [employer] at [phone] enter code (249) when prompted. M-F 9am-5pm EST.      Return Appointment:  [x] Return Appointment: With Dr. Romi Rosales in 2 Weeks  [x] Nurse Visit 08/28/24  [] Ordered tests:      Electronically signed Oneida Polanco RN on 8/21/2024 at 2:16 PM     Wound Care Center Information: Should you experience any significant changes in your wound(s) or have questions about your wound care, please contact the [facility] at MONDAY - FRIDAY 8:00 am - 4:30.  If you need help with your wound outside these hours and cannot wait until we are again available, contact your PCP or go to the hospital emergency room.     PLEASE NOTE: IF YOU ARE UNABLE TO OBTAIN WOUND SUPPLIES, CONTINUE TO USE THE SUPPLIES YOU HAVE AVAILABLE UNTIL YOU ARE ABLE TO REACH US. IT IS MOST IMPORTANT TO KEEP THE WOUND COVERED AT ALL TIMES.     Physician

## 2024-08-22 LAB
BACTERIA SPEC CULT: NORMAL
GRAM STN SPEC: NORMAL
GRAM STN SPEC: NORMAL
SERVICE CMNT-IMP: NORMAL

## 2024-08-23 ENCOUNTER — HOSPITAL ENCOUNTER (OUTPATIENT)
Facility: HOSPITAL | Age: 59
Setting detail: INFUSION SERIES
Discharge: HOME | End: 2024-08-23
Payer: MEDICARE

## 2024-08-23 DIAGNOSIS — M00.9 PYOGENIC ARTHRITIS OF RIGHT KNEE JOINT, DUE TO UNSPECIFIED ORGANISM (HCC): Primary | ICD-10-CM

## 2024-08-23 PROCEDURE — 96523 IRRIG DRUG DELIVERY DEVICE: CPT

## 2024-08-23 PROCEDURE — 2580000003 HC RX 258

## 2024-08-23 RX ORDER — SODIUM CHLORIDE 0.9 % (FLUSH) 0.9 %
5-40 SYRINGE (ML) INJECTION PRN
Status: CANCELLED | OUTPATIENT
Start: 2024-08-23

## 2024-08-23 RX ORDER — HEPARIN 100 UNIT/ML
500 SYRINGE INTRAVENOUS PRN
Status: DISCONTINUED | OUTPATIENT
Start: 2024-08-23 | End: 2024-08-24 | Stop reason: HOSPADM

## 2024-08-23 RX ORDER — SODIUM CHLORIDE 9 MG/ML
5-250 INJECTION, SOLUTION INTRAVENOUS PRN
Status: DISCONTINUED | OUTPATIENT
Start: 2024-08-23 | End: 2024-08-24 | Stop reason: HOSPADM

## 2024-08-23 RX ORDER — SODIUM CHLORIDE 9 MG/ML
5-250 INJECTION, SOLUTION INTRAVENOUS PRN
Status: CANCELLED | OUTPATIENT
Start: 2024-08-23

## 2024-08-23 RX ORDER — HEPARIN 100 UNIT/ML
500 SYRINGE INTRAVENOUS PRN
Status: CANCELLED | OUTPATIENT
Start: 2024-08-23

## 2024-08-23 RX ORDER — SODIUM CHLORIDE 0.9 % (FLUSH) 0.9 %
5-40 SYRINGE (ML) INJECTION PRN
Status: DISCONTINUED | OUTPATIENT
Start: 2024-08-23 | End: 2024-08-24 | Stop reason: HOSPADM

## 2024-08-23 RX ADMIN — SODIUM CHLORIDE, PRESERVATIVE FREE 10 ML: 5 INJECTION INTRAVENOUS at 11:51

## 2024-08-23 RX ADMIN — SODIUM CHLORIDE, PRESERVATIVE FREE 10 ML: 5 INJECTION INTRAVENOUS at 11:50

## 2024-08-23 NOTE — PROGRESS NOTES
[facility] Visit [facility]                 Date: 2024  Name: Javier Ugalde  MRN: [id number]       : 1965    Pt admit to [facility] for PICC Line dressing change - ambulatory in stable condition.   Denies fever, cough, and N/V- denies covid-like symptoms.     Double lumen PICC line flushed with 10 ml NS (purple/white lumen) with positive blood return.   Dressing changed. Time, initial and dated.    Tolerated procedure well without issue. New alcohol-infused caps placed at end of each catheter.     Patient aware of upcoming appointment. Patient declined post- monitoring time. Education provided.     Medications Administered         sodium chloride flush 0.9 % injection 5-40 mL Admin Date  2024 Action  Given Dose  10 mL Route  IntraVENous Documented By  Emilee Sanchez RN        sodium chloride flush 0.9 % injection 5-40 mL Admin Date  2024 Action  Given Dose  10 mL Route  IntraVENous Documented By  Emilee Sanchez RN          Future Appointments   Date Time Provider Department Center   2024 11:30 AM [facility] FASTTRACK 2 [facility]   9/3/2024  8:20 AM Oneida Rodriguez MD [facility]   2024  1:45 PM Romi Rosales MD [facility]   2024 11:30 AM [facility]     Emilee Sanchez RN  2024

## 2024-08-24 LAB
BACTERIA SPEC CULT: ABNORMAL
GRAM STN SPEC: ABNORMAL
GRAM STN SPEC: ABNORMAL
SERVICE CMNT-IMP: ABNORMAL

## 2024-08-27 LAB
BACTERIA SPEC CULT: ABNORMAL
BACTERIA SPEC CULT: ABNORMAL
GRAM STN SPEC: ABNORMAL
GRAM STN SPEC: ABNORMAL
SERVICE CMNT-IMP: ABNORMAL

## 2024-08-29 ENCOUNTER — TELEPHONE (OUTPATIENT)
Facility: HOSPITAL | Age: 59
End: 2024-08-29

## 2024-08-29 DIAGNOSIS — L97.212 NON-PRESSURE CHRONIC ULCER OF RIGHT CALF WITH FAT LAYER EXPOSED (HCC): Primary | ICD-10-CM

## 2024-08-30 ENCOUNTER — HOSPITAL ENCOUNTER (OUTPATIENT)
Facility: HOSPITAL | Age: 59
Setting detail: INFUSION SERIES
Discharge: HOME | End: 2024-08-30
Payer: MEDICARE

## 2024-08-30 DIAGNOSIS — M00.9 PYOGENIC ARTHRITIS OF RIGHT KNEE JOINT, DUE TO UNSPECIFIED ORGANISM (HCC): Primary | ICD-10-CM

## 2024-08-30 PROCEDURE — 96523 IRRIG DRUG DELIVERY DEVICE: CPT

## 2024-08-30 PROCEDURE — 2580000003 HC RX 258

## 2024-08-30 RX ORDER — SODIUM CHLORIDE 9 MG/ML
5-250 INJECTION, SOLUTION INTRAVENOUS PRN
Status: DISCONTINUED | OUTPATIENT
Start: 2024-08-30 | End: 2024-08-31 | Stop reason: HOSPADM

## 2024-08-30 RX ORDER — SODIUM CHLORIDE 0.9 % (FLUSH) 0.9 %
5-40 SYRINGE (ML) INJECTION PRN
Status: DISCONTINUED | OUTPATIENT
Start: 2024-08-30 | End: 2024-08-31 | Stop reason: HOSPADM

## 2024-08-30 RX ORDER — SODIUM CHLORIDE 9 MG/ML
5-250 INJECTION, SOLUTION INTRAVENOUS PRN
OUTPATIENT
Start: 2024-08-30

## 2024-08-30 RX ORDER — HEPARIN 100 UNIT/ML
500 SYRINGE INTRAVENOUS PRN
OUTPATIENT
Start: 2024-08-30

## 2024-08-30 RX ORDER — SODIUM CHLORIDE 0.9 % (FLUSH) 0.9 %
5-40 SYRINGE (ML) INJECTION PRN
OUTPATIENT
Start: 2024-08-30

## 2024-08-30 RX ORDER — HEPARIN 100 UNIT/ML
500 SYRINGE INTRAVENOUS PRN
Status: DISCONTINUED | OUTPATIENT
Start: 2024-08-30 | End: 2024-08-31 | Stop reason: HOSPADM

## 2024-08-30 RX ADMIN — SODIUM CHLORIDE, PRESERVATIVE FREE 10 ML: 5 INJECTION INTRAVENOUS at 11:31

## 2024-08-30 RX ADMIN — SODIUM CHLORIDE, PRESERVATIVE FREE 10 ML: 5 INJECTION INTRAVENOUS at 11:30

## 2024-08-30 ASSESSMENT — PAIN DESCRIPTION - LOCATION: LOCATION: LEG

## 2024-08-30 ASSESSMENT — PAIN SCALES - GENERAL: PAINLEVEL_OUTOF10: 2

## 2024-08-30 ASSESSMENT — PAIN DESCRIPTION - ORIENTATION: ORIENTATION: RIGHT

## 2024-08-30 ASSESSMENT — PAIN SCALES - WONG BAKER: WONGBAKER_NUMERICALRESPONSE: NO HURT

## 2024-08-30 NOTE — PROGRESS NOTES
[facility] Visit Notes                 Date: 2024  Name: Javier Ugalde  MRN: [id number]       : 1965    Pt admit to [facility] for Central Line Dressing Change - ambulatory in stable condition.   Denies fever, cough, and N/V- denies covid-like symptoms.     Central line dressing changed. Both catheters flushed with positive blood return. New alcohol-infused caps placed at end of each catheter. Tolerated procedure well without issue. e.   Patient aware of upcoming appointment. Patient declined post- monitoring time. Education provided.     Medications Administered         sodium chloride flush 0.9 % injection 5-40 mL Admin Date  2024 Action  Given Dose  10 mL Route  IntraVENous Documented By  Emilee Sanchez RN        sodium chloride flush 0.9 % injection 5-40 mL Admin Date  2024 Action  Given Dose  10 mL Route  IntraVENous Documented By  Emilee Sanchez RN          Future Appointments   Date Time Provider Department Center   9/3/2024  8:20 AM Oneida Rodriguez MD TOS [facility]   2024  1:45 PM [facility]   2024 11:30 AM [facility]     Emilee Sanchez RN  2024

## 2024-09-04 ENCOUNTER — HOSPITAL ENCOUNTER (OUTPATIENT)
Facility: HOSPITAL | Age: 59
Discharge: HOME | End: 2024-09-04
Attending: FAMILY MEDICINE
Payer: MEDICARE

## 2024-09-04 VITALS
TEMPERATURE: 97.8 F | DIASTOLIC BLOOD PRESSURE: 82 MMHG | SYSTOLIC BLOOD PRESSURE: 145 MMHG | HEART RATE: 77 BPM | RESPIRATION RATE: 16 BRPM

## 2024-09-04 DIAGNOSIS — L97.212 NON-PRESSURE CHRONIC ULCER OF RIGHT CALF WITH FAT LAYER EXPOSED (HCC): Primary | ICD-10-CM

## 2024-09-04 PROCEDURE — 11042 DBRDMT SUBQ TIS 1ST 20SQCM/<: CPT

## 2024-09-04 RX ORDER — LIDOCAINE HYDROCHLORIDE 20 MG/ML
JELLY TOPICAL ONCE
OUTPATIENT
Start: 2024-09-04 | End: 2024-09-04

## 2024-09-04 RX ORDER — NEOMYCIN/BACITRACIN/POLYMYXINB 3.5-400-5K
OINTMENT (GRAM) TOPICAL ONCE
OUTPATIENT
Start: 2024-09-04 | End: 2024-09-04

## 2024-09-04 RX ORDER — BETAMETHASONE DIPROPIONATE 0.5 MG/G
CREAM TOPICAL ONCE
OUTPATIENT
Start: 2024-09-04 | End: 2024-09-04

## 2024-09-04 RX ORDER — LIDOCAINE 50 MG/G
OINTMENT TOPICAL ONCE
OUTPATIENT
Start: 2024-09-04 | End: 2024-09-04

## 2024-09-04 RX ORDER — GINSENG 100 MG
CAPSULE ORAL ONCE
OUTPATIENT
Start: 2024-09-04 | End: 2024-09-04

## 2024-09-04 RX ORDER — TRIAMCINOLONE ACETONIDE 1 MG/G
OINTMENT TOPICAL ONCE
OUTPATIENT
Start: 2024-09-04 | End: 2024-09-04

## 2024-09-04 RX ORDER — BACITRACIN ZINC AND POLYMYXIN B SULFATE 500; 1000 [USP'U]/G; [USP'U]/G
OINTMENT TOPICAL ONCE
OUTPATIENT
Start: 2024-09-04 | End: 2024-09-04

## 2024-09-04 RX ORDER — SILVER SULFADIAZINE 10 MG/G
CREAM TOPICAL ONCE
OUTPATIENT
Start: 2024-09-04 | End: 2024-09-04

## 2024-09-04 RX ORDER — CLOBETASOL PROPIONATE 0.5 MG/G
OINTMENT TOPICAL ONCE
OUTPATIENT
Start: 2024-09-04 | End: 2024-09-04

## 2024-09-04 RX ORDER — LIDOCAINE 40 MG/G
CREAM TOPICAL ONCE
OUTPATIENT
Start: 2024-09-04 | End: 2024-09-04

## 2024-09-04 RX ORDER — SODIUM CHLOR/HYPOCHLOROUS ACID 0.033 %
SOLUTION, IRRIGATION IRRIGATION ONCE
OUTPATIENT
Start: 2024-09-04 | End: 2024-09-04

## 2024-09-04 RX ORDER — GENTAMICIN SULFATE 1 MG/G
OINTMENT TOPICAL ONCE
OUTPATIENT
Start: 2024-09-04 | End: 2024-09-04

## 2024-09-04 RX ORDER — MUPIROCIN 20 MG/G
OINTMENT TOPICAL ONCE
OUTPATIENT
Start: 2024-09-04 | End: 2024-09-04

## 2024-09-04 RX ORDER — LIDOCAINE HYDROCHLORIDE 40 MG/ML
SOLUTION TOPICAL ONCE
OUTPATIENT
Start: 2024-09-04 | End: 2024-09-04

## 2024-09-04 NOTE — FLOWSHEET NOTE
09/04/24 1413   Anesthetic   Anesthetic 4% Lidocaine Cream   Right Leg Edema Point of Measurement   Leg circumference 46.5 cm   Ankle circumference 27 cm   Compression Therapy Tubular elastic support bandage   Tubular Elastic Support Bandage Compression Pressure Medium   Peripheral Vascular   Edema Right lower extremity   RLE Edema +2  (centralized to lateral pretibial area)   RLE Neurovascular Assessment   Capillary Refill Less than/Equal to 3 seconds   Color Appropriate for Ethnicity   Temperature Cool   RLE Sensation  Full sensation   R Pedal Pulse +2   Wound 07/03/24 Thigh Right;Lateral;Proximal cluster   Date First Assessed/Time First Assessed: 07/03/24 1028   Present on Original Admission: No  Wound Approximate Age at First Assessment (Weeks): 1 weeks  Location: Thigh  Wound Location Orientation: Right;Lateral;Proximal  Wound Description (Comments): ...   Wound Image    Wound Etiology Non-Healing Surgical   Dressing Status Intact;Old drainage noted   Wound Cleansed Cleansed with saline   Offloading for Diabetic Foot Ulcers Offloading not required   Wound Length (cm) 1 cm   Wound Width (cm) 0.9 cm   Wound Depth (cm) 0.2 cm   Wound Surface Area (cm^2) 0.9 cm^2   Change in Wound Size % (l*w) 47.37   Wound Volume (cm^3) 0.18 cm^3   Wound Healing % -5   Wound Assessment [place]/red;Slough   Drainage Amount Moderate (25-50%)   Drainage Description Serosanguinous   Odor None   Emely-wound Assessment Hyperpigmented   Margins Defined edges   Wound Thickness Description not for Pressure Injury Full thickness   Wound 06/19/24 Leg Right;Lateral;Distal Cluster   Date First Assessed/Time First Assessed: 06/19/24 1428   Present on Original Admission: Yes  Wound Approximate Age at First Assessment (Weeks): 4 weeks  Primary Wound Type: Traumatic  Location: Leg  Wound Location Orientation: Right;Lateral;Distal  Wo...   Wound Image    Wound Etiology Non-Healing Surgical   Dressing Status Intact;Old drainage noted   Wound Cleansed

## 2024-09-04 NOTE — FLOWSHEET NOTE
09/04/24 1413   Anesthetic   Anesthetic 4% Lidocaine Cream   Right Leg Edema Point of Measurement   Leg circumference 46.5 cm   Ankle circumference 27 cm   Compression Therapy Tubular elastic support bandage   Tubular Elastic Support Bandage Compression Pressure Medium   Peripheral Vascular   Edema Right lower extremity   RLE Edema +2  (centralized to lateral pretibial area)   RLE Neurovascular Assessment   Capillary Refill Less than/Equal to 3 seconds   Color Appropriate for Ethnicity   Temperature Cool   RLE Sensation  Full sensation   R Pedal Pulse +2   Wound 07/03/24 Thigh Right;Lateral;Proximal cluster   Date First Assessed/Time First Assessed: 07/03/24 1028   Present on Original Admission: No  Wound Approximate Age at First Assessment (Weeks): 1 weeks  Location: Thigh  Wound Location Orientation: Right;Lateral;Proximal  Wound Description (Comments): ...   Wound Image    Wound Etiology Non-Healing Surgical   Dressing Status Intact;Old drainage noted   Wound Cleansed Cleansed with saline   Offloading for Diabetic Foot Ulcers Offloading not required   Wound Length (cm) 1 cm   Wound Width (cm) 0.9 cm   Wound Depth (cm) 0.2 cm   Wound Surface Area (cm^2) 0.9 cm^2   Change in Wound Size % (l*w) 47.37   Wound Volume (cm^3) 0.18 cm^3   Wound Healing % -5   Wound Assessment [place]/red;Slough   Drainage Amount Moderate (25-50%)   Drainage Description Serosanguinous   Odor None   Emely-wound Assessment Hyperpigmented   Margins Defined edges   Wound Thickness Description not for Pressure Injury Full thickness   Wound 06/19/24 Leg Right;Lateral;Distal Cluster   Date First Assessed/Time First Assessed: 06/19/24 1428   Present on Original Admission: Yes  Wound Approximate Age at First Assessment (Weeks): 4 weeks  Primary Wound Type: Traumatic  Location: Leg  Wound Location Orientation: Right;Lateral;Distal  Wo...   Wound Image    Wound Etiology Non-Healing Surgical   Dressing Status Intact;Old drainage noted   Wound Cleansed

## 2024-09-04 NOTE — PATIENT INSTRUCTIONS
Discharge Instructions for  [facility]  [address] [address]  Phone: [phone] Fax: [phone]    NAME:  Javier Ugalde  YOB: 1965  MEDICAL RECORD NUMBER:  [id number]  DATE:  September 4, 2024    WOUND CARE ORDERS:  Right lateral thigh wounds-Cleanse with baby shampoo. Rinse and pat dry. Pack wound with alginate ag rope. Cover with alginate ag. Cover with gauze and ABD. Secure with coban wrap. Double tubi , size G. Change every other day.    Keep leg elevated.       Home Health ([facility])     Activity:  [x] Elevate leg(s) above the level of the heart when sitting.  [x] Avoid prolonged standing in one place.   [x] Do no get dressing/wrap wet.       Dietary:  [] Diet as tolerated      [x] Diabetic Diet            [] Increase Protein: examples (Meat, cheese, eggs, greek yogurt, fish, nuts)          [] Teo Therapeutic Nutrition Powder  [] Other:  [] Dial a Dietician : Call [employer] at [phone] enter code (249) when prompted. M-F 9am-5pm EST.      Return Appointment:  [x] Return Appointment: With Dr. Uribe in 1 Week  [x] Nurse Visit 08/28/24  [] Ordered tests:      Electronically signed Oneida Polanco RN on 9/4/2024 at 2:20 PM     Wound Care Center Information: Should you experience any significant changes in your wound(s) or have questions about your wound care, please contact the [facility] at MONDAY - FRIDAY 8:00 am - 4:30.  If you need help with your wound outside these hours and cannot wait until we are again available, contact your PCP or go to the hospital emergency room.     PLEASE NOTE: IF YOU ARE UNABLE TO OBTAIN WOUND SUPPLIES, CONTINUE TO USE THE SUPPLIES YOU HAVE AVAILABLE UNTIL YOU ARE ABLE TO REACH US. IT IS MOST IMPORTANT TO KEEP THE WOUND COVERED AT ALL TIMES.     Physician Signature:_______________________    Date: ___________ Time:  ____________

## 2024-09-04 NOTE — FLOWSHEET NOTE
09/04/24 1558   Right Leg Edema Point of Measurement   Compression Therapy Tubular elastic support bandage   Tubular Elastic Support Bandage Compression Pressure Medium   Wound 07/03/24 Thigh Right;Lateral;Proximal cluster   Date First Assessed/Time First Assessed: 07/03/24 1028   Present on Original Admission: No  Wound Approximate Age at First Assessment (Weeks): 1 weeks  Location: Thigh  Wound Location Orientation: Right;Lateral;Proximal  Wound Description (Comments): ...   Dressing Status New dressing applied   Dressing/Treatment   (silver alginate rope, gauze, roll gauze, coban)   Offloading for Diabetic Foot Ulcers Offloading not ordered

## 2024-09-06 ENCOUNTER — HOSPITAL ENCOUNTER (OUTPATIENT)
Facility: HOSPITAL | Age: 59
Setting detail: INFUSION SERIES
Discharge: HOME | End: 2024-09-06
Payer: MEDICARE

## 2024-09-06 DIAGNOSIS — M00.9 PYOGENIC ARTHRITIS OF RIGHT KNEE JOINT, DUE TO UNSPECIFIED ORGANISM (HCC): Primary | ICD-10-CM

## 2024-09-06 PROCEDURE — 96523 IRRIG DRUG DELIVERY DEVICE: CPT

## 2024-09-06 PROCEDURE — 2580000003 HC RX 258

## 2024-09-06 RX ORDER — HEPARIN 100 UNIT/ML
500 SYRINGE INTRAVENOUS PRN
Status: DISCONTINUED | OUTPATIENT
Start: 2024-09-06 | End: 2024-09-07 | Stop reason: HOSPADM

## 2024-09-06 RX ORDER — SODIUM CHLORIDE 0.9 % (FLUSH) 0.9 %
5-40 SYRINGE (ML) INJECTION PRN
Status: DISCONTINUED | OUTPATIENT
Start: 2024-09-06 | End: 2024-09-07 | Stop reason: HOSPADM

## 2024-09-06 RX ORDER — SODIUM CHLORIDE 9 MG/ML
5-250 INJECTION, SOLUTION INTRAVENOUS PRN
OUTPATIENT
Start: 2024-09-06

## 2024-09-06 RX ORDER — SODIUM CHLORIDE 0.9 % (FLUSH) 0.9 %
5-40 SYRINGE (ML) INJECTION PRN
OUTPATIENT
Start: 2024-09-06

## 2024-09-06 RX ORDER — HEPARIN 100 UNIT/ML
500 SYRINGE INTRAVENOUS PRN
OUTPATIENT
Start: 2024-09-06

## 2024-09-06 RX ADMIN — SODIUM CHLORIDE, PRESERVATIVE FREE 10 ML: 5 INJECTION INTRAVENOUS at 11:30

## 2024-09-06 RX ADMIN — SODIUM CHLORIDE, PRESERVATIVE FREE 10 ML: 5 INJECTION INTRAVENOUS at 11:31

## 2024-09-06 NOTE — PROGRESS NOTES
[facility] Visit Notes                 Date: 2024  Name: Javier Ugalde  MRN: [id number]       : 1965    Pt admit to [facility] for Central Line Dressing Change - ambulatory in stable condition.   Denies fever, cough, and N/V- denies covid-like symptoms.      Central line dressing changed. Both catheters flushed with positive blood return. New alcohol-infused caps placed at end of each catheter. Tolerated procedure well without issue.     Patient aware of upcoming appointment. Patient declined post- monitoring time. Education provided.     Medications Administered         sodium chloride flush 0.9 % injection 5-40 mL Admin Date  2024 Action  Given Dose  10 mL Route  IntraVENous Documented By  Emilee Sanchez RN        sodium chloride flush 0.9 % injection 5-40 mL Admin Date  2024 Action  Given Dose  10 mL Route  IntraVENous Documented By  Emilee Sanchez RN          Future Appointments   Date Time Provider Department Center   2024  8:30 AM Bonny Uribe MD [facility]     Emilee Sanchez RN  2024

## 2024-09-11 ENCOUNTER — HOSPITAL ENCOUNTER (OUTPATIENT)
Facility: HOSPITAL | Age: 59
Discharge: HOME | End: 2024-09-11
Attending: FAMILY MEDICINE
Payer: MEDICARE

## 2024-09-11 VITALS
RESPIRATION RATE: 16 BRPM | DIASTOLIC BLOOD PRESSURE: 76 MMHG | HEART RATE: 71 BPM | SYSTOLIC BLOOD PRESSURE: 148 MMHG | TEMPERATURE: 98.2 F

## 2024-09-11 DIAGNOSIS — L97.112: ICD-10-CM

## 2024-09-11 DIAGNOSIS — L97.212 NON-PRESSURE CHRONIC ULCER OF RIGHT CALF WITH FAT LAYER EXPOSED (HCC): Primary | ICD-10-CM

## 2024-09-11 DIAGNOSIS — L02.415 ABSCESS OF RIGHT KNEE: ICD-10-CM

## 2024-09-11 PROCEDURE — 99213 OFFICE O/P EST LOW 20 MIN: CPT

## 2024-09-11 RX ORDER — NEOMYCIN/BACITRACIN/POLYMYXINB 3.5-400-5K
OINTMENT (GRAM) TOPICAL ONCE
OUTPATIENT
Start: 2024-09-11 | End: 2024-09-11

## 2024-09-11 RX ORDER — CLOBETASOL PROPIONATE 0.5 MG/G
OINTMENT TOPICAL ONCE
OUTPATIENT
Start: 2024-09-11 | End: 2024-09-11

## 2024-09-11 RX ORDER — SODIUM CHLOR/HYPOCHLOROUS ACID 0.033 %
SOLUTION, IRRIGATION IRRIGATION ONCE
OUTPATIENT
Start: 2024-09-11 | End: 2024-09-11

## 2024-09-11 RX ORDER — LIDOCAINE 50 MG/G
OINTMENT TOPICAL ONCE
OUTPATIENT
Start: 2024-09-11 | End: 2024-09-11

## 2024-09-11 RX ORDER — BACITRACIN ZINC AND POLYMYXIN B SULFATE 500; 1000 [USP'U]/G; [USP'U]/G
OINTMENT TOPICAL ONCE
OUTPATIENT
Start: 2024-09-11 | End: 2024-09-11

## 2024-09-11 RX ORDER — GINSENG 100 MG
CAPSULE ORAL ONCE
OUTPATIENT
Start: 2024-09-11 | End: 2024-09-11

## 2024-09-11 RX ORDER — TRIAMCINOLONE ACETONIDE 1 MG/G
OINTMENT TOPICAL ONCE
OUTPATIENT
Start: 2024-09-11 | End: 2024-09-11

## 2024-09-11 RX ORDER — LIDOCAINE 40 MG/G
CREAM TOPICAL ONCE
OUTPATIENT
Start: 2024-09-11 | End: 2024-09-11

## 2024-09-11 RX ORDER — GENTAMICIN SULFATE 1 MG/G
OINTMENT TOPICAL ONCE
OUTPATIENT
Start: 2024-09-11 | End: 2024-09-11

## 2024-09-11 RX ORDER — BETAMETHASONE DIPROPIONATE 0.5 MG/G
CREAM TOPICAL ONCE
OUTPATIENT
Start: 2024-09-11 | End: 2024-09-11

## 2024-09-11 RX ORDER — LIDOCAINE HYDROCHLORIDE 20 MG/ML
JELLY TOPICAL ONCE
OUTPATIENT
Start: 2024-09-11 | End: 2024-09-11

## 2024-09-11 RX ORDER — SILVER SULFADIAZINE 10 MG/G
CREAM TOPICAL ONCE
OUTPATIENT
Start: 2024-09-11 | End: 2024-09-11

## 2024-09-11 RX ORDER — MUPIROCIN 20 MG/G
OINTMENT TOPICAL ONCE
OUTPATIENT
Start: 2024-09-11 | End: 2024-09-11

## 2024-09-11 RX ORDER — LIDOCAINE HYDROCHLORIDE 40 MG/ML
SOLUTION TOPICAL ONCE
OUTPATIENT
Start: 2024-09-11 | End: 2024-09-11

## 2024-09-12 ENCOUNTER — TELEPHONE (OUTPATIENT)
Age: 59
End: 2024-09-12

## 2024-09-12 DIAGNOSIS — M00.9 PYOGENIC ARTHRITIS OF RIGHT KNEE JOINT, DUE TO UNSPECIFIED ORGANISM (HCC): Primary | ICD-10-CM

## 2024-09-12 RX ORDER — DOXYCYCLINE HYCLATE 100 MG
100 TABLET ORAL 2 TIMES DAILY
Qty: 28 TABLET | Refills: 0 | Status: SHIPPED | OUTPATIENT
Start: 2024-09-12 | End: 2024-09-26

## 2024-09-18 ENCOUNTER — HOSPITAL ENCOUNTER (OUTPATIENT)
Facility: HOSPITAL | Age: 59
Discharge: HOME | End: 2024-09-18
Attending: FAMILY MEDICINE
Payer: MEDICARE

## 2024-09-18 VITALS
DIASTOLIC BLOOD PRESSURE: 74 MMHG | HEART RATE: 68 BPM | SYSTOLIC BLOOD PRESSURE: 137 MMHG | RESPIRATION RATE: 16 BRPM | TEMPERATURE: 98 F

## 2024-09-18 DIAGNOSIS — L02.415 ABSCESS OF RIGHT KNEE: ICD-10-CM

## 2024-09-18 DIAGNOSIS — L97.112: Primary | ICD-10-CM

## 2024-09-18 PROCEDURE — 99213 OFFICE O/P EST LOW 20 MIN: CPT

## 2024-09-18 RX ORDER — SODIUM CHLOR/HYPOCHLOROUS ACID 0.033 %
SOLUTION, IRRIGATION IRRIGATION ONCE
OUTPATIENT
Start: 2024-09-18 | End: 2024-09-18

## 2024-09-18 RX ORDER — GENTAMICIN SULFATE 1 MG/G
OINTMENT TOPICAL ONCE
OUTPATIENT
Start: 2024-09-18 | End: 2024-09-18

## 2024-09-18 RX ORDER — LIDOCAINE HYDROCHLORIDE 20 MG/ML
JELLY TOPICAL ONCE
OUTPATIENT
Start: 2024-09-18 | End: 2024-09-18

## 2024-09-18 RX ORDER — LIDOCAINE 50 MG/G
OINTMENT TOPICAL ONCE
OUTPATIENT
Start: 2024-09-18 | End: 2024-09-18

## 2024-09-18 RX ORDER — NEOMYCIN/BACITRACIN/POLYMYXINB 3.5-400-5K
OINTMENT (GRAM) TOPICAL ONCE
OUTPATIENT
Start: 2024-09-18 | End: 2024-09-18

## 2024-09-18 RX ORDER — GINSENG 100 MG
CAPSULE ORAL ONCE
OUTPATIENT
Start: 2024-09-18 | End: 2024-09-18

## 2024-09-18 RX ORDER — LIDOCAINE 40 MG/G
CREAM TOPICAL ONCE
OUTPATIENT
Start: 2024-09-18 | End: 2024-09-18

## 2024-09-18 RX ORDER — LIDOCAINE HYDROCHLORIDE 40 MG/ML
SOLUTION TOPICAL ONCE
OUTPATIENT
Start: 2024-09-18 | End: 2024-09-18

## 2024-09-18 RX ORDER — TRIAMCINOLONE ACETONIDE 1 MG/G
OINTMENT TOPICAL ONCE
OUTPATIENT
Start: 2024-09-18 | End: 2024-09-18

## 2024-09-18 RX ORDER — SILVER SULFADIAZINE 10 MG/G
CREAM TOPICAL ONCE
OUTPATIENT
Start: 2024-09-18 | End: 2024-09-18

## 2024-09-18 RX ORDER — BETAMETHASONE DIPROPIONATE 0.5 MG/G
CREAM TOPICAL ONCE
OUTPATIENT
Start: 2024-09-18 | End: 2024-09-18

## 2024-09-18 RX ORDER — BACITRACIN ZINC AND POLYMYXIN B SULFATE 500; 1000 [USP'U]/G; [USP'U]/G
OINTMENT TOPICAL ONCE
OUTPATIENT
Start: 2024-09-18 | End: 2024-09-18

## 2024-09-18 RX ORDER — MUPIROCIN 20 MG/G
OINTMENT TOPICAL ONCE
OUTPATIENT
Start: 2024-09-18 | End: 2024-09-18

## 2024-09-18 RX ORDER — CLOBETASOL PROPIONATE 0.5 MG/G
OINTMENT TOPICAL ONCE
OUTPATIENT
Start: 2024-09-18 | End: 2024-09-18

## 2024-09-18 RX ORDER — DOXYCYCLINE HYCLATE 100 MG
100 TABLET ORAL 2 TIMES DAILY
Qty: 28 TABLET | Refills: 1 | Status: SHIPPED | OUTPATIENT
Start: 2024-09-18 | End: 2024-10-18

## 2024-09-19 ENCOUNTER — HOSPITAL ENCOUNTER (OUTPATIENT)
Facility: HOSPITAL | Age: 59
Discharge: HOME | End: 2024-09-19
Attending: INTERNAL MEDICINE | Admitting: RADIOLOGY
Payer: MEDICARE

## 2024-09-19 ENCOUNTER — HOSPITAL ENCOUNTER (OUTPATIENT)
Facility: HOSPITAL | Age: 59
Discharge: HOME | End: 2024-09-22
Attending: INTERNAL MEDICINE
Payer: MEDICARE

## 2024-09-19 DIAGNOSIS — M00.9 PYOGENIC ARTHRITIS OF RIGHT KNEE JOINT, DUE TO UNSPECIFIED ORGANISM: ICD-10-CM

## 2024-09-19 DIAGNOSIS — Z96.651 STATUS POST RIGHT KNEE REPLACEMENT: ICD-10-CM

## 2024-09-19 PROCEDURE — 20611 DRAIN/INJ JOINT/BURSA W/US: CPT

## 2024-09-19 PROCEDURE — 87205 SMEAR GRAM STAIN: CPT

## 2024-09-19 PROCEDURE — 87070 CULTURE OTHR SPECIMN AEROBIC: CPT

## 2024-09-19 PROCEDURE — 87075 CULTR BACTERIA EXCEPT BLOOD: CPT

## 2024-09-19 PROCEDURE — 36589 REMOVAL TUNNELED CV CATH: CPT

## 2024-09-19 RX ORDER — SODIUM CHLORIDE 9 MG/ML
10 INJECTION, SOLUTION INTRAMUSCULAR; INTRAVENOUS; SUBCUTANEOUS ONCE
Status: DISCONTINUED | OUTPATIENT
Start: 2024-09-19 | End: 2024-09-23 | Stop reason: HOSPADM

## 2024-09-19 RX ORDER — LIDOCAINE HYDROCHLORIDE 10 MG/ML
5 INJECTION, SOLUTION EPIDURAL; INFILTRATION; INTRACAUDAL; PERINEURAL ONCE
Status: DISCONTINUED | OUTPATIENT
Start: 2024-09-19 | End: 2024-09-23 | Stop reason: HOSPADM

## 2024-09-20 LAB
BACTERIA SPEC CULT: NORMAL
BACTERIA SPEC CULT: NORMAL
GRAM STN SPEC: NORMAL
GRAM STN SPEC: NORMAL
SERVICE CMNT-IMP: NORMAL
SERVICE CMNT-IMP: NORMAL

## 2024-10-02 ENCOUNTER — HOSPITAL ENCOUNTER (OUTPATIENT)
Facility: HOSPITAL | Age: 59
Discharge: HOME | End: 2024-10-02
Attending: FAMILY MEDICINE
Payer: MEDICARE

## 2024-10-02 VITALS
DIASTOLIC BLOOD PRESSURE: 73 MMHG | HEART RATE: 84 BPM | TEMPERATURE: 97.8 F | RESPIRATION RATE: 18 BRPM | SYSTOLIC BLOOD PRESSURE: 153 MMHG

## 2024-10-02 DIAGNOSIS — L97.112: Primary | ICD-10-CM

## 2024-10-02 DIAGNOSIS — L02.415 ABSCESS OF RIGHT KNEE: ICD-10-CM

## 2024-10-02 PROCEDURE — 10060 I&D ABSCESS SIMPLE/SINGLE: CPT

## 2024-10-02 RX ORDER — BACITRACIN ZINC AND POLYMYXIN B SULFATE 500; 1000 [USP'U]/G; [USP'U]/G
OINTMENT TOPICAL ONCE
OUTPATIENT
Start: 2024-10-02 | End: 2024-10-02

## 2024-10-02 RX ORDER — NEOMYCIN/BACITRACIN/POLYMYXINB 3.5-400-5K
OINTMENT (GRAM) TOPICAL ONCE
OUTPATIENT
Start: 2024-10-02 | End: 2024-10-02

## 2024-10-02 RX ORDER — MUPIROCIN 20 MG/G
OINTMENT TOPICAL ONCE
OUTPATIENT
Start: 2024-10-02 | End: 2024-10-02

## 2024-10-02 RX ORDER — CLOBETASOL PROPIONATE 0.5 MG/G
OINTMENT TOPICAL ONCE
OUTPATIENT
Start: 2024-10-02 | End: 2024-10-02

## 2024-10-02 RX ORDER — TRIAMCINOLONE ACETONIDE 1 MG/G
OINTMENT TOPICAL ONCE
OUTPATIENT
Start: 2024-10-02 | End: 2024-10-02

## 2024-10-02 RX ORDER — SODIUM CHLOR/HYPOCHLOROUS ACID 0.033 %
SOLUTION, IRRIGATION IRRIGATION ONCE
OUTPATIENT
Start: 2024-10-02 | End: 2024-10-02

## 2024-10-02 RX ORDER — GENTAMICIN SULFATE 1 MG/G
OINTMENT TOPICAL ONCE
OUTPATIENT
Start: 2024-10-02 | End: 2024-10-02

## 2024-10-02 RX ORDER — LIDOCAINE 40 MG/G
CREAM TOPICAL ONCE
OUTPATIENT
Start: 2024-10-02 | End: 2024-10-02

## 2024-10-02 RX ORDER — LIDOCAINE HYDROCHLORIDE 20 MG/ML
JELLY TOPICAL ONCE
OUTPATIENT
Start: 2024-10-02 | End: 2024-10-02

## 2024-10-02 RX ORDER — SILVER SULFADIAZINE 10 MG/G
CREAM TOPICAL ONCE
OUTPATIENT
Start: 2024-10-02 | End: 2024-10-02

## 2024-10-02 RX ORDER — BETAMETHASONE DIPROPIONATE 0.5 MG/G
CREAM TOPICAL ONCE
OUTPATIENT
Start: 2024-10-02 | End: 2024-10-02

## 2024-10-02 RX ORDER — LIDOCAINE 50 MG/G
OINTMENT TOPICAL ONCE
OUTPATIENT
Start: 2024-10-02 | End: 2024-10-02

## 2024-10-02 RX ORDER — GINSENG 100 MG
CAPSULE ORAL ONCE
OUTPATIENT
Start: 2024-10-02 | End: 2024-10-02

## 2024-10-02 RX ORDER — LIDOCAINE HYDROCHLORIDE 40 MG/ML
SOLUTION TOPICAL ONCE
OUTPATIENT
Start: 2024-10-02 | End: 2024-10-02

## 2024-10-02 NOTE — FLOWSHEET NOTE
10/02/24 0957   Anesthetic   Anesthetic 4% Lidocaine Cream   Right Leg Edema Point of Measurement   Leg circumference 48 cm   Ankle circumference 27 cm   Compression Therapy Tubular elastic support bandage   Tubular Elastic Support Bandage Compression Pressure Medium   Peripheral Vascular   RLE Edema Trace   RLE Neurovascular Assessment   Capillary Refill Greater than 3 seconds   Color Yellow-Brown/Hemosiderin Staining   Temperature Cool   RLE Sensation  Full sensation   R Pedal Pulse +3   Wound 07/03/24 Thigh Right;Lateral;Proximal cluster   Date First Assessed/Time First Assessed: 07/03/24 1028   Present on Original Admission: No  Wound Approximate Age at First Assessment (Weeks): 1 weeks  Location: Thigh  Wound Location Orientation: Right;Lateral;Proximal  Wound Description (Comments): ...   Wound Image    Wound Etiology Non-Healing Surgical   Dressing Status Intact;Old drainage noted   Wound Cleansed Cleansed with saline   Offloading for Diabetic Foot Ulcers Offloading not ordered   Wound Length (cm) 1 cm   Wound Width (cm) 1 cm   Wound Depth (cm) 2.5 cm   Wound Surface Area (cm^2) 1 cm^2   Change in Wound Size % (l*w) 41.52   Wound Volume (cm^3) 2.5 cm^3   Wound Healing % -1362   Distance Tunneling (cm) 6.4 cm   Tunneling Position ___ O'Clock 12   Wound Assessment [place]/red;Slough   Drainage Amount Large (50-75% saturated)   Drainage Description Serous;Yellow   Odor None   Emely-wound Assessment Hypopigmented   Margins Defined edges   Wound Thickness Description not for Pressure Injury Full thickness     BP (!) 153/73   Pulse 84   Temp 97.8 °F (36.6 °C) (Temporal)   Resp 18

## 2024-10-02 NOTE — WOUND CARE
None    Hemostasis Achieved: Pressure    Procedural Pain: 2 / 10     Post Procedural Pain: 3 / 10     Response to treatment: Well tolerated by patient        Signed By: Bonny Uribe MD     October 2, 2024

## 2024-10-02 NOTE — PATIENT INSTRUCTIONS
Discharge Instructions for  [facility]  [address] [address]  Phone: [phone] Fax: [phone]    NAME:  Javier Ugalde  YOB: 1965  MEDICAL RECORD NUMBER:  [id number]  DATE:  October 2, 2024    WOUND CARE ORDERS:  Right lateral thigh wounds-Cleanse with baby shampoo. Rinse and pat dry. Pack wound with alginate ag rope. Cover with alginate ag. Cover with gauze and ABD. Secure with coban wrap. Double tubi , size G. Change every other day.  [place] infusion center for Dalvance therapy. On day of first infusion stop taking Doxycycline.   Keep leg elevated.    Home Health ([facility]) to change three times a week (Monday, Wednesday, and Friday), unless patient has an appointment in clinic then change on Monday and Friday.     Activity:  [x] Elevate leg(s) above the level of the heart when sitting.  [x] Avoid prolonged standing in one place.   [x] Do no get dressing/wrap wet.       Dietary:  [] Diet as tolerated      [x] Diabetic Diet            [] Increase Protein: examples (Meat, cheese, eggs, greek yogurt, fish, nuts)          [] Teo Therapeutic Nutrition Powder  [] Other:  [] Dial a Dietician : Call [employer] at [phone] enter code (249) when prompted. M-F 9am-5pm EST.      Return Appointment:  [x] Return Appointment: With Dr. Uribe in 2 Weeks.  [] Nurse Visit  [] Ordered tests:      Electronically signed Oneida Polanco RN on 10/2/2024 at 10:16 AM     Wound Care Center Information: Should you experience any significant changes in your wound(s) or have questions about your wound care, please contact the [facility] at MONDAY - FRIDAY 8:00 am - 4:30.  If you need help with your wound outside these hours and cannot wait until we are again available, contact your PCP or go to the hospital emergency room.     PLEASE NOTE: IF YOU ARE UNABLE TO OBTAIN WOUND SUPPLIES, CONTINUE TO USE THE SUPPLIES YOU HAVE

## 2024-10-02 NOTE — FLOWSHEET NOTE
10/02/24 1101   Right Leg Edema Point of Measurement   Compression Therapy Tubular elastic support bandage   Tubular Elastic Support Bandage Compression Pressure Medium   Wound 07/03/24 Thigh Right;Lateral;Proximal cluster   Date First Assessed/Time First Assessed: 07/03/24 1028   Present on Original Admission: No  Wound Approximate Age at First Assessment (Weeks): 1 weeks  Location: Thigh  Wound Location Orientation: Right;Lateral;Proximal  Wound Description (Comments): ...   Dressing Status New dressing applied   Dressing/Treatment   (silver alginate rope, gauze, ABD, roll gauze, coban double tubi  size G)   Offloading for Diabetic Foot Ulcers Offloading not ordered

## 2024-10-03 LAB
BACTERIA SPEC CULT: NORMAL
GRAM STN SPEC: NORMAL
GRAM STN SPEC: NORMAL
SERVICE CMNT-IMP: NORMAL
SERVICE CMNT-IMP: NORMAL

## 2024-10-09 PROBLEM — M86.151: Status: ACTIVE | Noted: 2024-10-09

## 2024-10-11 ENCOUNTER — HOSPITAL ENCOUNTER (OUTPATIENT)
Facility: HOSPITAL | Age: 59
Setting detail: INFUSION SERIES
Discharge: HOME | End: 2024-10-11
Payer: MEDICARE

## 2024-10-11 VITALS
SYSTOLIC BLOOD PRESSURE: 120 MMHG | HEART RATE: 72 BPM | DIASTOLIC BLOOD PRESSURE: 68 MMHG | TEMPERATURE: 97.8 F | RESPIRATION RATE: 18 BRPM

## 2024-10-11 DIAGNOSIS — L02.415 ABSCESS OF RIGHT KNEE: ICD-10-CM

## 2024-10-11 DIAGNOSIS — M86.151 ACUTE OSTEOMYELITIS OF RIGHT THIGH: Primary | ICD-10-CM

## 2024-10-11 DIAGNOSIS — M00.9 PYOGENIC ARTHRITIS OF RIGHT KNEE JOINT, DUE TO UNSPECIFIED ORGANISM: ICD-10-CM

## 2024-10-11 PROCEDURE — 96365 THER/PROPH/DIAG IV INF INIT: CPT

## 2024-10-11 PROCEDURE — 2580000003 HC RX 258: Performed by: INTERNAL MEDICINE

## 2024-10-11 PROCEDURE — 6360000002 HC RX W HCPCS: Performed by: INTERNAL MEDICINE

## 2024-10-11 RX ADMIN — DALBAVANCIN 1500 MG: 500 INJECTION, POWDER, FOR SOLUTION INTRAVENOUS at 16:02

## 2024-10-11 ASSESSMENT — PAIN DESCRIPTION - LOCATION: LOCATION: BACK

## 2024-10-11 ASSESSMENT — PAIN SCALES - GENERAL: PAINLEVEL_OUTOF10: 7

## 2024-10-11 NOTE — PROGRESS NOTES
[facility] Short Note                   Date: 2024    Name: Javier Ugalde    MRN: [id number]         : 1965      Pt admit to [facility] for Dalvance () ambulatory in stable condition. Assessment completed and documented in flowsheets. No acute concerns at this time.  Please review pending lab results in CC.      Mr. Ugalde's vitals were reviewed prior to and after treatment.   Patient Vitals for the past 12 hrs:   Temp Pulse Resp BP   10/11/24 1515 97.8 °F (36.6 °C) 72 18 120/68         Medications given: via PIV L AC  Medications Administered         dalbavancin (DALVANCE) 1,500 mg in dextrose 5 % 500 mL IVPB Admin Date  10/11/2024 Action  New Bag Dose  1,500 mg Rate  999 mL/hr Route  IntraVENous Documented By  Jesica Rahman RN            PIV placed with positive blood return. PIV was flushed and removed per protocol prior to discharge.    Mr. Ugalde tolerated the infusion and had no complaints.    [facility] Ugalde [facility] in [facility] condition and is aware of future appointments.     Future Appointments   Date Time Provider Department Center   10/16/2024  9:45 AM Bonny Uribe MD [facility]       JESICA RAHMAN RN  2024  4:22 PM

## 2024-10-16 ENCOUNTER — HOSPITAL ENCOUNTER (OUTPATIENT)
Facility: HOSPITAL | Age: 59
Discharge: HOME | End: 2024-10-16
Attending: FAMILY MEDICINE
Payer: MEDICARE

## 2024-10-16 VITALS
RESPIRATION RATE: 16 BRPM | HEART RATE: 87 BPM | SYSTOLIC BLOOD PRESSURE: 129 MMHG | TEMPERATURE: 97.9 F | DIASTOLIC BLOOD PRESSURE: 83 MMHG

## 2024-10-16 DIAGNOSIS — L97.112: Primary | ICD-10-CM

## 2024-10-16 DIAGNOSIS — Z96.653 HISTORY OF TOTAL BILATERAL KNEE REPLACEMENT (TKR): ICD-10-CM

## 2024-10-16 DIAGNOSIS — L02.415 ABSCESS OF RIGHT KNEE: ICD-10-CM

## 2024-10-16 PROCEDURE — 99213 OFFICE O/P EST LOW 20 MIN: CPT

## 2024-10-16 RX ORDER — LIDOCAINE 50 MG/G
OINTMENT TOPICAL ONCE
OUTPATIENT
Start: 2024-10-16 | End: 2024-10-16

## 2024-10-16 RX ORDER — BETAMETHASONE DIPROPIONATE 0.5 MG/G
CREAM TOPICAL ONCE
OUTPATIENT
Start: 2024-10-16 | End: 2024-10-16

## 2024-10-16 RX ORDER — SODIUM CHLOR/HYPOCHLOROUS ACID 0.033 %
SOLUTION, IRRIGATION IRRIGATION ONCE
OUTPATIENT
Start: 2024-10-16 | End: 2024-10-16

## 2024-10-16 RX ORDER — LIDOCAINE HYDROCHLORIDE 20 MG/ML
JELLY TOPICAL ONCE
OUTPATIENT
Start: 2024-10-16 | End: 2024-10-16

## 2024-10-16 RX ORDER — NEOMYCIN/BACITRACIN/POLYMYXINB 3.5-400-5K
OINTMENT (GRAM) TOPICAL ONCE
OUTPATIENT
Start: 2024-10-16 | End: 2024-10-16

## 2024-10-16 RX ORDER — GINSENG 100 MG
CAPSULE ORAL ONCE
OUTPATIENT
Start: 2024-10-16 | End: 2024-10-16

## 2024-10-16 RX ORDER — GENTAMICIN SULFATE 1 MG/G
OINTMENT TOPICAL ONCE
OUTPATIENT
Start: 2024-10-16 | End: 2024-10-16

## 2024-10-16 RX ORDER — LIDOCAINE HYDROCHLORIDE 40 MG/ML
SOLUTION TOPICAL ONCE
OUTPATIENT
Start: 2024-10-16 | End: 2024-10-16

## 2024-10-16 RX ORDER — TRIAMCINOLONE ACETONIDE 1 MG/G
OINTMENT TOPICAL ONCE
OUTPATIENT
Start: 2024-10-16 | End: 2024-10-16

## 2024-10-16 RX ORDER — CLOBETASOL PROPIONATE 0.5 MG/G
OINTMENT TOPICAL ONCE
OUTPATIENT
Start: 2024-10-16 | End: 2024-10-16

## 2024-10-16 RX ORDER — SILVER SULFADIAZINE 10 MG/G
CREAM TOPICAL ONCE
OUTPATIENT
Start: 2024-10-16 | End: 2024-10-16

## 2024-10-16 RX ORDER — MUPIROCIN 20 MG/G
OINTMENT TOPICAL ONCE
OUTPATIENT
Start: 2024-10-16 | End: 2024-10-16

## 2024-10-16 RX ORDER — LIDOCAINE 40 MG/G
CREAM TOPICAL ONCE
OUTPATIENT
Start: 2024-10-16 | End: 2024-10-16

## 2024-10-16 RX ORDER — BACITRACIN ZINC AND POLYMYXIN B SULFATE 500; 1000 [USP'U]/G; [USP'U]/G
OINTMENT TOPICAL ONCE
OUTPATIENT
Start: 2024-10-16 | End: 2024-10-16

## 2024-10-16 NOTE — FLOWSHEET NOTE
10/16/24 1112   Right Leg Edema Point of Measurement   Compression Therapy Tubular elastic support bandage   Tubular Elastic Support Bandage Compression Pressure Medium   Wound 07/03/24 Thigh Right;Lateral;Proximal cluster   Date First Assessed/Time First Assessed: 07/03/24 1028   Present on Original Admission: No  Wound Approximate Age at First Assessment (Weeks): 1 weeks  Location: Thigh  Wound Location Orientation: Right;Lateral;Proximal  Wound Description (Comments): ...   Dressing Status New dressing applied   Dressing/Treatment   (silver alginate, gauze, ABD, roll gauze, coban, double tubi G)   Offloading for Diabetic Foot Ulcers Offloading not ordered

## 2024-10-16 NOTE — PATIENT INSTRUCTIONS
Discharge Instructions for  [facility]  [address] [address]  Phone: [phone] Fax: [phone]    NAME:  Javier Ugalde  YOB: 1965  MEDICAL RECORD NUMBER:  [id number]  DATE:  October 16, 2024    WOUND CARE ORDERS:  Right lateral thigh wounds-Cleanse with baby shampoo. Rinse and pat dry. Pack wound with alginate ag rope. Cover with alginate ag. Cover with gauze and ABD. Secure with coban wrap. Double tubi , size G. Change every other day.  Keep leg elevated.    Home Health ([facility]) to change three times a week (Monday, Wednesday, and Friday), unless patient has an appointment in clinic then change on Monday and Friday.     Activity:  [x] Elevate leg(s) above the level of the heart when sitting.  [x] Avoid prolonged standing in one place.   [x] Do no get dressing/wrap wet.       Dietary:  [] Diet as tolerated      [x] Diabetic Diet            [] Increase Protein: examples (Meat, cheese, eggs, greek yogurt, fish, nuts)          [] Teo Therapeutic Nutrition Powder  [] Other:  [] Dial a Dietician : Call [employer] at [phone] enter code (249) when prompted. M-F 9am-5pm EST.      Return Appointment:  [x] Return Appointment: With Dr. Uribe in 2 Weeks.  [] Nurse Visit  [] Ordered tests:      Electronically signed Oneida Polanco RN on 10/16/2024 at 10:25 AM     Wound Care Center Information: Should you experience any significant changes in your wound(s) or have questions about your wound care, please contact the [facility] at MONDAY - FRIDAY 8:00 am - 4:30.  If you need help with your wound outside these hours and cannot wait until we are again available, contact your PCP or go to the hospital emergency room.     PLEASE NOTE: IF YOU ARE UNABLE TO OBTAIN WOUND SUPPLIES, CONTINUE TO USE THE SUPPLIES YOU HAVE AVAILABLE UNTIL YOU ARE ABLE TO REACH US. IT IS MOST IMPORTANT TO KEEP THE WOUND COVERED AT ALL TIMES.

## 2024-10-16 NOTE — WOUND CARE
Take 1 tablet by mouth every morning (before breakfast)    amiodarone (PACERONE) 400 MG tablet Take 1 tablet by mouth daily    HYDROcodone-acetaminophen (NORCO) 5-325 MG per tablet Take 1 tablet by mouth every 6 hours as needed for Pain.    sennosides (SENOKOT) 8.8 MG/5ML syrup Take 15 mLs by mouth daily    lidocaine (LIDODERM) 5 % 12 hours on, 12 hours off.    blood glucose test strips (ASCENSIA AUTODISC VI;ONE TOUCH ULTRA TEST VI) strip 1 each by In Vitro route daily As needed.    Blood Glucose Monitoring Suppl (TRUE METRIX METER) w/Device KIT Use as directed to test blood sugar    albuterol sulfate HFA (PROVENTIL HFA) 108 (90 Base) MCG/ACT inhaler Inhale 2 puffs into the lungs every 6 hours as needed for Wheezing    glucose monitoring (FREESTYLE FREEDOM) kit 1 kit by Does not apply route daily     Current Facility-Administered Medications   Medication Dose Route Frequency    lidocaine 4 % jelly   Topical Once      Allergies   Allergen Reactions    Lisinopril        Review of Systems:  A comprehensive review of systems was negative except for that written in the History of Present Illness.    Physical Exam:     VS:   Vitals:    10/16/24 0957   BP: 129/83   Pulse: 87   Resp: 16   Temp: 97.9 °F (36.6 °C)        General:  alert, oriented times 3, and cooperative   Ears: ENT exam normal, no neck nodes or sinus tenderness.     chest clear to IPPA  abdomen is soft without significant tenderness, masses, organomegaly or guarding  Lower extremities: edema right > left         Wound 07/03/24 Thigh Right;Lateral;Proximal cluster (Active)   Wound Image   10/16/24 0956   Wound Etiology Non-Healing Surgical 10/16/24 0956   Dressing Status New dressing applied 10/16/24 1112   Wound Cleansed Cleansed with saline 10/16/24 0956   Dressing/Treatment ABD;Alginate with Ag;Gauze dressing/dressing sponge;Coban/self-adherent bandages;Tubular bandage 09/11/24 0957   Offloading for Diabetic Foot Ulcers Offloading not ordered 10/16/24 1112

## 2024-10-16 NOTE — FLOWSHEET NOTE
10/16/24 0956   Anesthetic   Anesthetic 4% Lidocaine Cream   Right Leg Edema Point of Measurement   Leg circumference 50 cm   Ankle circumference 27 cm   Compression Therapy Tubular elastic support bandage   Tubular Elastic Support Bandage Compression Pressure Medium   Peripheral Vascular   RLE Edema Trace   RLE Neurovascular Assessment   Capillary Refill Less than/Equal to 3 seconds   Color Appropriate for Ethnicity   Temperature Cool   RLE Sensation  Full sensation   R Pedal Pulse +3   Wound 07/03/24 Thigh Right;Lateral;Proximal cluster   Date First Assessed/Time First Assessed: 07/03/24 1028   Present on Original Admission: No  Wound Approximate Age at First Assessment (Weeks): 1 weeks  Location: Thigh  Wound Location Orientation: Right;Lateral;Proximal  Wound Description (Comments): ...   Wound Image    Wound Etiology Non-Healing Surgical   Dressing Status Intact;Old drainage noted   Wound Cleansed Cleansed with saline   Offloading for Diabetic Foot Ulcers Offloading not ordered   Wound Length (cm) 0.9 cm   Wound Width (cm) 0.9 cm   Wound Depth (cm) 1.7 cm   Wound Surface Area (cm^2) 0.81 cm^2   Change in Wound Size % (l*w) 52.63   Wound Volume (cm^3) 1.377 cm^3   Wound Healing % -705   Distance Tunneling (cm) 5.4 cm   Tunneling Position ___ O'Clock 12   Wound Assessment [place]/red;Slough   Drainage Amount Large (50-75% saturated)   Drainage Description Yellow   Odor None   Emely-wound Assessment Hypopigmented   Margins Defined edges   Wound Thickness Description not for Pressure Injury Full thickness   Pain Assessment   Pain Assessment None - Denies Pain     /83   Pulse 87   Temp 97.9 °F (36.6 °C) (Temporal)   Resp 16

## 2024-10-18 ENCOUNTER — HOSPITAL ENCOUNTER (OUTPATIENT)
Facility: HOSPITAL | Age: 59
Setting detail: INFUSION SERIES
Discharge: HOME | End: 2024-10-18
Payer: MEDICARE

## 2024-10-18 VITALS
OXYGEN SATURATION: 99 % | DIASTOLIC BLOOD PRESSURE: 71 MMHG | SYSTOLIC BLOOD PRESSURE: 124 MMHG | HEART RATE: 82 BPM | TEMPERATURE: 98.3 F | RESPIRATION RATE: 18 BRPM

## 2024-10-18 DIAGNOSIS — L02.415 ABSCESS OF RIGHT KNEE: ICD-10-CM

## 2024-10-18 DIAGNOSIS — M00.9 PYOGENIC ARTHRITIS OF RIGHT KNEE JOINT, DUE TO UNSPECIFIED ORGANISM: ICD-10-CM

## 2024-10-18 DIAGNOSIS — M86.151 ACUTE OSTEOMYELITIS OF RIGHT THIGH: Primary | ICD-10-CM

## 2024-10-18 PROCEDURE — 96365 THER/PROPH/DIAG IV INF INIT: CPT

## 2024-10-18 PROCEDURE — 2580000003 HC RX 258: Performed by: INTERNAL MEDICINE

## 2024-10-18 PROCEDURE — 6360000002 HC RX W HCPCS: Performed by: INTERNAL MEDICINE

## 2024-10-18 RX ADMIN — DEXTROSE MONOHYDRATE 1500 MG: 50 INJECTION, SOLUTION INTRAVENOUS at 09:08

## 2024-10-18 ASSESSMENT — PAIN DESCRIPTION - LOCATION: LOCATION: BACK;KNEE

## 2024-10-18 ASSESSMENT — PAIN DESCRIPTION - ORIENTATION: ORIENTATION: LEFT

## 2024-10-18 ASSESSMENT — PAIN SCALES - GENERAL: PAINLEVEL_OUTOF10: 5

## 2024-10-18 NOTE — PROGRESS NOTES
[facility] Progress Note    Date: 2024    Name: Javier Ugalde    MRN: [id number]         : 1965    Mr. Ugalde Arrived ambulatory and in no distress for Dalvance (2/).      Assessment was completed and documented in flowsheets. No acute concerns at this time. 24G IV placed without difficulty, positive blood return noted.    Mr. Ugalde's vital signs for this visit.  Vitals:    10/18/24 0800   BP: 124/71   Pulse: 82   Resp: 18   Temp: 98.3 °F (36.8 °C)   SpO2: 99%     No labs ordered for 10/18/2024    Medications given:     Medications Administered         dalbavancin (DALVANCE) 1,500 mg in dextrose 5 % 250 mL IVPB Admin Date  10/18/2024 Action  New Bag Dose  1,500 mg Rate  700 mL/hr Route  IntraVENous Documented By  Silvana Sánchez, BEENA                Mr. Ugalde tolerated the infusion, and had no complaints.    PIV flushed and removed after completion of infusion. 2x2 and coban placed.     [facility]gal[facility] and is aware of future appointments.     Future Appointments   Date Time Provider Department Center   10/30/2024  9:30 AM Bonny Uribe MD [facility]       SILVANA SÁNCHEZ RN  2024  8:20 AM

## 2024-10-30 ENCOUNTER — HOSPITAL ENCOUNTER (OUTPATIENT)
Facility: HOSPITAL | Age: 59
Discharge: HOME | End: 2024-10-30
Attending: FAMILY MEDICINE
Payer: MEDICARE

## 2024-10-30 VITALS
DIASTOLIC BLOOD PRESSURE: 75 MMHG | HEART RATE: 69 BPM | TEMPERATURE: 98 F | RESPIRATION RATE: 18 BRPM | SYSTOLIC BLOOD PRESSURE: 132 MMHG

## 2024-10-30 DIAGNOSIS — L02.415 ABSCESS OF RIGHT KNEE: ICD-10-CM

## 2024-10-30 DIAGNOSIS — L97.112: Primary | ICD-10-CM

## 2024-10-30 PROCEDURE — 97607 NEG PRS WND THR NDME<=50SQCM: CPT

## 2024-10-30 RX ORDER — LIDOCAINE HYDROCHLORIDE 20 MG/ML
JELLY TOPICAL ONCE
OUTPATIENT
Start: 2024-10-30 | End: 2024-10-30

## 2024-10-30 RX ORDER — BACITRACIN ZINC AND POLYMYXIN B SULFATE 500; 1000 [USP'U]/G; [USP'U]/G
OINTMENT TOPICAL ONCE
OUTPATIENT
Start: 2024-10-30 | End: 2024-10-30

## 2024-10-30 RX ORDER — BETAMETHASONE DIPROPIONATE 0.5 MG/G
CREAM TOPICAL ONCE
OUTPATIENT
Start: 2024-10-30 | End: 2024-10-30

## 2024-10-30 RX ORDER — LIDOCAINE 50 MG/G
OINTMENT TOPICAL ONCE
OUTPATIENT
Start: 2024-10-30 | End: 2024-10-30

## 2024-10-30 RX ORDER — TRIAMCINOLONE ACETONIDE 1 MG/G
OINTMENT TOPICAL ONCE
OUTPATIENT
Start: 2024-10-30 | End: 2024-10-30

## 2024-10-30 RX ORDER — SILVER SULFADIAZINE 10 MG/G
CREAM TOPICAL ONCE
OUTPATIENT
Start: 2024-10-30 | End: 2024-10-30

## 2024-10-30 RX ORDER — NEOMYCIN/BACITRACIN/POLYMYXINB 3.5-400-5K
OINTMENT (GRAM) TOPICAL ONCE
OUTPATIENT
Start: 2024-10-30 | End: 2024-10-30

## 2024-10-30 RX ORDER — LIDOCAINE 40 MG/G
CREAM TOPICAL ONCE
OUTPATIENT
Start: 2024-10-30 | End: 2024-10-30

## 2024-10-30 RX ORDER — LIDOCAINE HYDROCHLORIDE 40 MG/ML
SOLUTION TOPICAL ONCE
OUTPATIENT
Start: 2024-10-30 | End: 2024-10-30

## 2024-10-30 RX ORDER — SODIUM CHLOR/HYPOCHLOROUS ACID 0.033 %
SOLUTION, IRRIGATION IRRIGATION ONCE
OUTPATIENT
Start: 2024-10-30 | End: 2024-10-30

## 2024-10-30 RX ORDER — CLOBETASOL PROPIONATE 0.5 MG/G
OINTMENT TOPICAL ONCE
OUTPATIENT
Start: 2024-10-30 | End: 2024-10-30

## 2024-10-30 RX ORDER — GINSENG 100 MG
CAPSULE ORAL ONCE
OUTPATIENT
Start: 2024-10-30 | End: 2024-10-30

## 2024-10-30 RX ORDER — GENTAMICIN SULFATE 1 MG/G
OINTMENT TOPICAL ONCE
OUTPATIENT
Start: 2024-10-30 | End: 2024-10-30

## 2024-10-30 RX ORDER — MUPIROCIN 20 MG/G
OINTMENT TOPICAL ONCE
OUTPATIENT
Start: 2024-10-30 | End: 2024-10-30

## 2024-10-30 NOTE — WOUND CARE
Wound Care follow-up        Assessment:     59 y.o. 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 Rinse and pat dry.   Pack wound with alginate ag rope.   Apply snap vacuum at 125mmHg    Double tubi , size G. Change every other day.    For right femur osteomyelitis, he has been through already 6 weeks of IV followed by PO Doxycyline but given persistent abscess cavity and drainage, gave Dalbavancin 2 dose regimen at Infusion center.     Keep leg elevated.    Home Health ([facility]) to change three times a week (Monday, Wednesday, and Friday), unless patient has an appointment in clinic then change on Monday and Friday.    Currently patient failing medical management, Abx, draining abscess, Cx results showing Staph species, OR debridement may help.  I left message with 's office and left my cell no to discuss the case and relay my concerns but I have not heard from [facility] yet.    Patient's GILLES cath is removed. He has life west currently and was advised not to have permanent pacemaker until this infection is healed.    Keep leg elevated when sitting down.    Follow up in 1 weeks.  Patient understood and agrees with plan. Questions answered.        History of Present Illness:      Javier Ugalde is a 59 y.o.  male for evaluation of draining rt post thigh abscess and chronic sinus tract infection with Staph strain.  Patient had superficial wound started on right ant lateral knee area when he scratched that area. With swelling of that leg, wound took long time to heal and worsened. He was treated with PO Abx but due to concern for

## 2024-10-30 NOTE — FLOWSHEET NOTE
10/30/24 0949   Anesthetic   Anesthetic 4% Lidocaine Cream   Right Leg Edema Point of Measurement   Leg circumference 50 cm   Ankle circumference 27.5 cm   Compression Therapy Tubular elastic support bandage   Tubular Elastic Support Bandage Compression Pressure Low   Peripheral Vascular   RLE Edema +2;Pitting   RLE Neurovascular Assessment   Capillary Refill Less than/Equal to 3 seconds   Color Appropriate for Ethnicity   Temperature Warm   RLE Sensation  Full sensation   R Pedal Pulse +3   Wound 07/03/24 Thigh Right;Lateral;Proximal cluster   Date First Assessed/Time First Assessed: 07/03/24 1028   Present on Original Admission: No  Wound Approximate Age at First Assessment (Weeks): 1 weeks  Location: Thigh  Wound Location Orientation: Right;Lateral;Proximal  Wound Description (Comments): ...   Wound Image    Wound Etiology Non-Healing Surgical   Dressing Status Intact;Old drainage noted   Wound Cleansed Cleansed with saline   Offloading for Diabetic Foot Ulcers Offloading not ordered   Wound Length (cm) 0.4 cm   Wound Width (cm) 0.3 cm   Wound Depth (cm) 2 cm   Wound Surface Area (cm^2) 0.12 cm^2   Change in Wound Size % (l*w) 92.98   Wound Volume (cm^3) 0.24 cm^3   Wound Healing % -40   Distance Tunneling (cm) 7 cm   Tunneling Position ___ O'Clock 9   Undermining Starts ___ O'Clock 12   Undermining [place] Distance (cm) 4.0   Wound Assessment Pink/red   Drainage Amount Large (50-75% saturated)   Drainage Description Serosanguinous  (tan)   Odor None   Emely-wound Assessment Hypopigmented   Margins Defined edges   Wound Thickness Description not for Pressure Injury Full thickness   Pain Assessment   Pain Assessment None - Denies Pain     /75   Pulse 69   Temp 98 °F (36.7 °C) (Temporal)   Resp 18

## 2024-10-30 NOTE — PATIENT INSTRUCTIONS
Discharge Instructions for  [facility]  [address] [address]  Phone: [phone] Fax: [phone]    NAME:  Javier Ugalde  YOB: 1965  MEDICAL RECORD NUMBER:  [id number]  DATE:  October 30, 2024    WOUND CARE ORDERS:  Right lateral thigh wounds-Cleanse with baby shampoo. Rinse and pat dry. Pack wound with alginate ag rope at -include tunneling from 2 to 6 o'clock. Apply snap vacuum at 125mmHg. Double tubigrip, size G. Change weekly in clinic.  Keep leg elevated.    Discontinue Home Health ([facility]) at this time.   Activity:  [x] Elevate leg(s) above the level of the heart when sitting.  [x] Avoid prolonged standing in one place.   [x] Do no get dressing/wrap wet.       Dietary:  [] Diet as tolerated      [x] Diabetic Diet            [] Increase Protein: examples (Meat, cheese, eggs, greek yogurt, fish, nuts)          [] Teo Therapeutic Nutrition Powder  [] Other:  [] Dial a Dietician : Call [employer] at [phone] enter code ([email or web address]) when prompted. M-F 9am-5pm EST.      Return Appointment:  [x] Return Appointment: With Dr. Uribe in 1 Week(s).  [] Nurse Visit:   [] Ordered tests:          Wound Care Center Information: Should you experience any significant changes in your wound(s) or have questions about your wound care, please contact the [facility] at MONDAY - FRIDAY 8:00 am - 4:30.  If you need help with your wound outside these hours and cannot wait until we are again available, contact your PCP or go to the hospital emergency room.     PLEASE NOTE: IF YOU ARE UNABLE TO OBTAIN WOUND SUPPLIES, CONTINUE TO USE THE SUPPLIES YOU HAVE AVAILABLE UNTIL YOU ARE ABLE TO REACH US. IT IS MOST IMPORTANT TO KEEP THE WOUND COVERED AT ALL TIMES.     Physician Signature:_______________________    Date: ___________ Time:  ____________

## 2024-10-31 ENCOUNTER — HOSPITAL ENCOUNTER (OUTPATIENT)
Facility: HOSPITAL | Age: 59
Discharge: HOME | End: 2024-10-31
Attending: INTERNAL MEDICINE
Payer: MEDICARE

## 2024-10-31 VITALS
DIASTOLIC BLOOD PRESSURE: 72 MMHG | SYSTOLIC BLOOD PRESSURE: 102 MMHG | HEART RATE: 66 BPM | TEMPERATURE: 98 F | RESPIRATION RATE: 18 BRPM

## 2024-10-31 DIAGNOSIS — L97.112: Primary | ICD-10-CM

## 2024-10-31 DIAGNOSIS — L02.415 ABSCESS OF RIGHT KNEE: ICD-10-CM

## 2024-10-31 PROCEDURE — 99213 OFFICE O/P EST LOW 20 MIN: CPT

## 2024-10-31 RX ORDER — LIDOCAINE HYDROCHLORIDE 20 MG/ML
JELLY TOPICAL ONCE
OUTPATIENT
Start: 2024-10-31 | End: 2024-10-31

## 2024-10-31 RX ORDER — TRIAMCINOLONE ACETONIDE 1 MG/G
OINTMENT TOPICAL ONCE
OUTPATIENT
Start: 2024-10-31 | End: 2024-10-31

## 2024-10-31 RX ORDER — SILVER SULFADIAZINE 10 MG/G
CREAM TOPICAL ONCE
OUTPATIENT
Start: 2024-10-31 | End: 2024-10-31

## 2024-10-31 RX ORDER — NEOMYCIN/BACITRACIN/POLYMYXINB 3.5-400-5K
OINTMENT (GRAM) TOPICAL ONCE
OUTPATIENT
Start: 2024-10-31 | End: 2024-10-31

## 2024-10-31 RX ORDER — GENTAMICIN SULFATE 1 MG/G
OINTMENT TOPICAL ONCE
OUTPATIENT
Start: 2024-10-31 | End: 2024-10-31

## 2024-10-31 RX ORDER — MUPIROCIN 20 MG/G
OINTMENT TOPICAL ONCE
OUTPATIENT
Start: 2024-10-31 | End: 2024-10-31

## 2024-10-31 RX ORDER — LIDOCAINE 40 MG/G
CREAM TOPICAL ONCE
OUTPATIENT
Start: 2024-10-31 | End: 2024-10-31

## 2024-10-31 RX ORDER — SODIUM CHLOR/HYPOCHLOROUS ACID 0.033 %
SOLUTION, IRRIGATION IRRIGATION ONCE
OUTPATIENT
Start: 2024-10-31 | End: 2024-10-31

## 2024-10-31 RX ORDER — BACITRACIN ZINC AND POLYMYXIN B SULFATE 500; 1000 [USP'U]/G; [USP'U]/G
OINTMENT TOPICAL ONCE
OUTPATIENT
Start: 2024-10-31 | End: 2024-10-31

## 2024-10-31 RX ORDER — LIDOCAINE HYDROCHLORIDE 40 MG/ML
SOLUTION TOPICAL ONCE
OUTPATIENT
Start: 2024-10-31 | End: 2024-10-31

## 2024-10-31 RX ORDER — BETAMETHASONE DIPROPIONATE 0.5 MG/G
CREAM TOPICAL ONCE
OUTPATIENT
Start: 2024-10-31 | End: 2024-10-31

## 2024-10-31 RX ORDER — LIDOCAINE 50 MG/G
OINTMENT TOPICAL ONCE
OUTPATIENT
Start: 2024-10-31 | End: 2024-10-31

## 2024-10-31 RX ORDER — CLOBETASOL PROPIONATE 0.5 MG/G
OINTMENT TOPICAL ONCE
OUTPATIENT
Start: 2024-10-31 | End: 2024-10-31

## 2024-10-31 RX ORDER — GINSENG 100 MG
CAPSULE ORAL ONCE
OUTPATIENT
Start: 2024-10-31 | End: 2024-10-31

## 2024-10-31 NOTE — FLOWSHEET NOTE
Wound Care Center  Nurse Visit      10/31/24 1128   Right Leg Edema Point of Measurement   Compression Therapy Tubular elastic support bandage   Tubular Elastic Support Bandage Compression Pressure Low   Peripheral Vascular   RLE Edema +2;Pitting   RLE Neurovascular Assessment   Capillary Refill Less than/Equal to 3 seconds   Color Appropriate for Ethnicity   Temperature Warm   RLE Sensation  Full sensation   R Pedal Pulse +3   Wound 07/03/24 Thigh Right;Lateral;Proximal cluster   Date First Assessed/Time First Assessed: 07/03/24 1028   Present on Original Admission: No  Wound Approximate Age at First Assessment (Weeks): 1 weeks  Location: Thigh  Wound Location Orientation: Right;Lateral;Proximal  Wound Description (Comments): ...   Wound Etiology Non-Healing Surgical   Dressing Status Old drainage noted;New drainage noted;Intact;New dressing applied   Wound Cleansed Irrigated with saline   Dressing/Treatment Packing;Iodoform gauze;Alginate with Ag;Gauze dressing/dressing sponge;ABD;Tape/Soft cloth adhesive tape   Offloading for Diabetic Foot Ulcers Offloading not ordered   Wound Assessment Pink/red   Drainage Amount [place] (>75 % saturated)   Drainage Description Serosanguinous;Pink;Yellow   Odor None   Emely-wound Assessment Intact;Hyperpigmented   Margins Defined edges   Wound Thickness Description not for Pressure Injury Full thickness   Pain Assessment   Pain Assessment None - Denies Pain     /72   Pulse 66   Temp 98 °F (36.7 °C) (Temporal)   Resp 18

## 2024-10-31 NOTE — PROGRESS NOTES
Discharge Instructions for  [facility]  [address] [address]  Phone: [phone] Fax: [phone]    NAME:  Javier Ugalde  YOB: 1965  MEDICAL RECORD NUMBER:  [id number]  DATE:  October 31, 2024    WOUND CARE ORDERS:  Right lateral thigh wounds-Cleanse with baby shampoo. Rinse and pat dry. Pack wound with Iodofom. Cover with alginate ag. Cover with gauze and ABD. Secure with coban wrap. Double tubi , size G. Change every other day.    Home Health ([facility]) to change three times a week (Monday, Wednesday, and Friday), unless patient has an appointment in clinic then change on Monday and Friday.     Activity:  [x] Elevate leg(s) above the level of the heart when sitting.  [x] Avoid prolonged standing in one place.   [x] Do no get dressing/wrap wet.       Dietary:  [] Diet as tolerated      [x] Diabetic Diet            [] Increase Protein: examples (Meat, cheese, eggs, greek yogurt, fish, nuts)          [] Teo Therapeutic Nutrition Powder  [] Other:  [] Dial a Dietician : Call [employer] at [phone] enter code (249) when prompted. M-F 9am-5pm EST.      Return Appointment:  [] Return Appointment  [] Nurse Visit  [] Ordered tests:          Wound Care Center Information: Should you experience any significant changes in your wound(s) or have questions about your wound care, please contact the [facility] at MONDAY - FRIDAY 8:00 am - 4:30.  If you need help with your wound outside these hours and cannot wait until we are again available, contact your PCP or go to the hospital emergency room.     PLEASE NOTE: IF YOU ARE UNABLE TO OBTAIN WOUND SUPPLIES, CONTINUE TO USE THE SUPPLIES YOU HAVE AVAILABLE UNTIL YOU ARE ABLE TO REACH US. IT IS MOST IMPORTANT TO KEEP THE WOUND COVERED AT ALL TIMES.     Physician Signature:_______________________    Date: ___________ Time:  ____________

## 2024-11-06 ENCOUNTER — HOSPITAL ENCOUNTER (OUTPATIENT)
Facility: HOSPITAL | Age: 59
Discharge: HOME | End: 2024-11-06
Attending: FAMILY MEDICINE
Payer: MEDICARE

## 2024-11-06 VITALS
HEART RATE: 75 BPM | DIASTOLIC BLOOD PRESSURE: 75 MMHG | RESPIRATION RATE: 18 BRPM | SYSTOLIC BLOOD PRESSURE: 145 MMHG | TEMPERATURE: 99 F

## 2024-11-06 DIAGNOSIS — L97.112: Primary | ICD-10-CM

## 2024-11-06 DIAGNOSIS — L02.415 ABSCESS OF RIGHT KNEE: ICD-10-CM

## 2024-11-06 PROCEDURE — 99213 OFFICE O/P EST LOW 20 MIN: CPT

## 2024-11-06 RX ORDER — TRIAMCINOLONE ACETONIDE 1 MG/G
OINTMENT TOPICAL ONCE
OUTPATIENT
Start: 2024-11-06 | End: 2024-11-06

## 2024-11-06 RX ORDER — MUPIROCIN 20 MG/G
OINTMENT TOPICAL ONCE
OUTPATIENT
Start: 2024-11-06 | End: 2024-11-06

## 2024-11-06 RX ORDER — LIDOCAINE HYDROCHLORIDE 20 MG/ML
JELLY TOPICAL ONCE
OUTPATIENT
Start: 2024-11-06 | End: 2024-11-06

## 2024-11-06 RX ORDER — LIDOCAINE 50 MG/G
OINTMENT TOPICAL ONCE
OUTPATIENT
Start: 2024-11-06 | End: 2024-11-06

## 2024-11-06 RX ORDER — GINSENG 100 MG
CAPSULE ORAL ONCE
OUTPATIENT
Start: 2024-11-06 | End: 2024-11-06

## 2024-11-06 RX ORDER — LIDOCAINE 40 MG/G
CREAM TOPICAL ONCE
OUTPATIENT
Start: 2024-11-06 | End: 2024-11-06

## 2024-11-06 RX ORDER — SILVER SULFADIAZINE 10 MG/G
CREAM TOPICAL ONCE
OUTPATIENT
Start: 2024-11-06 | End: 2024-11-06

## 2024-11-06 RX ORDER — LIDOCAINE HYDROCHLORIDE 40 MG/ML
SOLUTION TOPICAL ONCE
OUTPATIENT
Start: 2024-11-06 | End: 2024-11-06

## 2024-11-06 RX ORDER — GENTAMICIN SULFATE 1 MG/G
OINTMENT TOPICAL ONCE
OUTPATIENT
Start: 2024-11-06 | End: 2024-11-06

## 2024-11-06 RX ORDER — CLOBETASOL PROPIONATE 0.5 MG/G
OINTMENT TOPICAL ONCE
OUTPATIENT
Start: 2024-11-06 | End: 2024-11-06

## 2024-11-06 RX ORDER — BACITRACIN ZINC AND POLYMYXIN B SULFATE 500; 1000 [USP'U]/G; [USP'U]/G
OINTMENT TOPICAL ONCE
OUTPATIENT
Start: 2024-11-06 | End: 2024-11-06

## 2024-11-06 RX ORDER — BETAMETHASONE DIPROPIONATE 0.5 MG/G
CREAM TOPICAL ONCE
OUTPATIENT
Start: 2024-11-06 | End: 2024-11-06

## 2024-11-06 RX ORDER — SODIUM CHLOR/HYPOCHLOROUS ACID 0.033 %
SOLUTION, IRRIGATION IRRIGATION ONCE
OUTPATIENT
Start: 2024-11-06 | End: 2024-11-06

## 2024-11-06 RX ORDER — NEOMYCIN/BACITRACIN/POLYMYXINB 3.5-400-5K
OINTMENT (GRAM) TOPICAL ONCE
OUTPATIENT
Start: 2024-11-06 | End: 2024-11-06

## 2024-11-06 NOTE — WOUND CARE
Wound Care follow-up        Assessment:     59 y.o. 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 Rinse and pat dry.   Pack wound with alginate ag rope.   Cover with gauze and gauze roll.  When wound vac is approved will apply vacuum at 125mmHg    Double tubi , size G. Change every other day.    For right femur osteomyelitis, he has been through already 6 weeks of IV followed by PO Doxycyline but given persistent abscess cavity and drainage, s/p Dalbavancin 2 dose regimen at Infusion center.     Keep leg elevated.    [facility] ([facility]) to change three times a week (Monday, Wednesday, and Friday), unless patient has an appointment in clinic then change on Monday and Friday.    Currently patient failing medical management, Abx, draining abscess, Cx results showing Staph species, OR debridement may help.  I left message with 's office and left my cell no to discuss the case and relay my concerns but I have not heard from [facility] yet.    Patient's GILLES cath is removed. He has life west currently and was advised not to have permanent pacemaker until this infection is healed.    Keep leg elevated when sitting down.    Follow up in 1 weeks.  Patient understood and agrees with plan. Questions answered.        History of Present Illness:      Javier Ugalde is a 59 y.o.  male for evaluation of draining rt post thigh abscess and chronic sinus tract infection with Staph strain.  Patient had superficial wound started on right ant lateral knee area when he scratched that area. With swelling of that leg, wound took long time to heal and

## 2024-11-06 NOTE — FLOWSHEET NOTE
11/06/24 0955   Anesthetic   Anesthetic 4% Lidocaine Cream   Right Leg Edema Point of Measurement   Leg circumference 50 cm   Ankle circumference 27 cm   Compression Therapy Tubular elastic support bandage   Tubular Elastic Support Bandage Compression Pressure Low   Peripheral Vascular   RLE Edema +1;Pitting   RLE Neurovascular Assessment   Capillary Refill Less than/Equal to 3 seconds   Color Appropriate for Ethnicity   Temperature Warm   RLE Sensation  Full sensation   R Pedal Pulse +3   Wound 07/03/24 Thigh Right;Lateral;Proximal cluster   Date First Assessed/Time First Assessed: 07/03/24 1028   Present on Original Admission: No  Wound Approximate Age at First Assessment (Weeks): 1 weeks  Location: Thigh  Wound Location Orientation: Right;Lateral;Proximal  Wound Description (Comments): ...   Wound Image    Wound Etiology Non-Healing Surgical   Dressing Status Old drainage noted;Intact   Wound Cleansed Cleansed with saline   Offloading for Diabetic Foot Ulcers Offloading not ordered   Wound Length (cm) 0.5 cm   Wound Width (cm) 0.5 cm   Wound Depth (cm) 2 cm   Wound Surface Area (cm^2) 0.25 cm^2   Change in Wound Size % (l*w) 85.38   Wound Volume (cm^3) 0.5 cm^3   Wound Healing % -192   Distance Tunneling (cm) 5.8 cm   Tunneling Position ___ O'Clock 10   Wound Assessment [place]/red;Slough   Drainage Amount Copious (>75 % saturated)   Drainage Description Serosanguinous;Yellow   Odor None   Emely-wound Assessment Intact;Hyperpigmented   Margins Defined edges   Wound Thickness Description not for Pressure Injury Full thickness   Pain Assessment   Pain Assessment None - Denies Pain     BP (!) 145/75   Pulse 75   Temp 99 °F (37.2 °C) (Temporal)   Resp 18

## 2024-11-06 NOTE — FLOWSHEET NOTE
11/06/24 1044   Right Leg Edema Point of Measurement   Compression Therapy Tubular elastic support bandage   Tubular Elastic Support Bandage Compression Pressure Medium   Wound 07/03/24 Thigh Right;Lateral;Proximal cluster   Date First Assessed/Time First Assessed: 07/03/24 1028   Present on Original Admission: No  Wound Approximate Age at First Assessment (Weeks): 1 weeks  Location: Thigh  Wound Location Orientation: Right;Lateral;Proximal  Wound Description (Comments): ...   Dressing Status New dressing applied   Dressing/Treatment   (iodoform packing, silver alginate, ABD, roll gauze, coban double tubi  g)   Offloading for Diabetic Foot Ulcers Offloading not ordered

## 2024-11-13 ENCOUNTER — HOSPITAL ENCOUNTER (OUTPATIENT)
Facility: HOSPITAL | Age: 59
Discharge: HOME | End: 2024-11-13
Attending: FAMILY MEDICINE
Payer: MEDICARE

## 2024-11-13 VITALS — DIASTOLIC BLOOD PRESSURE: 76 MMHG | TEMPERATURE: 98.2 F | SYSTOLIC BLOOD PRESSURE: 119 MMHG | HEART RATE: 72 BPM

## 2024-11-13 DIAGNOSIS — L97.112: Primary | ICD-10-CM

## 2024-11-13 DIAGNOSIS — L02.415 ABSCESS OF RIGHT KNEE: ICD-10-CM

## 2024-11-13 PROCEDURE — 97605 NEG PRS WND THER DME<=50SQCM: CPT

## 2024-11-13 PROCEDURE — 99213 OFFICE O/P EST LOW 20 MIN: CPT

## 2024-11-13 RX ORDER — LIDOCAINE 40 MG/G
CREAM TOPICAL ONCE
OUTPATIENT
Start: 2024-11-13 | End: 2024-11-13

## 2024-11-13 RX ORDER — GENTAMICIN SULFATE 1 MG/G
OINTMENT TOPICAL ONCE
OUTPATIENT
Start: 2024-11-13 | End: 2024-11-13

## 2024-11-13 RX ORDER — GINSENG 100 MG
CAPSULE ORAL ONCE
OUTPATIENT
Start: 2024-11-13 | End: 2024-11-13

## 2024-11-13 RX ORDER — SILVER SULFADIAZINE 10 MG/G
CREAM TOPICAL ONCE
OUTPATIENT
Start: 2024-11-13 | End: 2024-11-13

## 2024-11-13 RX ORDER — SODIUM CHLOR/HYPOCHLOROUS ACID 0.033 %
SOLUTION, IRRIGATION IRRIGATION ONCE
OUTPATIENT
Start: 2024-11-13 | End: 2024-11-13

## 2024-11-13 RX ORDER — LIDOCAINE HYDROCHLORIDE 40 MG/ML
SOLUTION TOPICAL ONCE
OUTPATIENT
Start: 2024-11-13 | End: 2024-11-13

## 2024-11-13 RX ORDER — LIDOCAINE 50 MG/G
OINTMENT TOPICAL ONCE
OUTPATIENT
Start: 2024-11-13 | End: 2024-11-13

## 2024-11-13 RX ORDER — LIDOCAINE HYDROCHLORIDE 20 MG/ML
JELLY TOPICAL ONCE
OUTPATIENT
Start: 2024-11-13 | End: 2024-11-13

## 2024-11-13 RX ORDER — NEOMYCIN/BACITRACIN/POLYMYXINB 3.5-400-5K
OINTMENT (GRAM) TOPICAL ONCE
OUTPATIENT
Start: 2024-11-13 | End: 2024-11-13

## 2024-11-13 RX ORDER — TRIAMCINOLONE ACETONIDE 1 MG/G
OINTMENT TOPICAL ONCE
OUTPATIENT
Start: 2024-11-13 | End: 2024-11-13

## 2024-11-13 RX ORDER — MUPIROCIN 20 MG/G
OINTMENT TOPICAL ONCE
OUTPATIENT
Start: 2024-11-13 | End: 2024-11-13

## 2024-11-13 RX ORDER — CLOBETASOL PROPIONATE 0.5 MG/G
OINTMENT TOPICAL ONCE
OUTPATIENT
Start: 2024-11-13 | End: 2024-11-13

## 2024-11-13 RX ORDER — BACITRACIN ZINC AND POLYMYXIN B SULFATE 500; 1000 [USP'U]/G; [USP'U]/G
OINTMENT TOPICAL ONCE
OUTPATIENT
Start: 2024-11-13 | End: 2024-11-13

## 2024-11-13 RX ORDER — BETAMETHASONE DIPROPIONATE 0.5 MG/G
CREAM TOPICAL ONCE
OUTPATIENT
Start: 2024-11-13 | End: 2024-11-13

## 2024-11-13 NOTE — FLOWSHEET NOTE
11/13/24 0956   Anesthetic   Anesthetic 4% Lidocaine Cream   Right Leg Edema Point of Measurement   Leg circumference 48 cm   Ankle circumference 28 cm   Compression Therapy Tubular elastic support bandage   Tubular Elastic Support Bandage Compression Pressure Medium   RLE Neurovascular Assessment   Capillary Refill Less than/Equal to 3 seconds   Color Appropriate for Ethnicity   Temperature Warm   RLE Sensation  Full sensation   R Pedal Pulse +3   Wound 07/03/24 Thigh Right;Lateral;Proximal cluster   Date First Assessed/Time First Assessed: 07/03/24 1028   Present on Original Admission: No  Wound Approximate Age at First Assessment (Weeks): 1 weeks  Location: Thigh  Wound Location Orientation: Right;Lateral;Proximal  Wound Description (Comments): ...   Wound Image    Wound Etiology Non-Healing Surgical   Dressing Status Old drainage noted   Wound Cleansed Cleansed with saline   Offloading for Diabetic Foot Ulcers Offloading not required   Wound Length (cm) 0.2 cm   Wound Width (cm) 0.2 cm   Wound Depth (cm) 2 cm   Wound Surface Area (cm^2) 0.04 cm^2   Change in Wound Size % (l*w) 97.66   Wound Volume (cm^3) 0.08 cm^3   Wound Healing % 53   Distance Tunneling (cm) 4.8 cm   Tunneling Position ___ O'Clock 10   Undermining Starts ___ O'Clock 12   Undermining Ends___ O'Clock 2   Wound Assessment [place]/red;Slough   Drainage Amount Copious (>75 % saturated)   Drainage Description Serosanguinous   Odor None   Emely-wound Assessment Intact   Margins Defined edges   Wound Thickness Description not for Pressure Injury Full thickness     /76   Pulse 72   Temp 98.2 °F (36.8 °C) (Temporal)

## 2024-11-13 NOTE — PATIENT INSTRUCTIONS
Discharge Instructions for  [facility]  [address] [address]  Phone: [phone] Fax: [phone]    NAME:  Javier Ugalde  YOB: 1965  MEDICAL RECORD NUMBER:  [id number]  DATE:  November 13, 2024    WOUND CARE ORDERS:  Right lateral thigh wounds-Cleanse with baby shampoo. Rinse and pat dry. Apply negative pressure therapy as instructed below. Double tubi , size G.   You will need to change your canister at home as instructed.   Negative Pressure:    Pressure @   125  mmHg   [X] continuous [ ] intermittent  [X]black foam [ ] white foam [ ] Other:                             Change Dressing: [ ] Two times per week [ ] Three times per week [X]Other: once a week in clinic                 Discontinue Home Health ([facility]) at this time.      Activity:  [x] Elevate leg(s) above the level of the heart when sitting.  [x] Avoid prolonged standing in one place.   [x] Do no get dressing/wrap wet.       Dietary:  [] Diet as tolerated      [x] Diabetic Diet            [] Increase Protein: examples (Meat, cheese, eggs, greek yogurt, fish, nuts)          [] Teo Therapeutic Nutrition Powder  [] Other:  [] Dial a Dietician : Call [employer] at [phone] enter code (249) when prompted. M-F 9am-5pm EST.      Return Appointment:  [x] Return Appointment: Dr. Bonny rUibe in 1 week.  [] Nurse Visit  [] Ordered tests:          Wound Care Center Information: Should you experience any significant changes in your wound(s) or have questions about your wound care, please contact the [facility] at MONDAY - FRIDAY 8:00 am - 4:30.  If you need help with your wound outside these hours and cannot wait until we are again available, contact your PCP or go to the hospital emergency room.     PLEASE NOTE: IF YOU ARE UNABLE TO OBTAIN WOUND SUPPLIES, CONTINUE TO USE THE SUPPLIES YOU HAVE AVAILABLE UNTIL YOU ARE ABLE TO REACH US. IT IS MOST IMPORTANT

## 2024-11-13 NOTE — WOUND CARE
Wound Care follow-up        Assessment:     59 y.o. 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 Rinse and pat dry.   Apply negative pressure therapy as instructed below.   Double tubi , size G.   You will need to change your canister at home as instructed.    For right femur osteomyelitis, he has been through already 6 weeks of IV followed by PO Doxycyline but given persistent abscess cavity and drainage, s/p Dalbavancin 2 dose regimen at Infusion center.     Currently patient failing medical management, Abx, draining abscess, Cx results showing Staph species, OR debridement may help.  I left message with 's office and left my cell no to discuss the case and relay my concerns but I have not heard from [facility] yet.    Patient's GILLES cath is removed. He has life west currently and was advised not to have permanent pacemaker until this infection is healed.    Keep leg elevated when sitting down.    Follow up in 1 weeks.  Patient understood and agrees with plan. Questions answered.        History of Present Illness:      Javier Ugalde is a 59 y.o.  male for evaluation of draining rt post thigh abscess and chronic sinus tract infection with Staph strain.  Patient had superficial wound started on right ant lateral knee area when he scratched that area. With swelling of that leg, wound took long time to heal and worsened. He was treated with PO Abx but due to concern for underlying rt prosthesis infection, he was admitted in [facility]. No Sx intervention done. He had Gilles cath placed due to hx CKD. Wound Cx with mixed skin

## 2024-11-13 NOTE — FLOWSHEET NOTE
11/13/24 1046   Wound 07/03/24 Thigh Right;Lateral;Proximal cluster   Date First Assessed/Time First Assessed: 07/03/24 1028   Present on Original Admission: No  Wound Approximate Age at First Assessment (Weeks): 1 weeks  Location: Thigh  Wound Location Orientation: Right;Lateral;Proximal  Wound Description (Comments): ...   Dressing Status New dressing applied   Dressing/Treatment Negative pressure wound therapy   Offloading for Diabetic Foot Ulcers Offloading not required     Negative Pressure    NAME:  Javier Ugalde  YOB: 1965  MEDICAL RECORD NUMBER:  [id number]  DATE:  November 13, 2024    Applied Negative Pressure to 1 wound(s)/ulcer(s).  [x] Applied skin barrier prep to kristyn-wound.   [x] Cut strips of plastic drape to picture frame wound so that kristyn-wound is     covered with the drape.   [x] If bridging dressing to less prominent site, cover any intact skin that will come in contact with the Negative Pressure Therapy sponge, gauze or channel drain with plastic drape.  The sponge should never touch intact skin.   [x] Cut sponge, gauze or channel drain to size which will fit into the wound/ulcer bed without being forced.   [x] Be sure the sponge is large enough to hold the entire round plastic flange which is attached to the tubing.  Never allow flange to be larger than the sponge or it will produce suction damaging intact skin.  Total number of individual pieces of foam used within the wound bed: 1  [x] If bridging the dressing away from the primary site, be sure the bridge leads to a piece of sponge large enough to hold the entire flange without allowing any of the flange to overlap onto intact skin.   [x] Covered sponge, gauze or channel drain with plastic drape.   [x] Cut a hole in this plastic drape directly over the sponge the same size as the plastic drain tubing.   [x] Removed plastic liner from flange and apply it directly over the hole you cut.   [x] Removed the plastic cover from

## 2024-11-18 ENCOUNTER — TELEPHONE (OUTPATIENT)
Facility: HOSPITAL | Age: 59
End: 2024-11-18

## 2024-11-18 DIAGNOSIS — L02.415 ABSCESS OF RIGHT KNEE: ICD-10-CM

## 2024-11-18 DIAGNOSIS — L97.112: Primary | ICD-10-CM

## 2024-11-18 NOTE — TELEPHONE ENCOUNTER
Patient called stating wound vacuum \"came off\". Call to Dr. Uribe to alert; patient may leave off wound vacuum until his appointment this week. Patient notified. Questions answered.   verified.

## 2024-11-20 ENCOUNTER — HOSPITAL ENCOUNTER (OUTPATIENT)
Facility: HOSPITAL | Age: 59
Discharge: HOME | End: 2024-11-20
Attending: FAMILY MEDICINE
Payer: MEDICARE

## 2024-11-20 VITALS — HEART RATE: 77 BPM | DIASTOLIC BLOOD PRESSURE: 73 MMHG | TEMPERATURE: 98 F | SYSTOLIC BLOOD PRESSURE: 133 MMHG

## 2024-11-20 DIAGNOSIS — L02.415 ABSCESS OF RIGHT KNEE: ICD-10-CM

## 2024-11-20 DIAGNOSIS — L97.112: Primary | ICD-10-CM

## 2024-11-20 PROCEDURE — 99213 OFFICE O/P EST LOW 20 MIN: CPT

## 2024-11-20 RX ORDER — NEOMYCIN/BACITRACIN/POLYMYXINB 3.5-400-5K
OINTMENT (GRAM) TOPICAL ONCE
OUTPATIENT
Start: 2024-11-20 | End: 2024-11-20

## 2024-11-20 RX ORDER — TRIAMCINOLONE ACETONIDE 1 MG/G
OINTMENT TOPICAL ONCE
OUTPATIENT
Start: 2024-11-20 | End: 2024-11-20

## 2024-11-20 RX ORDER — BETAMETHASONE DIPROPIONATE 0.5 MG/G
CREAM TOPICAL ONCE
OUTPATIENT
Start: 2024-11-20 | End: 2024-11-20

## 2024-11-20 RX ORDER — BACITRACIN ZINC AND POLYMYXIN B SULFATE 500; 1000 [USP'U]/G; [USP'U]/G
OINTMENT TOPICAL ONCE
OUTPATIENT
Start: 2024-11-20 | End: 2024-11-20

## 2024-11-20 RX ORDER — LIDOCAINE HYDROCHLORIDE 40 MG/ML
SOLUTION TOPICAL ONCE
OUTPATIENT
Start: 2024-11-20 | End: 2024-11-20

## 2024-11-20 RX ORDER — GENTAMICIN SULFATE 1 MG/G
OINTMENT TOPICAL ONCE
OUTPATIENT
Start: 2024-11-20 | End: 2024-11-20

## 2024-11-20 RX ORDER — SILVER SULFADIAZINE 10 MG/G
CREAM TOPICAL ONCE
OUTPATIENT
Start: 2024-11-20 | End: 2024-11-20

## 2024-11-20 RX ORDER — LIDOCAINE HYDROCHLORIDE 20 MG/ML
JELLY TOPICAL ONCE
OUTPATIENT
Start: 2024-11-20 | End: 2024-11-20

## 2024-11-20 RX ORDER — MUPIROCIN 20 MG/G
OINTMENT TOPICAL ONCE
OUTPATIENT
Start: 2024-11-20 | End: 2024-11-20

## 2024-11-20 RX ORDER — SODIUM CHLOR/HYPOCHLOROUS ACID 0.033 %
SOLUTION, IRRIGATION IRRIGATION ONCE
OUTPATIENT
Start: 2024-11-20 | End: 2024-11-20

## 2024-11-20 RX ORDER — GINSENG 100 MG
CAPSULE ORAL ONCE
OUTPATIENT
Start: 2024-11-20 | End: 2024-11-20

## 2024-11-20 RX ORDER — LIDOCAINE 40 MG/G
CREAM TOPICAL ONCE
OUTPATIENT
Start: 2024-11-20 | End: 2024-11-20

## 2024-11-20 RX ORDER — CLOBETASOL PROPIONATE 0.5 MG/G
OINTMENT TOPICAL ONCE
OUTPATIENT
Start: 2024-11-20 | End: 2024-11-20

## 2024-11-20 RX ORDER — LIDOCAINE 50 MG/G
OINTMENT TOPICAL ONCE
OUTPATIENT
Start: 2024-11-20 | End: 2024-11-20

## 2024-11-20 NOTE — PATIENT INSTRUCTIONS
Discharge Instructions for  [facility]  [address] [address]  Phone: [phone] Fax: [phone]    NAME:  Javier Ugalde  YOB: 1965  MEDICAL RECORD NUMBER:  [id number]  DATE:  November 20, 2024    WOUND CARE ORDERS:  Right lateral thigh wounds-Cleanse with baby shampoo. Rinse and pat dry. Cover wound with gauze and ABD. Secure with light coban wrap.  Double tubi , size G.           Activity:  [x] Elevate leg(s) above the level of the heart when sitting.  [x] Avoid prolonged standing in one place.   [x] Do no get dressing/wrap wet.       Dietary:  [] Diet as tolerated      [x] Diabetic Diet            [] Increase Protein: examples (Meat, cheese, eggs, greek yogurt, fish, nuts)          [] Teo Therapeutic Nutrition Powder  [] Other:  [] Dial a Dietician : Call [employer] at [phone] enter code [phone] when prompted. M-F 9am-5pm EST.      Return Appointment:  [x] Return Appointment: Dr. Bonny Uribe in 1 week.  [] Nurse Visit  [] Ordered tests:          Wound Care Center Information: Should you experience any significant changes in your wound(s) or have questions about your wound care, please contact the [facility] at MONDAY - FRIDAY 8:00 am - 4:30.  If you need help with your wound outside these hours and cannot wait until we are again available, contact your PCP or go to the hospital emergency room.     PLEASE NOTE: IF YOU ARE UNABLE TO OBTAIN WOUND SUPPLIES, CONTINUE TO USE THE SUPPLIES YOU HAVE AVAILABLE UNTIL YOU ARE ABLE TO REACH US. IT IS MOST IMPORTANT TO KEEP THE WOUND COVERED AT ALL TIMES.     Physician Signature:_______________________    Date: ___________ Time:  ____________

## 2024-11-20 NOTE — FLOWSHEET NOTE
11/20/24 0955   Wound 07/03/24 Thigh Right;Lateral;Proximal cluster   Date First Assessed/Time First Assessed: 07/03/24 1028   Present on Original Admission: No  Wound Approximate Age at First Assessment (Weeks): 1 weeks  Location: Thigh  Wound Location Orientation: Right;Lateral;Proximal  Wound Description (Comments): ...   Wound Etiology Non-Healing Surgical   Dressing Status New dressing applied   Dressing/Treatment ABD;Gauze dressing/dressing sponge;Coban/self-adherent bandages;Tubular bandage   Offloading for Diabetic Foot Ulcers Offloading not required

## 2024-11-20 NOTE — WOUND CARE
Wound Care follow-up        Assessment:     59 y.o. 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  Very small opening is closed up now with scab and cannot place any packing in it but fluid is still building up and upon pressure or using back end of q-tip gush of fluid is draining.     I personally talked to IR physician at [facility] today to evaluate patient fr US guided drainage of abscess    For right femur osteomyelitis, he has been through already 6 weeks of IV followed by PO Doxycyline but given persistent abscess cavity and drainage, he has finished another Dalbavancin 2 dose regimen at [facility]fusion center.     Currently patient failing medical management, Abx, draining abscess, Cx results showing Staph species, OR debridement may help.  I had left message with 's office one month ago, and left my cell no to discuss the case and relay my concerns but I have not heard from [facility] yet.    Patient's GILLES cath is removed. He has [employer] currently and was advised not to have permanent pacemaker until this infection is healed.    Keep leg elevated when sitting down.    Follow up in 1 weeks.  Patient understood and agrees with plan. Questions answered.        History of Present Illness:      Javier Ugalde is a 59 y.o.  male for evaluation of draining rt post thigh abscess and chronic sinus tract infection with Staph strain.  Patient had superficial wound started on right ant lateral knee area when he scratched that area. With swelling of that leg, wound took long time to heal and worsened. He was treated

## 2024-11-20 NOTE — FLOWSHEET NOTE
11/20/24 0907   Right Leg Edema Point of Measurement   Leg circumference 51.5 cm   Ankle circumference 29 cm   Compression Therapy Tubular elastic support bandage   Tubular Elastic Support Bandage Compression Pressure Medium   Peripheral Vascular   Edema Right lower extremity   RLE Edema +1;Pitting   RLE Neurovascular Assessment   Capillary Refill Less than/Equal to 3 seconds   Color Appropriate for Ethnicity   Temperature Warm   RLE Sensation  Full sensation   R Pedal Pulse +2   Wound 07/03/24 Thigh Right;Lateral;Proximal cluster   Date First Assessed/Time First Assessed: 07/03/24 1028   Present on Original Admission: No  Wound Approximate Age at First Assessment (Weeks): 1 weeks  Location: Thigh  Wound Location Orientation: Right;Lateral;Proximal  Wound Description (Comments): ...   Wound Image    Wound Etiology Non-Healing Surgical   Dressing Status Clean;Dry;Intact   Wound Cleansed Cleansed with saline   Offloading for Diabetic Foot Ulcers Offloading not required   Wound Length (cm) 0.1 cm   Wound Width (cm) 0.1 cm   Wound Depth (cm) 0.1 cm   Wound Surface Area (cm^2) 0.01 cm^2   Change in Wound Size % (l*w) 99.42   Wound Volume (cm^3) 0.001 cm^3   Wound Healing % 99   Wound Assessment Epithelialization   Drainage Amount None (dry)   Odor None   Emely-wound Assessment Hyperpigmented;Warm;Edematous     /73   Pulse 77   Temp 98 °F (36.7 °C) (Temporal)

## 2024-11-27 ENCOUNTER — HOSPITAL ENCOUNTER (OUTPATIENT)
Facility: HOSPITAL | Age: 59
Discharge: HOME | End: 2024-11-27
Attending: FAMILY MEDICINE
Payer: MEDICARE

## 2024-11-27 VITALS
RESPIRATION RATE: 18 BRPM | HEART RATE: 65 BPM | DIASTOLIC BLOOD PRESSURE: 70 MMHG | SYSTOLIC BLOOD PRESSURE: 111 MMHG | TEMPERATURE: 98.1 F

## 2024-11-27 DIAGNOSIS — L97.112: Primary | ICD-10-CM

## 2024-11-27 DIAGNOSIS — L02.415 ABSCESS OF RIGHT KNEE: ICD-10-CM

## 2024-11-27 PROCEDURE — 99213 OFFICE O/P EST LOW 20 MIN: CPT

## 2024-11-27 RX ORDER — LIDOCAINE HYDROCHLORIDE 20 MG/ML
JELLY TOPICAL ONCE
OUTPATIENT
Start: 2024-11-27 | End: 2024-11-27

## 2024-11-27 RX ORDER — GINSENG 100 MG
CAPSULE ORAL ONCE
OUTPATIENT
Start: 2024-11-27 | End: 2024-11-27

## 2024-11-27 RX ORDER — SILVER SULFADIAZINE 10 MG/G
CREAM TOPICAL ONCE
OUTPATIENT
Start: 2024-11-27 | End: 2024-11-27

## 2024-11-27 RX ORDER — NEOMYCIN/BACITRACIN/POLYMYXINB 3.5-400-5K
OINTMENT (GRAM) TOPICAL ONCE
OUTPATIENT
Start: 2024-11-27 | End: 2024-11-27

## 2024-11-27 RX ORDER — LIDOCAINE 40 MG/G
CREAM TOPICAL ONCE
OUTPATIENT
Start: 2024-11-27 | End: 2024-11-27

## 2024-11-27 RX ORDER — TRIAMCINOLONE ACETONIDE 1 MG/G
OINTMENT TOPICAL ONCE
OUTPATIENT
Start: 2024-11-27 | End: 2024-11-27

## 2024-11-27 RX ORDER — SODIUM CHLOR/HYPOCHLOROUS ACID 0.033 %
SOLUTION, IRRIGATION IRRIGATION ONCE
OUTPATIENT
Start: 2024-11-27 | End: 2024-11-27

## 2024-11-27 RX ORDER — LIDOCAINE HYDROCHLORIDE 40 MG/ML
SOLUTION TOPICAL ONCE
OUTPATIENT
Start: 2024-11-27 | End: 2024-11-27

## 2024-11-27 RX ORDER — GENTAMICIN SULFATE 1 MG/G
OINTMENT TOPICAL ONCE
OUTPATIENT
Start: 2024-11-27 | End: 2024-11-27

## 2024-11-27 RX ORDER — LIDOCAINE 50 MG/G
OINTMENT TOPICAL ONCE
OUTPATIENT
Start: 2024-11-27 | End: 2024-11-27

## 2024-11-27 RX ORDER — MUPIROCIN 20 MG/G
OINTMENT TOPICAL ONCE
OUTPATIENT
Start: 2024-11-27 | End: 2024-11-27

## 2024-11-27 RX ORDER — CLOBETASOL PROPIONATE 0.5 MG/G
OINTMENT TOPICAL ONCE
OUTPATIENT
Start: 2024-11-27 | End: 2024-11-27

## 2024-11-27 RX ORDER — BETAMETHASONE DIPROPIONATE 0.5 MG/G
CREAM TOPICAL ONCE
OUTPATIENT
Start: 2024-11-27 | End: 2024-11-27

## 2024-11-27 RX ORDER — BACITRACIN ZINC AND POLYMYXIN B SULFATE 500; 1000 [USP'U]/G; [USP'U]/G
OINTMENT TOPICAL ONCE
OUTPATIENT
Start: 2024-11-27 | End: 2024-11-27

## 2024-11-27 NOTE — PATIENT INSTRUCTIONS
Discharge Instructions for  [facility]  [address] [address]  Phone: [phone] Fax: [phone]    NAME:  Javier Ugalde  YOB: 1965  MEDICAL RECORD NUMBER:  [id number]  DATE:  November 27, 2024    WOUND CARE ORDERS:  Right lateral thigh wounds-Cleanse with baby shampoo. Rinse and pat dry. Cover wound with gauze and ABD. Secure with light coban wrap. Apply double layer tubigrip, size G.           Activity:  [x] Elevate leg(s) above the level of the heart when sitting.  [x] Avoid prolonged standing in one place.   [x] Do no get dressing/wrap wet.       Dietary:  [] Diet as tolerated      [x] Diabetic Diet            [] Increase Protein: examples (Meat, cheese, eggs, greek yogurt, fish, nuts)          [] Teo Therapeutic Nutrition Powder  [] Other:  [] Dial a Dietician : Call [employer] at [phone] enter code (249) when prompted. M-F 9am-5pm EST.      Return Appointment:  [x] Return Appointment: Dr. Bonny Uribe in 1 week.  [] Nurse Visit  [] Ordered tests:          Wound Care Center Information: Should you experience any significant changes in your wound(s) or have questions about your wound care, please contact the [facility] at MONDAY - FRIDAY 8:00 am - 4:30.  If you need help with your wound outside these hours and cannot wait until we are again available, contact your PCP or go to the hospital emergency room.     PLEASE NOTE: IF YOU ARE UNABLE TO OBTAIN WOUND SUPPLIES, CONTINUE TO USE THE SUPPLIES YOU HAVE AVAILABLE UNTIL YOU ARE ABLE TO REACH US. IT IS MOST IMPORTANT TO KEEP THE WOUND COVERED AT ALL TIMES.     Physician Signature:_______________________    Date: ___________ Time:  ____________

## 2024-11-27 NOTE — WOUND CARE
Wound Care follow-up        Assessment:     59 y.o. 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  Very small opening and cannot place any packing in it but fluid is still building up and upon pressure or using back end of q-tip gush of fluid is draining.     I personally talked to IR physician at [facility] to evaluate patient fr US guided drainage of abscess    For right femur osteomyelitis, ordered another round of Dalbavancin 2 dose regimen at [facility]fusion center.     Currently patient failing medical management, Abx, draining abscess, Cx results showing Staph species, OR debridement may help.  I had left message with 's office one month ago, and left my cell no to discuss the case and relay my concerns but I have not heard from [facility] yet.    Patient's GILLES cath is removed. He has [employer] currently and was advised not to have permanent pacemaker until this infection is healed.    Keep leg elevated when sitting down.    Follow up in 1 weeks.  Patient understood and agrees with plan. Questions answered.        History of Present Illness:      Javier Ugalde is a 59 y.o.  male for evaluation of draining rt post thigh abscess and chronic sinus tract infection with Staph strain.  Patient had superficial wound started on right ant lateral knee area when he scratched that area. With swelling of that leg, wound took long time to heal and worsened. He was treated with PO Abx but due to concern for underlying rt prosthesis infection, he was admitted in [facility]. No Sx intervention done. He had Gilles cath

## 2024-11-27 NOTE — FLOWSHEET NOTE
11/27/24 1006   Right Leg Edema Point of Measurement   Compression Therapy Tubular elastic support bandage   Tubular Elastic Support Bandage Compression Pressure Medium   Wound 07/03/24 Thigh Right;Lateral;Proximal cluster   Date First Assessed/Time First Assessed: 07/03/24 1028   Present on Original Admission: No  Wound Approximate Age at First Assessment (Weeks): 1 weeks  Location: Thigh  Wound Location Orientation: Right;Lateral;Proximal  Wound Description (Comments): ...   Dressing Status New dressing applied   Dressing/Treatment ABD;Coban/self-adherent bandages;Gauze dressing/dressing sponge;Tape/Soft cloth adhesive tape;Tubular bandage   Offloading for Diabetic Foot Ulcers Offloading not required

## 2024-11-29 RX ORDER — HEPARIN 100 UNIT/ML
500 SYRINGE INTRAVENOUS PRN
Status: CANCELLED | OUTPATIENT
Start: 2024-12-06

## 2024-12-04 ENCOUNTER — HOSPITAL ENCOUNTER (OUTPATIENT)
Facility: HOSPITAL | Age: 59
Discharge: HOME | End: 2024-12-04
Attending: FAMILY MEDICINE
Payer: MEDICARE

## 2024-12-04 VITALS
DIASTOLIC BLOOD PRESSURE: 79 MMHG | TEMPERATURE: 98.2 F | RESPIRATION RATE: 16 BRPM | HEART RATE: 73 BPM | SYSTOLIC BLOOD PRESSURE: 149 MMHG

## 2024-12-04 DIAGNOSIS — L97.112: Primary | ICD-10-CM

## 2024-12-04 DIAGNOSIS — L02.415 ABSCESS OF RIGHT KNEE: ICD-10-CM

## 2024-12-04 PROCEDURE — 99213 OFFICE O/P EST LOW 20 MIN: CPT

## 2024-12-04 RX ORDER — LIDOCAINE HYDROCHLORIDE 40 MG/ML
SOLUTION TOPICAL ONCE
OUTPATIENT
Start: 2024-12-04 | End: 2024-12-04

## 2024-12-04 RX ORDER — TRIAMCINOLONE ACETONIDE 1 MG/G
OINTMENT TOPICAL ONCE
OUTPATIENT
Start: 2024-12-04 | End: 2024-12-04

## 2024-12-04 RX ORDER — GENTAMICIN SULFATE 1 MG/G
OINTMENT TOPICAL ONCE
OUTPATIENT
Start: 2024-12-04 | End: 2024-12-04

## 2024-12-04 RX ORDER — BETAMETHASONE DIPROPIONATE 0.5 MG/G
CREAM TOPICAL ONCE
OUTPATIENT
Start: 2024-12-04 | End: 2024-12-04

## 2024-12-04 RX ORDER — LIDOCAINE HYDROCHLORIDE 20 MG/ML
JELLY TOPICAL ONCE
OUTPATIENT
Start: 2024-12-04 | End: 2024-12-04

## 2024-12-04 RX ORDER — CLOBETASOL PROPIONATE 0.5 MG/G
OINTMENT TOPICAL ONCE
OUTPATIENT
Start: 2024-12-04 | End: 2024-12-04

## 2024-12-04 RX ORDER — LIDOCAINE 50 MG/G
OINTMENT TOPICAL ONCE
OUTPATIENT
Start: 2024-12-04 | End: 2024-12-04

## 2024-12-04 RX ORDER — NEOMYCIN/BACITRACIN/POLYMYXINB 3.5-400-5K
OINTMENT (GRAM) TOPICAL ONCE
OUTPATIENT
Start: 2024-12-04 | End: 2024-12-04

## 2024-12-04 RX ORDER — SILVER SULFADIAZINE 10 MG/G
CREAM TOPICAL ONCE
OUTPATIENT
Start: 2024-12-04 | End: 2024-12-04

## 2024-12-04 RX ORDER — SODIUM CHLOR/HYPOCHLOROUS ACID 0.033 %
SOLUTION, IRRIGATION IRRIGATION ONCE
OUTPATIENT
Start: 2024-12-04 | End: 2024-12-04

## 2024-12-04 RX ORDER — BACITRACIN ZINC AND POLYMYXIN B SULFATE 500; 1000 [USP'U]/G; [USP'U]/G
OINTMENT TOPICAL ONCE
OUTPATIENT
Start: 2024-12-04 | End: 2024-12-04

## 2024-12-04 RX ORDER — HEPARIN 100 UNIT/ML
500 SYRINGE INTRAVENOUS PRN
OUTPATIENT
Start: 2024-12-08

## 2024-12-04 RX ORDER — GINSENG 100 MG
CAPSULE ORAL ONCE
OUTPATIENT
Start: 2024-12-04 | End: 2024-12-04

## 2024-12-04 RX ORDER — LIDOCAINE 40 MG/G
CREAM TOPICAL ONCE
OUTPATIENT
Start: 2024-12-04 | End: 2024-12-04

## 2024-12-04 RX ORDER — MUPIROCIN 20 MG/G
OINTMENT TOPICAL ONCE
OUTPATIENT
Start: 2024-12-04 | End: 2024-12-04

## 2024-12-04 NOTE — FLOWSHEET NOTE
12/04/24 1125   Right Leg Edema Point of Measurement   Compression Therapy Tubular elastic support bandage   Tubular Elastic Support Bandage Compression Pressure Medium   Wound 07/03/24 Thigh Right;Lateral;Proximal cluster   Date First Assessed/Time First Assessed: 07/03/24 1028   Present on Original Admission: No  Wound Approximate Age at First Assessment (Weeks): 1 weeks  Location: Thigh  Wound Location Orientation: Right;Lateral;Proximal  Wound Description (Comments): ...   Wound Etiology Non-Healing Surgical   Dressing Status New dressing applied   Dressing/Treatment ABD;Coban/self-adherent bandages   Offloading for Diabetic Foot Ulcers Offloading not required     Discharge Condition: Stable    Pain: 2    Ambulatory Status: Straight Cane    Discharge Destination: home    Transportation:car    Accompanied by: SELF    Discharge instructions reviewed with SELF and copy or written instructions have been provided. All questions/concerns have been addressed at this time.

## 2024-12-04 NOTE — FLOWSHEET NOTE
12/04/24 1050   Anesthetic   Anesthetic 4% Lidocaine Cream   Right Leg Edema Point of Measurement   Leg circumference 49 cm   Ankle circumference 28 cm   Compression Therapy Tubular elastic support bandage   Tubular Elastic Support Bandage Compression Pressure Medium   Peripheral Vascular   RLE Edema +1;Pitting   RLE Neurovascular Assessment   Capillary Refill Less than/Equal to 3 seconds   Color Appropriate for Ethnicity   Temperature Warm   RLE Sensation  Full sensation   R Pedal Pulse +3   Wound 07/03/24 Thigh Right;Lateral;Proximal cluster   Date First Assessed/Time First Assessed: 07/03/24 1028   Present on Original Admission: No  Wound Approximate Age at First Assessment (Weeks): 1 weeks  Location: Thigh  Wound Location Orientation: Right;Lateral;Proximal  Wound Description (Comments): ...   Wound Image    Wound Etiology Non-Healing Surgical   Dressing Status Intact   Wound Cleansed Cleansed with saline   Offloading for Diabetic Foot Ulcers Offloading not required   Wound Length (cm) 0.1 cm   Wound Width (cm) 0.1 cm   Wound Depth (cm) 2 cm   Wound Surface Area (cm^2) 0.01 cm^2   Change in Wound Size % (l*w) 99.42   Wound Volume (cm^3) 0.02 cm^3   Wound Healing % 88   Wound Assessment Epithelialization   Drainage Amount Moderate (25-50%)   Drainage Description Serosanguinous   Odor None   Emely-wound Assessment Intact   Margins Defined edges   Wound Thickness Description not for Pressure Injury Full thickness     BP (!) 149/79   Pulse 73   Temp 98.2 °F (36.8 °C) (Temporal)   Resp 16

## 2024-12-04 NOTE — WOUND CARE
Wound Care follow-up        Assessment:     59 y.o. 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     For right femur osteomyelitis, ordered another round of Dalbavancin 2 dose regimen at [facility]fusion center.     He has [employer] currently and was advised not to have permanent pacemaker until this infection is healed.    Keep leg elevated when sitting down.    Follow up in 1 weeks.  Patient understood and agrees with plan. Questions answered.        History of Present Illness:      Javier Ugalde is a 59 y.o.  male for evaluation of draining rt post thigh abscess and chronic sinus tract infection with Staph strain.  Patient had superficial wound started on right ant lateral knee area when he scratched that area. With swelling of that leg, wound took long time to heal and worsened. He was treated with PO Abx but due to concern for underlying rt prosthesis infection, he was admitted in [facility]. No Sx intervention done. He had Basilio cath placed due to hx CKD. Wound Cx with mixed skin arabella. He was treated with long IV daily infusion at Infusion center with Daptomycin and Ceftriaxone.  He followed with ID, , who was concerned about deeper rt prosthesis infection and recommend [facility] eval for possible debridement and even explant of prosthesis as alex IV Abx course was not able to heal post thigh wound.  Patient's BASILIO cath is removed.    He also had rt knee aspiration 2 month ago and synovial fluid Cx is

## 2024-12-04 NOTE — PATIENT INSTRUCTIONS
Discharge Instructions for  [facility]  [address] [address]  Phone: [phone] Fax: [phone]    NAME:  Javier Ugalde  YOB: 1965  MEDICAL RECORD NUMBER:  [id number]  DATE:  December 4, 2024    WOUND CARE ORDERS:  Right lateral thigh wounds-Cleanse with baby shampoo. Rinse and pat dry. Pack wound with Iodoform. Cover wound with gauze and ABD. Secure with light coban wrap. Apply double layer tubigrip, size G.           Activity:  [x] Elevate leg(s) above the level of the heart when sitting.  [x] Avoid prolonged standing in one place.   [x] Do no get dressing/wrap wet.       Dietary:  [] Diet as tolerated      [x] Diabetic Diet            [] Increase Protein: examples (Meat, cheese, eggs, greek yogurt, fish, nuts)          [] Teo Therapeutic Nutrition Powder  [] Other:  [] Dial a Dietician : Call [employer] at [phone] enter code (249) when prompted. M-F 9am-5pm EST.      Return Appointment:  [x] Return Appointment: Dr. Bonny Uribe in 1 week.  [] Nurse Visit  [] Ordered tests:          Wound Care Center Information: Should you experience any significant changes in your wound(s) or have questions about your wound care, please contact the [facility] at MONDAY - FRIDAY 8:00 am - 4:30.  If you need help with your wound outside these hours and cannot wait until we are again available, contact your PCP or go to the hospital emergency room.     PLEASE NOTE: IF YOU ARE UNABLE TO OBTAIN WOUND SUPPLIES, CONTINUE TO USE THE SUPPLIES YOU HAVE AVAILABLE UNTIL YOU ARE ABLE TO REACH US. IT IS MOST IMPORTANT TO KEEP THE WOUND COVERED AT ALL TIMES.     Physician Signature:_______________________    Date: ___________ Time:  ____________

## 2024-12-06 ENCOUNTER — HOSPITAL ENCOUNTER (OUTPATIENT)
Facility: HOSPITAL | Age: 59
Setting detail: INFUSION SERIES
Discharge: HOME | End: 2024-12-06
Payer: MEDICARE

## 2024-12-06 VITALS
TEMPERATURE: 96.8 F | DIASTOLIC BLOOD PRESSURE: 60 MMHG | HEART RATE: 80 BPM | RESPIRATION RATE: 18 BRPM | SYSTOLIC BLOOD PRESSURE: 129 MMHG

## 2024-12-06 DIAGNOSIS — L02.415 ABSCESS OF RIGHT KNEE: Primary | ICD-10-CM

## 2024-12-06 DIAGNOSIS — M86.151 ACUTE OSTEOMYELITIS OF RIGHT THIGH: ICD-10-CM

## 2024-12-06 LAB
ALBUMIN SERPL-MCNC: 4.3 G/DL (ref 3.5–5)
ALBUMIN/GLOB SERPL: 1 (ref 1.1–2.2)
ALP SERPL-CCNC: 111 U/L (ref 45–117)
ALT SERPL-CCNC: 60 U/L (ref 12–78)
ANION GAP SERPL CALC-SCNC: 8 MMOL/L (ref 2–12)
AST SERPL-CCNC: 72 U/L (ref 15–37)
BILIRUB SERPL-MCNC: 0.6 MG/DL (ref 0.2–1)
BUN SERPL-MCNC: 21 MG/DL (ref 6–20)
BUN/CREAT SERPL: 11 (ref 12–20)
CALCIUM SERPL-MCNC: 9.6 MG/DL (ref 8.5–10.1)
CHLORIDE SERPL-SCNC: 103 MMOL/L (ref 97–108)
CO2 SERPL-SCNC: 26 MMOL/L (ref 21–32)
CREAT SERPL-MCNC: 1.96 MG/DL (ref 0.7–1.3)
GLOBULIN SER CALC-MCNC: 4.4 G/DL (ref 2–4)
GLUCOSE SERPL-MCNC: 86 MG/DL (ref 65–100)
POTASSIUM SERPL-SCNC: 3.9 MMOL/L (ref 3.5–5.1)
PROT SERPL-MCNC: 8.7 G/DL (ref 6.4–8.2)
SODIUM SERPL-SCNC: 137 MMOL/L (ref 136–145)

## 2024-12-06 PROCEDURE — 96365 THER/PROPH/DIAG IV INF INIT: CPT

## 2024-12-06 PROCEDURE — 36415 COLL VENOUS BLD VENIPUNCTURE: CPT

## 2024-12-06 PROCEDURE — 80053 COMPREHEN METABOLIC PANEL: CPT

## 2024-12-06 PROCEDURE — 6360000002 HC RX W HCPCS: Performed by: INTERNAL MEDICINE

## 2024-12-06 PROCEDURE — 2580000003 HC RX 258: Performed by: INTERNAL MEDICINE

## 2024-12-06 RX ORDER — DEXTROSE MONOHYDRATE 50 MG/ML
INJECTION, SOLUTION INTRAVENOUS PRN
Status: DISCONTINUED | OUTPATIENT
Start: 2024-12-06 | End: 2024-12-07 | Stop reason: HOSPADM

## 2024-12-06 RX ORDER — HEPARIN 100 UNIT/ML
500 SYRINGE INTRAVENOUS PRN
OUTPATIENT
Start: 2024-12-11

## 2024-12-06 RX ORDER — DEXTROSE MONOHYDRATE 50 MG/ML
INJECTION, SOLUTION INTRAVENOUS PRN
Start: 2024-12-11

## 2024-12-06 RX ADMIN — DEXTROSE MONOHYDRATE: 50 INJECTION, SOLUTION INTRAVENOUS at 15:24

## 2024-12-06 RX ADMIN — DALBAVANCIN 1500 MG: 500 INJECTION, POWDER, FOR SOLUTION INTRAVENOUS at 15:26

## 2024-12-06 ASSESSMENT — PAIN DESCRIPTION - LOCATION: LOCATION: BACK;KNEE

## 2024-12-06 ASSESSMENT — PAIN SCALES - WONG BAKER: WONGBAKER_NUMERICALRESPONSE: NO HURT

## 2024-12-06 ASSESSMENT — PAIN SCALES - GENERAL: PAINLEVEL_OUTOF10: 7

## 2024-12-06 NOTE — PLAN OF CARE
[facility] Short Note                   Date: 2024    Name: Javier Ugalde    MRN: [id number]         : 1965      Pt admit to [facility] for Dalvance (1/2) ambulatory with cane in stable condition. Assessment completed and documented in flowsheets. PIV placed to right wrist with positive blood return. Labs drawn and sent for processing.      Mr. Ugalde's vitals were reviewed prior to and after treatment.   Patient Vitals for the past 12 hrs:   Temp Pulse Resp BP   24 1408 -- 80 -- 129/60   24 1402 96.8 °F (36 °C) 86 18 (!) 161/80         Lab results were obtained and reviewed.  Recent Results (from the past 12 hour(s))   Comprehensive metabolic panel    Collection Time: 24  2:17 PM   Result Value Ref Range    Sodium 137 136 - 145 mmol/L    Potassium 3.9 3.5 - 5.1 mmol/L    Chloride 103 97 - 108 mmol/L    CO2 26 21 - 32 mmol/L    Anion Gap 8 2 - 12 mmol/L    Glucose 86 65 - 100 mg/dL    BUN 21 (H) 6 - 20 MG/DL    Creatinine 1.96 (H) 0.70 - 1.30 MG/DL    BUN/Creatinine Ratio 11 (L) 12 - 20      Est, Glom Filt Rate 39 (L) >60 ml/min/1.73m2    Calcium 9.6 8.5 - 10.1 MG/DL    Total Bilirubin 0.6 0.2 - 1.0 MG/DL    ALT 60 12 - 78 U/L    AST 72 (H) 15 - 37 U/L    Alk Phosphatase 111 45 - 117 U/L    Total Protein 8.7 (H) 6.4 - 8.2 g/dL    Albumin 4.3 3.5 - 5.0 g/dL    Globulin 4.4 (H) 2.0 - 4.0 g/dL    Albumin/Globulin Ratio 1.0 (L) 1.1 - 2.2                  Medications given: via PIV    Medications Administered         dalbavancin (DALVANCE) 1,500 mg in dextrose 5 % 250 mL IVPB Admin Date  2024 Action  New Bag Dose  1,500 mg Rate  700 mL/hr Route  IntraVENous Documented By  Britney Blanchard, RN        dextrose 5 % solution Admin Date  2024 Action  New Bag Dose   Rate  50 mL/hr Route  IntraVENous Documented By  Britney Blanchard RN             PIV was flushed and removed per protocol prior to discharge.    Mr. Ugalde tolerated the infusion and had no complaints.    Mr. Ugalde was  [facility] in [facility] and is aware of future appointments.     Future Appointments   Date Time Provider Department Center   12/11/2024  9:15 AM Bonny Uirbe MD [facility]   12/14/2024  9:00 AM [facility]       Britney Blanchard RN  December 6, 2024  4:48 PM   Problem: Pain  Goal: Verbalizes/displays adequate comfort level or baseline comfort level  Outcome: Progressing     Problem: Safety - Adult  Goal: Free from fall injury  Outcome: Progressing

## 2024-12-11 ENCOUNTER — HOSPITAL ENCOUNTER (OUTPATIENT)
Facility: HOSPITAL | Age: 59
Discharge: HOME | End: 2024-12-11
Attending: FAMILY MEDICINE
Payer: MEDICARE

## 2024-12-11 VITALS
RESPIRATION RATE: 16 BRPM | SYSTOLIC BLOOD PRESSURE: 148 MMHG | HEART RATE: 86 BPM | DIASTOLIC BLOOD PRESSURE: 76 MMHG | TEMPERATURE: 97.9 F

## 2024-12-11 DIAGNOSIS — L02.415 ABSCESS OF RIGHT KNEE: ICD-10-CM

## 2024-12-11 DIAGNOSIS — L97.112: Primary | ICD-10-CM

## 2024-12-11 PROCEDURE — 99213 OFFICE O/P EST LOW 20 MIN: CPT

## 2024-12-11 RX ORDER — LIDOCAINE 40 MG/G
CREAM TOPICAL ONCE
OUTPATIENT
Start: 2024-12-11 | End: 2024-12-11

## 2024-12-11 RX ORDER — MUPIROCIN 20 MG/G
OINTMENT TOPICAL ONCE
OUTPATIENT
Start: 2024-12-11 | End: 2024-12-11

## 2024-12-11 RX ORDER — BACITRACIN ZINC AND POLYMYXIN B SULFATE 500; 1000 [USP'U]/G; [USP'U]/G
OINTMENT TOPICAL ONCE
OUTPATIENT
Start: 2024-12-11 | End: 2024-12-11

## 2024-12-11 RX ORDER — LIDOCAINE HYDROCHLORIDE 40 MG/ML
SOLUTION TOPICAL ONCE
OUTPATIENT
Start: 2024-12-11 | End: 2024-12-11

## 2024-12-11 RX ORDER — SODIUM CHLOR/HYPOCHLOROUS ACID 0.033 %
SOLUTION, IRRIGATION IRRIGATION ONCE
OUTPATIENT
Start: 2024-12-11 | End: 2024-12-11

## 2024-12-11 RX ORDER — TRIAMCINOLONE ACETONIDE 1 MG/G
OINTMENT TOPICAL ONCE
OUTPATIENT
Start: 2024-12-11 | End: 2024-12-11

## 2024-12-11 RX ORDER — LIDOCAINE HYDROCHLORIDE 20 MG/ML
JELLY TOPICAL ONCE
OUTPATIENT
Start: 2024-12-11 | End: 2024-12-11

## 2024-12-11 RX ORDER — LIDOCAINE 50 MG/G
OINTMENT TOPICAL ONCE
OUTPATIENT
Start: 2024-12-11 | End: 2024-12-11

## 2024-12-11 RX ORDER — NEOMYCIN/BACITRACIN/POLYMYXINB 3.5-400-5K
OINTMENT (GRAM) TOPICAL ONCE
OUTPATIENT
Start: 2024-12-11 | End: 2024-12-11

## 2024-12-11 RX ORDER — GENTAMICIN SULFATE 1 MG/G
OINTMENT TOPICAL ONCE
OUTPATIENT
Start: 2024-12-11 | End: 2024-12-11

## 2024-12-11 RX ORDER — CLOBETASOL PROPIONATE 0.5 MG/G
OINTMENT TOPICAL ONCE
OUTPATIENT
Start: 2024-12-11 | End: 2024-12-11

## 2024-12-11 RX ORDER — SILVER SULFADIAZINE 10 MG/G
CREAM TOPICAL ONCE
OUTPATIENT
Start: 2024-12-11 | End: 2024-12-11

## 2024-12-11 RX ORDER — GINSENG 100 MG
CAPSULE ORAL ONCE
OUTPATIENT
Start: 2024-12-11 | End: 2024-12-11

## 2024-12-11 RX ORDER — BETAMETHASONE DIPROPIONATE 0.5 MG/G
CREAM TOPICAL ONCE
OUTPATIENT
Start: 2024-12-11 | End: 2024-12-11

## 2024-12-11 NOTE — FLOWSHEET NOTE
12/11/24 0925   Anesthetic   Anesthetic 4% Lidocaine Cream   Right Leg Edema Point of Measurement   Compression Therapy Tubular elastic support bandage   Tubular Elastic Support Bandage Compression Pressure Medium   RLE Neurovascular Assessment   Capillary Refill Less than/Equal to 3 seconds   Color Appropriate for Ethnicity   Temperature Warm   RLE Sensation  Full sensation   R Pedal Pulse +3   Wound 07/03/24 Thigh Right;Lateral;Proximal cluster   Date First Assessed/Time First Assessed: 07/03/24 1028   Present on Original Admission: No  Wound Approximate Age at First Assessment (Weeks): 1 weeks  Location: Thigh  Wound Location Orientation: Right;Lateral;Proximal  Wound Description (Comments): ...   Wound Etiology Non-Healing Surgical   Dressing Status Intact;Old drainage noted   Wound Cleansed Cleansed with saline   Offloading for Diabetic Foot Ulcers Offloading not required   Wound Length (cm) 0.3 cm   Wound Width (cm) 0.3 cm   Wound Depth (cm) 2.2 cm   Wound Surface Area (cm^2) 0.09 cm^2   Change in Wound Size % (l*w) 94.74   Wound Volume (cm^3) 0.198 cm^3   Wound Healing % -16   Distance Tunneling (cm) 2.7 cm   Tunneling Position ___ O'Clock 12   Wound Assessment Pink/red   Drainage Amount Moderate (25-50%)   Drainage Description Serosanguinous   Odor None   Emely-wound Assessment Intact   Margins Defined edges   Wound Thickness Description not for Pressure Injury Full thickness   Pain Assessment   Pain Assessment None - Denies Pain     BP (!) 148/76   Pulse 86   Temp 97.9 °F (36.6 °C) (Temporal)   Resp 16

## 2024-12-11 NOTE — PATIENT INSTRUCTIONS
Discharge Instructions for  [facility]  [address] [address]  Phone: [phone] Fax: [phone]    NAME:  Javier Ugalde  YOB: 1965  MEDICAL RECORD NUMBER:  [id number]  DATE:  December 11, 2024    WOUND CARE ORDERS:  Right lateral thigh wounds-Cleanse with baby shampoo. Rinse and pat dry.Cover wound with gauze and ABD. Secure with light coban wrap. Apply double layer tubigrip, size G, to lower leg.            Activity:  [x] Elevate leg(s) above the level of the heart when sitting.  [x] Avoid prolonged standing in one place.   [x] Do no get dressing/wrap wet.       Dietary:  [] Diet as tolerated      [x] Diabetic Diet            [] Increase Protein: examples (Meat, cheese, eggs, greek yogurt, fish, nuts)          [] Teo Therapeutic Nutrition Powder  [] Other:  [] Dial a Dietician : Call [employer] at [phone] enter code (249) when prompted. M-F 9am-5pm EST.      Return Appointment:  [x] Return Appointment: Dr. Bonny Uribe in 1 week.  [] Nurse Visit  [] Ordered tests:          Wound Care Center Information: Should you experience any significant changes in your wound(s) or have questions about your wound care, please contact the [facility] at MONDAY - FRIDAY 8:00 am - 4:30.  If you need help with your wound outside these hours and cannot wait until we are again available, contact your PCP or go to the hospital emergency room.     PLEASE NOTE: IF YOU ARE UNABLE TO OBTAIN WOUND SUPPLIES, CONTINUE TO USE THE SUPPLIES YOU HAVE AVAILABLE UNTIL YOU ARE ABLE TO REACH US. IT IS MOST IMPORTANT TO KEEP THE WOUND COVERED AT ALL TIMES.     Physician Signature:_______________________    Date: ___________ Time:  ____________

## 2024-12-11 NOTE — WOUND CARE
Wound Care follow-up        Assessment:     59 y.o. 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     For right femur osteomyelitis, ordered another round of Dalbavancin 2 dose regimen at Infusion center.     He has life west currently and was advised not to have permanent pacemaker until this infection is healed.    Keep leg elevated when sitting down.    Follow up in 1 weeks.  Patient understood and agrees with plan. Questions answered.        History of Present Illness:      Javier Ugalde is a 59 y.o.  male for evaluation of draining rt post thigh abscess and chronic sinus tract infection with Staph strain.  Patient had superficial wound started on right ant lateral knee area when he scratched that area. With swelling of that leg, wound took long time to heal and worsened. He was treated with PO Abx but due to concern for underlying rt prosthesis infection, he was admitted in [facility]. No Sx intervention done. He had Basilio cath placed due to hx CKD. Wound Cx with mixed skin arabella. He was treated with long IV daily infusion at Infusion center with Daptomycin and Ceftriaxone.  He followed with ID, , who was concerned about deeper rt prosthesis infection and recommend OrthSx eval for possible debridement and even explant of prosthesis as alex IV Abx course was not able to heal post thigh wound.  Patient's BASILIO cath is removed.    He also had rt knee aspiration 2 month ago and synovial fluid Cx is negative so far.   Wound Cx done in wound care center one month

## 2024-12-14 ENCOUNTER — HOSPITAL ENCOUNTER (OUTPATIENT)
Facility: HOSPITAL | Age: 59
Setting detail: INFUSION SERIES
Discharge: HOME | End: 2024-12-14
Payer: MEDICARE

## 2024-12-14 VITALS
SYSTOLIC BLOOD PRESSURE: 154 MMHG | RESPIRATION RATE: 18 BRPM | DIASTOLIC BLOOD PRESSURE: 64 MMHG | OXYGEN SATURATION: 99 % | HEART RATE: 82 BPM | TEMPERATURE: 98.3 F

## 2024-12-14 DIAGNOSIS — M86.151 ACUTE OSTEOMYELITIS OF RIGHT THIGH: ICD-10-CM

## 2024-12-14 DIAGNOSIS — L02.415 ABSCESS OF RIGHT KNEE: Primary | ICD-10-CM

## 2024-12-14 PROCEDURE — 96365 THER/PROPH/DIAG IV INF INIT: CPT

## 2024-12-14 PROCEDURE — 6360000002 HC RX W HCPCS: Performed by: INTERNAL MEDICINE

## 2024-12-14 PROCEDURE — 2580000003 HC RX 258: Performed by: INTERNAL MEDICINE

## 2024-12-14 RX ORDER — DEXTROSE MONOHYDRATE 50 MG/ML
INJECTION, SOLUTION INTRAVENOUS PRN
Start: 2024-12-18

## 2024-12-14 RX ORDER — HEPARIN 100 UNIT/ML
500 SYRINGE INTRAVENOUS PRN
OUTPATIENT
Start: 2024-12-18

## 2024-12-14 RX ADMIN — DALBAVANCIN 1500 MG: 500 INJECTION, POWDER, FOR SOLUTION INTRAVENOUS at 09:36

## 2024-12-14 ASSESSMENT — PAIN DESCRIPTION - LOCATION: LOCATION: BACK;KNEE

## 2024-12-14 ASSESSMENT — PAIN DESCRIPTION - DESCRIPTORS: DESCRIPTORS: DISCOMFORT

## 2024-12-14 ASSESSMENT — PAIN DESCRIPTION - ORIENTATION: ORIENTATION: LOWER;RIGHT

## 2024-12-14 ASSESSMENT — PAIN SCALES - GENERAL: PAINLEVEL_OUTOF10: 4

## 2024-12-14 NOTE — PROGRESS NOTES
[facility] Visit Note    Pt arrived to [facility] ambulatory in no acute distress at 0850 for Dalvance.  Assessment unchanged.  #24g PIV started in R AC without issue and positive blood return noted.      BP (!) 165/71   Pulse 80   Temp 98.3 °F (36.8 °C) (Temporal)   Resp 18   SpO2 99%      Medications Administered         dalbavancin (DALVANCE) 1,500 mg in dextrose 5 % 250 mL IVPB Admin Date  12/14/2024 Action  New Bag Dose  1,500 mg Rate  700 mL/hr Route  IntraVENous Documented By  Shahrzad Tenorio, RN                Pt tolerated treatment well.  No adverse reaction noted.  PIV flushed per policy and needle removed, 2x2 and coban placed.  Pt discharged ambulatory in no acute distress at 1025, accompanied by self.  This is patient's last antibiotic appointment. He will follow up with wound care   Yes

## 2024-12-18 ENCOUNTER — HOSPITAL ENCOUNTER (OUTPATIENT)
Facility: HOSPITAL | Age: 59
Discharge: HOME | End: 2024-12-18
Attending: FAMILY MEDICINE
Payer: MEDICARE

## 2024-12-18 VITALS
DIASTOLIC BLOOD PRESSURE: 77 MMHG | HEART RATE: 83 BPM | RESPIRATION RATE: 16 BRPM | SYSTOLIC BLOOD PRESSURE: 132 MMHG | TEMPERATURE: 98.2 F

## 2024-12-18 DIAGNOSIS — L97.112: Primary | ICD-10-CM

## 2024-12-18 DIAGNOSIS — L02.415 ABSCESS OF RIGHT KNEE: ICD-10-CM

## 2024-12-18 PROCEDURE — 99213 OFFICE O/P EST LOW 20 MIN: CPT

## 2024-12-18 RX ORDER — SILVER SULFADIAZINE 10 MG/G
CREAM TOPICAL ONCE
OUTPATIENT
Start: 2024-12-18 | End: 2024-12-18

## 2024-12-18 RX ORDER — MUPIROCIN 20 MG/G
OINTMENT TOPICAL ONCE
OUTPATIENT
Start: 2024-12-18 | End: 2024-12-18

## 2024-12-18 RX ORDER — LIDOCAINE 50 MG/G
OINTMENT TOPICAL ONCE
OUTPATIENT
Start: 2024-12-18 | End: 2024-12-18

## 2024-12-18 RX ORDER — GENTAMICIN SULFATE 1 MG/G
OINTMENT TOPICAL ONCE
OUTPATIENT
Start: 2024-12-18 | End: 2024-12-18

## 2024-12-18 RX ORDER — GINSENG 100 MG
CAPSULE ORAL ONCE
OUTPATIENT
Start: 2024-12-18 | End: 2024-12-18

## 2024-12-18 RX ORDER — SODIUM CHLOR/HYPOCHLOROUS ACID 0.033 %
SOLUTION, IRRIGATION IRRIGATION ONCE
OUTPATIENT
Start: 2024-12-18 | End: 2024-12-18

## 2024-12-18 RX ORDER — BETAMETHASONE DIPROPIONATE 0.5 MG/G
CREAM TOPICAL ONCE
OUTPATIENT
Start: 2024-12-18 | End: 2024-12-18

## 2024-12-18 RX ORDER — LIDOCAINE HYDROCHLORIDE 20 MG/ML
JELLY TOPICAL ONCE
OUTPATIENT
Start: 2024-12-18 | End: 2024-12-18

## 2024-12-18 RX ORDER — BACITRACIN ZINC AND POLYMYXIN B SULFATE 500; 1000 [USP'U]/G; [USP'U]/G
OINTMENT TOPICAL ONCE
OUTPATIENT
Start: 2024-12-18 | End: 2024-12-18

## 2024-12-18 RX ORDER — NEOMYCIN/BACITRACIN/POLYMYXINB 3.5-400-5K
OINTMENT (GRAM) TOPICAL ONCE
OUTPATIENT
Start: 2024-12-18 | End: 2024-12-18

## 2024-12-18 RX ORDER — TRIAMCINOLONE ACETONIDE 1 MG/G
OINTMENT TOPICAL ONCE
OUTPATIENT
Start: 2024-12-18 | End: 2024-12-18

## 2024-12-18 RX ORDER — LIDOCAINE 40 MG/G
CREAM TOPICAL ONCE
OUTPATIENT
Start: 2024-12-18 | End: 2024-12-18

## 2024-12-18 RX ORDER — CLOBETASOL PROPIONATE 0.5 MG/G
OINTMENT TOPICAL ONCE
OUTPATIENT
Start: 2024-12-18 | End: 2024-12-18

## 2024-12-18 RX ORDER — LIDOCAINE HYDROCHLORIDE 40 MG/ML
SOLUTION TOPICAL ONCE
OUTPATIENT
Start: 2024-12-18 | End: 2024-12-18

## 2024-12-18 NOTE — FLOWSHEET NOTE
12/18/24 0925   Anesthetic   Anesthetic 4% Lidocaine Cream   Right Leg Edema Point of Measurement   Compression Therapy Tubular elastic support bandage   Tubular Elastic Support Bandage Compression Pressure Medium   RLE Neurovascular Assessment   Capillary Refill Less than/Equal to 3 seconds   Color Appropriate for Ethnicity   Temperature Warm   RLE Sensation  Full sensation   R Pedal Pulse +3   Wound 07/03/24 Thigh Right;Lateral;Proximal cluster   Date First Assessed/Time First Assessed: 07/03/24 1028   Present on Original Admission: No  Wound Approximate Age at First Assessment (Weeks): 1 weeks  Location: Thigh  Wound Location Orientation: Right;Lateral;Proximal  Wound Description (Comments): ...   Wound Image    Wound Etiology Non-Healing Surgical   Dressing Status Intact   Wound Cleansed Cleansed with saline   Offloading for Diabetic Foot Ulcers Offloading not ordered   Wound Length (cm) 0.1 cm   Wound Width (cm) 0.1 cm   Wound Depth (cm) 0.1 cm   Wound Surface Area (cm^2) 0.01 cm^2   Change in Wound Size % (l*w) 99.42   Wound Volume (cm^3) 0.001 cm^3   Wound Healing % 99   Wound Assessment Epithelialization   Drainage Amount None (dry)   Odor None   Emely-wound Assessment Intact   Margins Attached edges   Wound Thickness Description not for Pressure Injury Partial thickness     /77   Pulse 83   Temp 98.2 °F (36.8 °C) (Temporal)   Resp 16

## 2024-12-18 NOTE — WOUND CARE
received two doses of Dalbavancin.  He denies any fever/chills.   Wound vac came out and opening is very small so RN cannot pack the wound any longer or can fit wound vac any longer.   Sinus tract is almost healed, not much drainage in last 2-3 days per patient.   .    Past Medical History:   Diagnosis Date    Abscess of right knee 09/11/2024    Arthritis     Atrial fib/flutter, transient (HCC)     Cellulitis     Chronic pain     Chronic ulcer of thigh, right, with fat layer exposed (HCC) 09/11/2024    COVID-19 vaccine series completed 4/2/21, 5/7/21    MODERNA    Diabetes (HCC)     Elevated hemoglobin A1c 06/07/2021    6.4    Hyperlipidemia     Hypertension     Hypertension complicating diabetes (HCC)      Past Surgical History:   Procedure Laterality Date    BUNIONECTOMY Right 1985    BUNIONECTOMY Left 1983    CT BIOPSY RENAL  06/03/2024    CT BIOPSY RENAL 6/3/2024 [facility] RAD CT    FOOT SURGERY Right 1985    IR TUNNELED CVC PLACE WO SQ PORT/PUMP > 5 YEARS  07/18/2024    IR TUNNELED CATHETER PLACEMENT GREATER THAN 5 YEARS 7/18/2024 Liseth Dean PA [facility] RAD ANGIO IR    TOTAL KNEE ARTHROPLASTY Right 06/15/2021    mary alice implant - performed by Dr. Timi Zarate at [facility] main OR    US SOFT TISSUE ABSCESS DRAINAGE PERC  07/10/2024    US DRAIN SOFT TISSUE ABSCESS 7/10/2024 Lisandra Mccauley PA-C [facility] RAD US      Family History   Problem Relation Age of Onset    Heart Disease Father     No Known Problems Sister     Diabetes Mother     Anesth Problems Neg Hx     No Known Problems Brother      Social History     Socioeconomic History    Marital status: Single     Spouse name: None    Number of children: None    Years of education: None    Highest education level: None   Tobacco Use    Smoking status: Never    Smokeless tobacco: Never   Vaping Use    Vaping status: Never Used   Substance and Sexual Activity    Alcohol use: Yes     Alcohol/week: 2.0 standard drinks of alcohol    Drug use: Never     Social Determinants of Health

## 2024-12-18 NOTE — PATIENT INSTRUCTIONS
Discharge Instructions for  [facility]  [address] [address]  Phone: [phone] Fax: [phone]    NAME:  Javier Ugalde  YOB: 1965  MEDICAL RECORD NUMBER:  [id number]  DATE:  December 18, 2024    WOUND CARE ORDERS:  Right lateral thigh wounds-Cleanse with baby shampoo. Rinse and pat dry.Cover wound with silicone border foam. Apply double layer tubigrip, size G, to lower leg.            Activity:  [x] Elevate leg(s) above the level of the heart when sitting.  [x] Avoid prolonged standing in one place.   [x] Do no get dressing/wrap wet.       Dietary:  [] Diet as tolerated      [x] Diabetic Diet            [] Increase Protein: examples (Meat, cheese, eggs, greek yogurt, fish, nuts)          [] Teo Therapeutic Nutrition Powder  [] Other:  [] Dial a Dietician : Call [employer] at [phone] enter code (249) when prompted. M-F 9am-5pm EST.      Return Appointment:  [x] Return Appointment: Dr. Bonny Uribe in 3 weeks.  [] Nurse Visit  [] Ordered tests:          Wound Care Center Information: Should you experience any significant changes in your wound(s) or have questions about your wound care, please contact the [facility] at MONDAY - FRIDAY 8:00 am - 4:30.  If you need help with your wound outside these hours and cannot wait until we are again available, contact your PCP or go to the hospital emergency room.     PLEASE NOTE: IF YOU ARE UNABLE TO OBTAIN WOUND SUPPLIES, CONTINUE TO USE THE SUPPLIES YOU HAVE AVAILABLE UNTIL YOU ARE ABLE TO REACH US. IT IS MOST IMPORTANT TO KEEP THE WOUND COVERED AT ALL TIMES.     Physician Signature:_______________________    Date: ___________ Time:  ____________

## 2024-12-18 NOTE — FLOWSHEET NOTE
12/18/24 0944   Wound 07/03/24 Thigh Right;Lateral;Proximal cluster   Date First Assessed/Time First Assessed: 07/03/24 1028   Present on Original Admission: No  Wound Approximate Age at First Assessment (Weeks): 1 weeks  Location: Thigh  Wound Location Orientation: Right;Lateral;Proximal  Wound Description (Comments): ...   Dressing Status New dressing applied   Dressing/Treatment Silicone border;Tubular bandage

## 2025-01-03 PROBLEM — E11.69 HYPERLIPIDEMIA ASSOCIATED WITH TYPE 2 DIABETES MELLITUS (HCC): Status: ACTIVE | Noted: 2025-01-03

## 2025-01-03 PROBLEM — E78.5 HYPERLIPIDEMIA ASSOCIATED WITH TYPE 2 DIABETES MELLITUS (HCC): Status: ACTIVE | Noted: 2025-01-03

## 2025-02-12 ENCOUNTER — APPOINTMENT (OUTPATIENT)
Facility: HOSPITAL | Age: 60
End: 2025-02-12
Payer: MEDICARE

## 2025-02-12 ENCOUNTER — HOSPITAL ENCOUNTER (EMERGENCY)
Facility: HOSPITAL | Age: 60
Discharge: HOME | End: 2025-02-12
Attending: EMERGENCY MEDICINE
Payer: MEDICARE

## 2025-02-12 VITALS
RESPIRATION RATE: 22 BRPM | HEART RATE: 82 BPM | BODY MASS INDEX: 37.19 KG/M2 | OXYGEN SATURATION: 94 % | HEIGHT: 77 IN | DIASTOLIC BLOOD PRESSURE: 84 MMHG | SYSTOLIC BLOOD PRESSURE: 156 MMHG | TEMPERATURE: 97.5 F | WEIGHT: 315 LBS

## 2025-02-12 DIAGNOSIS — N18.9 CHRONIC KIDNEY DISEASE, UNSPECIFIED CKD STAGE: ICD-10-CM

## 2025-02-12 DIAGNOSIS — R06.09 DYSPNEA ON EXERTION: Primary | ICD-10-CM

## 2025-02-12 LAB
ALBUMIN SERPL-MCNC: 3.8 G/DL (ref 3.5–5)
ALBUMIN/GLOB SERPL: 0.8 (ref 1.1–2.2)
ALP SERPL-CCNC: 112 U/L (ref 45–117)
ALT SERPL-CCNC: 82 U/L (ref 12–78)
ANION GAP SERPL CALC-SCNC: 7 MMOL/L (ref 2–12)
AST SERPL-CCNC: 79 U/L (ref 15–37)
BASOPHILS # BLD: 0.07 K/UL (ref 0–0.1)
BASOPHILS NFR BLD: 1.1 % (ref 0–1)
BILIRUB SERPL-MCNC: 0.7 MG/DL (ref 0.2–1)
BUN SERPL-MCNC: 21 MG/DL (ref 6–20)
BUN/CREAT SERPL: 13 (ref 12–20)
CALCIUM SERPL-MCNC: 9.8 MG/DL (ref 8.5–10.1)
CHLORIDE SERPL-SCNC: 103 MMOL/L (ref 97–108)
CO2 SERPL-SCNC: 24 MMOL/L (ref 21–32)
COMMENT:: NORMAL
CREAT SERPL-MCNC: 1.61 MG/DL (ref 0.7–1.3)
DIFFERENTIAL METHOD BLD: ABNORMAL
EKG ATRIAL RATE: 82 BPM
EKG DIAGNOSIS: NORMAL
EKG P AXIS: 55 DEGREES
EKG P-R INTERVAL: 278 MS
EKG Q-T INTERVAL: 472 MS
EKG QRS DURATION: 192 MS
EKG QTC CALCULATION (BAZETT): 551 MS
EKG R AXIS: 37 DEGREES
EKG T AXIS: 93 DEGREES
EKG VENTRICULAR RATE: 82 BPM
EOSINOPHIL # BLD: 0.27 K/UL (ref 0–0.4)
EOSINOPHIL NFR BLD: 4.4 % (ref 0–7)
ERYTHROCYTE [DISTWIDTH] IN BLOOD BY AUTOMATED COUNT: 15.9 % (ref 11.5–14.5)
FLUAV RNA SPEC QL NAA+PROBE: NOT DETECTED
FLUBV RNA SPEC QL NAA+PROBE: NOT DETECTED
GLOBULIN SER CALC-MCNC: 4.7 G/DL (ref 2–4)
GLUCOSE SERPL-MCNC: 115 MG/DL (ref 65–100)
HCT VFR BLD AUTO: 42.6 % (ref 36.6–50.3)
HGB BLD-MCNC: 13.9 G/DL (ref 12.1–17)
IMM GRANULOCYTES # BLD AUTO: 0.05 K/UL (ref 0–0.04)
IMM GRANULOCYTES NFR BLD AUTO: 0.8 % (ref 0–0.5)
INR PPP: 2.6 (ref 0.9–1.1)
LYMPHOCYTES # BLD: 1.4 K/UL (ref 0.8–3.5)
LYMPHOCYTES NFR BLD: 22.6 % (ref 12–49)
MCH RBC QN AUTO: 29.1 PG (ref 26–34)
MCHC RBC AUTO-ENTMCNC: 32.6 G/DL (ref 30–36.5)
MCV RBC AUTO: 89.3 FL (ref 80–99)
MONOCYTES # BLD: 0.8 K/UL (ref 0–1)
MONOCYTES NFR BLD: 12.9 % (ref 5–13)
NEUTS SEG # BLD: 3.6 K/UL (ref 1.8–8)
NEUTS SEG NFR BLD: 58.2 % (ref 32–75)
NRBC # BLD: 0 K/UL (ref 0–0.01)
NRBC BLD-RTO: 0 PER 100 WBC
NT PRO BNP: 187 PG/ML
PLATELET # BLD AUTO: 285 K/UL (ref 150–400)
PMV BLD AUTO: 9.7 FL (ref 8.9–12.9)
POTASSIUM SERPL-SCNC: 3.8 MMOL/L (ref 3.5–5.1)
PROT SERPL-MCNC: 8.5 G/DL (ref 6.4–8.2)
PROTHROMBIN TIME: 26 SEC (ref 9.2–11.2)
RBC # BLD AUTO: 4.77 M/UL (ref 4.1–5.7)
SARS-COV-2 RNA RESP QL NAA+PROBE: NOT DETECTED
SODIUM SERPL-SCNC: 134 MMOL/L (ref 136–145)
SOURCE: NORMAL
SPECIMEN HOLD: NORMAL
TROPONIN I SERPL HS-MCNC: 16 NG/L (ref 0–76)
TROPONIN I SERPL HS-MCNC: 17 NG/L (ref 0–76)
WBC # BLD AUTO: 6.2 K/UL (ref 4.1–11.1)

## 2025-02-12 PROCEDURE — 84484 ASSAY OF TROPONIN QUANT: CPT

## 2025-02-12 PROCEDURE — 6360000004 HC RX CONTRAST MEDICATION: Performed by: INTERNAL MEDICINE

## 2025-02-12 PROCEDURE — 83880 ASSAY OF NATRIURETIC PEPTIDE: CPT

## 2025-02-12 PROCEDURE — 36415 COLL VENOUS BLD VENIPUNCTURE: CPT

## 2025-02-12 PROCEDURE — 87636 SARSCOV2 & INF A&B AMP PRB: CPT

## 2025-02-12 PROCEDURE — 80053 COMPREHEN METABOLIC PANEL: CPT

## 2025-02-12 PROCEDURE — 93005 ELECTROCARDIOGRAM TRACING: CPT | Performed by: EMERGENCY MEDICINE

## 2025-02-12 PROCEDURE — 71046 X-RAY EXAM CHEST 2 VIEWS: CPT

## 2025-02-12 PROCEDURE — 71275 CT ANGIOGRAPHY CHEST: CPT

## 2025-02-12 PROCEDURE — 85610 PROTHROMBIN TIME: CPT

## 2025-02-12 PROCEDURE — 99285 EMERGENCY DEPT VISIT HI MDM: CPT

## 2025-02-12 PROCEDURE — 85025 COMPLETE CBC W/AUTO DIFF WBC: CPT

## 2025-02-12 RX ORDER — IOPAMIDOL 755 MG/ML
100 INJECTION, SOLUTION INTRAVASCULAR
Status: COMPLETED | OUTPATIENT
Start: 2025-02-12 | End: 2025-02-12

## 2025-02-12 RX ADMIN — IOPAMIDOL 100 ML: 755 INJECTION, SOLUTION INTRAVENOUS at 16:42

## 2025-02-12 ASSESSMENT — PAIN SCALES - GENERAL: PAINLEVEL_OUTOF10: 0

## 2025-02-12 ASSESSMENT — ENCOUNTER SYMPTOMS
VOMITING: 0
ABDOMINAL PAIN: 0
SHORTNESS OF BREATH: 1
BLOOD IN STOOL: 0
ANAL BLEEDING: 0
ABDOMINAL DISTENTION: 0
DIARRHEA: 0
NAUSEA: 0
COUGH: 0
WHEEZING: 0

## 2025-02-12 ASSESSMENT — PAIN - FUNCTIONAL ASSESSMENT: PAIN_FUNCTIONAL_ASSESSMENT: NONE - DENIES PAIN

## 2025-02-12 NOTE — ED TRIAGE NOTES
Pt reports weakness that started earlier this week. Pt reports SOB with exertion. Pt denies blood in stool but reports gastric ulcer. Pt wears life vest (but has not been wearing it consistently) Pt is scheduled for defibrillator. Pt has pacemaker. Pt reports some dizziness.

## 2025-02-12 NOTE — ED PROVIDER NOTES
[facility] EMERGENCY DEPARTMENT  EMERGENCY DEPARTMENT ENCOUNTER      Pt Name: Javier Ugalde  MRN: [id number]  Birthdate 1965  Date of evaluation: 2/12/2025  Provider: Javier Menchaca MD    CHIEF COMPLAINT       Chief Complaint   Patient presents with    Fatigue    Shortness of Breath         HISTORY OF PRESENT ILLNESS   (Location/Symptom, Timing/Onset, Context/Setting, Quality, Duration, Modifying Factors, Severity)  Note limiting factors.   59M w/ hx DM, CKD, AVR on warfarin, OA, AF/flutter, HTN, PUD p/w 1wk SOB. Pt reports 1wk exertional SOB improved w/ rest. Reports feeling fatigue and dyspnea walking around his house now. Has PPM and currently wearing Life Vest w/ plan to have ICD placement next week. No chest or abd pain. No dizzienss or syncope. Also c/o cough and congestion. No F/C, N/V, rectal bleeding. Has chronic but unchanged BLE swelling. No hx of smoking or asthma/COPD.            Review of External Medical Records:     Nursing Notes were reviewed.    REVIEW OF SYSTEMS    (2-9 systems for level 4, 10 or more for level 5)     Review of Systems   Constitutional:  Positive for fatigue. Negative for diaphoresis and fever.   HENT:  Negative for nosebleeds.    Eyes:  Negative for visual disturbance.   Respiratory:  Positive for shortness of breath. Negative for cough and wheezing.    Cardiovascular:  Negative for chest pain, palpitations and leg swelling.   Gastrointestinal:  Negative for abdominal distention, abdominal pain, anal bleeding, blood in stool, diarrhea, nausea and vomiting.   Endocrine: Negative for polyuria.   Genitourinary:  Negative for difficulty urinating, dysuria and hematuria.   Musculoskeletal:  Negative for joint swelling.   Skin:  Negative for wound.   Neurological:  Negative for dizziness, syncope and light-headedness.   Hematological:  Does not bruise/bleed easily.   Psychiatric/Behavioral:  Negative for confusion.        Except as noted above the remainder of the review of

## 2025-02-12 NOTE — ED NOTES
1:55 PM  I have evaluated the patient as the Provider in Rapid Medical Evaluation (RME). I have reviewed his vital signs and the triage nurse assessment. I have talked with the patient and any available family and advised that I am the provider in triage and have ordered the appropriate study to initiate their work up based on the clinical presentation during my assessment. I have advised that the patient will be accommodated in the Main ED as soon as possible. I have also requested to contact the triage nurse or myself immediately if the patient experiences any changes in their condition during this brief waiting period.  KG Gannon    Javier Ugalde is a 59 y.o. male with history of CKD, hypertension, HLD, chronic lower extremity ulcer, aortic valve placement, pacemaker who presents to the emergency department complaining of weakness since beginning of this week.  Ports having ongoing congestion, shortness of breath, lightheadedness.  He reports feeling weak when standing.  He thought he had a gastric ulcer has been checking his stool however reports it has been nonbloody and been brown.  He denies any cough, fever, chills, nausea, vomiting.  He denies being any pain at this time.  He reports he was post to go to wound care today reports some drainage from his ulcer but denies any redness or warmth.         Mayra Wilson PA  02/12/25 [id number]

## 2025-02-26 ENCOUNTER — HOSPITAL ENCOUNTER (OUTPATIENT)
Facility: HOSPITAL | Age: 60
Discharge: HOME | End: 2025-02-26
Attending: FAMILY MEDICINE
Payer: MEDICARE

## 2025-02-26 VITALS
TEMPERATURE: 97.7 F | SYSTOLIC BLOOD PRESSURE: 135 MMHG | DIASTOLIC BLOOD PRESSURE: 78 MMHG | RESPIRATION RATE: 20 BRPM | HEART RATE: 84 BPM

## 2025-02-26 DIAGNOSIS — L02.415 ABSCESS OF RIGHT KNEE: ICD-10-CM

## 2025-02-26 DIAGNOSIS — L98.8 DRAINING CUTANEOUS SINUS TRACT: ICD-10-CM

## 2025-02-26 DIAGNOSIS — L97.112: Primary | ICD-10-CM

## 2025-02-26 PROCEDURE — 99213 OFFICE O/P EST LOW 20 MIN: CPT

## 2025-02-26 RX ORDER — LIDOCAINE HYDROCHLORIDE 20 MG/ML
JELLY TOPICAL ONCE
OUTPATIENT
Start: 2025-02-26 | End: 2025-02-26

## 2025-02-26 RX ORDER — TRIAMCINOLONE ACETONIDE 1 MG/G
OINTMENT TOPICAL ONCE
OUTPATIENT
Start: 2025-02-26 | End: 2025-02-26

## 2025-02-26 RX ORDER — MUPIROCIN 20 MG/G
OINTMENT TOPICAL ONCE
OUTPATIENT
Start: 2025-02-26 | End: 2025-02-26

## 2025-02-26 RX ORDER — SODIUM CHLOR/HYPOCHLOROUS ACID 0.033 %
SOLUTION, IRRIGATION IRRIGATION ONCE
OUTPATIENT
Start: 2025-02-26 | End: 2025-02-26

## 2025-02-26 RX ORDER — LIDOCAINE 40 MG/G
CREAM TOPICAL ONCE
OUTPATIENT
Start: 2025-02-26 | End: 2025-02-26

## 2025-02-26 RX ORDER — NEOMYCIN/BACITRACIN/POLYMYXINB 3.5-400-5K
OINTMENT (GRAM) TOPICAL ONCE
OUTPATIENT
Start: 2025-02-26 | End: 2025-02-26

## 2025-02-26 RX ORDER — GENTAMICIN SULFATE 1 MG/G
OINTMENT TOPICAL ONCE
OUTPATIENT
Start: 2025-02-26 | End: 2025-02-26

## 2025-02-26 RX ORDER — LIDOCAINE 50 MG/G
OINTMENT TOPICAL ONCE
OUTPATIENT
Start: 2025-02-26 | End: 2025-02-26

## 2025-02-26 RX ORDER — LIDOCAINE HYDROCHLORIDE 40 MG/ML
SOLUTION TOPICAL ONCE
OUTPATIENT
Start: 2025-02-26 | End: 2025-02-26

## 2025-02-26 RX ORDER — GINSENG 100 MG
CAPSULE ORAL ONCE
OUTPATIENT
Start: 2025-02-26 | End: 2025-02-26

## 2025-02-26 RX ORDER — CLOBETASOL PROPIONATE 0.5 MG/G
OINTMENT TOPICAL ONCE
OUTPATIENT
Start: 2025-02-26 | End: 2025-02-26

## 2025-02-26 RX ORDER — SILVER SULFADIAZINE 10 MG/G
CREAM TOPICAL ONCE
OUTPATIENT
Start: 2025-02-26 | End: 2025-02-26

## 2025-02-26 RX ORDER — BACITRACIN ZINC AND POLYMYXIN B SULFATE 500; 1000 [USP'U]/G; [USP'U]/G
OINTMENT TOPICAL ONCE
OUTPATIENT
Start: 2025-02-26 | End: 2025-02-26

## 2025-02-26 RX ORDER — BETAMETHASONE DIPROPIONATE 0.5 MG/G
CREAM TOPICAL ONCE
OUTPATIENT
Start: 2025-02-26 | End: 2025-02-26

## 2025-02-26 NOTE — PATIENT INSTRUCTIONS
Discharge Instructions for  [facility]  [address] [address]  Phone: [phone] Fax: [phone]    NAME:  Javier Ugalde  YOB: 1965  MEDICAL RECORD NUMBER:  [id number]  DATE:  February 26, 2025    WOUND CARE ORDERS:  Right lateral thigh wounds-Cleanse with baby shampoo. Rinse and pat dry. Pack wound with collagen Ag (Diandra). Apply mastisol over wound and cover with silicone border foam. Continue to wear compression socks daily.     Activity:  [x] Elevate leg(s) above the level of the heart when sitting.  [x] Avoid prolonged standing in one place.   [x] Do no get dressing/wrap wet.       Dietary:  [] Diet as tolerated      [x] Diabetic Diet            [] Increase Protein: examples (Meat, cheese, eggs, greek yogurt, fish, nuts)          [] Teo Therapeutic Nutrition Powder  [] Other:  [] Dial a Dietician : Call [employer] at [phone] enter code (249) when prompted. M-F 9am-5pm EST.      Return Appointment:  [x] Return Appointment: With Dr. Bonny Uribe in 3 Week(s)  [] Nurse Visit :   [] Ordered tests:            Wound Care Center Information: Should you experience any significant changes in your wound(s) or have questions about your wound care, please contact the [facility] MONDAY - THURSDAY 8:00AM - 4:00PM and FRIDAY 8:00AM - NOON at the number listed above. If you need help with your wound outside these hours and cannot wait until we are again available, contact your PCP or go to the hospital emergency room.       PLEASE NOTE: IF YOU ARE UNABLE TO OBTAIN WOUND SUPPLIES, CONTINUE TO USE THE SUPPLIES YOU HAVE AVAILABLE UNTIL YOU ARE ABLE TO REACH US. IT IS MOST IMPORTANT TO KEEP THE WOUND COVERED AT ALL TIMES.     Physician Signature:_______________________    Date: ___________ Time:  ____________

## 2025-02-26 NOTE — WOUND CARE
Wound Care follow-up        Assessment:     59 y.o. 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  Pack wound with collagen Ag (Diandra). Apply mastisol over wound and cover with silicone border foam. Continue to wear compression socks daily.      S/p long IV Abx course for right femur osteomyelitis, he has received two rounds of Dalbavancin 2 dose regimen at Infusion center.     He has life west currently and OK to get permanent pacemaker.    Keep leg elevated when sitting down.    Follow up in 3 weeks.  Patient understood and agrees with plan. Questions answered.        History of Present Illness:      Javier Ugalde is a 59 y.o.  male for evaluation of rt pot thigh sinus tract.    Patient had a long draining rt post thigh abscess and chronic sinus tract infection with Staph strain last year.  Patient had superficial wound started on right ant lateral knee area when he scratched that area. With swelling of that leg, wound took long time to heal and worsened. He was treated with PO Abx but due to concern for underlying rt prosthesis infection, he was admitted in [facility]. No Sx intervention done. He had Basilio cath placed due to hx CKD. Wound Cx with mixed skin arabella. He was treated with long IV daily infusion at Infusion center with Daptomycin and Ceftriaxone. He followed with ID, , who was concerned about deeper rt prosthesis infection and recommend OrthSx eval for possible debridement and even explant of prosthesis as alex IV Abx course was not able to heal post thigh wound. He underwent rt knee aspiration and synovial fluid Cx were negative.   Patient's BASILIO cath was removed.  Wound Cx done in wound care center last year, showed Staphylococcus species. He received two rounds of two dose regimen of Dalbavancin.  Wound vac did

## 2025-02-26 NOTE — FLOWSHEET NOTE
02/26/25 0931   Right Leg Edema Point of Measurement   Compression Therapy   (no compression applied)   RLE Neurovascular Assessment   Capillary Refill Less than/Equal to 3 seconds   Color Appropriate for Ethnicity   Temperature Warm   RLE Sensation  Full sensation   R Pedal Pulse +2   Wound 07/03/24 Thigh Right;Lateral;Proximal cluster   Date First Assessed/Time First Assessed: 07/03/24 1028   Present on Original Admission: No  Wound Approximate Age at First Assessment (Weeks): 1 weeks  Location: Thigh  Wound Location Orientation: Right;Lateral;Proximal  Wound Description (Comments): ...   Wound Image    Wound Etiology Non-Healing Surgical   Dressing Status   (janet)   Wound Cleansed Irrigated with saline   Offloading for Diabetic Foot Ulcers Offloading not required   Wound Length (cm) 0.4 cm   Wound Width (cm) 0.4 cm   Wound Depth (cm) 2 cm   Wound Surface Area (cm^2) 0.16 cm^2   Change in Wound Size % (l*w) 90.64   Wound Volume (cm^3) 0.32 cm^3   Wound Healing % -87   Wound Assessment [place]/red;Slough   Drainage Amount Moderate (25-50%)   Drainage Description Thick;Yellow   Odor None   Emely-wound Assessment Hyperpigmented   Margins Defined edges   Wound Thickness Description not for Pressure Injury Full thickness   Pain Assessment   Pain Assessment None - Denies Pain     /78   Pulse 84   Temp 97.7 °F (36.5 °C) (Temporal)   Resp 20      Banner Transposition Flap Text: The defect edges were debeveled with a #15 scalpel blade.  Given the location of the defect and the proximity to free margins a Banner transposition flap was deemed most appropriate.  Using a sterile surgical marker, an appropriate flap drawn around the defect. The area thus outlined was incised deep to adipose tissue with a #15 scalpel blade.  The skin margins were undermined to an appropriate distance in all directions utilizing iris scissors.

## 2025-02-26 NOTE — FLOWSHEET NOTE
02/26/25 1001   Wound 07/03/24 Thigh Right;Lateral;Proximal cluster   Date First Assessed/Time First Assessed: 07/03/24 1028   Present on Original Admission: No  Wound Approximate Age at First Assessment (Weeks): 1 weeks  Location: Thigh  Wound Location Orientation: Right;Lateral;Proximal  Wound Description (Comments): ...   Dressing Status New dressing applied   Dressing/Treatment   (peace, masticol, border foam)   Offloading for Diabetic Foot Ulcers Offloading not required

## 2025-05-14 NOTE — CONSENT
[facility]  [address]  [address]  [place] [id number]  Dept: [phone]     Massage  Date:  2025    Patient Name:  Swapnil De La Garaz                                                                 :  1964                                                                                Gender:   male                                                                             MRN: [id number]                                                                                 Visit #:  n/a  Evaluation Date: n/a      Provider:  Ruth Kaur PTA   Therapist of Record/Supervising: Radha Mcneill                                                   Referring Provider:     Kevin Del Rosario MD                       Medical Diagnoses:                                  Visit Diagnoses         Codes      Abdominal pain, generalized    -  Primary R10.84      Bilateral low back pain, unspecified chronicity, unspecified whether sciatica present     M54.50                    Patient Status:  Pt is present with bilateral lower abdominal pain during today's session.    Provider Interactions with Patient During Visit:    [id number]  25 11:45 AM EST review of systems completed     Treatment Time:    Time In:10:05am            Time Out: 10:37am    Electronically signed by:  Ruth Kaur PTA     Informed Consent for Blood Component Transfusion Note    I have discussed with the patient the rationale for blood component transfusion; its benefits in treating or preventing fatigue, organ damage, or death; and its risk which includes mild transfusion reactions, rare risk of blood borne infection, or more serious but rare reactions. I have discussed the alternatives to transfusion, including the risk and consequences of not receiving transfusion. The patient had an opportunity to ask questions and had agreed to proceed with transfusion of blood components.    Electronically signed by JIM Jasso NP on 5/22/24 at 10:24 AM EDT

## (undated) DEVICE — SUTURE VCRL SZ 2 L54IN ABSRB UD L65MM TP-1 1/2 CIR J880T

## (undated) DEVICE — T4 HOOD

## (undated) DEVICE — HANDPIECE SET WITH BONE CLEANING TIP AND SUCTION TUBE: Brand: INTERPULSE

## (undated) DEVICE — STRAP,POSITIONING,KNEE/BODY,FOAM,4X60": Brand: MEDLINE

## (undated) DEVICE — BLADE SAW W0.49XL3.15IN THK0.047IN CUT THK0.047IN REPL SAG

## (undated) DEVICE — ZIMMER® STERILE DISPOSABLE TOURNIQUET CUFF WITH PLC, DUAL PORT, SINGLE BLADDER, 34 IN. (86 CM)

## (undated) DEVICE — 4-PORT MANIFOLD: Brand: NEPTUNE 2

## (undated) DEVICE — SUTURE VCRL SZ 2-0 L36IN ABSRB UD L40MM CT 1/2 CIR J957H

## (undated) DEVICE — DRAPE,EXTREMITY,89X128,STERILE: Brand: MEDLINE

## (undated) DEVICE — STAPLER SKIN LEG L3.9MM DIA0.53MM WIDE ROT HD FOR WND CLSR

## (undated) DEVICE — STERILE POLYISOPRENE POWDER-FREE SURGICAL GLOVES WITH EMOLLIENT COATING: Brand: PROTEXIS

## (undated) DEVICE — BLADE SAW W073XL276IN THK0031IN CUT THK0036IN REPL SAG

## (undated) DEVICE — TOTAL TRAY, 16FR 10ML SIL FOLEY, URN: Brand: MEDLINE

## (undated) DEVICE — PADDING CST 6IN STERILE --

## (undated) DEVICE — CONTAINER,SPECIMEN,3OZ,OR STRL: Brand: MEDLINE

## (undated) DEVICE — BANDAGE COMPR M W6INXL10YD WHT BGE VELC E MTRX HK AND LOOP

## (undated) DEVICE — SOL IRR STRL H2O 1000ML BTL --

## (undated) DEVICE — TAPE,CLOTH/SILK,CURAD,3"X10YD,LF,40/CS: Brand: CURAD

## (undated) DEVICE — DRSG POSTOP PRMSL AG 3.5X14IN

## (undated) DEVICE — NEEDLE HYPO 18GA L1.5IN PNK S STL HUB POLYPR SHLD REG BVL

## (undated) DEVICE — ERASECAUTI INTERMIT TRAY: Brand: MEDLINE INDUSTRIES, INC.

## (undated) DEVICE — INFECTION CONTROL KIT SYS

## (undated) DEVICE — SUTURE VCRL SZ 0 L36IN ABSRB VLT L40MM CT 1/2 CIR J358H

## (undated) DEVICE — HEADLESS TROCHAR PIN 75MM: Brand: ZUK

## (undated) DEVICE — SYR 20ML LL STRL LF --

## (undated) DEVICE — STERILE POLYISOPRENE POWDER-FREE SURGICAL GLOVES: Brand: PROTEXIS

## (undated) DEVICE — 3M™ IOBAN™ 2 ANTIMICROBIAL INCISE DRAPE 6651EZ: Brand: IOBAN™ 2

## (undated) DEVICE — BLADE SAW W083XL354IN THK0047IN CUT THK0047IN SAG FLR

## (undated) DEVICE — SUTURE STRATAFIX SYMMETRIC PDS + SZ 1 L18IN ABSRB VLT L48MM SXPP1A400

## (undated) DEVICE — SPONGE LAP 18X18IN STRL -- 5/PK

## (undated) DEVICE — PADDING CAST SPEC 6INX4YD COT --

## (undated) DEVICE — SOL IRR SOD CL 0.9% 3000ML --

## (undated) DEVICE — SCRUB DRY SURG EZ SCRUB BRUSH PREOPERATIVE GRN

## (undated) DEVICE — SOLUTION SURG PREP 26 CC PURPREP

## (undated) DEVICE — REM POLYHESIVE ADULT PATIENT RETURN ELECTRODE: Brand: VALLEYLAB

## (undated) DEVICE — BOWL BNE CEM MIX SPAT CURET SMARTMIX CTS

## (undated) DEVICE — Device